# Patient Record
Sex: FEMALE | Race: WHITE | Employment: UNEMPLOYED | URBAN - METROPOLITAN AREA
[De-identification: names, ages, dates, MRNs, and addresses within clinical notes are randomized per-mention and may not be internally consistent; named-entity substitution may affect disease eponyms.]

---

## 2017-01-04 ENCOUNTER — TELEPHONE (OUTPATIENT)
Dept: FAMILY MEDICINE CLINIC | Facility: CLINIC | Age: 52
End: 2017-01-04

## 2017-01-04 NOTE — TELEPHONE ENCOUNTER
Went to [facility] on Monday 1/2/17 and was diagnosed with double ear infection, and there was crackling heard in lower left lung. Patient was given levofloxacin 750mg 1 QD and mucinex. Is doing albuterol nebs every 6 hours at home.      Patient has an appt to se

## 2017-01-04 NOTE — TELEPHONE ENCOUNTER
Future Appointments  Date Time Provider Batsheva Jade   1/5/2017 1:00 PM Lizandro Andrew PA-C [facility] [address] [facility] [address]   1/20/2017 11:40 AM Arlin Patton MD [facility] [address] [facility] [address]   2/3/2017 8:00 AM Edita Lopez  MED CTR-2   2/3/2017 8:45 AM Kathryn

## 2017-01-05 ENCOUNTER — OFFICE VISIT (OUTPATIENT)
Dept: FAMILY MEDICINE CLINIC | Facility: CLINIC | Age: 52
End: 2017-01-05

## 2017-01-05 ENCOUNTER — HOSPITAL ENCOUNTER (OUTPATIENT)
Dept: GENERAL RADIOLOGY | Age: 52
Discharge: HOME | End: 2017-01-05
Attending: PHYSICIAN ASSISTANT
Payer: COMMERCIAL

## 2017-01-05 VITALS
HEART RATE: 77 BPM | HEIGHT: 63 IN | TEMPERATURE: 97 F | WEIGHT: 197 LBS | SYSTOLIC BLOOD PRESSURE: 146 MMHG | RESPIRATION RATE: 13 BRPM | OXYGEN SATURATION: 96 % | BODY MASS INDEX: 34.91 KG/M2 | DIASTOLIC BLOOD PRESSURE: 86 MMHG

## 2017-01-05 DIAGNOSIS — R05.9 COUGH: Primary | ICD-10-CM

## 2017-01-05 DIAGNOSIS — R05.9 COUGH: ICD-10-CM

## 2017-01-05 DIAGNOSIS — J01.00 ACUTE NON-RECURRENT MAXILLARY SINUSITIS: ICD-10-CM

## 2017-01-05 PROCEDURE — 71020 XR CHEST PA + LAT CHEST (CPT=71020): CPT

## 2017-01-05 PROCEDURE — 99213 OFFICE O/P EST LOW 20 MIN: CPT | Performed by: PHYSICIAN ASSISTANT

## 2017-01-05 RX ORDER — METHYLPREDNISOLONE 4 MG/1
TABLET ORAL
Qty: 1 KIT | Refills: 0 | Status: SHIPPED | OUTPATIENT
Start: 2017-01-05 | End: 2017-01-13

## 2017-01-05 RX ORDER — LEVOFLOXACIN 750 MG/1
750 TABLET ORAL DAILY
COMMUNITY
End: 2017-01-13

## 2017-01-05 NOTE — PROGRESS NOTES
[facility] Internal Medicine Progress Note    CC:  Patient presents with:  Convenient Care F/U: see at [facility] immidiate care 1/2/17      HPI:   HPI  Pt went to [facility] quick care on Monday   Was diagnosed with double ear infection and sinus infection  S capsule (20 mg total) by mouth daily. Disp: 14 capsule Rfl: 0   Vitamin B-12 500 MCG Oral Tab Take 1 tablet (500 mcg total) by mouth daily. Disp: 10 tablet Rfl: 0   Linaclotide (LINZESS) 145 MCG Oral Cap Take 145 mcg by mouth daily.  Disp:  Rfl:    furosemi and well-developed, well-nourished, and in no distress. HENT:   Mouth/Throat: Oropharynx is clear and moist. No oropharyngeal exudate.    + serous fluid behind TMs bilaterally  Clear PND  Enlarged turbinates   Eyes: Pupils are equal, round, and reactive t Asthma     Obesity (BMI 30-39. 9)     Other malaise and fatigue     Malignant neoplasm of thyroid gland (HCC)     Irritable bowel syndrome     Allergic rhinitis     Hypoglycemia     Other and unspecified coagulation defects ([address]     Anxiety     Hypokalemia

## 2017-01-05 NOTE — PATIENT INSTRUCTIONS
Thank you for choosing Kahlil Guillory PA-C at [place] [address]  To Do: [address]  1. Pt to start medrol dose pack  2. Get chest xray  3.  Follow-up in 1 week, sooner if problems    • Please signup for MY CHART, which is electronic access to yo to improve your quality of life.     Referrals, and Radiology Information:    If your insurance requires a referral to a specialist, please allow 5 business days to process your referral request.    If Laura Ott PA-C orders a CT or MRI, it may take

## 2017-01-13 ENCOUNTER — TELEPHONE (OUTPATIENT)
Dept: FAMILY MEDICINE CLINIC | Facility: CLINIC | Age: 52
End: 2017-01-13

## 2017-01-13 ENCOUNTER — OFFICE VISIT (OUTPATIENT)
Dept: FAMILY MEDICINE CLINIC | Facility: CLINIC | Age: 52
End: 2017-01-13

## 2017-01-13 VITALS
TEMPERATURE: 98 F | RESPIRATION RATE: 16 BRPM | DIASTOLIC BLOOD PRESSURE: 70 MMHG | SYSTOLIC BLOOD PRESSURE: 110 MMHG | HEART RATE: 80 BPM

## 2017-01-13 DIAGNOSIS — H65.23 BILATERAL CHRONIC SEROUS OTITIS MEDIA: Primary | ICD-10-CM

## 2017-01-13 DIAGNOSIS — J32.0 CHRONIC MAXILLARY SINUSITIS: ICD-10-CM

## 2017-01-13 PROCEDURE — 99213 OFFICE O/P EST LOW 20 MIN: CPT | Performed by: PHYSICIAN ASSISTANT

## 2017-01-13 RX ORDER — SULFAMETHOXAZOLE AND TRIMETHOPRIM 800; 160 MG/1; MG/1
1 TABLET ORAL 2 TIMES DAILY
Qty: 20 TABLET | Refills: 0 | Status: SHIPPED | OUTPATIENT
Start: 2017-01-13 | End: 2017-02-13

## 2017-01-13 NOTE — TELEPHONE ENCOUNTER
Per Chucky Habermann, patient needs to see pulm Dr. Brandin Ochoa sooner than the end of February. Called Dr. Castañeda Lay office and made appt for patient to have PFT on 1/20/17 at 10am and then see Dr. Brandni Ochoa the same day at 10:45 in [place].   Patient a

## 2017-01-13 NOTE — PATIENT INSTRUCTIONS
Thank you for choosing Yuki Gaytan PA-C at [place] [address]  To Do: [address]  1. Pt to begin medications as prescribed  2. Pt to follow-up with ENT and pulmonology  3.  If symptoms persist or increase pt to call office    • Please signup f risks and we strive to make you healthier and to improve your quality of life.     Referrals, and Radiology Information:    If your insurance requires a referral to a specialist, please allow 5 business days to process your referral request.    Whit Krishnamurthy

## 2017-01-13 NOTE — PROGRESS NOTES
[facility] Internal Medicine Progress Note    CC:  Patient presents with: Follow - Up: ear, sinus, cough. Not better, did not clear, feels the same as last visit. Patient states in some ways it feels worse. Patient states fever is off and on. mouth daily. Disp: 10 tablet Rfl: 0   Linaclotide (LINZESS) 145 MCG Oral Cap Take 145 mcg by mouth daily. Disp:  Rfl:    Acidophilus/Pectin Oral Cap Take 2 capsules by mouth 2 (two) times daily with meals.  Disp:  Rfl:    furosemide 20 MG Oral Tab TAKE 1 TA regular rhythm and normal heart sounds. Exam reveals no gallop and no friction rub. No murmur heard. Pulmonary/Chest: Effort normal and breath sounds normal. No respiratory distress. She has no wheezes. She has no rales.    Lymphadenopathy:     She has Hypoglycemia     Other and unspecified coagulation defects ([facility] [address]     Anxiety     Hypokalemia     Fatigue     Acute sinusitis     Hyperlipidemia     Unspecified endocrine disorder     Foreign body of hand, right, infected     Tooth abscess     Encounter for

## 2017-01-19 ENCOUNTER — TELEPHONE (OUTPATIENT)
Dept: FAMILY MEDICINE CLINIC | Facility: CLINIC | Age: 52
End: 2017-01-19

## 2017-01-19 NOTE — TELEPHONE ENCOUNTER
Pt called back to reset appt.     Future Appointments  Date Time Provider Batsheva Jade   1/20/2017 10:00 AM [facility] SYDSANDRITA KB SP [facility] CTR-2   1/20/2017 10:45 AM Dandre Bateman MD SPPULM SP [facility]2   1/24/2017 5:15 PM Pamella Mccarty MD [facility]

## 2017-01-23 NOTE — TELEPHONE ENCOUNTER
Requesting ipratropium  LOV: 1/13/17  RTC:   Last Labs: 11/30/16  Filled: 12/9/16 # 1 bottle with 0 refills    Future Appointments  Date Time Provider Batsheva Hansen   1/24/2017 5:15 PM Dalila Lynn MD [facility]   2/13/2017 3:00 PM Paul Young

## 2017-01-24 ENCOUNTER — OFFICE VISIT (OUTPATIENT)
Dept: INTERNAL MEDICINE CLINIC | Facility: CLINIC | Age: 52
End: 2017-01-24

## 2017-01-24 VITALS
DIASTOLIC BLOOD PRESSURE: 80 MMHG | HEIGHT: 63 IN | WEIGHT: 202 LBS | BODY MASS INDEX: 35.79 KG/M2 | RESPIRATION RATE: 16 BRPM | SYSTOLIC BLOOD PRESSURE: 124 MMHG | HEART RATE: 90 BPM

## 2017-01-24 DIAGNOSIS — E66.9 OBESITY (BMI 30-39.9): ICD-10-CM

## 2017-01-24 DIAGNOSIS — Z51.81 ENCOUNTER FOR THERAPEUTIC DRUG MONITORING: Primary | ICD-10-CM

## 2017-01-24 DIAGNOSIS — J32.9 RECURRENT SINUS INFECTIONS: ICD-10-CM

## 2017-01-24 PROCEDURE — 99213 OFFICE O/P EST LOW 20 MIN: CPT | Performed by: INTERNAL MEDICINE

## 2017-01-24 RX ORDER — TOPIRAMATE 50 MG/1
50 TABLET, FILM COATED ORAL 2 TIMES DAILY
Qty: 60 TABLET | Refills: 5 | Status: SHIPPED | OUTPATIENT
Start: 2017-01-24 | End: 2017-01-30

## 2017-01-24 RX ORDER — FEXOFENADINE HCL 180 MG/1
180 TABLET ORAL DAILY
COMMUNITY
End: 2017-03-13

## 2017-01-24 RX ORDER — CETIRIZINE HYDROCHLORIDE 10 MG/1
10 TABLET ORAL DAILY
COMMUNITY
End: 2017-03-13

## 2017-01-24 RX ORDER — PHENTERMINE HYDROCHLORIDE 37.5 MG/1
37.5 TABLET ORAL
Qty: 30 TABLET | Refills: 0 | Status: SHIPPED | OUTPATIENT
Start: 2017-01-24 | End: 2017-02-13

## 2017-01-25 ENCOUNTER — LAB ENCOUNTER (OUTPATIENT)
Dept: LAB | Age: 52
End: 2017-01-25
Attending: INTERNAL MEDICINE
Payer: COMMERCIAL

## 2017-01-25 DIAGNOSIS — Z51.81 ENCOUNTER FOR THERAPEUTIC DRUG MONITORING: ICD-10-CM

## 2017-01-25 DIAGNOSIS — E66.9 OBESITY (BMI 30-39.9): ICD-10-CM

## 2017-01-25 DIAGNOSIS — E61.1 IRON DEFICIENCY: ICD-10-CM

## 2017-01-25 DIAGNOSIS — J32.9 RECURRENT SINUS INFECTIONS: ICD-10-CM

## 2017-01-25 LAB
25-HYDROXYVITAMIN D (TOTAL): 21 NG/ML (ref 30–100)
ALBUMIN SERPL-MCNC: 4.4 G/DL (ref 3.5–4.8)
ALP LIVER SERPL-CCNC: 110 U/L (ref 41–108)
ALT SERPL-CCNC: 46 U/L (ref 14–54)
AST SERPL-CCNC: 19 U/L (ref 15–41)
BASOPHILS # BLD AUTO: 0.06 X10(3) UL (ref 0–0.1)
BASOPHILS NFR BLD AUTO: 0.7 %
BILIRUB SERPL-MCNC: 0.5 MG/DL (ref 0.1–2)
BUN BLD-MCNC: 20 MG/DL (ref 8–20)
CALCIUM BLD-MCNC: 9.1 MG/DL (ref 8.3–10.3)
CHLORIDE: 104 MMOL/L (ref 101–111)
CHOLEST SMN-MCNC: 264 MG/DL (ref ?–200)
CO2: 25 MMOL/L (ref 22–32)
CREAT BLD-MCNC: 1.06 MG/DL (ref 0.55–1.02)
DEPRECATED HBV CORE AB SER IA-ACNC: 63 NG/ML (ref 10–291)
EOSINOPHIL # BLD AUTO: 0.25 X10(3) UL (ref 0–0.3)
EOSINOPHIL NFR BLD AUTO: 3.1 %
ERYTHROCYTE [DISTWIDTH] IN BLOOD BY AUTOMATED COUNT: 14.6 % (ref 11.5–16)
FREE T4: 0.8 NG/DL (ref 0.9–1.8)
GLUCOSE BLD-MCNC: 117 MG/DL (ref 70–99)
HAV AB SERPL IA-ACNC: 999 PG/ML (ref 193–986)
HCT VFR BLD AUTO: 45.9 % (ref 34–50)
HDLC SERPL-MCNC: 49 MG/DL (ref 45–?)
HDLC SERPL: 5.39 {RATIO} (ref ?–4.44)
HGB BLD-MCNC: 14.9 G/DL (ref 12–16)
IMMATURE GRANULOCYTE COUNT: 0.12 X10(3) UL (ref 0–1)
IMMATURE GRANULOCYTE RATIO %: 1.5 %
IMMUNOGLOBULIN A: 226 MG/DL (ref 70–312)
IMMUNOGLOBULIN G: 658 MG/DL (ref 791–1643)
IMMUNOGLOBULIN M: 143 MG/DL (ref 43–279)
IRON SATURATION: 20 % (ref 13–45)
IRON: 83 UG/DL (ref 28–170)
LDLC SERPL CALC-MCNC: 136 MG/DL (ref ?–130)
LYMPHOCYTES # BLD AUTO: 1.15 X10(3) UL (ref 0.9–4)
LYMPHOCYTES NFR BLD AUTO: 14.2 %
M PROTEIN MFR SERPL ELPH: 7.8 G/DL (ref 6.1–8.3)
MCH RBC QN AUTO: 29.3 PG (ref 27–33.2)
MCHC RBC AUTO-ENTMCNC: 32.5 G/DL (ref 31–37)
MCV RBC AUTO: 90.4 FL (ref 81–100)
MONOCYTES # BLD AUTO: 0.52 X10(3) UL (ref 0.1–0.6)
MONOCYTES NFR BLD AUTO: 6.4 %
NEUTROPHIL ABS PRELIM: 5.99 X10 (3) UL (ref 1.3–6.7)
NEUTROPHILS # BLD AUTO: 5.99 X10(3) UL (ref 1.3–6.7)
NEUTROPHILS NFR BLD AUTO: 74.1 %
NONHDLC SERPL-MCNC: 215 MG/DL (ref ?–130)
PLATELET # BLD AUTO: 279 10(3)UL (ref 150–450)
POTASSIUM SERPL-SCNC: 3.6 MMOL/L (ref 3.6–5.1)
RBC # BLD AUTO: 5.08 X10(6)UL (ref 3.8–5.1)
RED CELL DISTRIBUTION WIDTH-SD: 47.7 FL (ref 35.1–46.3)
SODIUM SERPL-SCNC: 138 MMOL/L (ref 136–144)
T3FREE SERPL-MCNC: 4.14 PG/ML (ref 2.3–4.2)
TOTAL IRON BINDING CAPACITY: 417 UG/DL (ref 298–536)
TRANSFERRIN: 280 MG/DL (ref 200–360)
TRIGLYCERIDES: 397 MG/DL (ref ?–150)
TSI SER-ACNC: 49.4 MIU/ML (ref 0.35–5.5)
VLDL: 79 MG/DL (ref 5–40)
WBC # BLD AUTO: 8.1 X10(3) UL (ref 4–13)

## 2017-01-25 PROCEDURE — 83540 ASSAY OF IRON: CPT

## 2017-01-25 PROCEDURE — 84443 ASSAY THYROID STIM HORMONE: CPT

## 2017-01-25 PROCEDURE — 82607 VITAMIN B-12: CPT

## 2017-01-25 PROCEDURE — 87389 HIV-1 AG W/HIV-1&-2 AB AG IA: CPT

## 2017-01-25 PROCEDURE — 82397 CHEMILUMINESCENT ASSAY: CPT

## 2017-01-25 PROCEDURE — 82784 ASSAY IGA/IGD/IGG/IGM EACH: CPT

## 2017-01-25 PROCEDURE — 82728 ASSAY OF FERRITIN: CPT

## 2017-01-25 PROCEDURE — 84481 FREE ASSAY (FT-3): CPT

## 2017-01-25 PROCEDURE — 80061 LIPID PANEL: CPT

## 2017-01-25 PROCEDURE — 85025 COMPLETE CBC W/AUTO DIFF WBC: CPT

## 2017-01-25 PROCEDURE — 82306 VITAMIN D 25 HYDROXY: CPT

## 2017-01-25 PROCEDURE — 84439 ASSAY OF FREE THYROXINE: CPT

## 2017-01-25 PROCEDURE — 83550 IRON BINDING TEST: CPT

## 2017-01-25 PROCEDURE — 80053 COMPREHEN METABOLIC PANEL: CPT

## 2017-01-25 PROCEDURE — 36415 COLL VENOUS BLD VENIPUNCTURE: CPT

## 2017-01-25 RX ORDER — IPRATROPIUM BROMIDE 21 UG/1
2 SPRAY, METERED NASAL EVERY 12 HOURS
Qty: 3 BOTTLE | Refills: 0 | Status: SHIPPED | OUTPATIENT
Start: 2017-01-25 | End: 2017-04-25

## 2017-01-25 NOTE — TELEPHONE ENCOUNTER
Requesting klor con   LOV: 1/24/16  RTC: 1 month  Last Labs: 11/1/16 level at 4.2  Filled: 4/26/16 #90 with 1 refills    Future Appointments  Date Time Provider Batsheva Hansen   2/13/2017 3:00 PM Misty Tellez MD [facility] [address] [facility] [address]   3/13/2017 5:40 PM

## 2017-01-25 NOTE — PROGRESS NOTES
CC: Patient presents with:  Weight Check: up 22 lb       HPI:   Obesity, worsening wt gain, worsening hunger, still on contrave and topamax. Run out of phentermine.        Current Outpatient Prescriptions:  Fexofenadine HCl (ALLEGRA ALLERGY) 180 MG Oral SODIUM 10 MG Oral Tab TAKE 1 TABLET DAILY Disp: 90 tablet Rfl: 3   KLOR-CON M20 20 MEQ Oral Tab CR TAKE 1 TABLET DAILY Disp: 90 tablet Rfl: 1   B Complex-C (SUPER B COMPLEX) Oral Tab Take  by mouth.  Disp:  Rfl:    Cholecalciferol (VITAMIN D-3 OR) Take  by Foreign body of hand, right, infected     Tooth abscess     Encounter for therapeutic drug monitoring     Elevated LFTs     Superficial foreign body of finger without major open wound and without infection     Displacement of lumbar intervertebral disc wi Status: Future  Standing Expiration Date: 1/24/2018  Immunoglobulin A/G/M, Quant  Standing Status: Future  Standing Expiration Date: 1/24/2018     Signed Prescriptions Disp Refills    Phentermine HCl 37.5 MG Oral Tab 30 tablet 0      Sig: Take 1 tablet (37

## 2017-01-30 LAB — LEPTIN: 20.3 NG/ML

## 2017-01-30 NOTE — TELEPHONE ENCOUNTER
From: Lamberto Hernandez  To: Dione Edmonds MD  Sent: 1/28/2017 12:54 PM CST  Subject: Medication Renewal Request    Original authorizing provider: MD Lamberto Mann would like a refill of the following medications:  topiramate (TOPAMAX) 50

## 2017-01-31 ENCOUNTER — TELEPHONE (OUTPATIENT)
Dept: FAMILY MEDICINE CLINIC | Facility: CLINIC | Age: 52
End: 2017-01-31

## 2017-01-31 NOTE — TELEPHONE ENCOUNTER
rx for topiramate  refilled on 1/24/17 at local pharmacy. Pt is asking this be sent to mail order. Please approve if ok.  Thanks

## 2017-02-02 NOTE — TELEPHONE ENCOUNTER
From: Karla Sepulveda Book  To: Dione Edmonds MD  Sent: 2/2/2017 1:04 PM CST  Subject: Medication Renewal Request    Original authorizing provider: Vern Laboy MD    [address] would like a refill of the following medications:  KLOR-CON M20 20 MEQ Oral

## 2017-02-03 ENCOUNTER — PATIENT MESSAGE (OUTPATIENT)
Dept: FAMILY MEDICINE CLINIC | Facility: CLINIC | Age: 52
End: 2017-02-03

## 2017-02-03 ENCOUNTER — TELEPHONE (OUTPATIENT)
Dept: FAMILY MEDICINE CLINIC | Facility: CLINIC | Age: 52
End: 2017-02-03

## 2017-02-03 DIAGNOSIS — E61.1 IRON DEFICIENCY: Primary | ICD-10-CM

## 2017-02-03 DIAGNOSIS — D50.9 IRON DEFICIENCY ANEMIA, UNSPECIFIED IRON DEFICIENCY ANEMIA TYPE: ICD-10-CM

## 2017-02-03 DIAGNOSIS — Z13.29 THYROID DISORDER SCREENING: Primary | ICD-10-CM

## 2017-02-03 DIAGNOSIS — E66.9 OBESITY (BMI 30-39.9): ICD-10-CM

## 2017-02-03 DIAGNOSIS — D64.9 NORMOCYTIC ANEMIA: ICD-10-CM

## 2017-02-03 RX ORDER — ERGOCALCIFEROL 1.25 MG/1
50000 CAPSULE ORAL WEEKLY
Qty: 4 CAPSULE | Refills: 5 | Status: SHIPPED | OUTPATIENT
Start: 2017-02-03 | End: 2017-02-24

## 2017-02-03 NOTE — TELEPHONE ENCOUNTER
From: Lynnea Cowden Book  To: Jeremi Jerez MD  Sent: 2/3/2017 1:26 AM CST  Subject: Test Results Question    Regarding the Vitamin D test results. I am already taking a vitamin D3 daily.  Should I increase and take a second one daily since I am close to bein

## 2017-02-03 NOTE — TELEPHONE ENCOUNTER
Requesting klor con   LOV: 1/24/17  RTC:   Last Labs: 1/25/7  Filled: 4/26/16 #90 with 1 refills    Future Appointments  Date Time Provider Batsheva Hansen   2/8/2017 10:40 AM Aniket Sebastian MD [facility]   2/13/2017 3:00 PM Kelley Burks,

## 2017-02-03 NOTE — TELEPHONE ENCOUNTER
I forgot to put on recent labs pt has low Vit D, begin 50,000 U weekly for 6 months, and then OTC 1,000 U daily

## 2017-02-03 NOTE — PROGRESS NOTES
Quick Note:    Elevated Leptin level  Thyroid is off, pt should follow-up with endocrine, Dr. Kostas Fischer  Cholesterol is elevated  Low level of immunoglobulin G  Will discuss more at upcoming [address]  ______

## 2017-02-05 RX ORDER — TOPIRAMATE 50 MG/1
50 TABLET, FILM COATED ORAL 2 TIMES DAILY
Qty: 90 TABLET | Refills: 1 | Status: SHIPPED | OUTPATIENT
Start: 2017-02-05 | End: 2017-02-24

## 2017-02-06 RX ORDER — POTASSIUM CHLORIDE 20 MEQ/1
TABLET, EXTENDED RELEASE ORAL
Qty: 90 TABLET | Refills: 1 | OUTPATIENT
Start: 2017-02-06

## 2017-02-08 ENCOUNTER — LAB ENCOUNTER (OUTPATIENT)
Dept: LAB | Age: 52
End: 2017-02-08
Attending: ALLERGY & IMMUNOLOGY
Payer: COMMERCIAL

## 2017-02-08 ENCOUNTER — PATIENT MESSAGE (OUTPATIENT)
Dept: FAMILY MEDICINE CLINIC | Facility: CLINIC | Age: 52
End: 2017-02-08

## 2017-02-08 DIAGNOSIS — D80.1 HYPOGAMMAGLOBULINEMIA (HCC): ICD-10-CM

## 2017-02-08 DIAGNOSIS — J32.4 CHRONIC PANSINUSITIS: ICD-10-CM

## 2017-02-08 PROCEDURE — 86360 T CELL ABSOLUTE COUNT/RATIO: CPT

## 2017-02-08 PROCEDURE — 86359 T CELLS TOTAL COUNT: CPT

## 2017-02-08 PROCEDURE — 86357 NK CELLS TOTAL COUNT: CPT

## 2017-02-08 PROCEDURE — 86356 MONONUCLEAR CELL ANTIGEN: CPT

## 2017-02-08 PROCEDURE — 86648 DIPHTHERIA ANTIBODY: CPT

## 2017-02-08 PROCEDURE — 36415 COLL VENOUS BLD VENIPUNCTURE: CPT

## 2017-02-08 PROCEDURE — 86317 IMMUNOASSAY INFECTIOUS AGENT: CPT

## 2017-02-08 PROCEDURE — 86355 B CELLS TOTAL COUNT: CPT

## 2017-02-08 PROCEDURE — 86774 TETANUS ANTIBODY: CPT

## 2017-02-08 NOTE — TELEPHONE ENCOUNTER
Requesting Klor-Con M20 20MEQ  LOV: 1/13/17 acute, 12/9/16 none acute  RTC: none specified  Last Labs: 1/25/17  Filled: 04/26/16 #90 with 1 refills  Future Appointments  Date Time Provider Batsheva Hansen   2/13/2017 3:00 PM Kathryn Monteiro MD EM[facility] 20 [facility] 1

## 2017-02-09 RX ORDER — POTASSIUM CHLORIDE 20 MEQ/1
TABLET, EXTENDED RELEASE ORAL
Qty: 90 TABLET | Refills: 1 | OUTPATIENT
Start: 2017-02-09

## 2017-02-09 RX ORDER — POTASSIUM CHLORIDE 20 MEQ/1
20 TABLET, EXTENDED RELEASE ORAL
Qty: 90 TABLET | Refills: 1 | Status: SHIPPED | OUTPATIENT
Start: 2017-02-09 | End: 2017-07-31

## 2017-02-09 NOTE — TELEPHONE ENCOUNTER
From: Manny Reyes  To: Vickey Wilks MD  Sent: 2/8/2017 10:18 PM CST  Subject: Prescription Question    Dr. Lexy Mahoney,  I have had my mail order pharmacy send 2 requests now for a refill on my prescription for Darryl Peterson, and it has been denied both times.

## 2017-02-10 LAB
DIPHTHERIA ANTIBODY, IGG: 0.4 IU/ML
Lab: 0.6 %
Lab: 1.1 CELLS/UL
Lab: 13 CELLS/UL
Lab: 14 %
Lab: 150 CELLS/UL
Lab: 16 %
Lab: 16 CELLS/UL
Lab: 191 CELLS/UL
Lab: 31 CELLS/UL
Lab: 7 %
Lab: 79 %
Lab: 9 %
PNEUMOCOCCAL SEROTYPE 1 IGG: 1.77 UG/ML
PNEUMOCOCCAL SEROTYPE 10A IGG: 1.81 UG/ML
PNEUMOCOCCAL SEROTYPE 11A IGG: 2.08 UG/ML
PNEUMOCOCCAL SEROTYPE 12F IGG: 3.07 UG/ML
PNEUMOCOCCAL SEROTYPE 14* IGG: >48.2 UG/ML
PNEUMOCOCCAL SEROTYPE 15B IGG: 8.22 UG/ML
PNEUMOCOCCAL SEROTYPE 17F IGG: >14.22 UG/ML
PNEUMOCOCCAL SEROTYPE 18C* IGG: 20.46 UG/ML
PNEUMOCOCCAL SEROTYPE 19A IGG: 12.16 UG/ML
PNEUMOCOCCAL SEROTYPE 19F* IGG: 18.99 UG/ML
PNEUMOCOCCAL SEROTYPE 2 IGG: 15.29 UG/ML
PNEUMOCOCCAL SEROTYPE 20 IGG: 4.27 UG/ML
PNEUMOCOCCAL SEROTYPE 22F IGG: 6.57 UG/ML
PNEUMOCOCCAL SEROTYPE 23F* IGG: 7.26 UG/ML
PNEUMOCOCCAL SEROTYPE 3 IGG: 1.03 UG/ML
PNEUMOCOCCAL SEROTYPE 33F IGG: 2.04 UG/ML
PNEUMOCOCCAL SEROTYPE 4* IGG: 0.9 UG/ML
PNEUMOCOCCAL SEROTYPE 5 IGG: 2.28 UG/ML
PNEUMOCOCCAL SEROTYPE 6B* IGG: 4.95 UG/ML
PNEUMOCOCCAL SEROTYPE 7F IGG: 4.88 UG/ML
PNEUMOCOCCAL SEROTYPE 8 IGG: 2.51 UG/ML
PNEUMOCOCCAL SEROTYPE 9N IGG: 9.64 UG/ML
PNEUMOCOCCAL SEROTYPE 9V*, IGG: 17.8 UG/ML
TETANUS ANTIBODY, IGG: 4.6 IU/ML

## 2017-02-12 LAB
% CD19: 13 %
% CD3: 70 %
% CD4: 43 %
% CD8: 26 %
% NATURAL KILLER CELLS: 15 %
ABSOLUTE CD19: 200 CELLS/UL
ABSOLUTE CD3: 1085 CELLS/UL
ABSOLUTE CD4: 666 CELLS/UL
ABSOLUTE CD8: 404 CELLS/UL
ABSOLUTE NATURAL KILLER CELLS: 234 CELLS/UL
CD4:CD8 RATIO: 1.65 RATIO

## 2017-02-13 ENCOUNTER — OFFICE VISIT (OUTPATIENT)
Dept: FAMILY MEDICINE CLINIC | Facility: CLINIC | Age: 52
End: 2017-02-13

## 2017-02-13 VITALS
RESPIRATION RATE: 16 BRPM | SYSTOLIC BLOOD PRESSURE: 122 MMHG | BODY MASS INDEX: 34.55 KG/M2 | DIASTOLIC BLOOD PRESSURE: 78 MMHG | HEIGHT: 63 IN | WEIGHT: 195 LBS | HEART RATE: 78 BPM

## 2017-02-13 DIAGNOSIS — E78.5 HYPERLIPIDEMIA, UNSPECIFIED HYPERLIPIDEMIA TYPE: ICD-10-CM

## 2017-02-13 DIAGNOSIS — Z51.81 ENCOUNTER FOR THERAPEUTIC DRUG MONITORING: Primary | ICD-10-CM

## 2017-02-13 DIAGNOSIS — E55.9 VITAMIN D DEFICIENCY: ICD-10-CM

## 2017-02-13 DIAGNOSIS — E66.9 OBESITY (BMI 30-39.9): ICD-10-CM

## 2017-02-13 PROCEDURE — 99214 OFFICE O/P EST MOD 30 MIN: CPT | Performed by: INTERNAL MEDICINE

## 2017-02-13 RX ORDER — METFORMIN HYDROCHLORIDE 750 MG/1
750 TABLET, EXTENDED RELEASE ORAL DAILY
Qty: 30 TABLET | Refills: 11 | Status: SHIPPED | OUTPATIENT
Start: 2017-02-13 | End: 2017-03-13

## 2017-02-13 RX ORDER — PHENTERMINE HYDROCHLORIDE 37.5 MG/1
37.5 TABLET ORAL
Qty: 30 TABLET | Refills: 0 | Status: SHIPPED | OUTPATIENT
Start: 2017-02-13 | End: 2017-03-13

## 2017-02-13 NOTE — PROGRESS NOTES
CC: Patient presents with:  Weight Check: down 7 lb       HPI:   1.  Obesity (BMI 30-39.9), doing well on phentermine, contrave and topamax.   2. Hyperlipidemia, unspecified hyperlipidemia type, trying to watch diet, with family hx of stroke and heart d Vitamin B-12 500 MCG Oral Tab Take 1 tablet (500 mcg total) by mouth daily. Disp: 10 tablet Rfl: 0   Linaclotide (LINZESS) 145 MCG Oral Cap Take 145 mcg by mouth daily.  Disp:  Rfl:    Acidophilus/Pectin Oral Cap Take 2 capsules by mouth 2 (two) times siddharth Asthma     Obesity (BMI 30-39. 9)     Other malaise and fatigue     Malignant neoplasm of thyroid gland (HCC)     Irritable bowel syndrome     Allergic rhinitis     Hypoglycemia     Other and unspecified coagulation defects     Anxiety     Hypokalemia     F Tab 30 tablet 0      Sig: Take 1 tablet (37.5 mg total) by mouth every morning before breakfast.      MetFORMIN HCl  MG Oral Tablet 24 Hr 30 tablet 11      Sig: Take 1 tablet (750 mg total) by mouth daily.           None     ASSESSMENT:   Encounter fo

## 2017-02-14 ENCOUNTER — LAB ENCOUNTER (OUTPATIENT)
Dept: LAB | Age: 52
End: 2017-02-14
Attending: INTERNAL MEDICINE
Payer: COMMERCIAL

## 2017-02-14 ENCOUNTER — TELEPHONE (OUTPATIENT)
Dept: FAMILY MEDICINE CLINIC | Facility: CLINIC | Age: 52
End: 2017-02-14

## 2017-02-14 DIAGNOSIS — E78.00 PURE HYPERCHOLESTEROLEMIA: Primary | ICD-10-CM

## 2017-02-14 DIAGNOSIS — R22.1 NECK MASS: Primary | ICD-10-CM

## 2017-02-14 LAB — AMB EXT HGBA1C: 5.3 %

## 2017-02-14 NOTE — TELEPHONE ENCOUNTER
Per verbal orders from Dr. Yelena Redmond [facility] to order US soft tissue of neck Dx: neck mass  US ordered and patient aware.

## 2017-02-14 NOTE — TELEPHONE ENCOUNTER
Pt would like to have a ultrasound order for her neck. Pt stated that she was supposed to have a order put in at her last visit. Please advise thank you.

## 2017-02-15 ENCOUNTER — HOSPITAL ENCOUNTER (OUTPATIENT)
Dept: ULTRASOUND IMAGING | Age: 52
Discharge: HOME | End: 2017-02-15
Attending: INTERNAL MEDICINE
Payer: COMMERCIAL

## 2017-02-15 DIAGNOSIS — R22.1 NECK MASS: ICD-10-CM

## 2017-02-15 PROCEDURE — 76536 US EXAM OF HEAD AND NECK: CPT

## 2017-02-15 NOTE — PROGRESS NOTES
Quick Note:    Allergy RN to notify:  -Immunity appears to be decent  -Given her history of significant infections, recommend PPV23 and post HISS in 4-6wks    Thanks,    TVO    ______

## 2017-02-16 NOTE — PROGRESS NOTES
Quick Note:    Addendum to result note:  She had PPV23 on 1/20/17 - this likely explains why her antibody levels are protective.  I suspect they were lower prior to receiving this vaccine  Thus, I would not readminister PPV23 at this time  Rather, plan for

## 2017-02-18 ENCOUNTER — PATIENT MESSAGE (OUTPATIENT)
Dept: FAMILY MEDICINE CLINIC | Facility: CLINIC | Age: 52
End: 2017-02-18

## 2017-02-20 ENCOUNTER — PATIENT MESSAGE (OUTPATIENT)
Dept: FAMILY MEDICINE CLINIC | Facility: CLINIC | Age: 52
End: 2017-02-20

## 2017-02-20 NOTE — TELEPHONE ENCOUNTER
From: Tye Langley Book  To: Adenike Chamberlain MD  Sent: 2/18/2017 10:25 PM CST  Subject: Other    Dr Hernandes Show,    I seen Dr Rodriguez Buys this past Thursday 2/16/2017. We scheduled sinus surgery for Tuesday 2/28/2017.  I told his surgical scheduling tech along

## 2017-02-20 NOTE — TELEPHONE ENCOUNTER
I called Dr. Kenzie Mitchell for report of surgery to be faxed to our office.   Is there anything you want to order for patient prior to appointment this Friday with you?    seen Dr Teodoro Raymond this past Thursday 2/16/2017.  We scheduled sinus surgery for Tues

## 2017-02-20 NOTE — TELEPHONE ENCOUNTER
From: Francisco Javier Puckett Book  To: Abhay Gunn MD  Sent: 2/20/2017 12:04 PM CST  Subject: Other    This is a follow up message to LUDWIG Cunha. I called the office and The  said the first available was with Sameer this Friday.  I hope that is

## 2017-02-22 NOTE — TELEPHONE ENCOUNTER
Patient has pre op exam with Kehinde Schwab on 2/24/17.   Future Appointments  Date Time Provider Batsheva Hansen   2/24/2017 9:00 AM Jamia GE PA-C [facility] [address] [facility] [address]   3/13/2017 5:40 PM Yvon Murrieta MD [facility] [address] [facility] [address]   4/14/2017 10:30 AM Jorge L

## 2017-02-24 ENCOUNTER — LAB ENCOUNTER (OUTPATIENT)
Dept: LAB | Age: 52
End: 2017-02-24
Attending: INTERNAL MEDICINE
Payer: COMMERCIAL

## 2017-02-24 ENCOUNTER — OFFICE VISIT (OUTPATIENT)
Dept: FAMILY MEDICINE CLINIC | Facility: CLINIC | Age: 52
End: 2017-02-24

## 2017-02-24 VITALS
HEART RATE: 66 BPM | RESPIRATION RATE: 16 BRPM | SYSTOLIC BLOOD PRESSURE: 120 MMHG | DIASTOLIC BLOOD PRESSURE: 74 MMHG | WEIGHT: 198 LBS | BODY MASS INDEX: 35.08 KG/M2 | TEMPERATURE: 97 F | HEIGHT: 63 IN

## 2017-02-24 DIAGNOSIS — Z01.818 PRE-OP EXAMINATION: ICD-10-CM

## 2017-02-24 DIAGNOSIS — E66.9 OBESITY (BMI 30-39.9): ICD-10-CM

## 2017-02-24 DIAGNOSIS — J32.9 RECURRENT SINUS INFECTIONS: ICD-10-CM

## 2017-02-24 DIAGNOSIS — J45.20 MILD INTERMITTENT ASTHMA WITHOUT COMPLICATION: ICD-10-CM

## 2017-02-24 DIAGNOSIS — E87.6 HYPOKALEMIA: ICD-10-CM

## 2017-02-24 DIAGNOSIS — I10 ESSENTIAL HYPERTENSION: ICD-10-CM

## 2017-02-24 DIAGNOSIS — J30.9 ALLERGIC RHINITIS, UNSPECIFIED ALLERGIC RHINITIS TRIGGER, UNSPECIFIED RHINITIS SEASONALITY: ICD-10-CM

## 2017-02-24 DIAGNOSIS — Z01.818 PRE-OP EXAMINATION: Primary | ICD-10-CM

## 2017-02-24 DIAGNOSIS — E89.0 POSTOPERATIVE HYPOTHYROIDISM: ICD-10-CM

## 2017-02-24 LAB
ALBUMIN SERPL-MCNC: 4.1 G/DL (ref 3.5–4.8)
ALP LIVER SERPL-CCNC: 91 U/L (ref 41–108)
ALT SERPL-CCNC: 31 U/L (ref 14–54)
AST SERPL-CCNC: 13 U/L (ref 15–41)
BASOPHILS # BLD AUTO: 0.05 X10(3) UL (ref 0–0.1)
BASOPHILS NFR BLD AUTO: 0.8 %
BILIRUB SERPL-MCNC: 0.4 MG/DL (ref 0.1–2)
BUN BLD-MCNC: 16 MG/DL (ref 8–20)
CALCIUM BLD-MCNC: 9.3 MG/DL (ref 8.3–10.3)
CHLORIDE: 108 MMOL/L (ref 101–111)
CO2: 29 MMOL/L (ref 22–32)
CREAT BLD-MCNC: 0.89 MG/DL (ref 0.55–1.02)
EOSINOPHIL # BLD AUTO: 0.14 X10(3) UL (ref 0–0.3)
EOSINOPHIL NFR BLD AUTO: 2.1 %
ERYTHROCYTE [DISTWIDTH] IN BLOOD BY AUTOMATED COUNT: 14.4 % (ref 11.5–16)
GLUCOSE BLD-MCNC: 111 MG/DL (ref 70–99)
HCT VFR BLD AUTO: 43.8 % (ref 34–50)
HGB BLD-MCNC: 14.2 G/DL (ref 12–16)
IMMATURE GRANULOCYTE COUNT: 0.08 X10(3) UL (ref 0–1)
IMMATURE GRANULOCYTE RATIO %: 1.2 %
LYMPHOCYTES # BLD AUTO: 1.55 X10(3) UL (ref 0.9–4)
LYMPHOCYTES NFR BLD AUTO: 23.6 %
M PROTEIN MFR SERPL ELPH: 7.2 G/DL (ref 6.1–8.3)
MCH RBC QN AUTO: 29.5 PG (ref 27–33.2)
MCHC RBC AUTO-ENTMCNC: 32.4 G/DL (ref 31–37)
MCV RBC AUTO: 91.1 FL (ref 81–100)
MONOCYTES # BLD AUTO: 0.41 X10(3) UL (ref 0.1–0.6)
MONOCYTES NFR BLD AUTO: 6.2 %
NEUTROPHIL ABS PRELIM: 4.34 X10 (3) UL (ref 1.3–6.7)
NEUTROPHILS # BLD AUTO: 4.34 X10(3) UL (ref 1.3–6.7)
NEUTROPHILS NFR BLD AUTO: 66.1 %
PLATELET # BLD AUTO: 286 10(3)UL (ref 150–450)
POTASSIUM SERPL-SCNC: 3.8 MMOL/L (ref 3.6–5.1)
RBC # BLD AUTO: 4.81 X10(6)UL (ref 3.8–5.1)
RED CELL DISTRIBUTION WIDTH-SD: 48.1 FL (ref 35.1–46.3)
SODIUM SERPL-SCNC: 143 MMOL/L (ref 136–144)
WBC # BLD AUTO: 6.6 X10(3) UL (ref 4–13)

## 2017-02-24 PROCEDURE — 93000 ELECTROCARDIOGRAM COMPLETE: CPT | Performed by: PHYSICIAN ASSISTANT

## 2017-02-24 PROCEDURE — 99214 OFFICE O/P EST MOD 30 MIN: CPT | Performed by: PHYSICIAN ASSISTANT

## 2017-02-24 PROCEDURE — 85025 COMPLETE CBC W/AUTO DIFF WBC: CPT

## 2017-02-24 PROCEDURE — 36415 COLL VENOUS BLD VENIPUNCTURE: CPT

## 2017-02-24 PROCEDURE — 80053 COMPREHEN METABOLIC PANEL: CPT

## 2017-02-24 RX ORDER — TOPIRAMATE 50 MG/1
50 TABLET, FILM COATED ORAL 2 TIMES DAILY
Qty: 180 TABLET | Refills: 1 | Status: SHIPPED | OUTPATIENT
Start: 2017-02-24 | End: 2017-12-12

## 2017-02-24 RX ORDER — ERGOCALCIFEROL 1.25 MG/1
50000 CAPSULE ORAL WEEKLY
Qty: 4 CAPSULE | Refills: 5 | Status: SHIPPED | OUTPATIENT
Start: 2017-02-24 | End: 2017-03-26

## 2017-02-24 NOTE — PROGRESS NOTES
Preoperative History and Physical    CC:  Patient presents with:  Pre-Op Exam: sinus surgery scheduled for 2/28/17 with Dr. Shanique Harris is 46year old presenting for a preoperative exam.    This patient is having surgery on date: 2/28/17 syndrome)    • Unspecified sleep apnea PSG 7-3-14     PSG 7-3-14 AHI 15 RDI 15 REM AHI 20 SaO2 payal 91%   • Anxiety    • Hypertension      not currently being treated   • Dyspnea 8/16/2016   • Blood disorder 2003     MTHFR   • History of blood transfusion Maranda Vernon DO;  Location: [facility] ENDOSCOPY    EGD N/A 9/19/2016    Comment Procedure: ESOPHAGOGASTRODUODENOSCOPY (EGD);   Surgeon: Maranda Vernon DO;  Location: [facility] ENDOSCOPY       Social History:    Smoking Status: Never Smoker                      Sm Rfl: 0   Fexofenadine HCl (ALLEGRA ALLERGY) 180 MG Oral Tab Take 180 mg by mouth daily. Disp:  Rfl:    cetirizine 10 MG Oral Tab Take 10 mg by mouth daily.  Disp:  Rfl:    fluticasone-salmeterol 500-50 MCG/DOSE Inhalation Aerosol Powder, Breath Activated In pain.   Gastrointestinal: Negative for nausea and vomiting. Neurological: Negative for dizziness, syncope, light-headedness and headaches.        /74 mmHg  Pulse 66  Temp(Src) 97 °F (36.1 °C) (Temporal)  Resp 16  Ht 63\"  Wt 198 lb  BMI 35.08 kg/m2 surgery    Lab work is stable  Pt is moderate risk for an intermediate risk surgery    Patient/Caregiver Education: Patient/Caregiver Education: There are no barriers to learning. Medical education done. Outcome: Patient verbalizes understanding.       Orde

## 2017-02-24 NOTE — PATIENT INSTRUCTIONS
Thank you for choosing Erin Angeles PA-C at [place] [address]  To Do: [address]  1. Pt to get lab work done  2.  Pt to follow-up after surgery  Considerations in the Preoperative period:   Surgery is a big deal.  Anesthesia and your medicines your doctor or cardiologist    Psychotropic agents:  Serotinin reuptake inhibitors like zoloft, effexor, paxil, prozac, citalopram, remeron etc. For mood should be held 3 weeks before surgery if high risk of bleeding as these may increase risk of bleeding through the [employer] website http://iMICROQ.org/. org and type in [place]elia [place] and follow the links for \"SCHEDULE ONLINE NOW\"    •The Lab is here [address] Mon, Tues, Friday 8am-4pm in office, Wed and Thurs 7am-3pm plus most Saturday 830am-12p. call 81 allow our office 5 business days to contact you regarding any testing results. Refill policies:   Allow 3 business days for refills; controlled substances may take longer and must be picked up from the office in person.   Narcotic medications can only be

## 2017-02-27 ENCOUNTER — TELEPHONE (OUTPATIENT)
Dept: FAMILY MEDICINE CLINIC | Facility: CLINIC | Age: 52
End: 2017-02-27

## 2017-02-27 NOTE — TELEPHONE ENCOUNTER
Faxed H&P, EKG, and lab results with surgical clearance to Dr. Morejon [facility] office [phone].

## 2017-03-13 ENCOUNTER — TELEPHONE (OUTPATIENT)
Dept: FAMILY MEDICINE CLINIC | Facility: CLINIC | Age: 52
End: 2017-03-13

## 2017-03-13 ENCOUNTER — OFFICE VISIT (OUTPATIENT)
Dept: FAMILY MEDICINE CLINIC | Facility: CLINIC | Age: 52
End: 2017-03-13

## 2017-03-13 VITALS
TEMPERATURE: 98 F | RESPIRATION RATE: 16 BRPM | DIASTOLIC BLOOD PRESSURE: 80 MMHG | WEIGHT: 197 LBS | HEART RATE: 70 BPM | BODY MASS INDEX: 34.91 KG/M2 | HEIGHT: 63 IN | SYSTOLIC BLOOD PRESSURE: 130 MMHG

## 2017-03-13 DIAGNOSIS — E66.9 OBESITY (BMI 30-39.9): ICD-10-CM

## 2017-03-13 DIAGNOSIS — Z51.81 ENCOUNTER FOR THERAPEUTIC DRUG MONITORING: Primary | ICD-10-CM

## 2017-03-13 PROCEDURE — 99213 OFFICE O/P EST LOW 20 MIN: CPT | Performed by: INTERNAL MEDICINE

## 2017-03-13 RX ORDER — MOXIFLOXACIN HYDROCHLORIDE 400 MG/1
400 TABLET ORAL DAILY
COMMUNITY
End: 2017-05-12

## 2017-03-13 RX ORDER — METFORMIN HYDROCHLORIDE 750 MG/1
1500 TABLET, EXTENDED RELEASE ORAL DAILY
Qty: 60 TABLET | Refills: 5 | Status: SHIPPED | OUTPATIENT
Start: 2017-03-13 | End: 2017-05-15

## 2017-03-13 RX ORDER — PHENTERMINE HYDROCHLORIDE 37.5 MG/1
37.5 TABLET ORAL
Qty: 30 TABLET | Refills: 0 | Status: SHIPPED | OUTPATIENT
Start: 2017-03-13 | End: 2017-04-14

## 2017-03-13 NOTE — TELEPHONE ENCOUNTER
Singulex results received.  Per Dr. Mallory Blue: lipids okay    Future Appointments  Date Time Provider [facility]   3/13/2017 5:00 PM Arlin Patton MD [facility] [address] [facility] [address]   4/14/2017 10:30 AM Arlin Patton MD [facility]   5/12/2017 3:30 PM Thomas Tariq

## 2017-03-17 ENCOUNTER — PATIENT MESSAGE (OUTPATIENT)
Dept: INTERNAL MEDICINE CLINIC | Facility: CLINIC | Age: 52
End: 2017-03-17

## 2017-03-19 ENCOUNTER — PATIENT MESSAGE (OUTPATIENT)
Dept: INTERNAL MEDICINE CLINIC | Facility: CLINIC | Age: 52
End: 2017-03-19

## 2017-03-20 NOTE — TELEPHONE ENCOUNTER
From: Lea Gramajo Book  To: Ryder Pichardo MD  Sent: 3/19/2017 9:35 AM CDT  Subject: Prescription Question    Dr Sharyn Calderón,  This is a follow up message to a previous message I sent last Friday. The nausea has lessened. I am still not eating a whole lot.  It ta

## 2017-03-20 NOTE — TELEPHONE ENCOUNTER
From: Jaylin Perkins Book  To: Misty Tellez MD  Sent: 3/17/2017 4:34 PM CDT  Subject: Prescription Question    Dr. Karly Barnhart seen Dr. Anshu Ludwig on Thursday. He felt that I so not need steroids for my sinus'.  He want's me to finish off my antibiotic, which wi

## 2017-04-01 ENCOUNTER — MED REC SCAN ONLY (OUTPATIENT)
Dept: FAMILY MEDICINE CLINIC | Facility: CLINIC | Age: 52
End: 2017-04-01

## 2017-04-14 ENCOUNTER — OFFICE VISIT (OUTPATIENT)
Dept: INTERNAL MEDICINE CLINIC | Facility: CLINIC | Age: 52
End: 2017-04-14

## 2017-04-14 VITALS
HEART RATE: 74 BPM | DIASTOLIC BLOOD PRESSURE: 78 MMHG | WEIGHT: 180 LBS | SYSTOLIC BLOOD PRESSURE: 122 MMHG | HEIGHT: 63 IN | BODY MASS INDEX: 31.89 KG/M2 | RESPIRATION RATE: 16 BRPM

## 2017-04-14 DIAGNOSIS — E66.9 OBESITY (BMI 30-39.9): ICD-10-CM

## 2017-04-14 DIAGNOSIS — Z51.81 ENCOUNTER FOR THERAPEUTIC DRUG MONITORING: Primary | ICD-10-CM

## 2017-04-14 DIAGNOSIS — E55.9 VITAMIN D DEFICIENCY: ICD-10-CM

## 2017-04-14 PROCEDURE — 99213 OFFICE O/P EST LOW 20 MIN: CPT | Performed by: INTERNAL MEDICINE

## 2017-04-14 RX ORDER — PHENTERMINE HYDROCHLORIDE 37.5 MG/1
37.5 TABLET ORAL
Qty: 30 TABLET | Refills: 0 | Status: SHIPPED | OUTPATIENT
Start: 2017-04-14 | End: 2017-05-12

## 2017-04-14 NOTE — PROGRESS NOTES
CC: Patient presents with:  Weight Check: down 17 lb       HPI:   Obesity, doing well on phentermine, topamax, contrave and metformin. No chest pain.        Current Outpatient Prescriptions:  Phentermine HCl 37.5 MG Oral Tab Take 1 tablet (37.5 mg tot Takes 3 tabs daily  Disp:  Rfl:    Calcium Carbonate-Vitamin D (CALCIUM 500/D) 500-125 MG-UNIT Oral Tab Take 1 Tab by mouth daily.  Disp:  Rfl:       Past Medical History   Diagnosis Date   • Obesity 8/31/11   • Lipid screening 8/5/2011   • Thyroid cancer ( in female     Bloating     Bronchitis     H1N1 influenza     Other follow-up examination     DARSHANA (obstructive sleep apnea)     Diarrhea     Breast nodule     Thyroid cancer ([facility] [address]     Nipple discharge     Discharge from left nipple     Abnormal ultrasound o

## 2017-04-17 RX ORDER — FUROSEMIDE 20 MG/1
TABLET ORAL
Qty: 90 TABLET | Refills: 1 | Status: SHIPPED | OUTPATIENT
Start: 2017-04-17 | End: 2017-06-14

## 2017-04-17 RX ORDER — POTASSIUM CHLORIDE 20 MEQ/1
TABLET, EXTENDED RELEASE ORAL
Qty: 90 TABLET | Refills: 1 | OUTPATIENT
Start: 2017-04-17

## 2017-04-19 ENCOUNTER — PATIENT MESSAGE (OUTPATIENT)
Dept: INTERNAL MEDICINE CLINIC | Facility: CLINIC | Age: 52
End: 2017-04-19

## 2017-04-20 NOTE — TELEPHONE ENCOUNTER
From: Amara Reed Book  To: Deo Chandra MD  Sent: 4/19/2017 8:05 PM CDT  Subject: Visit Follow-up Question    Dr. Gaurav Houston,  I tried to sched the appt w/ Dr Jordan Irvin & was told she's not accepting new pts. I sched w/ her partner Dr Ankur Vo.  I seen him tod

## 2017-04-25 NOTE — TELEPHONE ENCOUNTER
Name of Medication:Ipratropium Bromide (ATROVENT) 0.03 % Nasal Solution     Dose:     How is medication prescribed:    Specific name of pharmacy and location: [place] [id number], [address] [place] [address],  [phone], [phone]

## 2017-04-26 NOTE — TELEPHONE ENCOUNTER
Requesting ipratropium  LOV: 4/14/17  RTC: 1 month  Last Labs: 2/24/17  Filled: 4/14/17   # with refills    Future Appointments  Date Time Provider Batsheva Hansen   5/12/2017 3:30 PM Genaro Millan MD [address]   6/14/2017 10:00 AM Genaro Millan

## 2017-04-28 ENCOUNTER — TELEPHONE (OUTPATIENT)
Dept: FAMILY MEDICINE CLINIC | Facility: CLINIC | Age: 52
End: 2017-04-28

## 2017-04-30 RX ORDER — IPRATROPIUM BROMIDE 21 UG/1
2 SPRAY, METERED NASAL EVERY 12 HOURS
Qty: 3 BOTTLE | Refills: 1 | Status: SHIPPED | OUTPATIENT
Start: 2017-04-30 | End: 2018-09-06

## 2017-05-12 ENCOUNTER — OFFICE VISIT (OUTPATIENT)
Dept: INTERNAL MEDICINE CLINIC | Facility: CLINIC | Age: 52
End: 2017-05-12

## 2017-05-12 VITALS
HEART RATE: 76 BPM | WEIGHT: 171 LBS | HEIGHT: 63 IN | RESPIRATION RATE: 16 BRPM | BODY MASS INDEX: 30.3 KG/M2 | SYSTOLIC BLOOD PRESSURE: 122 MMHG | DIASTOLIC BLOOD PRESSURE: 80 MMHG

## 2017-05-12 DIAGNOSIS — E66.9 OBESITY (BMI 30-39.9): ICD-10-CM

## 2017-05-12 DIAGNOSIS — R91.1 LUNG NODULE: ICD-10-CM

## 2017-05-12 DIAGNOSIS — Z51.81 ENCOUNTER FOR THERAPEUTIC DRUG MONITORING: Primary | ICD-10-CM

## 2017-05-12 PROCEDURE — 99213 OFFICE O/P EST LOW 20 MIN: CPT | Performed by: INTERNAL MEDICINE

## 2017-05-12 RX ORDER — PHENTERMINE HYDROCHLORIDE 37.5 MG/1
37.5 TABLET ORAL
Qty: 30 TABLET | Refills: 0 | Status: SHIPPED | OUTPATIENT
Start: 2017-05-12 | End: 2017-06-14

## 2017-05-12 NOTE — PROGRESS NOTES
CC: Patient presents with:  Weight Check: down 9 lbs       HPI:   Obesity, doing well on phentermine, metformin, contrave and topamax. No chest pain.        Current Outpatient Prescriptions:  Phentermine HCl 37.5 MG Oral Tab Take 1 tablet (37.5 mg tot Oral Cap Take 10,000 Units by mouth daily. Takes 3 tabs daily  Disp:  Rfl:    Calcium Carbonate-Vitamin D (CALCIUM 500/D) 500-125 MG-UNIT Oral Tab Take 1 Tab by mouth daily.  Disp:  Rfl:       Past Medical History   Diagnosis Date   • Obesity 8/31/11   • Angelia Huitron without myelopathy     Constipation     Cough     Breast pain in female     Bloating     Bronchitis     H1N1 influenza     Other follow-up examination(V67.59)     DARSHANA (obstructive sleep apnea)     Diarrhea     Breast nodule     Thyroid cancer (HCC)     Nip the plan. Return in about 4 weeks (around 6/9/2017).

## 2017-05-15 RX ORDER — METFORMIN HYDROCHLORIDE 750 MG/1
1500 TABLET, EXTENDED RELEASE ORAL DAILY
Qty: 180 TABLET | Refills: 0 | Status: SHIPPED | OUTPATIENT
Start: 2017-05-15 | End: 2017-07-31

## 2017-05-15 RX ORDER — FUROSEMIDE 20 MG/1
TABLET ORAL
Qty: 90 TABLET | Refills: 1 | Status: SHIPPED | OUTPATIENT
Start: 2017-05-15 | End: 2018-01-16

## 2017-05-15 NOTE — TELEPHONE ENCOUNTER
Requesting contrave  LOV: 5/12/17  RTC: 1 month  Last Labs:   Filled: 1/24/17 #120  with 5 refills     Wants this sent to [facility]    Future Appointments  Date Time Provider Batsheva Hansen   6/14/2017 10:00 AM Deo Chandra MD [facility] [address] [facility] [address]   7/21/2017

## 2017-05-15 NOTE — TELEPHONE ENCOUNTER
Requesting metformin ER 750mg  LOV: 5/12/17  RTC: 4 weeks  Last Labs: 2/14/17 A1c on singulex labs, no microalbumin  Filled: 3/13/17 #60 with 5 refills    Future Appointments  Date Time Provider Batsheva Hansen   6/14/2017 10:00 AM Bong Bya MD EMG 2

## 2017-05-15 NOTE — TELEPHONE ENCOUNTER
From: Lynnea Cowden Book  To: Jeremi Jerez MD  Sent: 5/13/2017 10:19 AM CDT  Subject: Medication Renewal Request    Original authorizing provider: Sowmya Joseph MD    [address] would like a refill of the following medications:  Naltrexone-Bupropion [facility], [facility].

## 2017-05-15 NOTE — TELEPHONE ENCOUNTER
From: Adolfo Clarity Book  To: Pepper Brown MD  Sent: 5/13/2017 10:18 AM CDT  Subject: Medication Renewal Request    Original authorizing provider: Cesar Rodríguez MD    [address] would like a refill of the following medications:  MetFORMIN HCl  MG

## 2017-05-16 ENCOUNTER — PATIENT MESSAGE (OUTPATIENT)
Dept: FAMILY MEDICINE CLINIC | Facility: CLINIC | Age: 52
End: 2017-05-16

## 2017-05-17 NOTE — TELEPHONE ENCOUNTER
From: Kenya Fraga Book  To: Dione Edmonds MD  Sent: 5/16/2017 9:54 PM CDT  Subject: Prescription Question    Dr Purdy Earchang,  So sorry. I just got the message that you approved the Contrave by sending it to my mail order.    I actually was trying to get it sent

## 2017-05-18 ENCOUNTER — PATIENT MESSAGE (OUTPATIENT)
Dept: FAMILY MEDICINE CLINIC | Facility: CLINIC | Age: 52
End: 2017-05-18

## 2017-05-23 ENCOUNTER — LAB ENCOUNTER (OUTPATIENT)
Dept: LAB | Age: 52
End: 2017-05-23
Attending: INTERNAL MEDICINE
Payer: COMMERCIAL

## 2017-05-23 DIAGNOSIS — E89.0 POSTOPERATIVE HYPOTHYROIDISM: ICD-10-CM

## 2017-05-23 PROCEDURE — 36415 COLL VENOUS BLD VENIPUNCTURE: CPT

## 2017-05-23 PROCEDURE — 84439 ASSAY OF FREE THYROXINE: CPT

## 2017-05-23 PROCEDURE — 84443 ASSAY THYROID STIM HORMONE: CPT

## 2017-05-23 PROCEDURE — 84481 FREE ASSAY (FT-3): CPT

## 2017-05-26 RX ORDER — LEVOTHYROXINE SODIUM 0.2 MG/1
TABLET ORAL
Qty: 90 TABLET | Refills: 0 | Status: SHIPPED | OUTPATIENT
Start: 2017-05-26 | End: 2017-07-26

## 2017-05-27 ENCOUNTER — PATIENT MESSAGE (OUTPATIENT)
Dept: FAMILY MEDICINE CLINIC | Facility: CLINIC | Age: 52
End: 2017-05-27

## 2017-05-30 NOTE — TELEPHONE ENCOUNTER
From: Vivienne Mixon Book  To: Stewart Del Valle MD  Sent: 5/27/2017 10:03 AM CDT  Subject: Prescription Question    Dr. Paulette Goins,  I just wanted to keep you in the loop. I had your office transfer my rx of Contrave over to the [facility] pharmacy.  I got a copy of the nasim

## 2017-05-30 NOTE — TELEPHONE ENCOUNTER
From: Dragan Sharp Book  To: Yari Roque MD  Sent: 5/27/2017 10:10 AM CDT  Subject: Non-Urgent Medical Question    Dr. Luz Marina Haro,  Just to keep you informed of whats going on:  Dr. Pacheco Cousins did a thyroid blood test. You can see the results in the with my lab work

## 2017-06-08 ENCOUNTER — PATIENT MESSAGE (OUTPATIENT)
Dept: FAMILY MEDICINE CLINIC | Facility: CLINIC | Age: 52
End: 2017-06-08

## 2017-06-09 NOTE — TELEPHONE ENCOUNTER
From: Cambridge Rhyme Book  To: Isidro Montague MD  Sent: 6/8/2017 5:50 PM CDT  Subject: Prescription Question    5/30/2017 7:28 AM CDT    Von Watts,    Let us know if you are unable to get the card today and we will see what we can do to help you.     Thank y

## 2017-06-14 ENCOUNTER — OFFICE VISIT (OUTPATIENT)
Dept: FAMILY MEDICINE CLINIC | Facility: CLINIC | Age: 52
End: 2017-06-14

## 2017-06-14 VITALS
SYSTOLIC BLOOD PRESSURE: 122 MMHG | WEIGHT: 167 LBS | HEIGHT: 63 IN | DIASTOLIC BLOOD PRESSURE: 78 MMHG | BODY MASS INDEX: 29.59 KG/M2 | RESPIRATION RATE: 16 BRPM | HEART RATE: 84 BPM

## 2017-06-14 DIAGNOSIS — J45.20 MILD INTERMITTENT ASTHMA WITHOUT COMPLICATION: ICD-10-CM

## 2017-06-14 DIAGNOSIS — E66.9 OBESITY (BMI 30-39.9): ICD-10-CM

## 2017-06-14 DIAGNOSIS — Z51.81 ENCOUNTER FOR THERAPEUTIC DRUG MONITORING: Primary | ICD-10-CM

## 2017-06-14 PROCEDURE — 99213 OFFICE O/P EST LOW 20 MIN: CPT | Performed by: INTERNAL MEDICINE

## 2017-06-14 RX ORDER — PHENTERMINE HYDROCHLORIDE 37.5 MG/1
37.5 TABLET ORAL
Qty: 30 TABLET | Refills: 0 | Status: SHIPPED | OUTPATIENT
Start: 2017-06-14 | End: 2017-07-21

## 2017-06-14 RX ORDER — ALBUTEROL SULFATE 90 UG/1
2 AEROSOL, METERED RESPIRATORY (INHALATION) EVERY 4 HOURS PRN
Qty: 1 INHALER | Refills: 3 | Status: SHIPPED | OUTPATIENT
Start: 2017-06-14 | End: 2017-06-19

## 2017-06-14 NOTE — PROGRESS NOTES
CC: Patient presents with:  Weight Check: down 4 lb       HPI:   Obesity, doing well on phentermine, metformin, contrave and topamax, no chest pain.        Current Outpatient Prescriptions:  Phentermine HCl 37.5 MG Oral Tab Take 1 tablet (37.5 mg tota MONTELUKAST SODIUM 10 MG Oral Tab TAKE 1 TABLET DAILY Disp: 90 tablet Rfl: 3   B Complex-C (SUPER B COMPLEX) Oral Tab Take  by mouth. Disp:  Rfl:    Biotin (BIOTIN 5000) 5 MG Oral Cap Take 10,000 Units by mouth daily.  Takes 3 tabs daily  Disp:  Rfl: therapeutic drug monitoring     Elevated LFTs     Superficial foreign body of finger without major open wound and without infection     Displacement of lumbar intervertebral disc without myelopathy     Constipation     Cough     Breast pain in female     B (BMI 30-39.9), pt with 35 lb wt loss on 5 months of phentermine, metformin, contrave and topamax. Labs show high leptin. Will cont topamax and contrave. Will cont phentermine and do monthly for 6 months. Will cont metformin 2 tabs.      2.  Mild intermittent

## 2017-06-15 ENCOUNTER — HOSPITAL ENCOUNTER (OUTPATIENT)
Dept: ULTRASOUND IMAGING | Age: 52
Discharge: HOME | End: 2017-06-15
Attending: INTERNAL MEDICINE
Payer: COMMERCIAL

## 2017-06-15 ENCOUNTER — HOSPITAL ENCOUNTER (OUTPATIENT)
Dept: MAMMOGRAPHY | Age: 52
Discharge: HOME | End: 2017-06-15
Attending: INTERNAL MEDICINE
Payer: COMMERCIAL

## 2017-06-15 ENCOUNTER — TELEPHONE (OUTPATIENT)
Dept: FAMILY MEDICINE CLINIC | Facility: CLINIC | Age: 52
End: 2017-06-15

## 2017-06-15 DIAGNOSIS — R92.8 ABNORMAL MAMMOGRAM: Primary | ICD-10-CM

## 2017-06-15 DIAGNOSIS — R92.8 ABNORMAL MAMMOGRAM OF LEFT BREAST: ICD-10-CM

## 2017-06-15 DIAGNOSIS — R92.8 ABNORMAL MAMMOGRAM: ICD-10-CM

## 2017-06-15 PROCEDURE — 76642 ULTRASOUND BREAST LIMITED: CPT | Performed by: INTERNAL MEDICINE

## 2017-06-15 PROCEDURE — 77066 DX MAMMO INCL CAD BI: CPT | Performed by: INTERNAL MEDICINE

## 2017-06-15 NOTE — TELEPHONE ENCOUNTER
Patient is having u/s of left breast but is also due for bilateral diagnostic mammogram.  Radiology is asking for order to be placed.   Requesting Diagnostic mammogram  LOV: 6/14/17  RTC: 4 weeks  Last Labs: 6/16/16 mammogram      Future Appointments  Date

## 2017-06-19 DIAGNOSIS — Z12.31 ENCOUNTER FOR SCREENING MAMMOGRAM FOR BREAST CANCER: Primary | ICD-10-CM

## 2017-07-21 ENCOUNTER — OFFICE VISIT (OUTPATIENT)
Dept: INTERNAL MEDICINE CLINIC | Facility: CLINIC | Age: 52
End: 2017-07-21

## 2017-07-21 VITALS
SYSTOLIC BLOOD PRESSURE: 122 MMHG | WEIGHT: 161 LBS | DIASTOLIC BLOOD PRESSURE: 78 MMHG | HEIGHT: 63 IN | RESPIRATION RATE: 16 BRPM | HEART RATE: 80 BPM | BODY MASS INDEX: 28.53 KG/M2

## 2017-07-21 DIAGNOSIS — Z51.81 ENCOUNTER FOR THERAPEUTIC DRUG MONITORING: Primary | ICD-10-CM

## 2017-07-21 DIAGNOSIS — E66.9 OBESITY (BMI 30-39.9): ICD-10-CM

## 2017-07-21 DIAGNOSIS — E89.0 POSTOPERATIVE HYPOTHYROIDISM: ICD-10-CM

## 2017-07-21 PROCEDURE — 99213 OFFICE O/P EST LOW 20 MIN: CPT | Performed by: INTERNAL MEDICINE

## 2017-07-21 RX ORDER — PHENTERMINE HYDROCHLORIDE 37.5 MG/1
37.5 TABLET ORAL
Qty: 30 TABLET | Refills: 0 | Status: SHIPPED | OUTPATIENT
Start: 2017-07-21 | End: 2017-08-22

## 2017-07-21 NOTE — PROGRESS NOTES
CC: Patient presents with:  Weight Check: down 6 lb       HPI:   Obesity, phentermine, metformin, contrave and topamax, no chest pain. Doing well on thyroid meds.        Current Outpatient Prescriptions:  Phentermine HCl 37.5 MG Oral Tab Take 1 tablet Biotin (BIOTIN 5000) 5 MG Oral Cap Take 10,000 Units by mouth daily. Takes 3 tabs daily  Disp:  Rfl:    Calcium Carbonate-Vitamin D (CALCIUM 500/D) 500-125 MG-UNIT Oral Tab Take 1 Tab by mouth daily.  Disp:  Rfl:       Past Medical History:   Diagnosis Da intervertebral disc without myelopathy     Constipation     Cough     Breast pain in female     Bloating     Bronchitis     H1N1 influenza     Other follow-up examination(V67.59)     DARSHANA (obstructive sleep apnea)     Diarrhea     Breast nodule     Thyroid

## 2017-07-23 ENCOUNTER — PATIENT MESSAGE (OUTPATIENT)
Dept: INTERNAL MEDICINE CLINIC | Facility: CLINIC | Age: 52
End: 2017-07-23

## 2017-07-24 NOTE — TELEPHONE ENCOUNTER
From: Karl Valdes  To: Kathryn Monteiro MD  Sent: 7/23/2017 9:15 PM CDT  Subject: Prescription Question    Dr Leda Larkin,   I was seen on Friday for visit.  I forgot that last month I had a problem getting the Contrave filled, their site was not working and I

## 2017-07-24 NOTE — TELEPHONE ENCOUNTER
Time started: 1928    Time ended: 3633    Total time spent on chart: 4 min      Samples set aside, patient notified via [employer]

## 2017-07-25 ENCOUNTER — LAB ENCOUNTER (OUTPATIENT)
Dept: LAB | Age: 52
End: 2017-07-25
Attending: INTERNAL MEDICINE
Payer: COMMERCIAL

## 2017-07-25 DIAGNOSIS — E89.0 POSTOPERATIVE HYPOTHYROIDISM: ICD-10-CM

## 2017-07-25 LAB
FREE T4: 2 NG/DL (ref 0.9–1.8)
TSI SER-ACNC: <0.005 MIU/ML (ref 0.35–5.5)

## 2017-07-25 PROCEDURE — 36415 COLL VENOUS BLD VENIPUNCTURE: CPT

## 2017-07-25 PROCEDURE — 84439 ASSAY OF FREE THYROXINE: CPT

## 2017-07-25 PROCEDURE — 84443 ASSAY THYROID STIM HORMONE: CPT

## 2017-07-26 ENCOUNTER — TELEPHONE (OUTPATIENT)
Dept: INTERNAL MEDICINE CLINIC | Facility: CLINIC | Age: 52
End: 2017-07-26

## 2017-07-26 RX ORDER — LEVOTHYROXINE SODIUM 0.15 MG/1
150 TABLET ORAL DAILY
Qty: 30 TABLET | Refills: 6 | Status: SHIPPED | OUTPATIENT
Start: 2017-07-26 | End: 2017-08-09

## 2017-08-02 RX ORDER — METFORMIN HYDROCHLORIDE 750 MG/1
TABLET, EXTENDED RELEASE ORAL
Qty: 180 TABLET | Refills: 0 | Status: SHIPPED | OUTPATIENT
Start: 2017-08-02 | End: 2017-12-13 | Stop reason: ALTCHOICE

## 2017-08-02 NOTE — TELEPHONE ENCOUNTER
Requesting metformin  LOV: 7/21/17  RTC: 4 weeks  Last Labs: 2/14/17 5.3%  Filled: 5/15/17 #180 with 0 refills    Future Appointments  Date Time Provider Batsheva Hansen   8/22/2017 1:15 PM Ryder Pichardo MD [facility]   9/18/2017 1:40 PM Sharyn Calderón,

## 2017-08-03 RX ORDER — POTASSIUM CHLORIDE 20 MEQ/1
TABLET, EXTENDED RELEASE ORAL
Qty: 90 TABLET | Refills: 1 | Status: SHIPPED | OUTPATIENT
Start: 2017-08-03 | End: 2018-06-01

## 2017-08-03 NOTE — TELEPHONE ENCOUNTER
Time started: 914    Time ended: 917    Total time spent on chart: 3mins    Requesting klor geneva 20meq  LOV: 7/21/17 [facility])  RTC: 1 month  Last Labs: 2/24/17 (3.8)  Filled: 2/9/17 # 90  with 1 refills    Future Appointments  Date Time Provider Department Dain

## 2017-08-10 ENCOUNTER — HOSPITAL ENCOUNTER (OUTPATIENT)
Dept: GENERAL RADIOLOGY | Age: 52
Discharge: HOME | End: 2017-08-10
Attending: NURSE PRACTITIONER
Payer: COMMERCIAL

## 2017-08-10 DIAGNOSIS — R05.9 COUGH: ICD-10-CM

## 2017-08-10 PROCEDURE — 71020 XR CHEST PA + LAT CHEST (CPT=71020): CPT | Performed by: NURSE PRACTITIONER

## 2017-08-13 ENCOUNTER — PATIENT MESSAGE (OUTPATIENT)
Dept: INTERNAL MEDICINE CLINIC | Facility: CLINIC | Age: 52
End: 2017-08-13

## 2017-08-14 NOTE — TELEPHONE ENCOUNTER
From: Naomi Langford Book  To: Kiley Marsh MD  Sent: 8/13/2017 6:23 PM CDT  Subject: Non-Urgent Medical Question    Dr James Hernandez,  Returned home 8/8 from [place]. Every night while there, due to humidity I had asthma issues.  Used nebulizer and rescue inhaler several

## 2017-08-18 ENCOUNTER — OFFICE VISIT (OUTPATIENT)
Dept: FAMILY MEDICINE CLINIC | Facility: CLINIC | Age: 52
End: 2017-08-18

## 2017-08-18 VITALS
RESPIRATION RATE: 16 BRPM | HEART RATE: 68 BPM | BODY MASS INDEX: 28.88 KG/M2 | SYSTOLIC BLOOD PRESSURE: 110 MMHG | WEIGHT: 163 LBS | DIASTOLIC BLOOD PRESSURE: 70 MMHG | HEIGHT: 63 IN | TEMPERATURE: 98 F

## 2017-08-18 DIAGNOSIS — R05.9 COUGH: Primary | ICD-10-CM

## 2017-08-18 DIAGNOSIS — R06.02 SOB (SHORTNESS OF BREATH): ICD-10-CM

## 2017-08-18 PROCEDURE — 99213 OFFICE O/P EST LOW 20 MIN: CPT | Performed by: PHYSICIAN ASSISTANT

## 2017-08-18 RX ORDER — OMEGA-3S/DHA/EPA/FISH OIL 980-1400MG
1000 CAPSULE,DELAYED RELEASE (ENTERIC COATED) ORAL DAILY
COMMUNITY
End: 2018-11-30 | Stop reason: ALTCHOICE

## 2017-08-18 RX ORDER — ERGOCALCIFEROL 1.25 MG/1
50000 CAPSULE ORAL WEEKLY
COMMUNITY
End: 2018-04-11 | Stop reason: ALTCHOICE

## 2017-08-18 RX ORDER — ALBUTEROL SULFATE 90 UG/1
1 AEROSOL, METERED RESPIRATORY (INHALATION) EVERY 6 HOURS PRN
COMMUNITY
End: 2020-07-13

## 2017-08-18 RX ORDER — MELATONIN
400 DAILY
COMMUNITY
End: 2018-11-30 | Stop reason: ALTCHOICE

## 2017-08-18 RX ORDER — SULFAMETHOXAZOLE AND TRIMETHOPRIM 800; 160 MG/1; MG/1
1 TABLET ORAL 2 TIMES DAILY
Qty: 20 TABLET | Refills: 0 | Status: ON HOLD | OUTPATIENT
Start: 2017-08-18 | End: 2017-08-29

## 2017-08-18 RX ORDER — ALBUTEROL SULFATE 2.5 MG/3ML
2.5 SOLUTION RESPIRATORY (INHALATION)
COMMUNITY
End: 2017-08-22

## 2017-08-18 NOTE — PATIENT INSTRUCTIONS
Thank you for choosing Horald Mcardle, PA-C at [place] [address]  To Do: [address]  1. Pt to continue prednisone  2. Start bactrim   3. Continue inhalers and nebulizer  4. If not better call office and we can get CT scan of chest  5.  Follow-up all of the risks of treatment even beyond those discussed today.  All therapies have potential risk of harm or side effects or medication interactions.  It is your duty and for your safety to discuss with the pharmacist and our office with questions, and t

## 2017-08-18 NOTE — PROGRESS NOTES
[facility] Internal Medicine Progress Note    CC:  Patient presents with:  Asthma: f/u from 8/10 pulmonologist visit  Shortness Of Breath: on and  off   Fever: started on 9/8,still taking prednisione but not really helping       HPI:   HPI  Pt wa Rfl: 0   Levothyroxine Sodium 150 MCG Oral Tab Take 1 tablet (150 mcg total) by mouth daily.  Disp: 30 tablet Rfl: 6   KLOR-CON M20 20 MEQ Oral Tab CR TAKE 1 TABLET DAILY Disp: 90 tablet Rfl: 1   METFORMIN HCL  MG Oral Tablet 24 Hr TAKE 2 TABLETS (=15 visit. Review of Systems :  Review of Systems   Constitutional: Negative for chills and fever. HENT: Positive for congestion, postnasal drip and sinus pressure. Negative for ear pain, rhinorrhea, sinus pain and sore throat.     Respiratory: Positive f 800-160 MG Oral Tab per tablet 20 tablet 0      Sig: Take 1 tablet by mouth 2 (two) times daily. Imaging & Consults:  None     Patient/Caregiver Education: Patient/Caregiver Education: There are no barriers to learning. Medical education done.  Lola Perez Anemia     Symptomatic anemia     Anemia, unspecified type     Iron deficiency     Normocytic anemia     Recurrent sinus infections     Vitamin D deficiency     Lung nodule

## 2017-08-21 ENCOUNTER — TELEPHONE (OUTPATIENT)
Dept: FAMILY MEDICINE CLINIC | Facility: CLINIC | Age: 52
End: 2017-08-21

## 2017-08-21 DIAGNOSIS — R05.9 COUGH: ICD-10-CM

## 2017-08-21 DIAGNOSIS — R50.9 LOW GRADE FEVER: Primary | ICD-10-CM

## 2017-08-21 DIAGNOSIS — Z86.19 HISTORY OF PNEUMOCYSTIS JIROVECII PNEUMONIA: ICD-10-CM

## 2017-08-21 DIAGNOSIS — R06.02 SOB (SHORTNESS OF BREATH): ICD-10-CM

## 2017-08-21 NOTE — TELEPHONE ENCOUNTER
Pt was seen in office last Thursday 8/18/2017 by Regla DORADO, Was advised to call the office if not better that she would order a cat scan.

## 2017-08-21 NOTE — TELEPHONE ENCOUNTER
Patient states she symptoms have worsened. Patient reports low grade fever 99.4.        Per OV on 8/18/17:     Assessment and Plan:  Cough  (primary encounter diagnosis)  Sob (shortness of breath)  Pt to continue steroid  Has history of PCP pneumonia  Chest

## 2017-08-22 ENCOUNTER — OFFICE VISIT (OUTPATIENT)
Dept: INTERNAL MEDICINE CLINIC | Facility: CLINIC | Age: 52
End: 2017-08-22

## 2017-08-22 ENCOUNTER — HOSPITAL ENCOUNTER (OUTPATIENT)
Dept: CT IMAGING | Facility: HOSPITAL | Age: 52
Discharge: HOME | End: 2017-08-22
Attending: PHYSICIAN ASSISTANT
Payer: COMMERCIAL

## 2017-08-22 VITALS
BODY MASS INDEX: 28.7 KG/M2 | SYSTOLIC BLOOD PRESSURE: 122 MMHG | HEIGHT: 63 IN | DIASTOLIC BLOOD PRESSURE: 76 MMHG | RESPIRATION RATE: 16 BRPM | HEART RATE: 88 BPM | WEIGHT: 162 LBS

## 2017-08-22 DIAGNOSIS — R50.9 LOW GRADE FEVER: ICD-10-CM

## 2017-08-22 DIAGNOSIS — Z51.81 ENCOUNTER FOR THERAPEUTIC DRUG MONITORING: Primary | ICD-10-CM

## 2017-08-22 DIAGNOSIS — R06.02 SOB (SHORTNESS OF BREATH): ICD-10-CM

## 2017-08-22 DIAGNOSIS — Z86.19 HISTORY OF PNEUMOCYSTIS JIROVECII PNEUMONIA: ICD-10-CM

## 2017-08-22 DIAGNOSIS — R05.9 COUGH: ICD-10-CM

## 2017-08-22 DIAGNOSIS — E66.9 OBESITY (BMI 30-39.9): ICD-10-CM

## 2017-08-22 PROCEDURE — 71260 CT THORAX DX C+: CPT | Performed by: PHYSICIAN ASSISTANT

## 2017-08-22 PROCEDURE — 99213 OFFICE O/P EST LOW 20 MIN: CPT | Performed by: INTERNAL MEDICINE

## 2017-08-22 RX ORDER — ALBUTEROL SULFATE 2.5 MG/3ML
2.5 SOLUTION RESPIRATORY (INHALATION) EVERY 6 HOURS PRN
Qty: 1 BOX | Refills: 3 | Status: SHIPPED | OUTPATIENT
Start: 2017-08-22 | End: 2018-10-22

## 2017-08-22 RX ORDER — PHENTERMINE HYDROCHLORIDE 37.5 MG/1
37.5 TABLET ORAL
Qty: 30 TABLET | Refills: 0 | Status: ON HOLD | OUTPATIENT
Start: 2017-08-22 | End: 2017-08-29

## 2017-08-22 NOTE — PROGRESS NOTES
CC: Patient presents with:  Weight Check: up 1 lb       HPI:   Obesity, doing well on phentermine, metformin, contrave and topamax. No chest. Worsening cough for past 1 week.        Current Outpatient Prescriptions:  albuterol sulfate (2.5 MG/3ML) 0.0 Breath Activated Inhale 1 puff into the lungs daily. Disp: 3 each Rfl: 1   Ascorbic Acid (VITAMIN C OR) Take 1 tablet by mouth daily.  Disp:  Rfl:    alprazolam 0.25 MG Oral Tab Take 1 tablet (0.25 mg total) by mouth 2 (two) times daily as needed for Anxiet Insomnia     Lumbago     Unspecified adjustment reaction     Esophageal reflux     Hypothyroidism     Essential hypertension     Edema     Asthma     Obesity (BMI 30-39. 9)     Other malaise and fatigue     Malignant neoplasm of thyroid gland ([address]     Irri Take 3 mL (2.5 mg total) by nebulization every 6 (six) hours as needed for Wheezing.       Phentermine HCl 37.5 MG Oral Tab 30 tablet 0      Sig: Take 1 tablet (37.5 mg total) by mouth every morning before breakfast.          None     ASSESSMENT:   Encounte

## 2017-08-23 PROBLEM — J38.3 VOCAL CORD DYSFUNCTION: Chronic | Status: ACTIVE | Noted: 2017-08-23

## 2017-08-23 PROBLEM — J38.3 VOCAL CORD DYSFUNCTION: Status: ACTIVE | Noted: 2017-08-23

## 2017-08-23 PROBLEM — D80.1 HYPOGAMMAGLOBULINEMIA (HCC): Chronic | Status: ACTIVE | Noted: 2017-08-23

## 2017-08-23 PROBLEM — D80.1 HYPOGAMMAGLOBULINEMIA (HCC): Status: ACTIVE | Noted: 2017-08-23

## 2017-08-23 RX ORDER — MONTELUKAST SODIUM 10 MG/1
TABLET ORAL
Qty: 90 TABLET | Refills: 3 | Status: SHIPPED | OUTPATIENT
Start: 2017-08-23 | End: 2018-04-11

## 2017-08-23 NOTE — TELEPHONE ENCOUNTER
Asthma & COPD Medication Protocol Failed8/21 12:58 AM   Asthma Action Score greater than or equal to 20     Requesting Montelukast   LOV: 8/23/17  RTC: 4 weeks   Last Labs: no AAP on file   Filled: 4/26/16 #90 with 3 refills    Future Appointments  Date Ti

## 2017-08-24 ENCOUNTER — LAB ENCOUNTER (OUTPATIENT)
Dept: LAB | Age: 52
End: 2017-08-24
Attending: INTERNAL MEDICINE
Payer: COMMERCIAL

## 2017-08-24 DIAGNOSIS — D80.1 HYPOGAMMAGLOBULINEMIA (HCC): ICD-10-CM

## 2017-08-24 LAB
C-REACTIVE PROTEIN: 0.31 MG/DL (ref ?–1)
IMMUNOGLOBULIN A: 184 MG/DL (ref 70–312)
IMMUNOGLOBULIN G: 576 MG/DL (ref 791–1643)
IMMUNOGLOBULIN M: 96.7 MG/DL (ref 43–279)
SED RATE-ML: 6 MM/HR (ref 0–25)

## 2017-08-24 PROCEDURE — 36415 COLL VENOUS BLD VENIPUNCTURE: CPT

## 2017-08-24 PROCEDURE — 86774 TETANUS ANTIBODY: CPT

## 2017-08-24 PROCEDURE — 86140 C-REACTIVE PROTEIN: CPT

## 2017-08-24 PROCEDURE — 86317 IMMUNOASSAY INFECTIOUS AGENT: CPT

## 2017-08-24 PROCEDURE — 82784 ASSAY IGA/IGD/IGG/IGM EACH: CPT

## 2017-08-24 PROCEDURE — 86648 DIPHTHERIA ANTIBODY: CPT

## 2017-08-24 PROCEDURE — 85652 RBC SED RATE AUTOMATED: CPT

## 2017-08-25 ENCOUNTER — PATIENT MESSAGE (OUTPATIENT)
Dept: FAMILY MEDICINE CLINIC | Facility: CLINIC | Age: 52
End: 2017-08-25

## 2017-08-25 NOTE — TELEPHONE ENCOUNTER
LOV: 8/18/17 with Reggie Coy   RTC: Return if symptoms worsen or fail to improve.        Assessment and Plan:  Cough  (primary encounter diagnosis)  Sob (shortness of breath)  Pt to continue steroid  Has history of PCP pneumonia  Chest xray was normal  Will sta

## 2017-08-25 NOTE — TELEPHONE ENCOUNTER
From: Karla Sepulveda Book  To: Caren Kaur  Sent: 8/25/2017 2:06 PM CDT  Subject: Visit Jackie Dears Dr Gurwinder Chong,  Seen Juan Antonio bell/ Dr Collin Bates on Wed. She started me on a new inhaler & neb tx.  Still very labored breathing when

## 2017-08-26 ENCOUNTER — HOSPITAL ENCOUNTER (INPATIENT)
Facility: HOSPITAL | Age: 52
LOS: 3 days | Discharge: HOME | DRG: 202 | End: 2017-08-29
Attending: EMERGENCY MEDICINE | Admitting: HOSPITALIST
Payer: COMMERCIAL

## 2017-08-26 ENCOUNTER — APPOINTMENT (OUTPATIENT)
Dept: GENERAL RADIOLOGY | Facility: HOSPITAL | Age: 52
DRG: 202 | End: 2017-08-26
Attending: EMERGENCY MEDICINE
Payer: COMMERCIAL

## 2017-08-26 DIAGNOSIS — J38.3 VOCAL CORD DYSFUNCTION: Chronic | ICD-10-CM

## 2017-08-26 DIAGNOSIS — R06.00 DYSPNEA, UNSPECIFIED TYPE: Primary | ICD-10-CM

## 2017-08-26 DIAGNOSIS — J98.01 ACUTE BRONCHOSPASM: ICD-10-CM

## 2017-08-26 LAB
ALBUMIN SERPL-MCNC: 3.9 G/DL (ref 3.5–4.8)
ALP LIVER SERPL-CCNC: 92 U/L (ref 41–108)
ALT SERPL-CCNC: 39 U/L (ref 14–54)
APTT PPP: 27.4 SECONDS (ref 25–34)
AST SERPL-CCNC: 19 U/L (ref 15–41)
BASOPHILS # BLD AUTO: 0.08 X10(3) UL (ref 0–0.1)
BASOPHILS NFR BLD AUTO: 0.8 %
BILIRUB SERPL-MCNC: 0.4 MG/DL (ref 0.1–2)
BUN BLD-MCNC: 13 MG/DL (ref 8–20)
CALCIUM BLD-MCNC: 9.3 MG/DL (ref 8.3–10.3)
CHLORIDE: 107 MMOL/L (ref 101–111)
CO2: 22 MMOL/L (ref 22–32)
CREAT BLD-MCNC: 1.07 MG/DL (ref 0.55–1.02)
DIPHTHERIA ANTIBODY, IGG: 0.3 IU/ML
EOSINOPHIL # BLD AUTO: 0.15 X10(3) UL (ref 0–0.3)
EOSINOPHIL NFR BLD AUTO: 1.6 %
ERYTHROCYTE [DISTWIDTH] IN BLOOD BY AUTOMATED COUNT: 13.9 % (ref 11.5–16)
GLUCOSE BLD-MCNC: 158 MG/DL (ref 65–99)
GLUCOSE BLD-MCNC: 97 MG/DL (ref 70–99)
HCT VFR BLD AUTO: 42.5 % (ref 34–50)
HGB BLD-MCNC: 14.4 G/DL (ref 12–16)
IMMATURE GRANULOCYTE COUNT: 0.07 X10(3) UL (ref 0–1)
IMMATURE GRANULOCYTE RATIO %: 0.7 %
INR BLD: 1.01 (ref 0.89–1.11)
LDH: 167 U/L (ref 84–246)
LYMPHOCYTES # BLD AUTO: 2.32 X10(3) UL (ref 0.9–4)
LYMPHOCYTES NFR BLD AUTO: 24.1 %
M PROTEIN MFR SERPL ELPH: 7.1 G/DL (ref 6.1–8.3)
MCH RBC QN AUTO: 29.1 PG (ref 27–33.2)
MCHC RBC AUTO-ENTMCNC: 33.9 G/DL (ref 31–37)
MCV RBC AUTO: 86 FL (ref 81–100)
MONOCYTES # BLD AUTO: 0.46 X10(3) UL (ref 0.1–0.6)
MONOCYTES NFR BLD AUTO: 4.8 %
NEUTROPHIL ABS PRELIM: 6.53 X10 (3) UL (ref 1.3–6.7)
NEUTROPHILS # BLD AUTO: 6.53 X10(3) UL (ref 1.3–6.7)
NEUTROPHILS NFR BLD AUTO: 68 %
PLATELET # BLD AUTO: 346 10(3)UL (ref 150–450)
PNEUMOCOCCAL SEROTYPE 1 IGG: 1.18 UG/ML
PNEUMOCOCCAL SEROTYPE 10A IGG: 1.33 UG/ML
PNEUMOCOCCAL SEROTYPE 11A IGG: 0.88 UG/ML
PNEUMOCOCCAL SEROTYPE 12F IGG: 1.63 UG/ML
PNEUMOCOCCAL SEROTYPE 14* IGG: 63.42 UG/ML
PNEUMOCOCCAL SEROTYPE 15B IGG: 8.66 UG/ML
PNEUMOCOCCAL SEROTYPE 17F IGG: 15.08 UG/ML
PNEUMOCOCCAL SEROTYPE 18C* IGG: 17.83 UG/ML
PNEUMOCOCCAL SEROTYPE 19A IGG: 5.28 UG/ML
PNEUMOCOCCAL SEROTYPE 19F* IGG: 6.37 UG/ML
PNEUMOCOCCAL SEROTYPE 2 IGG: 10.4 UG/ML
PNEUMOCOCCAL SEROTYPE 20 IGG: 3.01 UG/ML
PNEUMOCOCCAL SEROTYPE 22F IGG: 1.29 UG/ML
PNEUMOCOCCAL SEROTYPE 23F* IGG: 3.42 UG/ML
PNEUMOCOCCAL SEROTYPE 3 IGG: 0.44 UG/ML
PNEUMOCOCCAL SEROTYPE 33F IGG: 0.55 UG/ML
PNEUMOCOCCAL SEROTYPE 4* IGG: 0.46 UG/ML
PNEUMOCOCCAL SEROTYPE 5 IGG: 1.76 UG/ML
PNEUMOCOCCAL SEROTYPE 6B* IGG: 1.71 UG/ML
PNEUMOCOCCAL SEROTYPE 7F IGG: 1.6 UG/ML
PNEUMOCOCCAL SEROTYPE 8 IGG: 1.44 UG/ML
PNEUMOCOCCAL SEROTYPE 9N IGG: 5.93 UG/ML
PNEUMOCOCCAL SEROTYPE 9V*, IGG: 7.71 UG/ML
POTASSIUM SERPL-SCNC: 3.5 MMOL/L (ref 3.6–5.1)
PSA SERPL DL<=0.01 NG/ML-MCNC: 13.3 SECONDS (ref 12–14.3)
RBC # BLD AUTO: 4.94 X10(6)UL (ref 3.8–5.1)
RED CELL DISTRIBUTION WIDTH-SD: 43.5 FL (ref 35.1–46.3)
SODIUM SERPL-SCNC: 141 MMOL/L (ref 136–144)
TETANUS ANTIBODY, IGG: 2.8 IU/ML
TROPONIN: <0.046 NG/ML (ref ?–0.05)
WBC # BLD AUTO: 9.6 X10(3) UL (ref 4–13)

## 2017-08-26 PROCEDURE — 99223 1ST HOSP IP/OBS HIGH 75: CPT | Performed by: HOSPITALIST

## 2017-08-26 PROCEDURE — 71020 XR CHEST PA + LAT CHEST (CPT=71020): CPT | Performed by: EMERGENCY MEDICINE

## 2017-08-26 RX ORDER — LEVOTHYROXINE SODIUM 0.15 MG/1
150 TABLET ORAL
Status: DISCONTINUED | OUTPATIENT
Start: 2017-08-27 | End: 2017-08-29

## 2017-08-26 RX ORDER — CHOLECALCIFEROL (VITAMIN D3) 125 MCG
500 CAPSULE ORAL DAILY
Status: DISCONTINUED | OUTPATIENT
Start: 2017-08-26 | End: 2017-08-29

## 2017-08-26 RX ORDER — METHYLPREDNISOLONE SODIUM SUCCINATE 40 MG/ML
40 INJECTION, POWDER, LYOPHILIZED, FOR SOLUTION INTRAMUSCULAR; INTRAVENOUS EVERY 8 HOURS
Status: COMPLETED | OUTPATIENT
Start: 2017-08-27 | End: 2017-08-28

## 2017-08-26 RX ORDER — ALPRAZOLAM 0.25 MG/1
0.25 TABLET ORAL 2 TIMES DAILY PRN
Status: DISCONTINUED | OUTPATIENT
Start: 2017-08-26 | End: 2017-08-29

## 2017-08-26 RX ORDER — ALBUTEROL SULFATE 2.5 MG/3ML
2.5 SOLUTION RESPIRATORY (INHALATION)
Status: DISCONTINUED | OUTPATIENT
Start: 2017-08-26 | End: 2017-08-27

## 2017-08-26 RX ORDER — ONDANSETRON 2 MG/ML
4 INJECTION INTRAMUSCULAR; INTRAVENOUS EVERY 4 HOURS PRN
Status: DISCONTINUED | OUTPATIENT
Start: 2017-08-26 | End: 2017-08-29

## 2017-08-26 RX ORDER — POTASSIUM CHLORIDE 20 MEQ/1
40 TABLET, EXTENDED RELEASE ORAL EVERY 4 HOURS
Status: COMPLETED | OUTPATIENT
Start: 2017-08-26 | End: 2017-08-27

## 2017-08-26 RX ORDER — PHENTERMINE HYDROCHLORIDE 37.5 MG/1
37.5 TABLET ORAL
Status: DISCONTINUED | OUTPATIENT
Start: 2017-08-27 | End: 2017-08-27

## 2017-08-26 RX ORDER — MONTELUKAST SODIUM 10 MG/1
10 TABLET ORAL
Status: DISCONTINUED | OUTPATIENT
Start: 2017-08-27 | End: 2017-08-29

## 2017-08-26 RX ORDER — IPRATROPIUM BROMIDE AND ALBUTEROL SULFATE 2.5; .5 MG/3ML; MG/3ML
3 SOLUTION RESPIRATORY (INHALATION) EVERY 6 HOURS
Status: DISCONTINUED | OUTPATIENT
Start: 2017-08-26 | End: 2017-08-29

## 2017-08-26 RX ORDER — TOPIRAMATE 25 MG/1
50 TABLET ORAL 2 TIMES DAILY
Status: DISCONTINUED | OUTPATIENT
Start: 2017-08-26 | End: 2017-08-29

## 2017-08-26 RX ORDER — HEPARIN SODIUM 5000 [USP'U]/ML
5000 INJECTION, SOLUTION INTRAVENOUS; SUBCUTANEOUS EVERY 8 HOURS SCHEDULED
Status: DISCONTINUED | OUTPATIENT
Start: 2017-08-26 | End: 2017-08-29

## 2017-08-26 RX ORDER — IPRATROPIUM BROMIDE AND ALBUTEROL SULFATE 2.5; .5 MG/3ML; MG/3ML
3 SOLUTION RESPIRATORY (INHALATION) ONCE
Status: COMPLETED | OUTPATIENT
Start: 2017-08-26 | End: 2017-08-26

## 2017-08-26 RX ORDER — SODIUM CHLORIDE 9 MG/ML
INJECTION, SOLUTION INTRAVENOUS CONTINUOUS
Status: ACTIVE | OUTPATIENT
Start: 2017-08-26 | End: 2017-08-26

## 2017-08-26 RX ORDER — METHYLPREDNISOLONE SODIUM SUCCINATE 125 MG/2ML
125 INJECTION, POWDER, LYOPHILIZED, FOR SOLUTION INTRAMUSCULAR; INTRAVENOUS ONCE
Status: COMPLETED | OUTPATIENT
Start: 2017-08-26 | End: 2017-08-26

## 2017-08-26 RX ORDER — BUDESONIDE 0.5 MG/2ML
0.5 INHALANT ORAL 2 TIMES DAILY
Status: DISCONTINUED | OUTPATIENT
Start: 2017-08-26 | End: 2017-08-28

## 2017-08-26 RX ORDER — FUROSEMIDE 20 MG/1
20 TABLET ORAL DAILY
Status: DISCONTINUED | OUTPATIENT
Start: 2017-08-26 | End: 2017-08-29

## 2017-08-26 NOTE — ED INITIAL ASSESSMENT (HPI)
C/o SOB,fevers,dry cough since 8/8/17 and has had numerous tests and placed an on several courses of antibiotics. C/o worsening SOB the past day and was told to come here.

## 2017-08-27 ENCOUNTER — APPOINTMENT (OUTPATIENT)
Dept: CT IMAGING | Facility: HOSPITAL | Age: 52
DRG: 202 | End: 2017-08-27
Attending: INTERNAL MEDICINE
Payer: COMMERCIAL

## 2017-08-27 LAB
ATRIAL RATE: 76 BPM
GLUCOSE BLD-MCNC: 132 MG/DL (ref 65–99)
GLUCOSE BLD-MCNC: 138 MG/DL (ref 65–99)
GLUCOSE BLD-MCNC: 143 MG/DL (ref 65–99)
GLUCOSE BLD-MCNC: 160 MG/DL (ref 65–99)
P AXIS: 73 DEGREES
P-R INTERVAL: 158 MS
POTASSIUM SERPL-SCNC: 5.1 MMOL/L (ref 3.6–5.1)
Q-T INTERVAL: 420 MS
QRS DURATION: 98 MS
QTC CALCULATION (BEZET): 472 MS
R AXIS: 38 DEGREES
RESPIRATORY PANEL NEG:: NEGATIVE
T AXIS: 72 DEGREES
VENTRICULAR RATE: 76 BPM

## 2017-08-27 PROCEDURE — 5A09357 ASSISTANCE WITH RESPIRATORY VENTILATION, LESS THAN 24 CONSECUTIVE HOURS, CONTINUOUS POSITIVE AIRWAY PRESSURE: ICD-10-PCS | Performed by: HOSPITALIST

## 2017-08-27 PROCEDURE — 99232 SBSQ HOSP IP/OBS MODERATE 35: CPT | Performed by: HOSPITALIST

## 2017-08-27 PROCEDURE — 71275 CT ANGIOGRAPHY CHEST: CPT | Performed by: INTERNAL MEDICINE

## 2017-08-27 RX ORDER — ALBUTEROL SULFATE 2.5 MG/3ML
2.5 SOLUTION RESPIRATORY (INHALATION) EVERY 4 HOURS PRN
Status: DISCONTINUED | OUTPATIENT
Start: 2017-08-27 | End: 2017-08-29

## 2017-08-27 NOTE — ED PROVIDER NOTES
Patient Seen in: [facility] 5nw-a    History   Patient presents with:  Dyspnea ROYA SOB (respiratory)    Stated Complaint: dyspnea    HPI    Patient is a 15-year-old female with extensive medical history as noted below including asthma and also a his • Obesity 8/31/11   • Pneumonia, organism unspecified(486)    • Prediabetes    • Problems with swallowing    • Shortness of breath    • Thyroid cancer ([facility] 2008    s/p PERRY, stage 1   • Unspecified sleep apnea PSG 7-3-14    PSG 7-3-14 AHI 15 RDI 15 REM exam. Upper and lower                esophageal biopsies taken (-) eosinophilic                esophagitis, and empiric dilation to 57 [place]   8/28/2014: UPPER GI ENDOSCOPY,BIOPSY N/A      Comment: Procedure: ESOPHAGOGASTRODUODENOSCOPY,                COL mouth 2 (two) times daily. Ascorbic Acid (VITAMIN C OR),  Take 1 tablet by mouth daily. alprazolam 0.25 MG Oral Tab,  Take 1 tablet (0.25 mg total) by mouth 2 (two) times daily as needed for Anxiety.    Vitamin B-12 500 MCG Oral Tab,  Take 1 tablet (500 Comment: OCCASIONAL less than once a week      Review of Systems    Positive for stated complaint: dyspnea  Other systems are as noted in HPI. Constitutional and vital signs reviewed. All other systems reviewed and negative except as noted above. (14) - Abnormal; Notable for the following:        Result Value    Creatinine 1.07 (*)     Potassium 3.5 (*)     All other components within normal limits   POCT GLUCOSE - Abnormal; Notable for the following:     POC Glucose 158 (*)     All other component did state by her pulmonologist office that she would require admission to the hospital if ongoing symptoms persisted despite outpatient treatment.   Dr. Kvng Rosas was notified from the emergency room and was in agreement with the need for admission at this t

## 2017-08-27 NOTE — RESPIRATORY THERAPY NOTE
DARSHANA - Equipment Use Daily Summary:  · Set Mode CFLEX  · Usage in hours: 2:46  · 90% Pressure (EPAP) level:   · 90% Insp Pressure (IPAP): 6.6  · AHI: 5  · Supplemental Oxygen:  · Comments:

## 2017-08-27 NOTE — PLAN OF CARE
PAIN - ADULT    • Verbalizes/displays adequate comfort level or patient's stated pain goal Progressing        Patient/Family Goals    • Patient/Family Long Term Goal Progressing    • Patient/Family Short Term Goal Progressing        RESPIRATORY - ADULT pain in female     Bloating     Bronchitis     H1N1 influenza     Other follow-up examination(V67.59)     DARSHANA (obstructive sleep apnea)     Diarrhea     Breast nodule     Thyroid cancer ([facility]     Nipple discharge     Discharge from left nipple     Abnormal

## 2017-08-27 NOTE — CONSULTS
Pulmonary Consult     Assessment / Plan:  1. Dyspnea, wheezing - likely asthma exacerbation and VCD, but given no improvement with usual treatment and history of MTHFR gene mutation will eval for PE  - CTA chest  - check RVP  2.  Asthma exacerbation  - IV s being treated   • IBS (irritable bowel syndrome)    • Kidney stones    • Lipid screening 8/5/2011   • Migraines    • MTHFR (methylene THF reductase) deficiency and homocystinuria (HCC)    • Muscle weakness    • Obesity 8/31/11   • Pneumonia, organism unspe ENDOSCOPY,SARAH CHIU GUIDE      Comment: Procedure: ;  Surgeon: Brando Borges MD;                Location: [address] [place] [address]  8/2014: UPPER GI ENDOSCOPY - REFERRAL      Comment: otherwise unremarkable exam. Upper and lower                esophage Unknown time   Sulfamethoxazole-TMP DS (BACTRIM DS) 800-160 MG Oral Tab per tablet Take 1 tablet by mouth 2 (two) times daily.  Disp: 20 tablet Rfl: 0 8/26/2017 at Unknown time   Levothyroxine Sodium 150 MCG Oral Tab Take 1 tablet (150 mcg total) by mouth d Potassium 50 MG Oral Powd Pack Take by mouth. Disp:  Rfl:  More than a month at Unknown time   Ipratropium Bromide (ATROVENT) 0.03 % Nasal Solution 2 sprays by Nasal route every 12 (twelve) hours.  Disp: 3 Bottle Rfl: 1 More than a month at Unknown time   f TABLETS (=1500MG)   DAILY Disp: 180 tablet Rfl: 0   Naltrexone-Bupropion HCl ER (CONTRAVE) 8-90 MG Oral Tablet 12 Hr Take 2 tablets by mouth 2 (two) times daily.  Disp: 70 tablet Rfl: 0   FUROSEMIDE 20 MG Oral Tab TAKE 1 TABLET ONCE DAILY Disp: 90 tablet Rf Drug use: No    Sexual activity: Not on file     Other Topics Concern    Caffeine Concern Yes    Comment: daily    Exercise Yes    Comment: walks     Social History Narrative   None on file       Family History   Problem Relation Age of Onset   • Hypert

## 2017-08-27 NOTE — PROGRESS NOTES
[facility] H[facility]PITAL[facility]  Progress Note     [address] Patient Status:  Inpatient    1965 MRN [id number]   [facility] 5NW-A Attending Missael Fox MD   Hosp Day # 1 PCP Velma Rodrigues MD     Chief Complaint: SOB    S: Patient still f topiramate  50 mg Oral BID   • Vitamin B-12  500 mcg Oral Daily   • Heparin Sodium (Porcine)  5,000 Units Subcutaneous Q8H [address]   • ipratropium-albuterol  3 mL Nebulization Q6H   • MethylPREDNISolone Sodium Succ  40 mg Intravenous Q8H       ASSESSMENT / RAJAT

## 2017-08-27 NOTE — PROGRESS NOTES
08/27/17 1348   Clinical Encounter Type   Visited With Patient   Routine Visit Introduction   Continue Visiting No   Hinduism Encounters   Spiritual Requests During Visit / Hospitalization [address]

## 2017-08-27 NOTE — H&P
[facility] HOSPITALIST  History and Physical     [facility] Duet Book Patient Status:  Emergency    1965 MRN [id number]   Location [address] [facility] Attending Radha Sorensen MD   Hosp Day # 0 PCP Vern Laboy MD     Chief Complaint: ENDOSCOPY  8/28/2014: COLONOSCOPY,BIOPSY      Comment: Procedure: ;  Surgeon: Eloina Vega MD;                Location: [address] [place] [address]  8/28/2014: [facility] [place]      Comment: Procedure: ;  Surgeon: Eloina Vega MD; family hx   • CHF[other] [OTHER] Other      family hx   • Diabetes Other      family hx   • Hypertension Mother    • Other[other] Elza Spatz Mother    • thyroid nodules[other] [OTHER] Mother    • nephrolithiasis[other] [OTHER] Other      family hx   • Rhae Pillion Rfl:    Fish Oil-Cholecalciferol (FISH OIL + D3) 5268-5019 MG-UNIT Oral Cap Take 2 tablets by mouth. Disp:  Rfl:    Diclofenac Potassium 50 MG Oral Powd Pack Take by mouth.  Disp:  Rfl:    Albuterol Sulfate HFA (PROAIR HFA) 108 (90 Base) MCG/ACT Inhalation 5000) 5 MG Oral Cap Take 10,000 Units by mouth daily. Takes 3 tabs daily  Disp:  Rfl:    Calcium Carbonate-Vitamin D (CALCIUM 500/D) 500-125 MG-UNIT Oral Tab Take 1 Tab by mouth daily.  Disp:  Rfl:        Review of Systems:   A comprehensive 14 point review laryngeal nerve  -Chest x-ray without evidence of pneumonia  -Vital signs stable  -Failed outpatient treatment with Bactrim Z-Howard prednisone and Advair for possible acute bronchitis  -ENT  and speech therapy referral  -Continue IV Solu-Medrol and frequent

## 2017-08-28 ENCOUNTER — PATIENT MESSAGE (OUTPATIENT)
Dept: FAMILY MEDICINE CLINIC | Facility: CLINIC | Age: 52
End: 2017-08-28

## 2017-08-28 LAB
GLUCOSE BLD-MCNC: 116 MG/DL (ref 65–99)
GLUCOSE BLD-MCNC: 133 MG/DL (ref 65–99)
GLUCOSE BLD-MCNC: 142 MG/DL (ref 65–99)
GLUCOSE BLD-MCNC: 163 MG/DL (ref 65–99)

## 2017-08-28 PROCEDURE — 99232 SBSQ HOSP IP/OBS MODERATE 35: CPT | Performed by: HOSPITALIST

## 2017-08-28 RX ORDER — BUDESONIDE 0.5 MG/2ML
0.5 INHALANT ORAL 2 TIMES DAILY
Status: DISCONTINUED | OUTPATIENT
Start: 2017-08-28 | End: 2017-08-29

## 2017-08-28 RX ORDER — PREDNISONE 20 MG/1
40 TABLET ORAL
Status: DISCONTINUED | OUTPATIENT
Start: 2017-08-29 | End: 2017-08-29

## 2017-08-28 RX ORDER — BENZONATATE 200 MG/1
200 CAPSULE ORAL 3 TIMES DAILY
Status: DISCONTINUED | OUTPATIENT
Start: 2017-08-28 | End: 2017-08-29

## 2017-08-28 NOTE — SLP NOTE
Voice EVALUATION - INPATIENT    Admission Date: 8/26/2017  Evaluation Date: 08/28/17    Reason for Referral: Other (Comment) (VCD)    ASSESSMENT & PLAN   ASSESSMENT    Speech therapy/voice eval ordered to assess possible VCD.  Pt has had progressive dyspnea with speech therapy in the past for her voice, however has forgotten the exercises. She participated in a FEES at that time which revealed a delayed swallow and retention, however no penetration or aspiration.   She reportedly tolerates a regular diet with and agree on the findings and goals.         FOLLOW UP  Treatment Plan:  (VCD exercises)  Number of Visits to Meet Established Goals: 2  Follow Up Needed: Yes  SLP Follow-up Date: 08/29/17    Thank you for your referral.  If you have any questions please co

## 2017-08-28 NOTE — RESPIRATORY THERAPY NOTE
DARSHANA - Equipment Use Daily Summary:                  . Set Mode:  CPAP WITH C-FLEX                . Usage in hours: 7:29                . 90% Pressure (EPAP) level: 10                . 90% Insp. Pressure (IPAP): Conor Hernández AHI: 1.8                .  Sup

## 2017-08-28 NOTE — PLAN OF CARE
DISCHARGE PLANNING    • Discharge to home or other facility with appropriate resources Progressing        METABOLIC/FLUID AND ELECTROLYTES - ADULT    • Glucose maintained within prescribed range Progressing        PAIN - ADULT    • Verbalizes/displays adeq 30-39. 9)     Other malaise and fatigue     Malignant neoplasm of thyroid gland (HCC)     Irritable bowel syndrome     Allergic rhinitis     Hypoglycemia     Other and unspecified coagulation defects     Anxiety     Hypokalemia     Fatigue     Acute sinusit

## 2017-08-28 NOTE — PAYOR COMM NOTE
--------------  ADMISSION REVIEW     Payor: [address]PO  Subscriber #:  [id number]  Authorization Number: N/A    Admit date: 8/26/17  Admit time: 2034       Admitting Physician: Francisco Javier Sahu MD  Attending Physician:  Adan Cuevas MD  Primary aPTT Heparin Therapeutic Range is approximately 65- 104 seconds. The therapeutic range has been validated against 0.3-0.7 heparin anti-Xa units/mL. LDH - Normal   CBC W/ DIFFERENTIAL - Normal   CBC WITH DIFFERENTIAL WITH PLATELET    Narrative:      The Nebulization Emma Nielsen, RCYAKOV      budesonide (PULMICORT) 0.5 MG/2ML nebulizer solution 0.5 mg     Date Action Dose Route User    8/28/2017 0730 Given 0.5 mg Nebulization Dhaval Mitchell RCP      furosemide (LASIX) tab 20 mg     Date Action Dose Route U 62.5 MCG/INH inhaler 1 puff     Date Action Dose Route User    8/27/2017 1211 Given 1 puff Inhalation Javon JENSEN RCP      Vitamin B-12 (VITAMIN B12) tab 500 mcg     Date Action Dose Route User    8/27/2017 3633 Given 500 mcg Oral Ava Erwin,

## 2017-08-28 NOTE — PROGRESS NOTES
Pulmonary Progress Note        NAME: [address] [facility]: 720/330-O - MRN: [id number] - Age: 46year old - : 1965        SUBJECTIVE: No events overnight, states that she doesn't feel any better than yesterday, still bothered by coughing, denies Recent Labs   08/26/17  1707   ALT 39   AST 19   ALB 3.9          Invalid input(s): ARTERIALPH, ARTERIALPO2, ARTERIALPCO2, ARTERIALHCO3    No results for input(s): BNP in the last 72 hours.     Invalid input(s): TROPI      INR   Date/Time Va 4.7 3.6 - 5.1 g/dL Final   ----------      Imaging: CTA reviewed- no PE noted, clear lung fields save for small nodule in left lower lobe that is stable    ASSESSMENT/PLAN:  1.  Dyspnea, wheezing - suspect VCD  -CTA negative, RVP negative  -off O2  -cont tr

## 2017-08-28 NOTE — TELEPHONE ENCOUNTER
FYI patient admitted to [address] [place] [address]     Time started: 1318    Time ended: 1319    Total time spent on chart: 1 min

## 2017-08-28 NOTE — PROGRESS NOTES
[facility]  Progress Note     [address] Patient Status:  Inpatient    1965 MRN [id number]   [facility] 5NW-A Attending Fidel New MD   Hosp Day # 2 PCP Maria A Choi MD     Chief Complaint: SOB    S: still with SOB Oral Daily   • Umeclidinium Bromide  1 puff Inhalation Daily   • topiramate  50 mg Oral BID   • Vitamin B-12  500 mcg Oral Daily   • Heparin Sodium (Porcine)  5,000 Units Subcutaneous Q8H [facility]   • ipratropium-albuterol  3 mL Nebulization Q6H   • MethylPREDN

## 2017-08-28 NOTE — PROGRESS NOTES
Allergy RN to notify:  -Immunity looks excellent.  She does not have CVID or other immunodeficiency syndrome and is not a candidate for antibody replacement therapy.   -No further immunology testing recommended at this time  -Likely needs ENT and speech the

## 2017-08-28 NOTE — CM/SW NOTE
08/28/17 1600   CM/SW Screening   Referral Source Social Work (self-referral)   [address] staff; Chart review;Nursing rounds   Patient's Mental Status Alert;Oriented   Patient lives with Spouse   Patient Status Prior to Admission   Independe

## 2017-08-28 NOTE — TELEPHONE ENCOUNTER
From: Earlene Neves Book  To: Claire Zhao  Sent: 8/28/2017 9:48 AM CDT  Subject: Visit Follow-up Question    April RN,  Please let Sonia Martines know that I was admitted into [facility] on Saturday. Thank you.    Marbin Mckeon

## 2017-08-29 ENCOUNTER — PATIENT MESSAGE (OUTPATIENT)
Dept: FAMILY MEDICINE CLINIC | Facility: CLINIC | Age: 52
End: 2017-08-29

## 2017-08-29 VITALS
OXYGEN SATURATION: 100 % | DIASTOLIC BLOOD PRESSURE: 82 MMHG | RESPIRATION RATE: 18 BRPM | BODY MASS INDEX: 29 KG/M2 | TEMPERATURE: 98 F | SYSTOLIC BLOOD PRESSURE: 118 MMHG | WEIGHT: 162 LBS | HEART RATE: 91 BPM

## 2017-08-29 LAB
GLUCOSE BLD-MCNC: 78 MG/DL (ref 65–99)
GLUCOSE BLD-MCNC: 93 MG/DL (ref 65–99)

## 2017-08-29 PROCEDURE — 99239 HOSP IP/OBS DSCHRG MGMT >30: CPT | Performed by: HOSPITALIST

## 2017-08-29 RX ORDER — BENZONATATE 200 MG/1
200 CAPSULE ORAL 3 TIMES DAILY
Qty: 50 CAPSULE | Refills: 0 | Status: SHIPPED | OUTPATIENT
Start: 2017-08-29 | End: 2017-12-12

## 2017-08-29 RX ORDER — PREDNISONE 20 MG/1
40 TABLET ORAL
Qty: 8 TABLET | Refills: 0 | Status: SHIPPED | OUTPATIENT
Start: 2017-08-29 | End: 2017-09-05

## 2017-08-29 RX ORDER — PREDNISONE 20 MG/1
40 TABLET ORAL
Qty: 4 TABLET | Refills: 0 | Status: SHIPPED | OUTPATIENT
Start: 2017-08-29 | End: 2017-08-29

## 2017-08-29 NOTE — PLAN OF CARE
DISCHARGE PLANNING    • Discharge to home or other facility with appropriate resources Adequate for Discharge        METABOLIC/FLUID AND ELECTROLYTES - ADULT    • Glucose maintained within prescribed range Adequate for Discharge        PAIN - ADULT    • Ve

## 2017-08-29 NOTE — RESPIRATORY THERAPY NOTE
DARSHANA Equipment Usage Summary :            Set Mode :CPAP          Usage in Hours:7;39          90% Pressure (EPAP) :  10          90% Insp Pressure (IPAP);           AHI : 1.9          Supplemental Oxygen :      LPM

## 2017-08-29 NOTE — PROGRESS NOTES
NURSING DISCHARGE NOTE    Discharged Home via Wheelchair. Accompanied by Support staff  Belongings Taken by patient/family. Pt sent home with personal belongings and stable vital signs. Pt given discharge instructions and verbalizes understanding.

## 2017-08-29 NOTE — PROGRESS NOTES
[facility] Patient Status:  Inpatient    1965 MRN [id number]   [facility] Taylor Carpenter MD   Hosp Day # 3 PCP Brina Khan MD     SUBJECTIVE: No acute events overnight.   This morning pt state Exam:                          General: alert, cooperative, in NAD                          HEENT: oropharynx clear without erythema or exudates, moist mucous membranes                          Lungs: Clear to auscultation bilaterally, no wheezes or crackl clinic in 2-3 weeks     Santhosh Lockwood MD  8/29/2017  10:14 AM

## 2017-08-29 NOTE — PLAN OF CARE
DISCHARGE PLANNING    • Discharge to home or other facility with appropriate resources Progressing        METABOLIC/FLUID AND ELECTROLYTES - ADULT    • Glucose maintained within prescribed range Progressing        PAIN - ADULT    • Verbalizes/displays adeq thyroid gland (HCC)     Irritable bowel syndrome     Allergic rhinitis     Hypoglycemia     Other and unspecified coagulation defects     Anxiety     Hypokalemia     Fatigue     Acute sinusitis     Hyperlipidemia     Unspecified endocrine disorder     Fore

## 2017-08-29 NOTE — DISCHARGE SUMMARY
[facility]OSPITAL[facility]  DISCHARGE SUMMARY     Jennifer Ayala Book Patient Status:  Inpatient    1965 MRN [id number]   [facility] 5NW-A Attending Prisca Rios MD   Hosp Day # 3 PCP Horacio Dudley MD     Date of Admission: 2017  Date of results pending at Discharge:   · none    Consultants:  • Pulmonary    Discharge Medication List:     Discharge Medications      START taking these medications      Instructions Prescription details   predniSONE 20 MG Tabs  Commonly known as:  Tavon Laurent tablet  Refills:  0     BIOTIN 5000 5 MG Caps  Generic drug:  Biotin      Take 10,000 Units by mouth daily.  Takes 3 tabs daily   Refills:  0     CALCIUM 500/VITAMIN D 500-125 MG-UNIT Tabs  Generic drug:  Calcium Carbonate-Vitamin D      Take 1 Tab by mouth times daily. Quantity:  180 tablet  Refills:  1     Vitamin B-12 500 MCG Tabs  Commonly known as:  VITAMIN B12      Take 1 tablet (500 mcg total) by mouth daily. Quantity:  10 tablet  Refills:  0     VITAMIN C OR      Take 1 tablet by mouth daily.    Re

## 2017-08-29 NOTE — SLP NOTE
SPEECH DAILY NOTE - INPATIENT    Evaluation Date: 08/29/17    ASSESSMENT & PLAN   ASSESSMENT  Pt seen for follow up voice therapy session. Pt reportedly feeling better and plans to d/c home later today.  Voice was perceptually stronger today as compared to Latrice Alains.  Silvia Llanes M.S.,CCC-SLP  Speech Language Pathologist

## 2017-08-29 NOTE — TELEPHONE ENCOUNTER
From: Bre Lambert Book  To: Paula Crenshaw MD  Sent: 8/29/2017 4:02 PM CDT  Subject: Other    I am scheduled for a echo cardio gram on the 31st. I was just released from the hospital today. They dx'd me with VCD (vocal cord dysfunction).  They did a echo in

## 2017-08-30 ENCOUNTER — PATIENT OUTREACH (OUTPATIENT)
Dept: CASE MANAGEMENT | Age: 52
End: 2017-08-30

## 2017-08-30 DIAGNOSIS — J45.901 ASTHMA WITH ACUTE EXACERBATION, UNSPECIFIED ASTHMA SEVERITY: ICD-10-CM

## 2017-08-30 DIAGNOSIS — J38.3 VOCAL CORD DYSFUNCTION: Chronic | ICD-10-CM

## 2017-08-30 DIAGNOSIS — J98.01 ACUTE BRONCHOSPASM: ICD-10-CM

## 2017-08-30 NOTE — PROGRESS NOTES
Initial Post Discharge Follow Up   Discharge Date: 8/29/17  Contact Date: 8/30/2017    Consent Verification:  Assessment Completed With: Patient  HIPAA Verified?   Yes    Discharge Dx:    Asthma exacerbation, vocal cord dysfunction     General:   • How h by mouth. Disp:  Rfl:    Albuterol Sulfate HFA (PROAIR HFA) 108 (90 Base) MCG/ACT Inhalation Aero Soln Inhale into the lungs every 6 (six) hours as needed for Wheezing.  Disp:  Rfl:    Levothyroxine Sodium 150 MCG Oral Tab Take 1 tablet (150 mcg total) by m discussed? yes  o Do you have any questions about your new medication? No  • Did you  your discharge medications when you left the hospital? Yes  • May I go over your medications with you to make sure we are not missing anything? yes  • Are there any ENDOCRINOLOGY ([facility])    Dec 18, 2017 10:45 AM CST FOLLOW UP with Abraham Pace MD [address] PULMONARY DEPT ([facility])        [address] AT [facility]

## 2017-08-31 ENCOUNTER — HOSPITAL ENCOUNTER (OUTPATIENT)
Dept: CV DIAGNOSTICS | Age: 52
Discharge: HOME | End: 2017-08-31
Attending: INTERNAL MEDICINE
Payer: COMMERCIAL

## 2017-08-31 DIAGNOSIS — E66.9 OBESITY (BMI 30-39.9): ICD-10-CM

## 2017-08-31 DIAGNOSIS — Z51.81 ENCOUNTER FOR THERAPEUTIC DRUG MONITORING: ICD-10-CM

## 2017-08-31 PROCEDURE — 93306 TTE W/DOPPLER COMPLETE: CPT | Performed by: INTERNAL MEDICINE

## 2017-09-05 ENCOUNTER — OFFICE VISIT (OUTPATIENT)
Dept: FAMILY MEDICINE CLINIC | Facility: CLINIC | Age: 52
End: 2017-09-05

## 2017-09-05 VITALS
BODY MASS INDEX: 29.77 KG/M2 | WEIGHT: 168 LBS | SYSTOLIC BLOOD PRESSURE: 120 MMHG | DIASTOLIC BLOOD PRESSURE: 70 MMHG | OXYGEN SATURATION: 98 % | HEIGHT: 63 IN | RESPIRATION RATE: 18 BRPM | TEMPERATURE: 98 F | HEART RATE: 80 BPM

## 2017-09-05 DIAGNOSIS — R06.02 SHORTNESS OF BREATH: ICD-10-CM

## 2017-09-05 DIAGNOSIS — J45.901 ASTHMA EXACERBATION: ICD-10-CM

## 2017-09-05 DIAGNOSIS — F43.9 STRESS: ICD-10-CM

## 2017-09-05 DIAGNOSIS — J38.3 VOCAL CORD DYSFUNCTION: Primary | ICD-10-CM

## 2017-09-05 PROCEDURE — 99495 TRANSJ CARE MGMT MOD F2F 14D: CPT | Performed by: INTERNAL MEDICINE

## 2017-09-05 RX ORDER — ALPRAZOLAM 0.25 MG/1
0.25 TABLET ORAL 2 TIMES DAILY PRN
Qty: 30 TABLET | Refills: 0 | Status: SHIPPED | OUTPATIENT
Start: 2017-09-05 | End: 2018-03-19

## 2017-09-05 NOTE — PROGRESS NOTES
HPI:    Karl Valdes is a 46year old female here today for hospital follow up.    She was discharged from Inpatient hospital, [facility] to Home   Admission Date: 8/26/17   Discharge Date: 8/29/17  Hospital Discharge Diagnosis: No Value exists f into the lungs daily. budesonide 0.5 MG/2ML Inhalation Suspension Take 2 mL (0.5 mg total) by nebulization 2 (two) times daily.    albuterol sulfate (2.5 MG/3ML) 0.083% Inhalation Nebu Soln Take 3 mL (2.5 mg total) by nebulization every 6 (six) hours as n Back problem; Blood disorder (2003); Cancer ([facility] Disorder of thyroid; Dyspnea (8/16/2016); Esophageal reflux; Exposure to radiation; Extrinsic asthma, unspecified; GENITO-URINARY DISEASE; History of blood transfusion (9/16/2016);  Hypertension; IBS (irrit smoked. She has never used smokeless tobacco. She reports that she drinks about 0.5 - 1.0 oz of alcohol per week . She reports that she does not use drugs. ROS:   Review of Systems   Constitutional: Negative for fever. HENT: Negative for rhinorrhea. visit    Other orders  -     ALPRAZolam 0.25 MG Oral Tab; Take 1 tablet (0.25 mg total) by mouth 2 (two) times daily as needed for Anxiety.       Meds & Refills for this Visit:  Signed Prescriptions Disp Refills    ALPRAZolam 0.25 MG Oral Tab 30 tablet 0

## 2017-09-06 NOTE — PAYOR COMM NOTE
--------------  DISCHARGE REVIEW    Payor: [address]  Subscriber #:  [id number]  Authorization Number: [id number]    Admit date: 8/26/17  Admit time:  2034  Discharge Date: 8/29/2017  2:09 PM     Admitting Physician: Dequan Vincent MD  Attending her nebs there with some improvement. 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 She was seen by pulmonary APN and her PCP's APN without good improvement after being treated with Bactrim z pack and Prednison Wheezing. Refills:  0     albuterol sulfate (2.5 MG/3ML) 0.083% Nebu  Commonly known as:  VENTOLIN      Take 3 mL (2.5 mg total) by nebulization every 6 (six) hours as needed for Wheezing.    Quantity:  1 Box  Refills:  3     SPIRIVA RESPIMAT 1.25 MCG/ACT TAKE 2 TABLETS (=1500MG)   DAILY   Quantity:  180 tablet  Refills:  0     Naltrexone-Bupropion HCl ER 8-90 MG Tb12  Commonly known as:  CONTRAVE      Take 2 tablets by mouth 2 (two) times daily.    Quantity:  70 tablet  Refills:  0     Phentermine HCl 37.5 extremities. Extremities: No edema.   -----------------------------------------------------------------------------------------------  PATIENT DISCHARGE INSTRUCTIONS: See electronic chart    Errol Everett MD 8/29/2017

## 2017-09-12 ENCOUNTER — PATIENT MESSAGE (OUTPATIENT)
Dept: FAMILY MEDICINE CLINIC | Facility: CLINIC | Age: 52
End: 2017-09-12

## 2017-09-12 NOTE — TELEPHONE ENCOUNTER
Advised pt to contact the office to schedule an OV.     Future Appointments  Date Time Provider Batsheva Hansen   9/13/2017 9:00 AM David Berger, SLP [facility]  [address]   9/18/2017 1:40 PM Gómez Quijano MD [facility] [address]   9/20/2017 8:00 AM

## 2017-09-12 NOTE — TELEPHONE ENCOUNTER
From: [employer]  To: Manjit Novoa MD  Sent: 9/12/2017 2:21 PM CDT  Subject: Other    Dr Keon Thrasher to [facility] event Fri & Sat & tried talking to much. Having extreme difficulty w/ the VCD b'darcie of this. Have appt w/ speech therapist tomorrow.    Have been

## 2017-09-13 ENCOUNTER — HOSPITAL ENCOUNTER (OUTPATIENT)
Dept: SPEECH THERAPY | Facility: HOSPITAL | Age: 52
Setting detail: THERAPIES SERIES
Discharge: HOME | End: 2017-09-13
Attending: INTERNAL MEDICINE
Payer: COMMERCIAL

## 2017-09-13 ENCOUNTER — OFFICE VISIT (OUTPATIENT)
Dept: FAMILY MEDICINE CLINIC | Facility: CLINIC | Age: 52
End: 2017-09-13

## 2017-09-13 VITALS
RESPIRATION RATE: 16 BRPM | SYSTOLIC BLOOD PRESSURE: 122 MMHG | HEIGHT: 63 IN | BODY MASS INDEX: 29.23 KG/M2 | WEIGHT: 165 LBS | DIASTOLIC BLOOD PRESSURE: 76 MMHG | HEART RATE: 88 BPM

## 2017-09-13 DIAGNOSIS — Z51.81 ENCOUNTER FOR THERAPEUTIC DRUG MONITORING: Primary | ICD-10-CM

## 2017-09-13 DIAGNOSIS — R06.02 SHORTNESS OF BREATH: ICD-10-CM

## 2017-09-13 DIAGNOSIS — K21.9 GASTROESOPHAGEAL REFLUX DISEASE, ESOPHAGITIS PRESENCE NOT SPECIFIED: ICD-10-CM

## 2017-09-13 DIAGNOSIS — E66.9 OBESITY (BMI 30-39.9): ICD-10-CM

## 2017-09-13 DIAGNOSIS — J38.3 VOCAL CORD DYSFUNCTION: Chronic | ICD-10-CM

## 2017-09-13 PROCEDURE — 92524 BEHAVRAL QUALIT ANALYS VOICE: CPT

## 2017-09-13 PROCEDURE — 99213 OFFICE O/P EST LOW 20 MIN: CPT | Performed by: INTERNAL MEDICINE

## 2017-09-13 RX ORDER — PHENTERMINE HYDROCHLORIDE 15 MG/1
15 CAPSULE ORAL EVERY MORNING
Qty: 30 CAPSULE | Refills: 2 | Status: SHIPPED | OUTPATIENT
Start: 2017-09-13 | End: 2017-10-13

## 2017-09-13 RX ORDER — PHENTERMINE HYDROCHLORIDE 37.5 MG/1
37.5 TABLET ORAL
Qty: 30 TABLET | Refills: 0 | Status: CANCELLED | OUTPATIENT
Start: 2017-09-13

## 2017-09-13 RX ORDER — PHENTERMINE HYDROCHLORIDE 37.5 MG/1
37.5 TABLET ORAL
COMMUNITY
End: 2018-01-11

## 2017-09-13 NOTE — PROGRESS NOTES
CC: Patient presents with:  Weight Check: up 3 lb       HPI:   Obesity, doing well on phentermine, metformin, contrave and topamax. Some worsening acid reflux.        Current Outpatient Prescriptions:  Phentermine HCl 37.5 MG Oral Tab Take 37.5 mg by mo TAKE 2 TABLETS (=1500MG)   DAILY Disp: 180 tablet Rfl: 0   FUROSEMIDE 20 MG Oral Tab TAKE 1 TABLET ONCE DAILY Disp: 90 tablet Rfl: 1   Ipratropium Bromide (ATROVENT) 0.03 % Nasal Solution 2 sprays by Nasal route every 12 (twelve) hours.  Disp: 3 Bottle Rfl: Shortness of breath    • [facility] 2008    s/p PERRY, stage 1   • Unspecified sleep apnea PSG 7-3-14    PSG 7-3-14 AHI 15 RDI 15 REM AHI 20 SaO2 payal 91%   • Visual impairment     just glasses for reading      Patient Active Problem List:     Uns type     Iron deficiency     Normocytic anemia     Recurrent sinus infections     Vitamin D deficiency     Lung nodule     Vocal cord dysfunction     Hypogammaglobulinemia ([address] mild at 658mg/dL with normal specific antibody titers.      Acute bronchospasm

## 2017-09-13 NOTE — PROGRESS NOTES
VOCAL CORD DYSFUNCTION EVALUATION  Referring Physician: Dr. Vita Perez  Diagnosis: Vocal Cord Dysfunction (J38.3)   Date of Service: 9/13/2017   Voice evaluation completed per MD order.   Patient arrived to session on time and participated actively during eval include difficulty breathing and speaking at appropriate volume, which negatively impacts participation in IADLs. Patient describes prior level of function as functional with limitations due to breathing problems. Patient did not state goals.   Past medi tablet Rfl: 0   benzonatate 200 MG Oral Cap Take 1 capsule (200 mg total) by mouth 3 (three) times daily.  Disp: 50 capsule Rfl: 0   MONTELUKAST SODIUM 10 MG Oral Tab TAKE 1 TABLET DAILY Disp: 90 tablet Rfl: 3   Tiotropium Bromide Monohydrate (SPIRIVA RESPI daily. Disp: 10 tablet Rfl: 0   Linaclotide (LINZESS) 145 MCG Oral Cap Take 145 mcg by mouth daily. Disp:  Rfl:    Acidophilus/Pectin Oral Cap Take 2 capsules by mouth 2 (two) times daily with meals.  Disp:  Rfl:    B Complex-C (SUPER B COMPLEX) Oral Tab Ta sequence. Patient was provided with a home exercise program (HEP) to increase rehab potential.        Charges: Eval x1, [id number]; Treatment 38556. Total Treatment Time: 60 min   PLAN  Recommendations:   1.  Patient will be seen 6x for voice therapy over the du my care.     X___________________________________________________ Date____________________    Certification From: 1/73/9801  To:12/12/2017

## 2017-09-21 ENCOUNTER — HOSPITAL ENCOUNTER (OUTPATIENT)
Dept: SPEECH THERAPY | Facility: HOSPITAL | Age: 52
Setting detail: THERAPIES SERIES
Discharge: HOME | End: 2017-09-21
Attending: INTERNAL MEDICINE
Payer: COMMERCIAL

## 2017-09-21 PROCEDURE — 92507 TX SP LANG VOICE COMM INDIV: CPT

## 2017-09-21 NOTE — PROGRESS NOTES
Treatment #2 (PPO; PN due 12/15/17)   Treatment Time: 60 minutes  Precautions: none     Charges: 1 billed (15332)  Pain: 0/10      Diagnosis: VCD (J38.3)      Subjective: Patient arrived to session on time.   She stated she was feeling significant SOB due t

## 2017-09-22 ENCOUNTER — LAB ENCOUNTER (OUTPATIENT)
Dept: LAB | Age: 52
End: 2017-09-22
Attending: INTERNAL MEDICINE
Payer: COMMERCIAL

## 2017-09-22 DIAGNOSIS — E89.0 POSTOPERATIVE HYPOTHYROIDISM: ICD-10-CM

## 2017-09-22 LAB
FREE T4: 1.1 NG/DL (ref 0.9–1.8)
TSI SER-ACNC: 24.1 MIU/ML (ref 0.35–5.5)

## 2017-09-22 PROCEDURE — 86800 THYROGLOBULIN ANTIBODY: CPT

## 2017-09-22 PROCEDURE — 84432 ASSAY OF THYROGLOBULIN: CPT

## 2017-09-22 PROCEDURE — 84439 ASSAY OF FREE THYROXINE: CPT

## 2017-09-22 PROCEDURE — 36415 COLL VENOUS BLD VENIPUNCTURE: CPT

## 2017-09-22 PROCEDURE — 84443 ASSAY THYROID STIM HORMONE: CPT

## 2017-09-24 LAB
THYROGLOBULIN AB: <0.9 IU/ML
THYROGLOBULIN, SERUM OR PLASMA: 0.2 NG/ML

## 2017-09-27 ENCOUNTER — HOSPITAL ENCOUNTER (OUTPATIENT)
Dept: SPEECH THERAPY | Facility: HOSPITAL | Age: 52
Setting detail: THERAPIES SERIES
Discharge: HOME | End: 2017-09-27
Attending: INTERNAL MEDICINE
Payer: COMMERCIAL

## 2017-09-27 PROCEDURE — 92507 TX SP LANG VOICE COMM INDIV: CPT

## 2017-09-27 NOTE — PROGRESS NOTES
Treatment #3 (PPO; PN due 12/15/17)   Treatment Time: 60 minutes  Precautions: none     Charges: 1 billed (73649)  Pain: 0/10      Diagnosis: VCD (J38.3)      Subjective: Patient arrived to session on time.   She stated she was feeling very well and that br months). Not addressed due to focus on other goals. Assessment: Patient reports decreased VCD symptoms with use of easy/rescue breathing techniques.      Plan: Continue speech therapy targeting easy/rescue breathing techniques to prevent/remediate VCD/c

## 2017-10-04 ENCOUNTER — HOSPITAL ENCOUNTER (OUTPATIENT)
Dept: SPEECH THERAPY | Facility: HOSPITAL | Age: 52
Setting detail: THERAPIES SERIES
Discharge: HOME | End: 2017-10-04
Attending: INTERNAL MEDICINE
Payer: COMMERCIAL

## 2017-10-04 PROCEDURE — 92507 TX SP LANG VOICE COMM INDIV: CPT

## 2017-10-04 NOTE — PROGRESS NOTES
Treatment #4 (PPO; PN due 12/15/17)   Treatment Time: 60 minutes  Precautions: none     Charges: 1 billed ([id number])  Pain: 0/10      Diagnosis: VCD (J38.3)      Subjective: Patient arrived to session on time.   She stated that she continues to feel better wit techniques to prevent/remediate VCD/coughing episodes.   NEW GOALS:  STG 6: Patient will demonstrate resonant voice during word/sentence level with 80% accuracy given min verbal/visual cues to reduce laryngeal focus and increase oral resonance for speech (3

## 2017-10-11 ENCOUNTER — HOSPITAL ENCOUNTER (OUTPATIENT)
Dept: SPEECH THERAPY | Facility: HOSPITAL | Age: 52
Setting detail: THERAPIES SERIES
Discharge: HOME | End: 2017-10-11
Attending: INTERNAL MEDICINE
Payer: COMMERCIAL

## 2017-10-11 PROCEDURE — 92507 TX SP LANG VOICE COMM INDIV: CPT

## 2017-10-11 NOTE — PROGRESS NOTES
Treatment #5 (PPO; PN due 12/15/17)   Treatment Time: 60 minutes  Precautions: none     Charges: 1 billed ([id number])  Pain: 0/10      Diagnosis: VCD (J38.3)      Subjective: Patient arrived to session on time.   She stated that she has had a very stressful wee symptoms with use of easy/rescue breathing techniques. Plan: Continue speech therapy targeting easy/rescue breathing techniques to prevent/remediate VCD/coughing episodes.

## 2017-10-14 ENCOUNTER — PATIENT MESSAGE (OUTPATIENT)
Dept: FAMILY MEDICINE CLINIC | Facility: CLINIC | Age: 52
End: 2017-10-14

## 2017-10-16 NOTE — TELEPHONE ENCOUNTER
From: Augustine Cline  To: Gianna Giles  Sent: 10/14/2017 8:06 PM CDT  Subject: Non-Urgent Medical Question    Dr Jessika Hood,  I am scheduled w/ my husbands [employer] health fair on Wednesday (10/18).  I want to confirm w/ you because of my health w

## 2017-10-18 ENCOUNTER — APPOINTMENT (OUTPATIENT)
Dept: SPEECH THERAPY | Facility: HOSPITAL | Age: 52
End: 2017-10-18
Attending: INTERNAL MEDICINE
Payer: COMMERCIAL

## 2017-10-26 ENCOUNTER — APPOINTMENT (OUTPATIENT)
Dept: SPEECH THERAPY | Facility: HOSPITAL | Age: 52
End: 2017-10-26
Attending: INTERNAL MEDICINE
Payer: COMMERCIAL

## 2017-11-08 ENCOUNTER — HOSPITAL ENCOUNTER (OUTPATIENT)
Dept: SPEECH THERAPY | Facility: HOSPITAL | Age: 52
Setting detail: THERAPIES SERIES
Discharge: HOME | End: 2017-11-08
Attending: INTERNAL MEDICINE
Payer: COMMERCIAL

## 2017-11-08 PROCEDURE — 92507 TX SP LANG VOICE COMM INDIV: CPT

## 2017-11-08 NOTE — PROGRESS NOTES
Treatment #6 (PPO; PN due 12/15/17)   Treatment Time: 60 minutes  Precautions: none     Charges: 1 billed ([id number])  Pain: 0/10      Diagnosis: VCD (J38.3)      Subjective: Patient returned to therapy after a few missed sessions.   She stated that she has bee bisyllabic words and /m/ sentences with 60% accuracy independently increasing to 80% given self-correction and min verbal/visual cues. Assessment: Patient continues to report decreased VCD symptoms with use of easy/rescue breathing techniques.      Plan:

## 2017-11-10 ENCOUNTER — APPOINTMENT (OUTPATIENT)
Dept: LAB | Age: 52
End: 2017-11-10
Attending: INTERNAL MEDICINE
Payer: COMMERCIAL

## 2017-11-10 DIAGNOSIS — E89.0 POSTOPERATIVE HYPOTHYROIDISM: ICD-10-CM

## 2017-11-10 PROCEDURE — 36415 COLL VENOUS BLD VENIPUNCTURE: CPT

## 2017-11-10 PROCEDURE — 84443 ASSAY THYROID STIM HORMONE: CPT

## 2017-11-15 ENCOUNTER — APPOINTMENT (OUTPATIENT)
Dept: SPEECH THERAPY | Facility: HOSPITAL | Age: 52
End: 2017-11-15
Payer: COMMERCIAL

## 2017-11-24 ENCOUNTER — HOSPITAL ENCOUNTER (OUTPATIENT)
Dept: SPEECH THERAPY | Facility: HOSPITAL | Age: 52
Setting detail: THERAPIES SERIES
Discharge: HOME | End: 2017-11-24
Attending: INTERNAL MEDICINE
Payer: COMMERCIAL

## 2017-11-24 PROCEDURE — 92507 TX SP LANG VOICE COMM INDIV: CPT

## 2017-11-24 NOTE — PROGRESS NOTES
Treatment #7 (PPO; PN due 12/15/17)   Treatment Time: 60 minutes  Precautions: none     Charges: 1 billed ([id number])  Pain: 0/10      Diagnosis: VCD (J38.3)      Subjective: Patient arrived to therapy on time.    She stated she was fatigued from Thanksgiving p

## 2017-12-01 ENCOUNTER — APPOINTMENT (OUTPATIENT)
Dept: SPEECH THERAPY | Facility: HOSPITAL | Age: 52
End: 2017-12-01
Payer: COMMERCIAL

## 2017-12-12 ENCOUNTER — OFFICE VISIT (OUTPATIENT)
Dept: FAMILY MEDICINE CLINIC | Facility: CLINIC | Age: 52
End: 2017-12-12

## 2017-12-12 VITALS
OXYGEN SATURATION: 100 % | RESPIRATION RATE: 12 BRPM | TEMPERATURE: 98 F | HEART RATE: 75 BPM | SYSTOLIC BLOOD PRESSURE: 134 MMHG | WEIGHT: 188 LBS | HEIGHT: 63 IN | BODY MASS INDEX: 33.31 KG/M2 | DIASTOLIC BLOOD PRESSURE: 80 MMHG

## 2017-12-12 DIAGNOSIS — J01.00 ACUTE NON-RECURRENT MAXILLARY SINUSITIS: Primary | ICD-10-CM

## 2017-12-12 PROCEDURE — 99203 OFFICE O/P NEW LOW 30 MIN: CPT | Performed by: FAMILY MEDICINE

## 2017-12-12 RX ORDER — LEVOFLOXACIN 500 MG/1
500 TABLET, FILM COATED ORAL DAILY
Qty: 10 TABLET | Refills: 0 | Status: SHIPPED | OUTPATIENT
Start: 2017-12-12 | End: 2017-12-22

## 2017-12-16 ENCOUNTER — HOSPITAL ENCOUNTER (EMERGENCY)
Age: 52
Discharge: HOME | End: 2017-12-16
Attending: EMERGENCY MEDICINE
Payer: COMMERCIAL

## 2017-12-16 ENCOUNTER — APPOINTMENT (OUTPATIENT)
Dept: CT IMAGING | Age: 52
End: 2017-12-16
Attending: NURSE PRACTITIONER
Payer: COMMERCIAL

## 2017-12-16 VITALS
TEMPERATURE: 99 F | SYSTOLIC BLOOD PRESSURE: 125 MMHG | BODY MASS INDEX: 33 KG/M2 | RESPIRATION RATE: 20 BRPM | DIASTOLIC BLOOD PRESSURE: 68 MMHG | WEIGHT: 186 LBS | HEART RATE: 85 BPM | OXYGEN SATURATION: 97 %

## 2017-12-16 DIAGNOSIS — J32.0 CHRONIC MAXILLARY SINUSITIS: Primary | ICD-10-CM

## 2017-12-16 PROCEDURE — 96374 THER/PROPH/DIAG INJ IV PUSH: CPT

## 2017-12-16 PROCEDURE — 99284 EMERGENCY DEPT VISIT MOD MDM: CPT

## 2017-12-16 PROCEDURE — 96361 HYDRATE IV INFUSION ADD-ON: CPT

## 2017-12-16 PROCEDURE — 96376 TX/PRO/DX INJ SAME DRUG ADON: CPT

## 2017-12-16 PROCEDURE — 70486 CT MAXILLOFACIAL W/O DYE: CPT | Performed by: NURSE PRACTITIONER

## 2017-12-16 RX ORDER — KETOROLAC TROMETHAMINE 30 MG/ML
30 INJECTION, SOLUTION INTRAMUSCULAR; INTRAVENOUS ONCE
Status: DISCONTINUED | OUTPATIENT
Start: 2017-12-16 | End: 2017-12-16

## 2017-12-16 RX ORDER — KETOROLAC TROMETHAMINE 30 MG/ML
15 INJECTION, SOLUTION INTRAMUSCULAR; INTRAVENOUS ONCE
Status: COMPLETED | OUTPATIENT
Start: 2017-12-16 | End: 2017-12-16

## 2017-12-16 RX ORDER — ACETAMINOPHEN 500 MG
1000 TABLET ORAL ONCE
Status: COMPLETED | OUTPATIENT
Start: 2017-12-16 | End: 2017-12-16

## 2017-12-16 RX ORDER — ONDANSETRON 2 MG/ML
4 INJECTION INTRAMUSCULAR; INTRAVENOUS ONCE
Status: COMPLETED | OUTPATIENT
Start: 2017-12-16 | End: 2017-12-16

## 2017-12-16 NOTE — ED INITIAL ASSESSMENT (HPI)
WAS DX WITH SINUS INFECTION ON TUES AND HAS BEEN ON LEVAQUIN SINCE THEN.   REPORTS INCREASED PAIN SINCE THEN

## 2017-12-17 NOTE — ED PROVIDER NOTES
Patient Seen in: [facility] Emergency Department In [place]    History   Patient presents with:  Fever (infectious)  Cough/URI    Stated Complaint: fever, congestion- dx with sinus infection Tuesday.   Started on levaquin- state*    49-year-old female who transfusion 9/16/2016    HGB 7.1 with 2 units to be given   • Hypertension     not currently being treated   • IBS (irritable bowel syndrome)    • Kidney stones    • Lipid screening 8/5/2011   • Migraines    • MTHFR (methylene THF reductase) deficiency and Comment: ablation of vaginal lesion  03/28/2017: SINUS SURGERY    No date: TOTAL ABDOM HYSTERECTOMY  8/28/2014: UP GI ENDOSCOPY,SARAH CHIU GUIDE      Comment: Procedure: ;  Surgeon: Brando Borges MD;                Location: [address] [place] [address]  8/ place, and time. Vital signs are normal. SHe appears well-developed and well-nourished. She is cooperative. Non-toxic appearance. SHe does not have a sickly appearance. SHe does not appear ill. No distress. HENT:   Head: Normocephalic and atraumatic. information is transmitted to the ACR ([facility]) NRDR ([address]) which includes the Dose Index Registry.  PATIENT STATED HISTORY: (As transcribed by Technologist)  Patient states she was diagnosed with sinus infe  discharge instructions and plan. I answered all of the patient's questions prior to discharge.           Disposition and Plan     Clinical Impression:  Chronic maxillary sinusitis  (primary encounter diagnosis)    Disposition:  Discharge  12/16/2017  5:49

## 2017-12-20 ENCOUNTER — PATIENT MESSAGE (OUTPATIENT)
Dept: FAMILY MEDICINE CLINIC | Facility: CLINIC | Age: 52
End: 2017-12-20

## 2017-12-21 NOTE — TELEPHONE ENCOUNTER
From: Ankita Hendrickson Book  To: Lakisha Mcguire MD  Sent: 12/20/2017 11:12 PM CST  Subject: Other    I received a message stating that I missed a appt on the 18th w/ Dr Radha Foley. I called and rescheduled that appt since I was down w/ the flu.    Please make ap

## 2018-01-03 ENCOUNTER — TELEPHONE (OUTPATIENT)
Dept: FAMILY MEDICINE CLINIC | Facility: CLINIC | Age: 53
End: 2018-01-03

## 2018-01-03 ENCOUNTER — HOSPITAL ENCOUNTER (EMERGENCY)
Age: 53
Discharge: HOME | End: 2018-01-03
Attending: EMERGENCY MEDICINE
Payer: COMMERCIAL

## 2018-01-03 VITALS
OXYGEN SATURATION: 97 % | HEART RATE: 68 BPM | RESPIRATION RATE: 16 BRPM | HEIGHT: 63 IN | BODY MASS INDEX: 32.78 KG/M2 | TEMPERATURE: 98 F | DIASTOLIC BLOOD PRESSURE: 90 MMHG | WEIGHT: 185 LBS | SYSTOLIC BLOOD PRESSURE: 164 MMHG

## 2018-01-03 DIAGNOSIS — J32.0 CHRONIC MAXILLARY SINUSITIS: Primary | ICD-10-CM

## 2018-01-03 PROCEDURE — 99283 EMERGENCY DEPT VISIT LOW MDM: CPT

## 2018-01-03 PROCEDURE — 96372 THER/PROPH/DIAG INJ SC/IM: CPT

## 2018-01-03 RX ORDER — KETOROLAC TROMETHAMINE 30 MG/ML
30 INJECTION, SOLUTION INTRAMUSCULAR; INTRAVENOUS ONCE
Status: COMPLETED | OUTPATIENT
Start: 2018-01-03 | End: 2018-01-03

## 2018-01-03 RX ORDER — DOXYCYCLINE HYCLATE 100 MG/1
100 CAPSULE ORAL 2 TIMES DAILY
Qty: 20 CAPSULE | Refills: 0 | Status: SHIPPED | OUTPATIENT
Start: 2018-01-03 | End: 2018-01-11

## 2018-01-03 NOTE — ED INITIAL ASSESSMENT (HPI)
Patient sent here from [address] for further eval of continued sinus pain. Patient was treated last month with a 10-day course of Levaquin for sinusitis that she finished on 12/22/17.   Patient states continued sinus pain and pressure, \"my head fe

## 2018-01-03 NOTE — ED PROVIDER NOTES
Patient Seen in: [facility] In [place]    History   Patient presents with:  Cough/URI    Stated Complaint: fever, sinus pressure, headache    HPI    80-year-old female, extensive medical history as noted below, here for chronic sinus Unspecified sleep apnea PSG 7-3-14    PSG 7-3-14 AHI 15 RDI 15 REM AHI 20 SaO2 payal 91%   • Visual impairment     just glasses for reading       Past Surgical History:  : Luis Ocampo  No date: BACK SURGERY  96,4176,5856,:  UPPER GI ENDOSCOPY,BIOPSY N/A      Comment: Procedure: ESOPHAGOGASTRODUODENOSCOPY,                COLONOSCOPY, POSSIBLE BIOPSY, POSSIBLE                POLYPECTOMY 51134,16060;  Surgeon: Shiva Aguirre MD;  Location: [address] [place] [address], sinuses. ED Course   Labs Reviewed - No data to display    ED Course as of Jan 03 1653  ------------------------------------------------------------       MDM     Patient's medical records were reviewed.   Her prior visits to the ER and urgent care rev

## 2018-01-11 ENCOUNTER — OFFICE VISIT (OUTPATIENT)
Dept: FAMILY MEDICINE CLINIC | Facility: CLINIC | Age: 53
End: 2018-01-11

## 2018-01-11 VITALS
HEART RATE: 70 BPM | TEMPERATURE: 98 F | WEIGHT: 199 LBS | SYSTOLIC BLOOD PRESSURE: 130 MMHG | DIASTOLIC BLOOD PRESSURE: 80 MMHG | BODY MASS INDEX: 35.26 KG/M2 | RESPIRATION RATE: 16 BRPM | HEIGHT: 63 IN

## 2018-01-11 DIAGNOSIS — J32.0 CHRONIC MAXILLARY SINUSITIS: Primary | ICD-10-CM

## 2018-01-11 DIAGNOSIS — E66.9 OBESITY (BMI 30-39.9): ICD-10-CM

## 2018-01-11 PROCEDURE — 99214 OFFICE O/P EST MOD 30 MIN: CPT | Performed by: FAMILY MEDICINE

## 2018-01-11 RX ORDER — PHENTERMINE HYDROCHLORIDE 37.5 MG/1
37.5 TABLET ORAL
Qty: 30 TABLET | Refills: 2 | Status: SHIPPED | OUTPATIENT
Start: 2018-01-11 | End: 2018-04-11

## 2018-01-11 RX ORDER — LEVOFLOXACIN 500 MG/1
500 TABLET, FILM COATED ORAL DAILY
Qty: 10 TABLET | Refills: 0 | Status: SHIPPED | OUTPATIENT
Start: 2018-01-11 | End: 2018-01-21

## 2018-01-11 RX ORDER — FLUTICASONE PROPIONATE 50 MCG
2 SPRAY, SUSPENSION (ML) NASAL DAILY
Qty: 3 BOTTLE | Refills: 3 | Status: SHIPPED | OUTPATIENT
Start: 2018-01-11 | End: 2019-01-06

## 2018-01-11 NOTE — PATIENT INSTRUCTIONS
Thank you for choosing Lizz Fontanez MD at [place] [address]  To Do: [address]  1. Please schedule appointment with ENT  Effective 6/19/17 until November 2017  Due to Maldonado Carrie is being moved.   It is inside the [facility] interactions.  It is your duty and for your safety to discuss with the pharmacist and our office with questions, and to notify us and stop treatment if problems arise, but know that our intention is that the benefits outweigh those potential risks and we s

## 2018-01-11 NOTE — PROGRESS NOTES
HPI:    Patient ID: Dana Harmon is a 46year old female. HPI  Mrs. Bella Gallegos is a 70-year-old female with history of hypothyroidism, asthma, chronic sinusitis who was recently seen for recurrent sinusitis and had CT scan of the sinuses done.   Last year Rfl: 3   Tiotropium Bromide Monohydrate (SPIRIVA RESPIMAT) 1.25 MCG/ACT Inhalation Aero Soln Inhale 2 puffs into the lungs daily.  Disp: 3 Inhaler Rfl: 3   ALPRAZolam 0.25 MG Oral Tab Take 1 tablet (0.25 mg total) by mouth 2 (two) times daily as needed for Asthma, Runny nose, Wheezing   PHYSICAL EXAM:   Physical Exam   HENT:   Right Ear: Tympanic membrane, external ear and ear canal normal.   Left Ear: Tympanic membrane, external ear and ear canal normal.   Nose: No mucosal edema.  Right sinus exhibits Nare route daily. Diclofenac Sodium 50 MG Oral Tab  tablet 3      Sig: Take 1 tablet (50 mg total) by mouth 2 (two) times daily as needed.       Phentermine HCl 37.5 MG Oral Tab 30 tablet 2      Sig: Take 1 tablet (37.5 mg total) by mouth every m

## 2018-01-16 RX ORDER — FUROSEMIDE 20 MG/1
TABLET ORAL
Qty: 90 TABLET | Refills: 1 | Status: SHIPPED | OUTPATIENT
Start: 2018-01-16 | End: 2018-07-30

## 2018-01-16 NOTE — TELEPHONE ENCOUNTER
Received refill request from [facility] mail order pharmacy for Furosemide Tab 20 mg for quantity of 90.

## 2018-01-16 NOTE — TELEPHONE ENCOUNTER
Hypertension Medications Protocol Passed1/16 7:31 AM   CMP or BMP in past 12 months    Last serum creatinine< 2.0    Appointment in past 6 or next 3 months     Requesting furosemide 20mg  LOV: 1/11/18  RTC: 3 months  Last Relevant Labs: 8/26/17  Filled: 5/

## 2018-03-19 PROBLEM — J98.01 ACUTE BRONCHOSPASM: Status: RESOLVED | Noted: 2017-08-26 | Resolved: 2018-03-19

## 2018-03-19 PROBLEM — Z86.19 HISTORY OF PNEUMOCYSTIS JIROVECII PNEUMONIA: Status: ACTIVE | Noted: 2018-03-19

## 2018-03-19 RX ORDER — ERGOCALCIFEROL 1.25 MG/1
CAPSULE ORAL
Qty: 4 CAPSULE | Refills: 5 | OUTPATIENT
Start: 2018-03-19

## 2018-03-20 RX ORDER — ALPRAZOLAM 0.25 MG/1
TABLET ORAL
Qty: 30 TABLET | Refills: 0 | Status: SHIPPED
Start: 2018-03-20 | End: 2018-09-06

## 2018-03-20 NOTE — TELEPHONE ENCOUNTER
ALPRAZolam 0.25MG TAB  Will file in chart as: ALPRAZOLAM 0.25 MG Oral Tab  TAKE ONE TABLET BY MOUTH TWICE DAILY AS NEEDED FOR ANXIETY       Disp: 30 tablet Refills: 0    Class: Normal Start: 3/19/2018   Originally ordered: 1 year ago by Ilya James MD

## 2018-04-11 ENCOUNTER — OFFICE VISIT (OUTPATIENT)
Dept: FAMILY MEDICINE CLINIC | Facility: CLINIC | Age: 53
End: 2018-04-11

## 2018-04-11 VITALS
SYSTOLIC BLOOD PRESSURE: 130 MMHG | HEART RATE: 70 BPM | WEIGHT: 208 LBS | BODY MASS INDEX: 36.86 KG/M2 | DIASTOLIC BLOOD PRESSURE: 90 MMHG | RESPIRATION RATE: 16 BRPM | TEMPERATURE: 98 F | HEIGHT: 63 IN

## 2018-04-11 DIAGNOSIS — R53.82 CHRONIC FATIGUE: ICD-10-CM

## 2018-04-11 DIAGNOSIS — J38.3 VOCAL CORD DYSFUNCTION: Chronic | ICD-10-CM

## 2018-04-11 DIAGNOSIS — E66.9 OBESITY (BMI 30-39.9): Primary | ICD-10-CM

## 2018-04-11 PROCEDURE — 99213 OFFICE O/P EST LOW 20 MIN: CPT | Performed by: FAMILY MEDICINE

## 2018-04-11 PROCEDURE — 90471 IMMUNIZATION ADMIN: CPT | Performed by: FAMILY MEDICINE

## 2018-04-11 PROCEDURE — 90670 PCV13 VACCINE IM: CPT | Performed by: FAMILY MEDICINE

## 2018-04-11 RX ORDER — PHENTERMINE HYDROCHLORIDE 37.5 MG/1
37.5 TABLET ORAL
Qty: 30 TABLET | Refills: 2 | Status: SHIPPED | OUTPATIENT
Start: 2018-04-11 | End: 2018-09-06

## 2018-04-11 NOTE — PROGRESS NOTES
HPI:    Patient ID: Roger Butler is a 46year old female. HPI  Ms. rene is a pleasant 59-year-old female with history of sleep apnea, laryngeal cancer, hypertension, GERD, hypothyroidism, asthma who is here initially for weight management.   She was (six) hours as needed for Wheezing. Disp: 1 Box Rfl: 3   Folic Acid 5 MG Oral Cap Take by mouth. Disp:  Rfl:    Fish Oil-Cholecalciferol (FISH OIL + D3) 5715-7315 MG-UNIT Oral Cap Take 2 tablets by mouth.  Disp:  Rfl:    Albuterol Sulfate HFA (PROAIR HFA) 1 has no cervical adenopathy. Neurological: She is alert. Psychiatric: She has a normal mood and affect. Her behavior is normal.   Vitals reviewed.              ASSESSMENT/PLAN:   Obesity (bmi 30-39.9)  (primary encounter diagnosis)  Chronic fatigue  Voca

## 2018-04-11 NOTE — PATIENT INSTRUCTIONS
Thank you for choosing Cate Gann MD at [place] [address]  To Do: [address]  1. Please see ENT re: vocal cord dysfunction  [facility] is located in [address]. Monday, Tuesday & Friday – 8 a.m. to 4 p.m. Wednesday, Thursday – 7 a. benefits outweigh those potential risks and we strive to make you healthier and to improve your quality of life.     Referrals, and Radiology Information:    If your insurance requires a referral to a specialist, please allow 5 business days to process your

## 2018-05-31 ENCOUNTER — PATIENT MESSAGE (OUTPATIENT)
Dept: FAMILY MEDICINE CLINIC | Facility: CLINIC | Age: 53
End: 2018-05-31

## 2018-05-31 DIAGNOSIS — Z12.31 ENCOUNTER FOR SCREENING MAMMOGRAM FOR MALIGNANT NEOPLASM OF BREAST: Primary | ICD-10-CM

## 2018-06-01 RX ORDER — POTASSIUM CHLORIDE 20 MEQ/1
20 TABLET, EXTENDED RELEASE ORAL
Qty: 90 TABLET | Refills: 1 | Status: SHIPPED | OUTPATIENT
Start: 2018-06-01 | End: 2018-12-09

## 2018-06-01 NOTE — TELEPHONE ENCOUNTER
Requesting: Rosario 20MEQ  LOV: 4/11/18  RTC: 4 weeks or as needed  Last Relevant Labs: Potassium level drawn on 8/27/17   Filled: 8/3/17 #90 with 1 refills    Future Appointments  Date Time Provider Batsheva Hansen   9/19/2018 8:20 AM ARSENIO Millan

## 2018-06-01 NOTE — TELEPHONE ENCOUNTER
Requesting mammogram order   LOV: 4/11/18  RTC: 4 weeks   Last Relevant Labs: 6/15/17      Future Appointments  Date Time Provider Batsheva Hansen   9/19/2018 8:20 AM RUI Valdivia [address] 12/12/2018 2:00 PM Luis Gilmore MD [facility] ENDO DM

## 2018-06-01 NOTE — TELEPHONE ENCOUNTER
From: Jennifer Rangel  Sent: 5/31/2018 9:54 PM CDT  Subject: Medication Renewal Request    Mami Christianson.  Book would like a refill of the following medications:     KLOR-CON M20 20 MEQ Oral Tab CR Kahlil Guillory PA-C]   Patient Comment: 3 mo supply to C

## 2018-06-01 NOTE — TELEPHONE ENCOUNTER
From: Shannon Wilde Book  To: Apryl Walters MD  Sent: 5/31/2018 9:56 PM CDT  Subject: Referral Request    Hi! I am due for my mammogram. Please place the order so that I can call and schedule.     Gwen Camacho

## 2018-06-06 ENCOUNTER — HOSPITAL ENCOUNTER (OUTPATIENT)
Dept: MAMMOGRAPHY | Age: 53
Discharge: HOME | End: 2018-06-06
Attending: FAMILY MEDICINE
Payer: COMMERCIAL

## 2018-06-06 DIAGNOSIS — Z12.31 ENCOUNTER FOR SCREENING MAMMOGRAM FOR MALIGNANT NEOPLASM OF BREAST: ICD-10-CM

## 2018-06-06 PROCEDURE — 77063 BREAST TOMOSYNTHESIS BI: CPT | Performed by: FAMILY MEDICINE

## 2018-06-06 PROCEDURE — 77067 SCR MAMMO BI INCL CAD: CPT | Performed by: FAMILY MEDICINE

## 2018-07-30 RX ORDER — FUROSEMIDE 20 MG/1
TABLET ORAL
Qty: 30 TABLET | Refills: 0 | Status: SHIPPED | OUTPATIENT
Start: 2018-07-30 | End: 2018-09-06

## 2018-07-30 NOTE — TELEPHONE ENCOUNTER
Medication(s) to Refill: Furosemide 20mg Disp 9      Last time Medication was Filled: 1/16/18 # 90 with 1 refill      Last office Visit with Provider: 4/11/18      When Patient was Due Back to the Office : 1 month/ Cancelled      Future Appointments: NONE

## 2018-09-06 ENCOUNTER — LAB ENCOUNTER (OUTPATIENT)
Dept: LAB | Age: 53
End: 2018-09-06
Attending: PHYSICIAN ASSISTANT
Payer: COMMERCIAL

## 2018-09-06 ENCOUNTER — OFFICE VISIT (OUTPATIENT)
Dept: FAMILY MEDICINE CLINIC | Facility: CLINIC | Age: 53
End: 2018-09-06
Payer: COMMERCIAL

## 2018-09-06 VITALS
RESPIRATION RATE: 16 BRPM | BODY MASS INDEX: 38.45 KG/M2 | HEART RATE: 82 BPM | SYSTOLIC BLOOD PRESSURE: 122 MMHG | TEMPERATURE: 98 F | WEIGHT: 217 LBS | HEIGHT: 63 IN | DIASTOLIC BLOOD PRESSURE: 80 MMHG

## 2018-09-06 DIAGNOSIS — H66.002 ACUTE SUPPURATIVE OTITIS MEDIA OF LEFT EAR WITHOUT SPONTANEOUS RUPTURE OF TYMPANIC MEMBRANE, RECURRENCE NOT SPECIFIED: ICD-10-CM

## 2018-09-06 DIAGNOSIS — J38.3 VOCAL CORD DYSFUNCTION: Chronic | ICD-10-CM

## 2018-09-06 DIAGNOSIS — I10 ESSENTIAL HYPERTENSION: Primary | ICD-10-CM

## 2018-09-06 DIAGNOSIS — E89.0 POSTOPERATIVE HYPOTHYROIDISM: ICD-10-CM

## 2018-09-06 DIAGNOSIS — I10 ESSENTIAL HYPERTENSION: ICD-10-CM

## 2018-09-06 DIAGNOSIS — J45.909 MODERATE ASTHMA WITHOUT COMPLICATION, UNSPECIFIED WHETHER PERSISTENT: ICD-10-CM

## 2018-09-06 LAB
ANION GAP SERPL CALC-SCNC: 8 MMOL/L (ref 0–18)
BUN BLD-MCNC: 15 MG/DL (ref 8–20)
BUN/CREAT SERPL: 18.8 (ref 10–20)
CALCIUM BLD-MCNC: 9.2 MG/DL (ref 8.3–10.3)
CHLORIDE SERPL-SCNC: 104 MMOL/L (ref 101–111)
CO2 SERPL-SCNC: 29 MMOL/L (ref 22–32)
CREAT BLD-MCNC: 0.8 MG/DL (ref 0.55–1.02)
GLUCOSE BLD-MCNC: 106 MG/DL (ref 70–99)
OSMOLALITY SERPL CALC.SUM OF ELEC: 293 MOSM/KG (ref 275–295)
POTASSIUM SERPL-SCNC: 3.9 MMOL/L (ref 3.6–5.1)
SODIUM SERPL-SCNC: 141 MMOL/L (ref 136–144)

## 2018-09-06 PROCEDURE — 36415 COLL VENOUS BLD VENIPUNCTURE: CPT | Performed by: PHYSICIAN ASSISTANT

## 2018-09-06 PROCEDURE — 80048 BASIC METABOLIC PNL TOTAL CA: CPT | Performed by: PHYSICIAN ASSISTANT

## 2018-09-06 PROCEDURE — 99214 OFFICE O/P EST MOD 30 MIN: CPT | Performed by: PHYSICIAN ASSISTANT

## 2018-09-06 RX ORDER — CLARITHROMYCIN 500 MG/1
500 TABLET, COATED ORAL 2 TIMES DAILY
Qty: 20 TABLET | Refills: 0 | Status: ON HOLD | OUTPATIENT
Start: 2018-09-06 | End: 2018-09-21

## 2018-09-06 RX ORDER — FUROSEMIDE 20 MG/1
20 TABLET ORAL DAILY
Qty: 90 TABLET | Refills: 1 | Status: SHIPPED | OUTPATIENT
Start: 2018-09-06 | End: 2019-02-27

## 2018-09-06 NOTE — PATIENT INSTRUCTIONS
Thank you for choosing Elmira Blankenship PA-C at [place] [address]  To Do: [address]  1. Pt to begin medications as prescribed  2. Get lab work done  3. Bring in copy of recent labs through insurance  4.  Follow-up for annual well visit    • Felipa called informing you that the insurance company approved your testing, please call Central Scheduling at [phone]  Please allow our office 5 business days to contact you regarding any testing results.     Refill policies:   Allow 3 business days for ref

## 2018-09-06 NOTE — PROGRESS NOTES
[facility] Internal Medicine Progress Note    CC:  Patient presents with:  Medication Follow-Up      HPI:   HPI  Had surgery July 12th for skin cancer  Pt saw Dr. Jo-Ann Staton, for all her recurrent sinus infections  She has been having issues with h 1   Fluticasone Propionate 50 MCG/ACT Nasal Suspension 2 sprays by Each Nare route daily.  Disp: 3 Bottle Rfl: 3   albuterol sulfate (2.5 MG/3ML) 0.083% Inhalation Nebu Soln Take 3 mL (2.5 mg total) by nebulization every 6 (six) hours as needed for Wheezing reactive to light. Neck: No thyromegaly present. Cardiovascular: Normal rate, regular rhythm and normal heart sounds. Exam reveals no gallop and no friction rub. No murmur heard.   Pulmonary/Chest: Effort normal and breath sounds normal. No respirat barriers to learning. Medical education done. Outcome: Patient verbalizes understanding.     Problem List:  Patient Active Problem List:     Unspecified polyarthropathy or polyarthritis, multiple sites     Migraine     Pure hyperglyceridemia     Insomnia

## 2018-09-11 ENCOUNTER — HOSPITAL ENCOUNTER (EMERGENCY)
Facility: HOSPITAL | Age: 53
Discharge: HOME | End: 2018-09-11
Attending: EMERGENCY MEDICINE
Payer: COMMERCIAL

## 2018-09-11 ENCOUNTER — APPOINTMENT (OUTPATIENT)
Dept: GENERAL RADIOLOGY | Facility: HOSPITAL | Age: 53
End: 2018-09-11
Attending: EMERGENCY MEDICINE
Payer: COMMERCIAL

## 2018-09-11 VITALS
RESPIRATION RATE: 22 BRPM | SYSTOLIC BLOOD PRESSURE: 146 MMHG | BODY MASS INDEX: 37.21 KG/M2 | HEIGHT: 63 IN | OXYGEN SATURATION: 98 % | TEMPERATURE: 99 F | DIASTOLIC BLOOD PRESSURE: 69 MMHG | WEIGHT: 210 LBS | HEART RATE: 80 BPM

## 2018-09-11 DIAGNOSIS — J45.21 MILD INTERMITTENT ASTHMA WITH EXACERBATION: Primary | ICD-10-CM

## 2018-09-11 LAB
ALBUMIN SERPL-MCNC: 4.1 G/DL (ref 3.5–4.8)
ALBUMIN/GLOB SERPL: 1.1 {RATIO} (ref 1–2)
ALP LIVER SERPL-CCNC: 132 U/L (ref 41–108)
ALT SERPL-CCNC: 73 U/L (ref 14–54)
ANION GAP SERPL CALC-SCNC: 8 MMOL/L (ref 0–18)
AST SERPL-CCNC: 39 U/L (ref 15–41)
BASOPHILS # BLD AUTO: 0.07 X10(3) UL (ref 0–0.1)
BASOPHILS NFR BLD AUTO: 1 %
BILIRUB SERPL-MCNC: 0.6 MG/DL (ref 0.1–2)
BUN BLD-MCNC: 16 MG/DL (ref 8–20)
BUN/CREAT SERPL: 19 (ref 10–20)
CALCIUM BLD-MCNC: 9.4 MG/DL (ref 8.3–10.3)
CHLORIDE SERPL-SCNC: 104 MMOL/L (ref 101–111)
CO2 SERPL-SCNC: 28 MMOL/L (ref 22–32)
CREAT BLD-MCNC: 0.84 MG/DL (ref 0.55–1.02)
D-DIMER: 0.37 UG/ML FEU (ref 0–0.49)
EOSINOPHIL # BLD AUTO: 0.12 X10(3) UL (ref 0–0.3)
EOSINOPHIL NFR BLD AUTO: 1.7 %
ERYTHROCYTE [DISTWIDTH] IN BLOOD BY AUTOMATED COUNT: 13.7 % (ref 11.5–16)
GLOBULIN PLAS-MCNC: 3.6 G/DL (ref 2.5–4)
GLUCOSE BLD-MCNC: 98 MG/DL (ref 70–99)
HCT VFR BLD AUTO: 40.5 % (ref 34–50)
HGB BLD-MCNC: 14 G/DL (ref 12–16)
IMMATURE GRANULOCYTE COUNT: 0.08 X10(3) UL (ref 0–1)
IMMATURE GRANULOCYTE RATIO %: 1.2 %
LYMPHOCYTES # BLD AUTO: 1.82 X10(3) UL (ref 0.9–4)
LYMPHOCYTES NFR BLD AUTO: 26.5 %
M PROTEIN MFR SERPL ELPH: 7.7 G/DL (ref 6.1–8.3)
MCH RBC QN AUTO: 30.8 PG (ref 27–33.2)
MCHC RBC AUTO-ENTMCNC: 34.6 G/DL (ref 31–37)
MCV RBC AUTO: 89 FL (ref 81–100)
MONOCYTES # BLD AUTO: 0.48 X10(3) UL (ref 0.1–1)
MONOCYTES NFR BLD AUTO: 7 %
NEUTROPHIL ABS PRELIM: 4.3 X10 (3) UL (ref 1.3–6.7)
NEUTROPHILS # BLD AUTO: 4.3 X10(3) UL (ref 1.3–6.7)
NEUTROPHILS NFR BLD AUTO: 62.6 %
OSMOLALITY SERPL CALC.SUM OF ELEC: 291 MOSM/KG (ref 275–295)
PLATELET # BLD AUTO: 290 10(3)UL (ref 150–450)
POTASSIUM SERPL-SCNC: 3.7 MMOL/L (ref 3.6–5.1)
RBC # BLD AUTO: 4.55 X10(6)UL (ref 3.8–5.1)
RED CELL DISTRIBUTION WIDTH-SD: 44.3 FL (ref 35.1–46.3)
SODIUM SERPL-SCNC: 140 MMOL/L (ref 136–144)
TROPONIN I SERPL-MCNC: <0.046 NG/ML (ref ?–0.05)
WBC # BLD AUTO: 6.9 X10(3) UL (ref 4–13)

## 2018-09-11 PROCEDURE — 96374 THER/PROPH/DIAG INJ IV PUSH: CPT

## 2018-09-11 PROCEDURE — 85025 COMPLETE CBC W/AUTO DIFF WBC: CPT | Performed by: EMERGENCY MEDICINE

## 2018-09-11 PROCEDURE — 85378 FIBRIN DEGRADE SEMIQUANT: CPT | Performed by: EMERGENCY MEDICINE

## 2018-09-11 PROCEDURE — 71046 X-RAY EXAM CHEST 2 VIEWS: CPT | Performed by: EMERGENCY MEDICINE

## 2018-09-11 PROCEDURE — 93005 ELECTROCARDIOGRAM TRACING: CPT

## 2018-09-11 PROCEDURE — 99285 EMERGENCY DEPT VISIT HI MDM: CPT

## 2018-09-11 PROCEDURE — 93010 ELECTROCARDIOGRAM REPORT: CPT

## 2018-09-11 PROCEDURE — 94640 AIRWAY INHALATION TREATMENT: CPT

## 2018-09-11 PROCEDURE — 84484 ASSAY OF TROPONIN QUANT: CPT | Performed by: EMERGENCY MEDICINE

## 2018-09-11 PROCEDURE — 80053 COMPREHEN METABOLIC PANEL: CPT | Performed by: EMERGENCY MEDICINE

## 2018-09-11 RX ORDER — METHYLPREDNISOLONE SODIUM SUCCINATE 125 MG/2ML
60 INJECTION, POWDER, LYOPHILIZED, FOR SOLUTION INTRAMUSCULAR; INTRAVENOUS ONCE
Status: COMPLETED | OUTPATIENT
Start: 2018-09-11 | End: 2018-09-11

## 2018-09-11 RX ORDER — IPRATROPIUM BROMIDE AND ALBUTEROL SULFATE 2.5; .5 MG/3ML; MG/3ML
3 SOLUTION RESPIRATORY (INHALATION) ONCE
Status: COMPLETED | OUTPATIENT
Start: 2018-09-11 | End: 2018-09-11

## 2018-09-11 RX ORDER — PREDNISONE 20 MG/1
40 TABLET ORAL DAILY
Qty: 10 TABLET | Refills: 0 | Status: SHIPPED | OUTPATIENT
Start: 2018-09-11 | End: 2018-09-12 | Stop reason: DRUGHIGH

## 2018-09-12 ENCOUNTER — PATIENT MESSAGE (OUTPATIENT)
Dept: FAMILY MEDICINE CLINIC | Facility: CLINIC | Age: 53
End: 2018-09-12

## 2018-09-12 DIAGNOSIS — R79.89 ELEVATED LFTS: Primary | ICD-10-CM

## 2018-09-12 LAB
ATRIAL RATE: 78 BPM
P AXIS: 56 DEGREES
P-R INTERVAL: 166 MS
Q-T INTERVAL: 412 MS
QRS DURATION: 106 MS
QTC CALCULATION (BEZET): 469 MS
R AXIS: 19 DEGREES
T AXIS: 26 DEGREES
VENTRICULAR RATE: 78 BPM

## 2018-09-12 NOTE — TELEPHONE ENCOUNTER
From: Diana Alas Book  To: Adalberto Patten  Sent: 9/12/2018 5:39 AM CDT  Subject: Test Results Question    Please ask Alma Merchant to look at the blood test results I had last night in the ER.    Thank you,  Clark Davila

## 2018-09-12 NOTE — TELEPHONE ENCOUNTER
Liver enzymes are elevated, I would recommend rechecking CMP in 2 weeks, if still elevated will do elevated liver enzyme protocol.   Otherwise labs are normal

## 2018-09-12 NOTE — ED PROVIDER NOTES
Patient Seen in: [facility] Emergency Department    History   Patient presents with:  Dyspnea DIGNA SOB (respiratory)    Stated Complaint: digna    HPI    63-year-old white female who presents emergency room today for complaint of cough and difficulty b BACK SURGERY  5000,[phone],3171,:       Comment:  x3  2014: COLONOSCOPY      Comment:  1 cm cecal polyp s/p tattoo and removal was                hyperplastic.  Smaller polyps in descending were                adenomas/hp's. repeat 2017, MACe  1996: auscultation bilaterally. Heart is regular rate and rhythm without murmur gallop or rub    Abdomen is soft nondistended nontender to deep palpation there is no rebound or guarding noted no hepatosplenomegaly is noted. No masses are noted.   No hernias are  Heart and mediastinum are normal.  There is no pleural effusion. CARDIAC:  Normal size cardiac silhouette. MEDIASTINUM:  Normal.  PLEURA:  Normal.  No pleural effusions.   BONES:  Normal for age.      Impression     CONCLUSION:  Basilar discoid atelectas

## 2018-09-20 ENCOUNTER — APPOINTMENT (OUTPATIENT)
Dept: GENERAL RADIOLOGY | Facility: HOSPITAL | Age: 53
DRG: 153 | End: 2018-09-20
Attending: EMERGENCY MEDICINE
Payer: COMMERCIAL

## 2018-09-20 ENCOUNTER — HOSPITAL ENCOUNTER (INPATIENT)
Facility: HOSPITAL | Age: 53
LOS: 1 days | Discharge: HOME | DRG: 153 | End: 2018-09-21
Attending: EMERGENCY MEDICINE | Admitting: HOSPITALIST
Payer: COMMERCIAL

## 2018-09-20 DIAGNOSIS — J18.9 COMMUNITY ACQUIRED PNEUMONIA, UNSPECIFIED LATERALITY: Primary | ICD-10-CM

## 2018-09-20 PROBLEM — R73.9 HYPERGLYCEMIA: Status: ACTIVE | Noted: 2018-09-20

## 2018-09-20 LAB
ALBUMIN SERPL-MCNC: 4 G/DL (ref 3.5–4.8)
ALBUMIN/GLOB SERPL: 1.1 {RATIO} (ref 1–2)
ALP LIVER SERPL-CCNC: 128 U/L (ref 41–108)
ALT SERPL-CCNC: 50 U/L (ref 14–54)
ANION GAP SERPL CALC-SCNC: 8 MMOL/L (ref 0–18)
AST SERPL-CCNC: 25 U/L (ref 15–41)
BAND %: 2 %
BASOPHIL % MANUAL: 2 %
BASOPHIL ABSOLUTE MANUAL: 0.26 X10(3) UL (ref 0–0.1)
BILIRUB SERPL-MCNC: 0.4 MG/DL (ref 0.1–2)
BUN BLD-MCNC: 16 MG/DL (ref 8–20)
BUN/CREAT SERPL: 16.3 (ref 10–20)
CALCIUM BLD-MCNC: 9 MG/DL (ref 8.3–10.3)
CHLORIDE SERPL-SCNC: 103 MMOL/L (ref 101–111)
CO2 SERPL-SCNC: 29 MMOL/L (ref 22–32)
CREAT BLD-MCNC: 0.98 MG/DL (ref 0.55–1.02)
D-DIMER: <0.27 UG/ML FEU (ref 0–0.49)
EOSINOPHIL % MANUAL: 0 %
EOSINOPHIL ABSOLUTE MANUAL: 0 X10(3) UL (ref 0–0.3)
ERYTHROCYTE [DISTWIDTH] IN BLOOD BY AUTOMATED COUNT: 14.5 % (ref 11.5–16)
GLOBULIN PLAS-MCNC: 3.7 G/DL (ref 2.5–4)
GLUCOSE BLD-MCNC: 111 MG/DL (ref 70–99)
HCT VFR BLD AUTO: 43.3 % (ref 34–50)
HGB BLD-MCNC: 14.5 G/DL (ref 12–16)
LYMPHOCYTE % MANUAL: 12 %
LYMPHOCYTE ABSOLUTE MANUAL: 1.56 X10(3) UL (ref 0.9–4)
M PROTEIN MFR SERPL ELPH: 7.7 G/DL (ref 6.1–8.3)
MCH RBC QN AUTO: 30.6 PG (ref 27–33.2)
MCHC RBC AUTO-ENTMCNC: 33.5 G/DL (ref 31–37)
MCV RBC AUTO: 91.4 FL (ref 81–100)
METAMYELOCYTE %: 2 %
METAMYELOCYTE ABSOLUTE MANUAL: 0.26 X10(3) UL (ref ?–0.01)
MONOCYTE % MANUAL: 2 %
MONOCYTE ABSOLUTE MANUAL: 0.26 X10(3) UL (ref 0.1–1)
MORPHOLOGY: NORMAL
MYELOCYTE %: 1 %
MYELOCYTE ABSOLUTE MANUAL: 0.13 X10(3) UL (ref ?–0.01)
NEUTROPHIL ABS PRELIM: 9.58 X10 (3) UL (ref 1.3–6.7)
NEUTROPHIL ABSOLUTE MANUAL: 10.53 X10(3) UL (ref 1.3–6.7)
NEUTROPHILS % MANUAL: 79 %
OSMOLALITY SERPL CALC.SUM OF ELEC: 292 MOSM/KG (ref 275–295)
PLATELET # BLD AUTO: 351 10(3)UL (ref 150–450)
PLATELET MORPHOLOGY: NORMAL
POTASSIUM SERPL-SCNC: 4.2 MMOL/L (ref 3.6–5.1)
PRO-BETA NATRIURETIC PEPTIDE: 28 PG/ML (ref ?–125)
PROCALCITONIN SERPL-MCNC: <0.11 NG/ML
RBC # BLD AUTO: 4.74 X10(6)UL (ref 3.8–5.1)
RED CELL DISTRIBUTION WIDTH-SD: 48 FL (ref 35.1–46.3)
SODIUM SERPL-SCNC: 140 MMOL/L (ref 136–144)
TOTAL CELLS COUNTED: 100
TROPONIN I SERPL-MCNC: <0.046 NG/ML (ref ?–0.05)
WBC # BLD AUTO: 13 X10(3) UL (ref 4–13)

## 2018-09-20 PROCEDURE — 99223 1ST HOSP IP/OBS HIGH 75: CPT | Performed by: HOSPITALIST

## 2018-09-20 PROCEDURE — 71045 X-RAY EXAM CHEST 1 VIEW: CPT | Performed by: EMERGENCY MEDICINE

## 2018-09-20 RX ORDER — TRAZODONE HYDROCHLORIDE 50 MG/1
50 TABLET ORAL NIGHTLY PRN
Status: DISCONTINUED | OUTPATIENT
Start: 2018-09-20 | End: 2018-09-21

## 2018-09-20 RX ORDER — GARLIC EXTRACT 500 MG
2 CAPSULE ORAL 2 TIMES DAILY WITH MEALS
Status: DISCONTINUED | OUTPATIENT
Start: 2018-09-21 | End: 2018-09-21

## 2018-09-20 RX ORDER — ONDANSETRON 2 MG/ML
4 INJECTION INTRAMUSCULAR; INTRAVENOUS EVERY 6 HOURS PRN
Status: DISCONTINUED | OUTPATIENT
Start: 2018-09-20 | End: 2018-09-21

## 2018-09-20 RX ORDER — AMITRIPTYLINE HYDROCHLORIDE 50 MG/1
50 TABLET, FILM COATED ORAL NIGHTLY
Status: DISCONTINUED | OUTPATIENT
Start: 2018-09-20 | End: 2018-09-21

## 2018-09-20 RX ORDER — LEVOTHYROXINE SODIUM 0.15 MG/1
150 TABLET ORAL
Status: DISCONTINUED | OUTPATIENT
Start: 2018-09-21 | End: 2018-09-21

## 2018-09-20 RX ORDER — ENOXAPARIN SODIUM 100 MG/ML
40 INJECTION SUBCUTANEOUS DAILY
Status: DISCONTINUED | OUTPATIENT
Start: 2018-09-20 | End: 2018-09-21

## 2018-09-20 RX ORDER — AMITRIPTYLINE HYDROCHLORIDE 50 MG/1
50 TABLET, FILM COATED ORAL NIGHTLY
COMMUNITY
End: 2018-10-16 | Stop reason: ALTCHOICE

## 2018-09-20 RX ORDER — OMEPRAZOLE 20 MG/1
10 CAPSULE, DELAYED RELEASE ORAL
COMMUNITY
End: 2018-10-03

## 2018-09-20 RX ORDER — IPRATROPIUM BROMIDE AND ALBUTEROL SULFATE 2.5; .5 MG/3ML; MG/3ML
3 SOLUTION RESPIRATORY (INHALATION) ONCE
Status: COMPLETED | OUTPATIENT
Start: 2018-09-20 | End: 2018-09-20

## 2018-09-20 RX ORDER — SODIUM CHLORIDE 9 MG/ML
INJECTION, SOLUTION INTRAVENOUS CONTINUOUS
Status: CANCELLED | OUTPATIENT
Start: 2018-09-20 | End: 2018-09-20

## 2018-09-20 RX ORDER — METHYLPREDNISOLONE SODIUM SUCCINATE 125 MG/2ML
125 INJECTION, POWDER, LYOPHILIZED, FOR SOLUTION INTRAMUSCULAR; INTRAVENOUS ONCE
Status: COMPLETED | OUTPATIENT
Start: 2018-09-20 | End: 2018-09-20

## 2018-09-20 RX ORDER — ALBUTEROL SULFATE 2.5 MG/3ML
2.5 SOLUTION RESPIRATORY (INHALATION) EVERY 6 HOURS PRN
Status: DISCONTINUED | OUTPATIENT
Start: 2018-09-20 | End: 2018-09-21

## 2018-09-20 RX ORDER — METOCLOPRAMIDE HYDROCHLORIDE 5 MG/ML
10 INJECTION INTRAMUSCULAR; INTRAVENOUS EVERY 8 HOURS PRN
Status: DISCONTINUED | OUTPATIENT
Start: 2018-09-20 | End: 2018-09-21

## 2018-09-20 RX ORDER — ACETAMINOPHEN 325 MG/1
650 TABLET ORAL EVERY 6 HOURS PRN
Status: DISCONTINUED | OUTPATIENT
Start: 2018-09-20 | End: 2018-09-21

## 2018-09-20 NOTE — ED INITIAL ASSESSMENT (HPI)
Pt states 9/6 she was dx with bilateral ear infection and sinus infection. Pt took abx and ear infection cleared. Was given another rx for sinus infection, avelox. Pt states she is still taking them.   Pt was seen at [facility] ER 9/14 and had blood work, ch

## 2018-09-21 VITALS
DIASTOLIC BLOOD PRESSURE: 74 MMHG | HEART RATE: 84 BPM | SYSTOLIC BLOOD PRESSURE: 138 MMHG | HEIGHT: 63 IN | OXYGEN SATURATION: 94 % | WEIGHT: 218.5 LBS | TEMPERATURE: 98 F | BODY MASS INDEX: 38.71 KG/M2 | RESPIRATION RATE: 18 BRPM

## 2018-09-21 LAB
ADENOVIRUS PCR:: NEGATIVE
ANION GAP SERPL CALC-SCNC: 11 MMOL/L (ref 0–18)
ATRIAL RATE: 86 BPM
B PERT DNA SPEC QL NAA+PROBE: NEGATIVE
BAND %: 4 %
BASOPHIL % MANUAL: 0 %
BASOPHIL ABSOLUTE MANUAL: 0 X10(3) UL (ref 0–0.1)
BUN BLD-MCNC: 17 MG/DL (ref 8–20)
BUN/CREAT SERPL: 17.9 (ref 10–20)
C PNEUM DNA SPEC QL NAA+PROBE: NEGATIVE
CALCIUM BLD-MCNC: 9.4 MG/DL (ref 8.3–10.3)
CHLORIDE SERPL-SCNC: 103 MMOL/L (ref 101–111)
CO2 SERPL-SCNC: 25 MMOL/L (ref 22–32)
CORONAVIRUS 229E PCR:: NEGATIVE
CORONAVIRUS HKU1 PCR:: NEGATIVE
CORONAVIRUS NL63 PCR:: NEGATIVE
CORONAVIRUS OC43 PCR:: NEGATIVE
CREAT BLD-MCNC: 0.95 MG/DL (ref 0.55–1.02)
CRP SERPL-MCNC: <0.29 MG/DL (ref ?–1)
EOSINOPHIL % MANUAL: 3 %
EOSINOPHIL ABSOLUTE MANUAL: 0.53 X10(3) UL (ref 0–0.3)
ERYTHROCYTE [DISTWIDTH] IN BLOOD BY AUTOMATED COUNT: 14.4 % (ref 11.5–16)
FLUAV RNA SPEC QL NAA+PROBE: NEGATIVE
FLUBV RNA SPEC QL NAA+PROBE: NEGATIVE
GLUCOSE BLD-MCNC: 130 MG/DL (ref 70–99)
HAV IGM SER QL: 2.8 MG/DL (ref 1.8–2.5)
HCT VFR BLD AUTO: 41 % (ref 34–50)
HGB BLD-MCNC: 13.7 G/DL (ref 12–16)
LYMPHOCYTE % MANUAL: 12 %
LYMPHOCYTE ABSOLUTE MANUAL: 2.14 X10(3) UL (ref 0.9–4)
MCH RBC QN AUTO: 30.2 PG (ref 27–33.2)
MCHC RBC AUTO-ENTMCNC: 33.4 G/DL (ref 31–37)
MCV RBC AUTO: 90.3 FL (ref 81–100)
METAMYELOCYTE %: 2 %
METAMYELOCYTE ABSOLUTE MANUAL: 0.36 X10(3) UL (ref ?–0.01)
METAPNEUMOVIRUS PCR:: NEGATIVE
MONOCYTE % MANUAL: 5 %
MONOCYTE ABSOLUTE MANUAL: 0.89 X10(3) UL (ref 0.1–1)
MORPHOLOGY: NORMAL
MYCOPLASMA PNEUMONIA PCR:: NEGATIVE
MYELOCYTE %: 1 %
MYELOCYTE ABSOLUTE MANUAL: 0.18 X10(3) UL (ref ?–0.01)
NEUTROPHIL ABS PRELIM: 14.62 X10 (3) UL (ref 1.3–6.7)
NEUTROPHIL ABSOLUTE MANUAL: 13.71 X10(3) UL (ref 1.3–6.7)
NEUTROPHILS % MANUAL: 73 %
OSMOLALITY SERPL CALC.SUM OF ELEC: 291 MOSM/KG (ref 275–295)
P AXIS: 56 DEGREES
P-R INTERVAL: 156 MS
PARAINFLUENZA 1 PCR:: NEGATIVE
PARAINFLUENZA 2 PCR:: NEGATIVE
PARAINFLUENZA 3 PCR:: NEGATIVE
PARAINFLUENZA 4 PCR:: NEGATIVE
PLATELET # BLD AUTO: 355 10(3)UL (ref 150–450)
PLATELET MORPHOLOGY: NORMAL
POTASSIUM SERPL-SCNC: 4.4 MMOL/L (ref 3.6–5.1)
PROCALCITONIN SERPL-MCNC: <0.11 NG/ML
Q-T INTERVAL: 390 MS
QRS DURATION: 102 MS
QTC CALCULATION (BEZET): 466 MS
R AXIS: 34 DEGREES
RBC # BLD AUTO: 4.54 X10(6)UL (ref 3.8–5.1)
RED CELL DISTRIBUTION WIDTH-SD: 47.2 FL (ref 35.1–46.3)
RHINOVIRUS/ENTERO PCR:: NEGATIVE
RSV RNA SPEC QL NAA+PROBE: NEGATIVE
SED RATE-ML: 8 MM/HR (ref 0–25)
SODIUM SERPL-SCNC: 139 MMOL/L (ref 136–144)
T AXIS: 43 DEGREES
TOTAL CELLS COUNTED: 100
VENTRICULAR RATE: 86 BPM
WBC # BLD AUTO: 17.8 X10(3) UL (ref 4–13)

## 2018-09-21 PROCEDURE — 99238 HOSP IP/OBS DSCHRG MGMT 30/<: CPT | Performed by: HOSPITALIST

## 2018-09-21 PROCEDURE — 5A09357 ASSISTANCE WITH RESPIRATORY VENTILATION, LESS THAN 24 CONSECUTIVE HOURS, CONTINUOUS POSITIVE AIRWAY PRESSURE: ICD-10-PCS | Performed by: HOSPITALIST

## 2018-09-21 RX ORDER — FLUTICASONE PROPIONATE 50 MCG
1 SPRAY, SUSPENSION (ML) NASAL 2 TIMES DAILY
Status: DISCONTINUED | OUTPATIENT
Start: 2018-09-21 | End: 2018-09-21

## 2018-09-21 RX ORDER — MOXIFLOXACIN HYDROCHLORIDE 400 MG/1
400 TABLET ORAL DAILY
Refills: 0 | Status: SHIPPED | COMMUNITY
Start: 2018-09-21 | End: 2018-09-25

## 2018-09-21 NOTE — RESPIRATORY THERAPY NOTE
Sputum cup placed at bedside. Patient states cannot cough anything up at this time. Will notify RN once sample produced.

## 2018-09-21 NOTE — PROGRESS NOTES
09/21/18 0058   Provider Notification   Reason for Communication New consult   Provider Name Chivo Calderón MD   Method of Communication Page   Response Waiting for response   Notification Time [phone]    called back- see orders, DC vibramycin    Re

## 2018-09-21 NOTE — PROGRESS NOTES
09/21/18 0055   Provider Notification   Reason for Communication New consult   Provider Name Other (comment)  Shannan Padron)   Method of Communication Page   Response Waiting for response   Notification Time Patrecia Cooks Dr called back- no new orders at this time

## 2018-09-21 NOTE — PROGRESS NOTES
NURSING DISCHARGE NOTE    Discharged Home via Ambulatory. Accompanied by Spouse  Belongings Taken by patient/family. Pt is assessed and ready for discharge. Instructions gone over with patient, no questions at this time. IV dc'd.  To marli ambulator

## 2018-09-21 NOTE — ED PROVIDER NOTES
Patient Seen in: [facility] Emergency Department    History   Patient presents with:  Dyspnea ROYA SOB (respiratory)    Stated Complaint: sob    HPI    This is a 51-year-old female complaining of shortness of breath this patient has had symptoms hist 7-3-14 AHI 15 RDI 15 REM AHI 20 SaO2 payal 91%  No date: Visual impairment      Comment:  just glasses for reading    Past Surgical History:  : ANESTH, SECTION  No date: BACK SURGERY  2016: BRONCHOSCOPY; N/A      Comment:  Performed by discectomy  No date: OTHER SURGICAL HISTORY      Comment:  ablation of vaginal lesion  03/28/2017: SINUS SURGERY    No date: TOTAL ABDOM HYSTERECTOMY  8/28/2014: UP GI ENDOSCOPY,SARAH STAHL      Comment:  Procedure: ;  Surgeon: Suzanne Hill MD;  L exams normal.    ED Course     Labs Reviewed   COMP METABOLIC PANEL (14) - Abnormal; Notable for the following components:       Result Value    Glucose 111 (*)     Alkaline Phosphatase 128 (*)     All other components within normal limits   MANUAL DIFFERE COMPARISON:  [facility] , XR CHEST PA + LAT CHEST (CPT=71020), 8/26/2017, 18:10.  TECHNIQUE:  PA and lateral chest radiographs were obtained.   PATIENT STATED HISTORY: (As transcribed by Technologist)  Patient stated she has been experiencing shortness of breat antibiotics will be admitted.     MDM   The patient's had 2 courses of antibiotics with as well as steroids with no improvement chest x-ray shows findings consistent with pneumonia patient will be admitted given IV antibiotics            Disposition and Kevin

## 2018-09-21 NOTE — RESPIRATORY THERAPY NOTE
DARSHANA : EQUIPMENT USE: DAILY SUMMARY                                            SET MODE: CPAP WITH CFLEX                                          USAGE IN HOURS: 3:49                                          90% EXP

## 2018-09-21 NOTE — PROGRESS NOTES
[facility]  Progress Note     Amara Reyes Patient Status:  Inpatient    1965 MRN [id number]   [facility] 5NW-A Attending Jose Kapadia MD   Hosp Day # 1 PCP Bhavana Jack MD     Chief Complaint: dyspnea    S: Patient ASSESSMENT / PLAN:     1. Sinusitis  1. Resume nasal steroids  2. ? PNA  1. On azithro  2. ESR/CRP neg  3. Leukocytosis  1. Due to steroids given in ED  4. VCD  1. Stable  5. Neurogenic Cough  1. Cont. Home meds  6. Hx of PJP PNA  1.  Likely was due t

## 2018-09-21 NOTE — H&P
[facility] HOSPITALIST  History and Physical     [facility] Book Patient Status:  Emergency    1965 MRN [id number]   Location [facility] Attending Francisco J Guzman MD   Hosp Day # 0 PCP Jeannine Bingham MD     Chief Complain Comment:  Performed by Dandre Bateman MD at [facility] ENDOSCOPY  [id number],:       Comment:  x3  2014: COLONOSCOPY      Comment:  1 cm cecal polyp s/p tattoo and removal was                hyperplastic.  Smaller polyps in descending were Paola Hernandez MD;  Location:               [address] [place] [address]  8/2014: UPPER GI ENDOSCOPY - REFERRAL      Comment:  otherwise unremarkable exam. Upper and lower esophageal                biopsies taken (-) eosinophilic esophagitis, and empiric Current Outpatient Medications on File Prior to Encounter:  predniSONE 10 MG Oral Tab 4 tabs daily for 3 days, then 3 tabs daily for 3 days, then 2 tabs daily for 3 days, then 1 tab daily for 3 days, then discontinue.  Disp: 30 tablet Rfl: 0   Boswellia S (BIOTIN 5000) 5 MG Oral Cap Take 10,000 Units by mouth daily. Takes 3 tabs daily  Disp:  Rfl:        Review of Systems:   A comprehensive 14 point review of systems was completed. Pertinent positives and negatives noted in the HPI.     Physical Exam: avelox; will start doxcycline but suspect will need bronchoscopy; will ask pulm and ID to see; pcp pcr ordered; sputum culture ordered; will also recheck ANCA levels to ensure this is not a presentation of vasculitis (given sinus involvement and hx ANCA+).

## 2018-09-21 NOTE — PAYOR COMM NOTE
--------------  ADMISSION REVIEW     Payor: [address] [place] [address]  Subscriber #:  [id number]  Authorization Number: N/A    Admit date: 9/20/18  Admit time: 2258       Admitting Physician: Sheri Dewitt DO  Attending Physician:  Vini Padron unspecified  No date: GENITO-URINARY DISEASE  9/16/2016: History of blood transfusion      Comment:  HGB 7.1 with 2 units to be given  No date: Hypertension      Comment:  not currently being treated  No date: IBS (irritable bowel syndrome)  No date: JUANCARLOS Kwan [address]  9/19/2016: EGD; N/A      Comment:  Procedure: ESOPHAGOGASTRODUODENOSCOPY (EGD);   Surgeon:                Dinora Bravo DO;  Location: [address] [place] [address] ENDOSCOPY  9/19/2016: ESOPHAGOGASTRODUODENOSCOPY (EGD); N/A      Comment:  Performed by Bernadette Escobedo 160 cm (5' 3\")   Wt 95.3 kg   SpO2 96%   BMI 37.20 kg/m²      Physical Exam  Patient is alert and oriented ×3 no acute distress  HEENT exam within normal limits neck is no lymphadenopathy or JVD lungs are clear cardiac vascular exam shows regular rate and will be admitted.     MDM   The patient's had 2 courses of antibiotics as well as steroids with no improvement chest x-ray shows findings consistent with pneumonia patient will be admitted given IV antibiotics    Disposition and Plan     Clinical Impression Medications on File Prior to Encounter:  predniSONE 10 MG Oral Tab 4 tabs daily for 3 days, then 3 tabs daily for 3 days, then 2 tabs daily for 3 days, then 1 tab daily for 3 days, then discontinue.  Disp: 30 tablet Rfl: 0   Boswellia Juanjo (BOSWELLIA OR) tabs daily  Disp:  Rfl:      Review of Systems:   A comprehensive 14 point review of systems was completed. Pertinent positives and negatives noted in the HPI.     Physical Exam:    /78   Pulse 79   Temp 98.6 °F (37 °C) (Temporal)   Resp (!) 27   H ordered; sputum culture ordered; will also recheck ANCA levels to ensure this is not a presentation of vasculitis (given sinus involvement and hx ANCA+). Check respiratory pcr and procalcitonin as well. No role IMO for steroids at this time. 2.  Hx PCP pn

## 2018-09-21 NOTE — PLAN OF CARE
Patient/Family Goals    • Patient/Family Long Term Goal Progressing    • Patient/Family Short Term Goal Progressing        RESPIRATORY - ADULT    • Achieves optimal ventilation and oxygenation Progressing          See flowsheets. Pt is AOx4.  Pt denies pain

## 2018-09-21 NOTE — PROGRESS NOTES
09/21/18 1321   Clinical Encounter Type   Visited With Patient   Sacramental Encounters   Sacrament of Sick-Anointing Anointed   The patient was seen by Rafita Fan. Received prayer, Scripture, support and Sacrament of the Sick.  Chaplain sorensen

## 2018-09-23 LAB
MYELOPEROX ANTIBODIES, IGG: 0 AU/ML
SERINE PROTEASE3, IGG: 118 AU/ML

## 2018-09-23 NOTE — DISCHARGE SUMMARY
[facility] HOSPITALIST  DISCHARGE SUMMARY     [facility] Book Patient Status:  Inpatient    1965 MRN [id number]   [facility] 5NW-A Attending No att. providers found   Hosp Day # 1 PCP Flaco Cleveland MD     Date of Admission: 2018 (two) times daily with meals. Refills:  0     Amitriptyline HCl 50 MG Tabs  Commonly known as:  ELAVIL      Take 50 mg by mouth nightly. Refills:  0     Amitriptyline HCl 10 MG Tabs  Commonly known as:  ELAVIL      30 mg Noon.    Refills:  0     BIOTIN tablet by mouth daily. Refills:  0     SYNTHROID 150 MCG Tabs  Generic drug:  Levothyroxine Sodium      Take 150 mcg by mouth once daily. Refills:  2     Vitamin D3 38595 units Caps      Take 1 capsule by mouth once a week.    Refills:  0     VITAMIN K2

## 2018-09-24 ENCOUNTER — PATIENT OUTREACH (OUTPATIENT)
Dept: CASE MANAGEMENT | Age: 53
End: 2018-09-24

## 2018-09-24 ENCOUNTER — TELEPHONE (OUTPATIENT)
Dept: FAMILY MEDICINE CLINIC | Facility: CLINIC | Age: 53
End: 2018-09-24

## 2018-09-24 DIAGNOSIS — Z02.9 ENCOUNTERS FOR ADMINISTRATIVE PURPOSE: ICD-10-CM

## 2018-09-24 DIAGNOSIS — R76.8 ABNORMAL ANCA TEST: Primary | ICD-10-CM

## 2018-09-24 PROCEDURE — 1111F DSCHRG MED/CURRENT MED MERGE: CPT

## 2018-09-24 NOTE — PROGRESS NOTES
Initial Post Discharge Follow Up   Discharge Date: 9/21/18  Contact Date: 9/24/2018    Consent Verification:  Assessment Completed With: Patient  HIPAA Verified?   Yes    Discharge Dx:     Sinusitis  VCD  Neurogenic Cough  Hx of PJP PNA  Hypothyroidism w Menaquinone-7 (VITAMIN K2 OR) Take 1 tablet by mouth daily. Disp:  Rfl:    Cholecalciferol (VITAMIN D3) 70091 units Oral Cap Take 1 capsule by mouth once a week. Disp:  Rfl:    furosemide 20 MG Oral Tab Take 1 tablet (20 mg total) by mouth daily.  Disp: 9 you received your (DME)?   no  Services ordered at D/C?   No        Needs post D/C:   Now that you are home, are there any needs or concerns you need addressed before your next visit with your PCP?  (DME, meds, disease concerns, Etc): No     Follow up appoi Panel Vasculitis w/ Reflex lab result. Provided support and encouragement regarding pt healthcare.     BOOK BY DATE: 10/05/18    [x]  Discharge Summary, Discharge medications reviewed/discussed/and reconciled with patient, and orders reviewed and discussed

## 2018-09-24 NOTE — TELEPHONE ENCOUNTER
Spoke to patient and informed of below, patient will follow up with Rheumatology    Referral placed  [facility] Jeff Bell  [address] [facility]  [facility] [address]  [place], [address]  Phone: [phone]  Fax: [phone]

## 2018-09-24 NOTE — TELEPHONE ENCOUNTER
Patient would like to know if you would be able to review results of ANCA test completed when she was inpatient.  Patient never received results from hospital.     Future Appointments   Date Time Provider Batsheva Hansen   9/26/2018  8:00 AM Gabriela Carrillo

## 2018-09-24 NOTE — TELEPHONE ENCOUNTER
Elevated serine protease IgG. I am not certain why this would be elevated but would recommend referral to rheumatology for further evaluation.

## 2018-09-24 NOTE — TELEPHONE ENCOUNTER
Spoke to pt for TCM today. Pt d/c from hospital on 09/24/18 for Sinusitis, VCD, Neurogenic Cough and hx pneumonia. Pt states today she is having some SOB however is monitoring at this time. Pt states she is not having any chest pain.   Pt states she had

## 2018-09-25 ENCOUNTER — LAB ENCOUNTER (OUTPATIENT)
Dept: LAB | Age: 53
End: 2018-09-25
Attending: PHYSICIAN ASSISTANT
Payer: COMMERCIAL

## 2018-09-25 DIAGNOSIS — R79.89 ELEVATED LFTS: ICD-10-CM

## 2018-09-25 LAB
ALBUMIN SERPL-MCNC: 3.5 G/DL (ref 3.5–4.8)
ALBUMIN/GLOB SERPL: 1.1 {RATIO} (ref 1–2)
ALP LIVER SERPL-CCNC: 120 U/L (ref 41–108)
ALT SERPL-CCNC: 47 U/L (ref 14–54)
ANION GAP SERPL CALC-SCNC: 7 MMOL/L (ref 0–18)
AST SERPL-CCNC: 19 U/L (ref 15–41)
BILIRUB SERPL-MCNC: 0.5 MG/DL (ref 0.1–2)
BUN BLD-MCNC: 13 MG/DL (ref 8–20)
BUN/CREAT SERPL: 16.7 (ref 10–20)
CALCIUM BLD-MCNC: 9 MG/DL (ref 8.3–10.3)
CHLORIDE SERPL-SCNC: 102 MMOL/L (ref 101–111)
CO2 SERPL-SCNC: 33 MMOL/L (ref 22–32)
CREAT BLD-MCNC: 0.78 MG/DL (ref 0.55–1.02)
GLOBULIN PLAS-MCNC: 3.3 G/DL (ref 2.5–4)
GLUCOSE BLD-MCNC: 81 MG/DL (ref 70–99)
M PROTEIN MFR SERPL ELPH: 6.8 G/DL (ref 6.1–8.3)
OSMOLALITY SERPL CALC.SUM OF ELEC: 293 MOSM/KG (ref 275–295)
POTASSIUM SERPL-SCNC: 4.1 MMOL/L (ref 3.6–5.1)
SODIUM SERPL-SCNC: 142 MMOL/L (ref 136–144)

## 2018-09-25 PROCEDURE — 36415 COLL VENOUS BLD VENIPUNCTURE: CPT | Performed by: PHYSICIAN ASSISTANT

## 2018-09-25 PROCEDURE — 80053 COMPREHEN METABOLIC PANEL: CPT | Performed by: PHYSICIAN ASSISTANT

## 2018-09-25 NOTE — PAYOR COMM NOTE
--------------  DISCHARGE REVIEW    Payor: [facility]  Subscriber #:  [id number]  Authorization Number: N/A    Admit date: 9/20/18  Admit time:  2258  Discharge Date: 9/21/2018  4:00 PM     Admitting Physician: Ross Kumar DO is now on day 10 of 14 avelox. Sinuses still hurt and still sob with cough. Has not improved much at all. Brief Synopsis:   The patient was admitted for possible pneumonia. This was ruled out.   She remained stable and afebrile during her hospital sta by mouth 2 (two) times daily before meals. Refills:  0     Potassium Chloride ER 20 MEQ Tbcr  Commonly known as:  KLOR-CON M20      Take 1 tablet (20 mEq total) by mouth once daily.    Quantity:  90 tablet  Refills:  1     PROAIR  (90 Base) MCG/ACT

## 2018-09-25 NOTE — PAYOR COMM NOTE
--------------  CONTINUED STAY REVIEW    Payor: [employer]  Subscriber #:  [id number]  Authorization Number: N/A    Admit date: 9/20/18  Admit time: 2258    Admitting Physician: Gonzalez Marcano DO  Attending Physician:  Gisele att.  pr Levothyroxine Sodium  150 mcg Oral QAM AC   • enoxaparin  40 mg Subcutaneous Daily       FOR REVIEW/APPROVAL OF INPT ADMISSION    PLEASE FAX ALL INPT DAYS AS AUTHORIZED ALONG W/NRD

## 2018-09-26 ENCOUNTER — LAB ENCOUNTER (OUTPATIENT)
Dept: LAB | Age: 53
End: 2018-09-26
Attending: NURSE PRACTITIONER
Payer: COMMERCIAL

## 2018-09-26 DIAGNOSIS — K92.1 MELENA: ICD-10-CM

## 2018-09-26 DIAGNOSIS — R74.8 ELEVATED LIVER ENZYMES: ICD-10-CM

## 2018-09-26 LAB
BAND %: 2 %
BASOPHIL % MANUAL: 0 %
BASOPHIL ABSOLUTE MANUAL: 0 X10(3) UL (ref 0–0.1)
EOSINOPHIL % MANUAL: 0 %
EOSINOPHIL ABSOLUTE MANUAL: 0 X10(3) UL (ref 0–0.3)
ERYTHROCYTE [DISTWIDTH] IN BLOOD BY AUTOMATED COUNT: 14.6 % (ref 11.5–16)
HCT VFR BLD AUTO: 39.9 % (ref 34–50)
HGB BLD-MCNC: 12.7 G/DL (ref 12–16)
LYMPHOCYTE % MANUAL: 10 %
LYMPHOCYTE ABSOLUTE MANUAL: 1.2 X10(3) UL (ref 0.9–4)
MCH RBC QN AUTO: 30 PG (ref 27–33.2)
MCHC RBC AUTO-ENTMCNC: 31.8 G/DL (ref 31–37)
MCV RBC AUTO: 94.1 FL (ref 81–100)
MONOCYTE % MANUAL: 5 %
MONOCYTE ABSOLUTE MANUAL: 0.6 X10(3) UL (ref 0.1–1)
MORPHOLOGY: NORMAL
MYELOCYTE %: 1 %
MYELOCYTE ABSOLUTE MANUAL: 0.12 X10(3) UL (ref ?–0.01)
NEUTROPHIL ABS PRELIM: 8.99 X10 (3) UL (ref 1.3–6.7)
NEUTROPHIL ABSOLUTE MANUAL: 10.08 X10(3) UL (ref 1.3–6.7)
NEUTROPHILS % MANUAL: 82 %
PLATELET # BLD AUTO: 286 10(3)UL (ref 150–450)
PLATELET MORPHOLOGY: NORMAL
RBC # BLD AUTO: 4.24 X10(6)UL (ref 3.8–5.1)
RED CELL DISTRIBUTION WIDTH-SD: 50.5 FL (ref 35.1–46.3)
TOTAL CELLS COUNTED: 100
WBC # BLD AUTO: 12 X10(3) UL (ref 4–13)

## 2018-09-26 PROCEDURE — 84080 ASSAY ALKALINE PHOSPHATASES: CPT

## 2018-09-26 PROCEDURE — 85027 COMPLETE CBC AUTOMATED: CPT

## 2018-09-26 PROCEDURE — 84075 ASSAY ALKALINE PHOSPHATASE: CPT

## 2018-09-26 PROCEDURE — 36415 COLL VENOUS BLD VENIPUNCTURE: CPT

## 2018-09-26 PROCEDURE — 85025 COMPLETE CBC W/AUTO DIFF WBC: CPT

## 2018-09-26 PROCEDURE — 85007 BL SMEAR W/DIFF WBC COUNT: CPT

## 2018-09-28 NOTE — PROGRESS NOTES
Alk phos is elevated  Pt recently saw RAFAEL, who is running isoenzymes  Will await those results and discuss more at upcoming OV

## 2018-09-30 ENCOUNTER — HOSPITAL ENCOUNTER (OUTPATIENT)
Dept: CT IMAGING | Age: 53
Discharge: HOME | End: 2018-09-30
Attending: INTERNAL MEDICINE
Payer: COMMERCIAL

## 2018-09-30 DIAGNOSIS — J45.40 MODERATE PERSISTENT ASTHMA WITHOUT COMPLICATION: ICD-10-CM

## 2018-09-30 DIAGNOSIS — J32.9 CHRONIC SINUSITIS, UNSPECIFIED LOCATION: ICD-10-CM

## 2018-09-30 DIAGNOSIS — R06.00 DYSPNEA, UNSPECIFIED TYPE: ICD-10-CM

## 2018-09-30 PROCEDURE — 71250 CT THORAX DX C-: CPT | Performed by: INTERNAL MEDICINE

## 2018-09-30 PROCEDURE — 70486 CT MAXILLOFACIAL W/O DYE: CPT | Performed by: INTERNAL MEDICINE

## 2018-10-01 ENCOUNTER — MED REC SCAN ONLY (OUTPATIENT)
Dept: FAMILY MEDICINE CLINIC | Facility: CLINIC | Age: 53
End: 2018-10-01

## 2018-10-02 ENCOUNTER — HOSPITAL ENCOUNTER (OUTPATIENT)
Dept: ULTRASOUND IMAGING | Age: 53
Discharge: HOME | End: 2018-10-02
Attending: NURSE PRACTITIONER
Payer: COMMERCIAL

## 2018-10-02 DIAGNOSIS — R74.8 ELEVATED LIVER ENZYMES: ICD-10-CM

## 2018-10-02 PROCEDURE — 76700 US EXAM ABDOM COMPLETE: CPT | Performed by: NURSE PRACTITIONER

## 2018-10-03 ENCOUNTER — OFFICE VISIT (OUTPATIENT)
Dept: FAMILY MEDICINE CLINIC | Facility: CLINIC | Age: 53
End: 2018-10-03
Payer: COMMERCIAL

## 2018-10-03 ENCOUNTER — TELEPHONE (OUTPATIENT)
Dept: FAMILY MEDICINE CLINIC | Facility: CLINIC | Age: 53
End: 2018-10-03

## 2018-10-03 VITALS
DIASTOLIC BLOOD PRESSURE: 90 MMHG | OXYGEN SATURATION: 96 % | BODY MASS INDEX: 41.29 KG/M2 | WEIGHT: 233 LBS | HEIGHT: 63 IN | RESPIRATION RATE: 20 BRPM | TEMPERATURE: 98 F | HEART RATE: 94 BPM | SYSTOLIC BLOOD PRESSURE: 130 MMHG

## 2018-10-03 DIAGNOSIS — Z23 NEED FOR INFLUENZA VACCINATION: ICD-10-CM

## 2018-10-03 DIAGNOSIS — J45.41 MODERATE PERSISTENT ASTHMA WITH ACUTE EXACERBATION: ICD-10-CM

## 2018-10-03 DIAGNOSIS — J32.9 CHRONIC SINUSITIS, UNSPECIFIED LOCATION: ICD-10-CM

## 2018-10-03 DIAGNOSIS — J40 BRONCHITIS: Primary | ICD-10-CM

## 2018-10-03 PROCEDURE — 99495 TRANSJ CARE MGMT MOD F2F 14D: CPT | Performed by: FAMILY MEDICINE

## 2018-10-03 RX ORDER — DOXYCYCLINE HYCLATE 100 MG
100 TABLET ORAL 2 TIMES DAILY
Qty: 20 TABLET | Refills: 0 | Status: ON HOLD | OUTPATIENT
Start: 2018-10-03 | End: 2018-10-13

## 2018-10-03 RX ORDER — PREDNISONE 20 MG/1
20 TABLET ORAL DAILY
Qty: 21 TABLET | Refills: 0 | Status: SHIPPED | OUTPATIENT
Start: 2018-10-03 | End: 2018-10-10 | Stop reason: ALTCHOICE

## 2018-10-03 RX ORDER — PREDNISONE 10 MG/1
TABLET ORAL
Qty: 9 TABLET | Refills: 1 | Status: SHIPPED | OUTPATIENT
Start: 2018-10-03 | End: 2018-10-09

## 2018-10-03 NOTE — TELEPHONE ENCOUNTER
Please call the pharmacy. Instructions for prednisone taper is prednisone 10 mg 1 tablet twice a day for next 3 days followed by 10 mg 1 tablet daily for 3 days.

## 2018-10-03 NOTE — TELEPHONE ENCOUNTER
Myra Rojas from [address] [place] [address] called with questions on the prednisone prescriptions.  The 10 mg is a taper but they need clarification on the 20 mg instructions

## 2018-10-03 NOTE — PATIENT INSTRUCTIONS
Thank you for choosing Lynda Tyson MD at [place] [address]  To Do: [address]  1. Please take meds as directed. [facility] is located in [address]. Monday, Tuesday & Friday – 8 a.m. to 4 p.m. Wednesday, Thursday – 7 a.m. to 3 p. outweigh those potential risks and we strive to make you healthier and to improve your quality of life.     Referrals, and Radiology Information:    If your insurance requires a referral to a specialist, please allow 5 business days to process your referral

## 2018-10-03 NOTE — PROGRESS NOTES
HPI:    Ina Marquez is a 46year old female here today for hospital follow up.    She was discharged from Inpatient hospital, [facility] to Home   Admission Date: 9/20/18   Discharge Date: 9/21/18  Hospital Discharge Diagnoses (since 9/3/2018)   N and wheezing. Allergies:  She is allergic to amoxicillin; lyrica [pregabalin]; cat hair extract; and trees, box elder.     Current Meds:    Current Outpatient Medications on File Prior to Visit:  Amitriptyline HCl 50 MG Oral Tab Take 50 mg by mouth nightly Bloating (6/2018), Blood disorder (2003), Blood in the stool (9/20/2018), Blurred vision (not sure), Body piercing (1981), Calculus of kidney (25 yrs), Cancer ([facility] Change in hair (6/2018), Chronic cough (5/4/2018), Constipation (years), Disorder of thyro (N/A, 8/19/2016); and ESOPHAGOGASTRODUODENOSCOPY, COLONOSCOPY, POSSIBLE BIOPSY, POSSIBLE POLYPECTOMY 49513, 22287 (N/A, 8/28/2014). She family history includes ALS in her father; Alcohol and Other Disorders Associated in her brother;  Allergies in an oth following:    Height as of this encounter: 63\". Weight as of this encounter: 233 lb.    /90 (BP Location: Left arm, Patient Position: Sitting, Cuff Size: adult)   Pulse 94   Temp 98 °F (36.7 °C) (Temporal)   Resp 20   Ht 63\"   Wt 233 lb   SpO2 96 in 1 week or as needed  Orders Placed This Encounter      Flulaval 6 months and older 0.5 ml Quad PF [73924]      Meds & Refills for this Visit:  Requested Prescriptions     Signed Prescriptions Disp Refills   • Doxycycline Hyclate 100 MG Oral Tab 20 table

## 2018-10-03 NOTE — TELEPHONE ENCOUNTER
Called back the pharmacy and they received 2 scripts for prednisone for this patient and just wanted to clarify if you wanted patient to have both or just the taper?        Medication Quantity Refills Start End   predniSONE 20 MG Oral Tab 21 tablet 0 10/3/2

## 2018-10-07 ENCOUNTER — LAB ENCOUNTER (OUTPATIENT)
Dept: LAB | Facility: HOSPITAL | Age: 53
End: 2018-10-07
Attending: NURSE PRACTITIONER
Payer: COMMERCIAL

## 2018-10-07 DIAGNOSIS — R74.8 ELEVATED LIVER ENZYMES: ICD-10-CM

## 2018-10-07 DIAGNOSIS — R74.8 ELEVATED ALKALINE PHOSPHATASE LEVEL: ICD-10-CM

## 2018-10-07 DIAGNOSIS — R19.4 CHANGE IN BOWEL HABITS: ICD-10-CM

## 2018-10-07 PROCEDURE — 82103 ALPHA-1-ANTITRYPSIN TOTAL: CPT

## 2018-10-07 PROCEDURE — 36415 COLL VENOUS BLD VENIPUNCTURE: CPT

## 2018-10-07 PROCEDURE — 86706 HEP B SURFACE ANTIBODY: CPT

## 2018-10-07 PROCEDURE — 82728 ASSAY OF FERRITIN: CPT

## 2018-10-07 PROCEDURE — 83516 IMMUNOASSAY NONANTIBODY: CPT

## 2018-10-07 PROCEDURE — 82784 ASSAY IGA/IGD/IGG/IGM EACH: CPT

## 2018-10-07 PROCEDURE — 85025 COMPLETE CBC W/AUTO DIFF WBC: CPT

## 2018-10-07 PROCEDURE — 86704 HEP B CORE ANTIBODY TOTAL: CPT

## 2018-10-07 PROCEDURE — 82390 ASSAY OF CERULOPLASMIN: CPT

## 2018-10-07 PROCEDURE — 86708 HEPATITIS A ANTIBODY: CPT

## 2018-10-07 PROCEDURE — 86038 ANTINUCLEAR ANTIBODIES: CPT

## 2018-10-07 PROCEDURE — 86709 HEPATITIS A IGM ANTIBODY: CPT

## 2018-10-07 PROCEDURE — 80074 ACUTE HEPATITIS PANEL: CPT

## 2018-10-10 ENCOUNTER — HOSPITAL ENCOUNTER (INPATIENT)
Facility: HOSPITAL | Age: 53
LOS: 3 days | Discharge: HOME | DRG: 202 | End: 2018-10-13
Attending: EMERGENCY MEDICINE | Admitting: HOSPITALIST
Payer: COMMERCIAL

## 2018-10-10 ENCOUNTER — APPOINTMENT (OUTPATIENT)
Dept: CT IMAGING | Facility: HOSPITAL | Age: 53
DRG: 202 | End: 2018-10-10
Attending: HOSPITALIST
Payer: COMMERCIAL

## 2018-10-10 ENCOUNTER — APPOINTMENT (OUTPATIENT)
Dept: GENERAL RADIOLOGY | Facility: HOSPITAL | Age: 53
DRG: 202 | End: 2018-10-10
Payer: COMMERCIAL

## 2018-10-10 ENCOUNTER — OFFICE VISIT (OUTPATIENT)
Dept: FAMILY MEDICINE CLINIC | Facility: CLINIC | Age: 53
End: 2018-10-10
Payer: COMMERCIAL

## 2018-10-10 VITALS
OXYGEN SATURATION: 97 % | HEART RATE: 80 BPM | TEMPERATURE: 99 F | BODY MASS INDEX: 41.29 KG/M2 | RESPIRATION RATE: 20 BRPM | WEIGHT: 233 LBS | HEIGHT: 63 IN | DIASTOLIC BLOOD PRESSURE: 80 MMHG | SYSTOLIC BLOOD PRESSURE: 130 MMHG

## 2018-10-10 DIAGNOSIS — R06.02 SHORTNESS OF BREATH: Primary | ICD-10-CM

## 2018-10-10 DIAGNOSIS — J45.901 EXACERBATION OF ASTHMA, UNSPECIFIED ASTHMA SEVERITY, UNSPECIFIED WHETHER PERSISTENT: Primary | ICD-10-CM

## 2018-10-10 PROCEDURE — 71250 CT THORAX DX C-: CPT | Performed by: HOSPITALIST

## 2018-10-10 PROCEDURE — 71045 X-RAY EXAM CHEST 1 VIEW: CPT | Performed by: EMERGENCY MEDICINE

## 2018-10-10 PROCEDURE — 99213 OFFICE O/P EST LOW 20 MIN: CPT | Performed by: FAMILY MEDICINE

## 2018-10-10 PROCEDURE — 99223 1ST HOSP IP/OBS HIGH 75: CPT | Performed by: HOSPITALIST

## 2018-10-10 RX ORDER — ENOXAPARIN SODIUM 100 MG/ML
40 INJECTION SUBCUTANEOUS DAILY
Status: DISCONTINUED | OUTPATIENT
Start: 2018-10-10 | End: 2018-10-13

## 2018-10-10 RX ORDER — ACETAMINOPHEN 325 MG/1
650 TABLET ORAL EVERY 6 HOURS PRN
Status: DISCONTINUED | OUTPATIENT
Start: 2018-10-10 | End: 2018-10-13

## 2018-10-10 RX ORDER — GARLIC EXTRACT 500 MG
2 CAPSULE ORAL 2 TIMES DAILY WITH MEALS
Status: DISCONTINUED | OUTPATIENT
Start: 2018-10-10 | End: 2018-10-13

## 2018-10-10 RX ORDER — FUROSEMIDE 20 MG/1
20 TABLET ORAL DAILY
Status: DISCONTINUED | OUTPATIENT
Start: 2018-10-11 | End: 2018-10-13

## 2018-10-10 RX ORDER — AMITRIPTYLINE HYDROCHLORIDE 10 MG/1
30 TABLET, FILM COATED ORAL
Status: DISCONTINUED | OUTPATIENT
Start: 2018-10-11 | End: 2018-10-13

## 2018-10-10 RX ORDER — METHYLPREDNISOLONE SODIUM SUCCINATE 40 MG/ML
40 INJECTION, POWDER, LYOPHILIZED, FOR SOLUTION INTRAMUSCULAR; INTRAVENOUS EVERY 8 HOURS
Status: COMPLETED | OUTPATIENT
Start: 2018-10-10 | End: 2018-10-11

## 2018-10-10 RX ORDER — BUDESONIDE 0.5 MG/2ML
0.5 INHALANT ORAL
Status: DISCONTINUED | OUTPATIENT
Start: 2018-10-10 | End: 2018-10-13

## 2018-10-10 RX ORDER — IPRATROPIUM BROMIDE AND ALBUTEROL SULFATE 2.5; .5 MG/3ML; MG/3ML
3 SOLUTION RESPIRATORY (INHALATION)
Status: DISCONTINUED | OUTPATIENT
Start: 2018-10-10 | End: 2018-10-13

## 2018-10-10 RX ORDER — AMITRIPTYLINE HYDROCHLORIDE 50 MG/1
50 TABLET, FILM COATED ORAL NIGHTLY
Status: DISCONTINUED | OUTPATIENT
Start: 2018-10-10 | End: 2018-10-13

## 2018-10-10 RX ORDER — LEVOTHYROXINE SODIUM 0.15 MG/1
150 TABLET ORAL
Status: DISCONTINUED | OUTPATIENT
Start: 2018-10-11 | End: 2018-10-13

## 2018-10-10 RX ORDER — DOXYCYCLINE HYCLATE 100 MG/1
100 CAPSULE ORAL EVERY 12 HOURS SCHEDULED
Status: DISCONTINUED | OUTPATIENT
Start: 2018-10-10 | End: 2018-10-11

## 2018-10-10 RX ORDER — METOCLOPRAMIDE HYDROCHLORIDE 5 MG/ML
10 INJECTION INTRAMUSCULAR; INTRAVENOUS EVERY 8 HOURS PRN
Status: DISCONTINUED | OUTPATIENT
Start: 2018-10-10 | End: 2018-10-13

## 2018-10-10 RX ORDER — MELATONIN
400 DAILY
Status: DISCONTINUED | OUTPATIENT
Start: 2018-10-11 | End: 2018-10-13

## 2018-10-10 RX ORDER — ONDANSETRON 2 MG/ML
4 INJECTION INTRAMUSCULAR; INTRAVENOUS EVERY 6 HOURS PRN
Status: DISCONTINUED | OUTPATIENT
Start: 2018-10-10 | End: 2018-10-13

## 2018-10-10 RX ORDER — METHYLPREDNISOLONE SODIUM SUCCINATE 125 MG/2ML
125 INJECTION, POWDER, LYOPHILIZED, FOR SOLUTION INTRAMUSCULAR; INTRAVENOUS ONCE
Status: COMPLETED | OUTPATIENT
Start: 2018-10-10 | End: 2018-10-10

## 2018-10-10 NOTE — PROGRESS NOTES
HPI:    Patient ID: Sammi Sheppard is a 46year old female. HPI  Ms. Reyes is a pleasant 15-year-old female with known history of asthma, history of pneumonia, hypertension, hyperlipidemia, sinusitis, sleep apnea on CPAP who was recently hospitalized f Positive for weakness. Negative for dizziness, light-headedness, numbness and headaches. Current Outpatient Medications:  Doxycycline Hyclate 100 MG Oral Tab Take 1 tablet (100 mg total) by mouth 2 (two) times daily for 10 days.  Disp: 20 table 10,000 Units by mouth daily.  Takes 3 tabs daily  Disp:  Rfl:      Allergies:  Amoxicillin             HIVES    Comment:TABS  Lyrica [Pregabalin]     SWELLING  Cat Hair Extract        ASTHMA, Coughing, Runny nose,                            SHORTNESS OF TEN

## 2018-10-10 NOTE — H&P
[facility] HOSPITALIST  History and Physical     [facility]  Book Patient Status:  Inpatient    1965 MRN [id number]   [facility] 5NW-A Attending Darshan Parkinson MD   Hosp Day # 0 PCP Felix Zamarripa MD     Chief Complaint: Shortness of b Fatigue not sure    This has been going on for a while   • Fever 9/2/2018    off and on   • Food intolerance approx 15 yrs    dairy does not agree with me. My stomach bloats.    • GENITO-URINARY DISEASE    • Headache disorder years    I have always gotten History:   Past Surgical History:   Procedure Laterality Date   • ANESTH, SECTION     • BACK SURGERY     • BRONCHOSCOPY N/A 2016    Performed by Daren Duff MD at [facility] ENDOSCOPY   •   8075,0158,2940,    x3   • COLONOSCOPY otherwise unremarkable exam. Upper and lower esophageal biopsies taken (-) eosinophilic esophagitis, and empiric dilation to 57 Armenian    • UPPER GI ENDOSCOPY,BIOPSY N/A 8/28/2014    Procedure: ESOPHAGOGASTRODUODENOSCOPY, COLONOSCOPY, POSSIBLE BIOPSY, POSS Type 2   • Diabetes Paternal Aunt         Type 2   • Diabetes Maternal Uncle         Type 2   • Diabetes Paternal Uncle         Type 2   • Mental Disorder Paternal Aunt         Dimensia   • Other Sister         Sjogren's Syndrome (rheumatoid) / Thin ba Disp: 3 Bottle Rfl: 3   albuterol sulfate (2.5 MG/3ML) 0.083% Inhalation Nebu Soln Take 3 mL (2.5 mg total) by nebulization every 6 (six) hours as needed for Wheezing. Disp: 1 Box Rfl: 3   Folic Acid 5 MG Oral Cap Take 5 mg by mouth daily.    Disp:  Rfl: K  3.8   CL  105   CO2  26.0   ALKPHO  141*   AST  35   ALT  71*   BILT  0.4   TP  7.5       Estimated Creatinine Clearance: 69.8 mL/min (based on SCr of 0.78 mg/dL). No results for input(s): PTP, INR in the last 168 hours.     Recent Labs   Lab  10/10

## 2018-10-10 NOTE — ED PROVIDER NOTES
Patient Seen in: [facility] Emergency Department    History   Patient presents with:  Dyspnea DIGNA SOB (respiratory)    Stated Complaint: digna    HPI    Patient is a 66-year-old female comes emergency room for evaluation difficulty breathing.   Patient still have at times, but not as often. • Disorder of thyroid    • Dizziness 9/13/2018    off and on.    • Dyspnea 8/16/2016   • Endometriosis 12/1995    Hysterectomy 12/1995   • Enlarged lymph node 9/19/2018    lymph nodes in neck area   • Esophageal refl 1   • Uncomfortable fullness after meals 9/4/2018    Not sure if I get full quickly/ if due to swallowing issue   • Unspecified sleep apnea PSG 7-3-14    PSG 7-3-14 AHI 15 RDI 15 REM AHI 20 SaO2 payal 91%   • Visual impairment     just glasses for reading HISTORY      ablation of vaginal lesion   • SINUS SURGERY    03/28/2017   • THYROIDECTOMY  1/2008    complete   • TOTAL ABDOM HYSTERECTOMY     • UP GI ENDOSCOPY,SARAH STAHL  8/28/2014    Procedure: ;  Surgeon: Renato Hatch MD;  Location: [facility] Runner exudate. NECK: Supple, trachea midline, no lymphadenopathy. LUNG: Diminished breath sounds. Slight  wheeze at the end of expiratory phase phase  CARDIOVASCULAR: Regular rate and rhythm. Normal S1S2. No S3S4 or murmur.   ABDOMEN: Bowel sounds are prese Report. Rate: 97  Rhythm: Sinus Rhythm  Reading: No acute change              Xr Chest Ap Portable  (cpt=71045)    Result Date: 10/10/2018  PROCEDURE:  XR CHEST AP PORTABLE  (CPT=71045)  TECHNIQUE:  AP chest radiograph was obtained.   COMPARISON:  [facility] 10/10/2018  3:14 PM                 Disposition and Plan     Clinical Impression:  Exacerbation of asthma, unspecified asthma severity, unspecified whether persistent  (primary encounter diagnosis)    Disposition:  Admit  10/10/2018  3:14 pm    Follow-up:

## 2018-10-10 NOTE — ED INITIAL ASSESSMENT (HPI)
PT here from PCP office for progressively worsening ROYA X 1 month. Negative CXR neg CT. Patient breathing labored, even, rapid. 02 sat 98% RA. Patient reports intermittent fever X 1 month.

## 2018-10-10 NOTE — CONSULTS
Pulmonary / Critical Care H&P/Consult       NAME: Lamberto Hernandez - ROOM: [facility] - MRN: [id number] - Age: 46year old - :  1965    Date of Admission: 10/10/2018  2:01 PM  Admission Diagnosis: No admission diagnoses are documented for this encounter has been going on for a while   • Fever 9/2/2018    off and on   • Food intolerance approx 15 yrs    dairy does not agree with me. My stomach bloats.    • GENITO-URINARY DISEASE    • Headache disorder years    I have always gotten headaches   • Heartburn n Date   • ANESTH, SECTION     • BACK SURGERY     • BRONCHOSCOPY N/A 2016    Performed by Kar Rea MD at [facility] ENDOSCOPY   •   2592,9190,9408,    x3   • COLONOSCOPY  2014    1 cm cecal polyp s/p tattoo and removal was hyp biopsies taken (-) eosinophilic esophagitis, and empiric dilation to 57 Latvian    • UPPER GI ENDOSCOPY,BIOPSY N/A 8/28/2014    Procedure: ESOPHAGOGASTRODUODENOSCOPY, COLONOSCOPY, POSSIBLE BIOPSY, POSSIBLE POLYPECTOMY 23121,45381;  Surgeon: Cheryl Garcia Self-Exams: Not Asked    Social History Narrative      Not on file     Lifelong nonsmoker      Family History:  Family History   Problem Relation Age of Onset   • Hypertension Mother    • Other (Other) Mother    • Other (thyroid nodules) Mother    • Colon taking for the 10/10/18 encounter [facility] Encounter).     Scheduled Medication:  Continuous Infusing Medication:  • albuterol Sulfate       PRN Medication:     REVIEW OF SYSTEMS:   Reviewed and negative except per HPI    OBJECTIVE:   10/10/18  1359 10/10/ 105   CO2  26.0   BUN  17     Creatinine, Serum (mg/dL)   Date Value   09/24/2009 0.60     CREATININE (mg/dL)   Date Value   03/08/2014 0.58   03/07/2014 0.43   03/02/2014 0.69   08/11/2011 0.87     Creatinine (mg/dL)   Date Value   10/10/2018 0.78   09/25

## 2018-10-10 NOTE — ED NOTES
Received patient from holding with c/o dyspnea.  Patient states she has been to the ED multiple times in the past month as well as f/u with PMD. Recent pneumonia in September saw PMD for f/u today who sent patient to ED for further evaluation and probable a

## 2018-10-10 NOTE — PROGRESS NOTES
ADMISSION       Patient admitted via Cart. Oriented to room. Safety precautions initiated. Bed in low position. Call light in reach. Patient alert and oriented. Dyspneic with exertion. Maintaining O2 saturation >93% on 2L NC. Tele- NSR/ ST.  To

## 2018-10-10 NOTE — PATIENT INSTRUCTIONS
Thank you for choosing Lydia Goldberg MD at [place] [address]  To Do: [address]  1. Please go to [facility] is located in [address]. Monday, Tuesday & Friday – 8 a.m. to 4 p.m. Wednesday, Thursday – 7 a.m. to 3 p.m.   The lab is c potential risks and we strive to make you healthier and to improve your quality of life.     Referrals, and Radiology Information:    If your insurance requires a referral to a specialist, please allow 5 business days to process your referral request.    If

## 2018-10-11 PROCEDURE — 99232 SBSQ HOSP IP/OBS MODERATE 35: CPT | Performed by: HOSPITALIST

## 2018-10-11 RX ORDER — PREDNISONE 20 MG/1
40 TABLET ORAL
Status: DISCONTINUED | OUTPATIENT
Start: 2018-10-12 | End: 2018-10-13

## 2018-10-11 RX ORDER — KETOROLAC TROMETHAMINE 30 MG/ML
15 INJECTION, SOLUTION INTRAMUSCULAR; INTRAVENOUS EVERY 6 HOURS PRN
Status: ACTIVE | OUTPATIENT
Start: 2018-10-11 | End: 2018-10-13

## 2018-10-11 RX ORDER — KETOROLAC TROMETHAMINE 30 MG/ML
30 INJECTION, SOLUTION INTRAMUSCULAR; INTRAVENOUS EVERY 6 HOURS PRN
Status: DISPENSED | OUTPATIENT
Start: 2018-10-11 | End: 2018-10-13

## 2018-10-11 NOTE — PAYOR COMM NOTE
--------------  ADMISSION REVIEW     Payor: [address] [place] [address]PO  Subscriber #:  [id number]  Authorization Number: N/A    Admit date: 10/10/18  Admit time: 1633       Admitting Physician: Calderon Dawn MD  Attending Physician:  MD Riley Salinas Unspecified sleep apnea PSG 7-3-14     Past Surgical History:   • BRONCHOSCOPY N/A 8/19/2016   • OTHER  1/1/99    lithotomy, several kidney stones   • OTHER SURGICAL HISTORY      back surgery, L4-5 discectomy   • SINUS SURGERY    03/28/2017   • THYROIDECTO Clinical Impression: Exacerbation of asthma, unspecified asthma severity, unspecified whether persistent          [facility] HOSPITALIST  History and Physical     [facility] Patient Status:  Inpatient    1965 MRN [id number]   Location [facility]  albuterol sulfate (2.5 MG/3ML) 0.083% Inhalation Nebu Soln Take 3 mL (2.5 mg total) by nebulization every 6 (six) hours as needed for Wheezing. Folic Acid 5 MG Oral Cap Take 5 mg by mouth daily.      Fish Oil-Cholecalciferol (FISH OIL + D3) 7774-4263 MG she is finishing. However states breathing continued to worsen so came to the ER. ASSESSMENT/PLAN:   1. Dyspnea: secondary to asthma exac. Recent ct had some vague ggo, ? Atelectasis. - will repeat ct chest now to r/o progressive process.  If this is 10/10/2018 2019 Given 0.5 mg Nebulization Ish Merino      cholecalciferol (VITAMIN D3) cap/tab 1,000 Units     Date Action Dose Route User    10/11/2018 0906 Given 1000 Units Oral Ilya Almeida RN      Doxycycline Hyclate (VIBRAMYCIN) cap 100 Intravenous Tamika ELIZONDO RN      MethylPREDNISolone Sodium Succ (Solu-MEDROL) injection 40 mg     Date Action Dose Route User    10/11/2018 0616 Given 40 mg Intravenous Ronnell Ruvalcaba RN    10/10/2018 2311 Given 40 mg Intravenous Ronnell Ruvalcaba RN

## 2018-10-11 NOTE — CM/SW NOTE
10/11/18 1400   CM/SW Screening   Referral Source    Information Source Chart review;Nursing rounds   Patient's Mental Status Alert;Oriented   Patient's [address]   Patient lives with Spouse   Patient Status Prior to Admission

## 2018-10-11 NOTE — PROGRESS NOTES
[facility] HOSPITALIST  Progress Note     [address] Patient Status:  Inpatient    1965 MRN [id number]   [facility] 5NW-A Attending Serenity Bernstein MD   Hosp Day # 1 PCP Jeannine Bingham MD     Chief Complaint: SOB    S: Patient stil Daily   • budesonide  0.5 mg Nebulization 2 times daily   • Acidophilus/Pectin  2 capsule Oral BID with meals   • Amitriptyline HCl  30 mg Oral Noon   • Amitriptyline HCl  50 mg Oral Nightly   • furosemide  20 mg Oral Daily   • Levothyroxine Sodium  150 mc

## 2018-10-11 NOTE — PLAN OF CARE
Patient/Family Goals    • Patient/Family Long Term Goal Progressing    • Patient/Family Short Term Goal Progressing        RESPIRATORY - ADULT    • Achieves optimal ventilation and oxygenation Progressing        DX: ASTHMA/+RHINO/ENTEROVIRUS  PLAN OF CARE: Acid-fast bacteria present     Irritable bowel syndrome with constipation     Anemia     Iron deficiency     Normocytic anemia     Recurrent sinus infections     Vitamin D deficiency     Lung nodule     Vocal cord dysfunction     Hypogammaglobulinemia ([address]

## 2018-10-11 NOTE — PLAN OF CARE
Patient/Family Goals    • Patient/Family Long Term Goal Progressing    • Patient/Family Short Term Goal Progressing        RESPIRATORY - ADULT    • Achieves optimal ventilation and oxygenation Progressing          Patient alert,oriented here with Asthma Ex

## 2018-10-11 NOTE — PROGRESS NOTES
Pulmonary Progress Note     Assessment / Plan:  1. Dyspnea - secondary to asthma exac. RVP positive for rhinovirus  - wean O2 as able  - given minimal improvement in symptoms check d-dimer  - dc abx given negative PCT and CT chest with no e/o PNA  2.  Asthm

## 2018-10-12 PROCEDURE — 99232 SBSQ HOSP IP/OBS MODERATE 35: CPT | Performed by: HOSPITALIST

## 2018-10-12 RX ORDER — BENZONATATE 200 MG/1
200 CAPSULE ORAL 3 TIMES DAILY
Status: DISCONTINUED | OUTPATIENT
Start: 2018-10-12 | End: 2018-10-13

## 2018-10-12 NOTE — PROGRESS NOTES
[facility] HOSPITALIST  Progress Note     [address] Patient Status:  Inpatient    1965 MRN [id number]   [facility] 5NW-A Attending Mckay Wylie MD   Hosp Day # 2 PCP Lydia Goldberg MD     Chief Complaint: SOB    S: Patient stil Nebulization 2 times daily   • Acidophilus/Pectin  2 capsule Oral BID with meals   • Amitriptyline HCl  30 mg Oral Noon   • Amitriptyline HCl  50 mg Oral Nightly   • furosemide  20 mg Oral Daily   • Levothyroxine Sodium  150 mcg Oral Before breakfast   • c

## 2018-10-12 NOTE — PLAN OF CARE
Patient/Family Goals    • Patient/Family Long Term Goal Progressing    • Patient/Family Short Term Goal Progressing        RESPIRATORY - ADULT    • Achieves optimal ventilation and oxygenation Progressing            Patient is AOx4, on room air, and on tel

## 2018-10-12 NOTE — PROGRESS NOTES
Pulmonary Progress Note        NAME: [address] [facility]: [id number] - MRN: [id number] - Age: 46year old - : 1965        Last 24hrs: No events overnight, doesn't feel any worse than yesterday, still doesn't feel better    OBJECTIVE:   10/11/18 or edema    Recent Labs      10/10/18   1409  10/11/18   0628   WBC  7.7  14.7*   HGB  13.3  13.7   MCV  93.7  92.8   PLT  330.0  363.0   BAND   --   1       Recent Labs      10/10/18   1409  10/11/18   0628   NA  141  138   K  3.8  4.3   CL  105  105   CO Second trimester   0.30-4.10   Third trimester    0.40-2.70     Albumin   Date/Time Value Ref Range Status   10/11/2018 06:28 AM 4.0 3.1 - 4.5 g/dL Final   09/24/2009 06:23 PM 4.6 3.5 - 5.5 g/dL Final     ALBUMIN   Date/Time Value Ref Range Status   03/07/

## 2018-10-12 NOTE — PLAN OF CARE
Patient/Family Goals    • Patient/Family Short Term Goal Progressing        RESPIRATORY - ADULT    • Achieves optimal ventilation and oxygenation Progressing        Pt A/O x 4, no distress noted. Pt on 2L/min per nasal cannula, O2sat above 90%.  DARSHANA with CP

## 2018-10-12 NOTE — PROGRESS NOTES
Multidisciplinary Discharge Rounds held 10/12/2018. Treatment team members present today include , , Charge Nurse,  Nurse, RT, PT and Pharmacy caring for [employer].      Other care providers present:    Patient Active Prob Exacerbation of asthma     Exacerbation of asthma, unspecified asthma severity, unspecified whether persistent       Active issue(s) requiring resolution prior to discharge: ASTHMA & SOB    Anticipated discharge date: 10/13/18    Current discharge plan: WILBERT

## 2018-10-12 NOTE — PAYOR COMM NOTE
--------------  CONTINUED STAY REVIEW    Payor: [employer]  Subscriber #:  [id number]  Authorization Number: N/A    Admit date: 10/10/18  Admit time: 1633    10/11:    HOSPITALIST:  Patient still having SOB    Vital signs:  Temp:  [97.7 °F (3 CPAP —   10/11/18 2007 98.3 °F (36.8 °C) 97 20 144/65 96 % Nasal cannula 2 L/min   10/11/18 1643 98.3 °F (36.8 °C) — 20 122/61 — Nasal cannula 2 L/min   10/11/18 1511 — 95 18 — — — —   10/11/18 1115 98 °F (36.7 °C) — 22 116/69 — Nasal cannula 2 L/min   10/ Oral Cortney Frias, RN      Enoxaparin Sodium (LOVENOX) 40 MG/0.4ML injection 40 mg     Date Action Dose Route User    10/12/2018 0857 Given 40 mg Subcutaneous (Left Lower Abdomen) Socrates Nur, RN      Fluticasone Furoate-Vilanterol (BREO ELLIPTA) 200-

## 2018-10-13 VITALS
SYSTOLIC BLOOD PRESSURE: 144 MMHG | HEIGHT: 63 IN | RESPIRATION RATE: 20 BRPM | HEART RATE: 97 BPM | WEIGHT: 233.31 LBS | BODY MASS INDEX: 41.34 KG/M2 | TEMPERATURE: 98 F | OXYGEN SATURATION: 94 % | DIASTOLIC BLOOD PRESSURE: 76 MMHG

## 2018-10-13 PROCEDURE — 99239 HOSP IP/OBS DSCHRG MGMT >30: CPT | Performed by: HOSPITALIST

## 2018-10-13 RX ORDER — PREDNISONE 20 MG/1
TABLET ORAL
Qty: 12 TABLET | Refills: 0 | Status: SHIPPED | OUTPATIENT
Start: 2018-10-13 | End: 2018-10-22 | Stop reason: ALTCHOICE

## 2018-10-13 NOTE — PROGRESS NOTES
NURSING DISCHARGE NOTE    Discharged Home via Wheelchair. Accompanied by Family member  Belongings Taken by patient/family. Pt discharged home with personal belongings and stable vital signs.  Pt given discharge instructions and verbalizes [place]

## 2018-10-13 NOTE — PLAN OF CARE
Patient/Family Goals    • Patient/Family Long Term Goal Progressing    • Patient/Family Short Term Goal Progressing        RESPIRATORY - ADULT    • Achieves optimal ventilation and oxygenation Progressing          Problem:  Asthma and rhino/entero virus

## 2018-10-13 NOTE — PROGRESS NOTES
Pulmonary Progress Note     Assessment / Plan:  1. Dyspnea - secondary to asthma exac. D-dimer negative. RVP positive for rhinovirus  - wean O2 as able  - add tessalon to help w/ cough  2.  Asthma  - oral steroids  - bd protocol  - breo for symbicort  - con

## 2018-10-13 NOTE — DISCHARGE SUMMARY
[facility] HOSPITALIST  DISCHARGE SUMMARY     Naomi Reyes Patient Status:  Inpatient    1965 MRN [id number]   [facility] 5NW-A Attending No att. providers found   Hosp Day # 3 PCP Krystal Bass MD     Date of Admission: 10/10/2018 Discharge Medication List:     Discharge Medications      START taking these medications      Instructions Prescription details   Fluticasone Furoate-Vilanterol 200-25 MCG/INH Aepb  Commonly known as:  BREO ELLIPTA      Inhale 1 puff into the lungs siddharth Aers  Generic drug:  Albuterol Sulfate HFA      Inhale into the lungs every 6 (six) hours as needed for Wheezing.    Refills:  0     albuterol sulfate (2.5 MG/3ML) 0.083% Nebu  Commonly known as:  VENTOLIN      Take 3 mL (2.5 mg total) by nebulization every

## 2018-10-13 NOTE — PROGRESS NOTES
[facility] HOSPITALIST  Progress Note     [address] Patient Status:  Inpatient    1965 MRN [id number]   [facility] 5NW-A Attending Samy Louie MD   Hosp Day # 3 PCP Brittany Clemens MD     Chief Complaint: SOB    S: Patient stil Fluticasone Furoate-Vilanterol  1 puff Inhalation Daily   • budesonide  0.5 mg Nebulization 2 times daily   • Acidophilus/Pectin  2 capsule Oral BID with meals   • Amitriptyline HCl  30 mg Oral Noon   • Amitriptyline HCl  50 mg Oral Nightly   • furosemide

## 2018-10-15 ENCOUNTER — PATIENT OUTREACH (OUTPATIENT)
Dept: CASE MANAGEMENT | Age: 53
End: 2018-10-15

## 2018-10-15 NOTE — PAYOR COMM NOTE
--------------  DISCHARGE REVIEW    Payor: [facility]  Subscriber #:  [id number]  Authorization Number: N/A    Admit date: 10/10/18  Admit time:  5240  Discharge Date: 10/13/2018  1:40 PM     Admitting Physician: Shruti Harris MD  Attending [address]  [phone]    In 1 week      Tamiko Rojas MD  [address] [facility]  [address]  [phone]    In 1 week    ---------------------------------------------------------------------------------------  PATIENT DISCHARGE INSTRUCTIO

## 2018-10-16 ENCOUNTER — TELEPHONE (OUTPATIENT)
Dept: CASE MANAGEMENT | Age: 53
End: 2018-10-16

## 2018-10-16 PROCEDURE — 82785 ASSAY OF IGE: CPT | Performed by: INTERNAL MEDICINE

## 2018-10-16 PROCEDURE — 36415 COLL VENOUS BLD VENIPUNCTURE: CPT | Performed by: INTERNAL MEDICINE

## 2018-10-16 PROCEDURE — 82784 ASSAY IGA/IGD/IGG/IGM EACH: CPT | Performed by: INTERNAL MEDICINE

## 2018-10-16 PROCEDURE — 86235 NUCLEAR ANTIGEN ANTIBODY: CPT | Performed by: INTERNAL MEDICINE

## 2018-10-16 NOTE — PROGRESS NOTES
Current TCM Episode  Condition Update Post Discharge   Discharge Date: 10/13/18  Contact Date: 10/15/2018    Consent Verification:  Assessment Completed With: Patient  HIPAA Verified?   Yes    General:   • How have you been since your discharge from the hos Tab Take 50 mg by mouth nightly. 80mg/day  Disp:  Rfl:    NON FORMULARY Take 1 capsule by mouth 2 (two) times daily. GI-ENCAP   Disp:  Rfl:    Boswellia Juanjo (BOSWELLIA OR) Take 3 tablets by mouth daily.  Disp:  Rfl:    Multiple Vitamins-Minerals (K-PAX yes  o Do you have any questions about your new medication? No  • Did you  your discharge medications when you left the hospital? Yes  • Are there any reasons that keep you from taking your medication as prescribed?  No      Referrals/orders at D/C: [facility], [address] [place] [address], [facility] [address] [place] [address] [address]  [facility] [address] [phone] [facility] PULMONARY MEDICINE  [place] [id number]

## 2018-10-16 NOTE — TELEPHONE ENCOUNTER
NCM spoke to pt for hospital f/up today. Pt d/c on 10/13/18 dx asthma exacerbation and rhinovirus. Pt states she is taking prednisone, inhalers and using her nebs as instructed at d/c.   Pt states she is also taking OTC Equate night time cough syrup to he

## 2018-10-22 ENCOUNTER — OFFICE VISIT (OUTPATIENT)
Dept: FAMILY MEDICINE CLINIC | Facility: CLINIC | Age: 53
End: 2018-10-22
Payer: COMMERCIAL

## 2018-10-22 ENCOUNTER — APPOINTMENT (OUTPATIENT)
Dept: LAB | Age: 53
End: 2018-10-22
Attending: FAMILY MEDICINE
Payer: COMMERCIAL

## 2018-10-22 VITALS
BODY MASS INDEX: 41.46 KG/M2 | TEMPERATURE: 98 F | DIASTOLIC BLOOD PRESSURE: 100 MMHG | SYSTOLIC BLOOD PRESSURE: 150 MMHG | WEIGHT: 234 LBS | HEIGHT: 63 IN | RESPIRATION RATE: 20 BRPM | HEART RATE: 80 BPM

## 2018-10-22 DIAGNOSIS — Z23 NEED FOR INFLUENZA VACCINATION: ICD-10-CM

## 2018-10-22 DIAGNOSIS — R06.02 SHORTNESS OF BREATH: ICD-10-CM

## 2018-10-22 DIAGNOSIS — J45.41 MODERATE PERSISTENT ASTHMA WITH ACUTE EXACERBATION: Primary | ICD-10-CM

## 2018-10-22 DIAGNOSIS — R76.8 ANTINEUTROPHIL CYTOPLASMIC ANTIBODY (ANCA) POSITIVE: ICD-10-CM

## 2018-10-22 PROCEDURE — 99214 OFFICE O/P EST MOD 30 MIN: CPT | Performed by: FAMILY MEDICINE

## 2018-10-22 PROCEDURE — 81003 URINALYSIS AUTO W/O SCOPE: CPT

## 2018-10-22 PROCEDURE — 1111F DSCHRG MED/CURRENT MED MERGE: CPT | Performed by: FAMILY MEDICINE

## 2018-10-22 NOTE — PATIENT INSTRUCTIONS
Thank you for choosing Rm Andre MD at [place] [address]  To Do: [address]  1. Please take meds as directed. [facility] is located in [address]. Monday, Tuesday & Friday – 8 a.m. to 4 p.m. Wednesday, Thursday – 7 a.m. to 3 p. outweigh those potential risks and we strive to make you healthier and to improve your quality of life.     Referrals, and Radiology Information:    If your insurance requires a referral to a specialist, please allow 5 business days to process your referral

## 2018-10-22 NOTE — PROGRESS NOTES
HPI:    Patient ID: Gina Ley is a 46year old female. HPI  Ms. Reyes is a pleasant 66-year-old female with known history of asthma, history of pneumonia, hypertension, hyperlipidemia, sinusitis, sleep apnea on CPAP recently hospitalized for acute GI-ENCAP  2-4 tabs daily  Disp:  Rfl:    Boswellia Juanjo (BOSWELLIA OR) Take 3 tablets by mouth daily. Disp:  Rfl:    Multiple Vitamins-Minerals (K-PAX IMMUNE PROFESSIONAL ST OR) Take 8 tablets by mouth daily.  Disp:  Rfl:    Menaquinone-7 (VITAMIN K2 OR) Tympanic membrane, external ear and ear canal normal.   Left Ear: Tympanic membrane, external ear and ear canal normal.   Nose: Rhinorrhea present. Mouth/Throat: Oropharynx is clear and moist.   Eyes: Conjunctivae are normal. No scleral icterus.    Neck:

## 2018-10-24 PROBLEM — R74.8 ELEVATED LIVER ENZYMES: Status: ACTIVE | Noted: 2018-10-24

## 2018-11-02 ENCOUNTER — LAB ENCOUNTER (OUTPATIENT)
Dept: LAB | Age: 53
End: 2018-11-02
Attending: INTERNAL MEDICINE
Payer: COMMERCIAL

## 2018-11-02 DIAGNOSIS — E89.0 POSTOPERATIVE HYPOTHYROIDISM: ICD-10-CM

## 2018-11-02 PROCEDURE — 84481 FREE ASSAY (FT-3): CPT

## 2018-11-02 PROCEDURE — 84439 ASSAY OF FREE THYROXINE: CPT

## 2018-11-02 PROCEDURE — 36415 COLL VENOUS BLD VENIPUNCTURE: CPT

## 2018-11-02 PROCEDURE — 84432 ASSAY OF THYROGLOBULIN: CPT

## 2018-11-02 PROCEDURE — 84443 ASSAY THYROID STIM HORMONE: CPT

## 2018-11-02 PROCEDURE — 86800 THYROGLOBULIN ANTIBODY: CPT

## 2018-11-06 ENCOUNTER — HOSPITAL ENCOUNTER (OUTPATIENT)
Dept: ULTRASOUND IMAGING | Facility: HOSPITAL | Age: 53
Discharge: HOME | End: 2018-11-06
Attending: INTERNAL MEDICINE
Payer: COMMERCIAL

## 2018-11-06 DIAGNOSIS — M79.89 BILATERAL HAND SWELLING: ICD-10-CM

## 2018-11-06 PROCEDURE — 76881 US COMPL JOINT R-T W/IMG: CPT | Performed by: INTERNAL MEDICINE

## 2018-11-21 ENCOUNTER — OFFICE VISIT (OUTPATIENT)
Dept: SURGERY | Facility: CLINIC | Age: 53
End: 2018-11-21
Payer: COMMERCIAL

## 2018-11-21 ENCOUNTER — APPOINTMENT (OUTPATIENT)
Dept: LAB | Facility: HOSPITAL | Age: 53
End: 2018-11-21
Attending: INTERNAL MEDICINE
Payer: COMMERCIAL

## 2018-11-21 VITALS
BODY MASS INDEX: 42 KG/M2 | DIASTOLIC BLOOD PRESSURE: 95 MMHG | HEART RATE: 100 BPM | RESPIRATION RATE: 20 BRPM | OXYGEN SATURATION: 93 % | WEIGHT: 237 LBS | SYSTOLIC BLOOD PRESSURE: 171 MMHG

## 2018-11-21 DIAGNOSIS — R79.89 ABNORMAL LIVER FUNCTION TESTS: Primary | ICD-10-CM

## 2018-11-21 DIAGNOSIS — R79.89 ABNORMAL LIVER FUNCTION TESTS: ICD-10-CM

## 2018-11-21 PROCEDURE — 80076 HEPATIC FUNCTION PANEL: CPT

## 2018-11-21 PROCEDURE — 36415 COLL VENOUS BLD VENIPUNCTURE: CPT

## 2018-11-21 NOTE — PROGRESS NOTES
[facility]  [address] [facility], [address]  [address]  [facility], [address]  [phone] ([phone]) improved after starting k-pax   • Chronic cough 5/4/2018    dx: Neuropathic Sensory Cough   • Constipation years    still have at times, but not as often. • Disorder of thyroid    • Dizziness 9/13/2018    off and on.    • Dyspnea 8/16/2016   • Endometr incontinence 8/8/2018    normally NO. About a month ago this did happen.    • Thyroid cancer ([facility] [address] 2008    s/p PERRY, stage 1   • Uncomfortable fullness after meals 9/4/2018    Not sure if I get full quickly/ if due to swallowing issue   • Unspecified sleep Cancer Maternal Grandmother    • Colon Cancer Maternal Grandmother          during tx after colonostomy.    • Uterine Cancer Maternal Grandmother    • Heart Disorder Paternal Grandfather    • Diabetes Paternal Grandfather         Type 1   • Diabetes Sherrie Carvalho daily - BRAND, Disp: 90 tablet, Rfl: 1  •  Tiotropium Bromide Monohydrate (SPIRIVA RESPIMAT) 1.25 MCG/ACT Inhalation Aero Soln, Inhale 2 puffs into the lungs daily. , Disp: 3 Inhaler, Rfl: 3  •  albuterol sulfate (2.5 MG/3ML) 0.083% Inhalation Nebu Soln, Ta 50 MCG/ACT Nasal Suspension, 2 sprays by Each Nare route daily. , Disp: 3 Bottle, Rfl: 3  •  folic acid 990 MCG Oral Tab, Take 400 mcg by mouth daily.   , Disp: , Rfl:   •  Fish Oil-Cholecalciferol (FISH OIL + D3) 0266-0624 MG-UNIT Oral Cap, Take 1,000 Units

## 2018-11-30 PROCEDURE — 82785 ASSAY OF IGE: CPT | Performed by: INTERNAL MEDICINE

## 2018-12-06 ENCOUNTER — LAB ENCOUNTER (OUTPATIENT)
Dept: LAB | Age: 53
End: 2018-12-06
Attending: INTERNAL MEDICINE
Payer: COMMERCIAL

## 2018-12-06 DIAGNOSIS — E89.0 POST-SURGICAL HYPOTHYROIDISM: ICD-10-CM

## 2018-12-06 DIAGNOSIS — E89.0 POSTOPERATIVE HYPOTHYROIDISM: ICD-10-CM

## 2018-12-06 DIAGNOSIS — C73 THYROID CANCER (HCC): ICD-10-CM

## 2018-12-06 PROCEDURE — 84439 ASSAY OF FREE THYROXINE: CPT

## 2018-12-06 PROCEDURE — 84432 ASSAY OF THYROGLOBULIN: CPT

## 2018-12-06 PROCEDURE — 84443 ASSAY THYROID STIM HORMONE: CPT

## 2018-12-06 PROCEDURE — 86800 THYROGLOBULIN ANTIBODY: CPT

## 2018-12-06 PROCEDURE — 36415 COLL VENOUS BLD VENIPUNCTURE: CPT

## 2018-12-08 ENCOUNTER — APPOINTMENT (OUTPATIENT)
Dept: CT IMAGING | Age: 53
End: 2018-12-08
Attending: EMERGENCY MEDICINE
Payer: COMMERCIAL

## 2018-12-08 ENCOUNTER — APPOINTMENT (OUTPATIENT)
Dept: GENERAL RADIOLOGY | Age: 53
End: 2018-12-08
Attending: EMERGENCY MEDICINE
Payer: COMMERCIAL

## 2018-12-08 ENCOUNTER — HOSPITAL ENCOUNTER (EMERGENCY)
Age: 53
Discharge: HOME | End: 2018-12-08
Attending: EMERGENCY MEDICINE
Payer: COMMERCIAL

## 2018-12-08 VITALS
HEIGHT: 63 IN | OXYGEN SATURATION: 97 % | BODY MASS INDEX: 40.75 KG/M2 | SYSTOLIC BLOOD PRESSURE: 132 MMHG | HEART RATE: 75 BPM | WEIGHT: 230 LBS | RESPIRATION RATE: 18 BRPM | TEMPERATURE: 98 F | DIASTOLIC BLOOD PRESSURE: 67 MMHG

## 2018-12-08 DIAGNOSIS — J06.9 VIRAL UPPER RESPIRATORY TRACT INFECTION: Primary | ICD-10-CM

## 2018-12-08 PROCEDURE — 87040 BLOOD CULTURE FOR BACTERIA: CPT | Performed by: EMERGENCY MEDICINE

## 2018-12-08 PROCEDURE — 36415 COLL VENOUS BLD VENIPUNCTURE: CPT

## 2018-12-08 PROCEDURE — 71275 CT ANGIOGRAPHY CHEST: CPT | Performed by: EMERGENCY MEDICINE

## 2018-12-08 PROCEDURE — 93005 ELECTROCARDIOGRAM TRACING: CPT

## 2018-12-08 PROCEDURE — 99285 EMERGENCY DEPT VISIT HI MDM: CPT

## 2018-12-08 PROCEDURE — 80053 COMPREHEN METABOLIC PANEL: CPT | Performed by: EMERGENCY MEDICINE

## 2018-12-08 PROCEDURE — 93010 ELECTROCARDIOGRAM REPORT: CPT

## 2018-12-08 PROCEDURE — 96361 HYDRATE IV INFUSION ADD-ON: CPT

## 2018-12-08 PROCEDURE — 85025 COMPLETE CBC W/AUTO DIFF WBC: CPT | Performed by: EMERGENCY MEDICINE

## 2018-12-08 PROCEDURE — 71046 X-RAY EXAM CHEST 2 VIEWS: CPT | Performed by: EMERGENCY MEDICINE

## 2018-12-08 PROCEDURE — 96360 HYDRATION IV INFUSION INIT: CPT

## 2018-12-09 ENCOUNTER — PATIENT MESSAGE (OUTPATIENT)
Dept: FAMILY MEDICINE CLINIC | Facility: CLINIC | Age: 53
End: 2018-12-09

## 2018-12-09 NOTE — ED PROVIDER NOTES
Patient Seen in: [facility] Emergency Department In [place]    History   Patient presents with:  Cough/URI    Stated Complaint: cough, [facility] from [facility]     HPI    49-year-old white female who presents emergency room today for complaint o still have at times, but not as often. • Disorder of thyroid    • Dizziness 9/13/2018    off and on.    • Dyspnea 8/16/2016   • Endometriosis 12/1995    Hysterectomy 12/1995   • Enlarged lymph node 9/19/2018    lymph nodes in neck area   • Esophageal r stage 1   • Uncomfortable fullness after meals 9/4/2018    Not sure if I get full quickly/ if due to swallowing issue   • Unspecified sleep apnea PSG 7-3-14    PSG 7-3-14 AHI 15 RDI 15 REM AHI 20 SaO2 payal 91%   • Visual impairment     just glasses for re for stated complaint: cough, sinus infection from [facility]   Other systems are as noted in HPI. Constitutional and vital signs reviewed. All other systems reviewed and negative except as noted above.     Physical Exam     ED Triage Vitals [12/08/ Final result                 Please view results for these tests on the individual orders. BLOOD CULTURE   BLOOD CULTURE   CBC W/ DIFFERENTIAL     EKG    Rate, intervals and axes as noted on EKG Report.   Rate: 79 bpm  Rhythm: Sinus Rh significant wheezing good air exchange. CBC and chemistries are generally unremarkable. Chest x-ray showed no signs of pneumonia. Patient was sent for CT scan. CT scan showed no pulmonary embolism or other active infection.   I spoke to the patient's pu

## 2018-12-10 RX ORDER — MONTELUKAST SODIUM 10 MG/1
10 TABLET ORAL
Qty: 90 TABLET | Refills: 1 | Status: SHIPPED | OUTPATIENT
Start: 2018-12-10 | End: 2019-07-16

## 2018-12-10 NOTE — TELEPHONE ENCOUNTER
Requesting Montelukast  LOV: 10/22/18  RTC: prn  Last Relevant Labs: 9/6/18 ACT = 12  Filled: 8/23/17 #90 with 3 refills    Future Appointments   Date Time Provider Batsheva aHnsen   12/12/2018  2:00 PM Pedro Luis Lawrence MD [facility] AT [facility]   12/13/2018

## 2018-12-10 NOTE — TELEPHONE ENCOUNTER
From: Alyssia Clay Book  To: Leland Mosley MD  Sent: 12/9/2018 10:53 AM CST  Subject: Prescription Question    I am out of refills on Montelukast Sod 10mg tablet. If you could please send a 90 day supply to [facility] [place] on [address]. Thank you!

## 2018-12-11 RX ORDER — POTASSIUM CHLORIDE 20 MEQ/1
TABLET, EXTENDED RELEASE ORAL
Qty: 90 TABLET | Refills: 1 | Status: SHIPPED | OUTPATIENT
Start: 2018-12-11 | End: 2019-07-16

## 2018-12-11 NOTE — TELEPHONE ENCOUNTER
Requesting collette-geneva 20meq  LOV: 10/22/18  RTC: 2 months  Last Relevant Labs: 12/8/18  Filled: 6/1/18 #90 with 1 refills    Future Appointments   Date Time Provider Batsheva Hansen   12/12/2018  9:30 AM Shara Barker MD [facility] [address] [facility] [address]   12/12/2018

## 2018-12-12 ENCOUNTER — APPOINTMENT (OUTPATIENT)
Dept: LAB | Age: 53
End: 2018-12-12
Attending: FAMILY MEDICINE
Payer: COMMERCIAL

## 2018-12-12 ENCOUNTER — OFFICE VISIT (OUTPATIENT)
Dept: FAMILY MEDICINE CLINIC | Facility: CLINIC | Age: 53
End: 2018-12-12
Payer: COMMERCIAL

## 2018-12-12 VITALS
RESPIRATION RATE: 18 BRPM | BODY MASS INDEX: 41.99 KG/M2 | HEIGHT: 63 IN | TEMPERATURE: 98 F | SYSTOLIC BLOOD PRESSURE: 120 MMHG | DIASTOLIC BLOOD PRESSURE: 80 MMHG | HEART RATE: 84 BPM | WEIGHT: 237 LBS

## 2018-12-12 DIAGNOSIS — H69.83 DYSFUNCTION OF BOTH EUSTACHIAN TUBES: ICD-10-CM

## 2018-12-12 DIAGNOSIS — E87.6 HYPOKALEMIA: ICD-10-CM

## 2018-12-12 DIAGNOSIS — J06.9 VIRAL URI: Primary | ICD-10-CM

## 2018-12-12 PROCEDURE — 99214 OFFICE O/P EST MOD 30 MIN: CPT | Performed by: FAMILY MEDICINE

## 2018-12-12 PROCEDURE — 36415 COLL VENOUS BLD VENIPUNCTURE: CPT | Performed by: FAMILY MEDICINE

## 2018-12-12 PROCEDURE — 80048 BASIC METABOLIC PNL TOTAL CA: CPT | Performed by: FAMILY MEDICINE

## 2018-12-12 NOTE — PROGRESS NOTES
HPI:    Patient ID: Augustine Cline is a 48year old female. HPI  Ms. Reyes is a pleasant 51-year-old female with known history of asthma, hypothyroidism, DARSHANA who was seen at the emergency room recently for shortness of breath and cough secondary to vir daily. Disp: 60 ampule Rfl: 5   Montelukast Sodium 10 MG Oral Tab Take 1 tablet (10 mg total) by mouth once daily.  Disp: 90 tablet Rfl: 1   LEVOTHYROXINE SODIUM 200 MCG Oral Tab LEVOXYL BRAND 200 mcg daily - BRAND Disp: 90 tablet Rfl: 1   Tiotropium Bromid Physical Exam   Constitutional: No distress. HENT:   Right Ear: External ear and ear canal normal. Tympanic membrane is bulging. Tympanic membrane is not erythematous. A middle ear effusion is present.    Left Ear: External ear and ear canal normal. Tym

## 2018-12-12 NOTE — PATIENT INSTRUCTIONS
Thank you for choosing Lizz Fontanez MD at [place] [address]  To Do: [address]  1. Please take meds as directed. [facility] is located in [address]. Monday, Tuesday & Friday – 8 a.m. to 4 p.m. Wednesday, Thursday – 7 a.m. to 3 p. outweigh those potential risks and we strive to make you healthier and to improve your quality of life.     Referrals, and Radiology Information:    If your insurance requires a referral to a specialist, please allow 5 business days to process your referral

## 2018-12-19 ENCOUNTER — HOSPITAL ENCOUNTER (EMERGENCY)
Facility: HOSPITAL | Age: 53
Discharge: HOME | End: 2018-12-19
Attending: EMERGENCY MEDICINE
Payer: COMMERCIAL

## 2018-12-19 ENCOUNTER — APPOINTMENT (OUTPATIENT)
Dept: GENERAL RADIOLOGY | Facility: HOSPITAL | Age: 53
End: 2018-12-19
Attending: EMERGENCY MEDICINE
Payer: COMMERCIAL

## 2018-12-19 VITALS
SYSTOLIC BLOOD PRESSURE: 157 MMHG | DIASTOLIC BLOOD PRESSURE: 78 MMHG | HEIGHT: 63 IN | HEART RATE: 92 BPM | RESPIRATION RATE: 19 BRPM | OXYGEN SATURATION: 95 % | TEMPERATURE: 98 F | WEIGHT: 236 LBS | BODY MASS INDEX: 41.82 KG/M2

## 2018-12-19 DIAGNOSIS — R06.02 SHORTNESS OF BREATH: Primary | ICD-10-CM

## 2018-12-19 PROCEDURE — 85025 COMPLETE CBC W/AUTO DIFF WBC: CPT | Performed by: EMERGENCY MEDICINE

## 2018-12-19 PROCEDURE — 99285 EMERGENCY DEPT VISIT HI MDM: CPT

## 2018-12-19 PROCEDURE — 84484 ASSAY OF TROPONIN QUANT: CPT | Performed by: EMERGENCY MEDICINE

## 2018-12-19 PROCEDURE — 87798 DETECT AGENT NOS DNA AMP: CPT | Performed by: EMERGENCY MEDICINE

## 2018-12-19 PROCEDURE — 83880 ASSAY OF NATRIURETIC PEPTIDE: CPT | Performed by: EMERGENCY MEDICINE

## 2018-12-19 PROCEDURE — 87633 RESP VIRUS 12-25 TARGETS: CPT | Performed by: EMERGENCY MEDICINE

## 2018-12-19 PROCEDURE — 94640 AIRWAY INHALATION TREATMENT: CPT

## 2018-12-19 PROCEDURE — 71046 X-RAY EXAM CHEST 2 VIEWS: CPT | Performed by: EMERGENCY MEDICINE

## 2018-12-19 PROCEDURE — 80053 COMPREHEN METABOLIC PANEL: CPT | Performed by: EMERGENCY MEDICINE

## 2018-12-19 PROCEDURE — 36415 COLL VENOUS BLD VENIPUNCTURE: CPT

## 2018-12-19 PROCEDURE — 87581 M.PNEUMON DNA AMP PROBE: CPT | Performed by: EMERGENCY MEDICINE

## 2018-12-19 PROCEDURE — 87486 CHLMYD PNEUM DNA AMP PROBE: CPT | Performed by: EMERGENCY MEDICINE

## 2018-12-19 RX ORDER — PREDNISONE 20 MG/1
60 TABLET ORAL DAILY
Qty: 15 TABLET | Refills: 0 | Status: SHIPPED | OUTPATIENT
Start: 2018-12-19 | End: 2018-12-20 | Stop reason: ALTCHOICE

## 2018-12-19 RX ORDER — IPRATROPIUM BROMIDE AND ALBUTEROL SULFATE 2.5; .5 MG/3ML; MG/3ML
3 SOLUTION RESPIRATORY (INHALATION) ONCE
Status: COMPLETED | OUTPATIENT
Start: 2018-12-19 | End: 2018-12-19

## 2018-12-19 RX ORDER — BENZONATATE 200 MG/1
200 CAPSULE ORAL DAILY
COMMUNITY
Start: 2018-11-20 | End: 2019-01-18

## 2018-12-19 RX ORDER — OMEPRAZOLE 20 MG/1
20 CAPSULE, DELAYED RELEASE ORAL DAILY
COMMUNITY

## 2018-12-19 RX ORDER — FLUTICASONE PROPIONATE 50 MCG
2 SPRAY, SUSPENSION (ML) NASAL DAILY
COMMUNITY
Start: 2018-01-11 | End: 2018-12-19

## 2018-12-19 RX ORDER — FUROSEMIDE 20 MG/1
20 TABLET ORAL DAILY
Status: ON HOLD | COMMUNITY
End: 2019-01-24

## 2018-12-19 RX ORDER — MONTELUKAST SODIUM 10 MG/1
10 TABLET ORAL DAILY
COMMUNITY
Start: 2017-04-19 | End: 2018-12-19

## 2018-12-19 RX ORDER — LEVOTHYROXINE SODIUM 0.2 MG/1
200 TABLET ORAL
COMMUNITY
Start: 2018-12-04 | End: 2019-01-18

## 2018-12-19 RX ORDER — AMITRIPTYLINE HYDROCHLORIDE 10 MG/1
10 TABLET, FILM COATED ORAL 2 TIMES DAILY
COMMUNITY
Start: 2018-05-24 | End: 2018-12-19

## 2018-12-19 RX ORDER — BUDESONIDE 0.5 MG/2ML
0.5 INHALANT ORAL 2 TIMES DAILY
COMMUNITY
Start: 2018-11-30 | End: 2019-01-29

## 2018-12-19 RX ORDER — POTASSIUM CHLORIDE 1.5 G/1.77G
20 POWDER, FOR SOLUTION ORAL DAILY
COMMUNITY
Start: 2017-04-19 | End: 2019-01-29

## 2018-12-19 RX ORDER — ALBUTEROL SULFATE 2.5 MG/3ML
2.5 SOLUTION RESPIRATORY (INHALATION) EVERY 4 HOURS PRN
Qty: 30 AMPULE | Refills: 0 | Status: SHIPPED | OUTPATIENT
Start: 2018-12-19 | End: 2019-01-18

## 2018-12-19 NOTE — ED PROVIDER NOTES
Patient Seen in: [facility] Emergency Department    History   Patient presents with:  Dyspnea ROYA SOB (respiratory)    Stated Complaint: ROYA    HPI    77-year-old female with a history of asthma presents to the emergency department complaining of in lymph node 9/19/2018    lymph nodes in neck area   • Esophageal reflux    • Exposure to radiation     hx thyroid cancer   • Extrinsic asthma, unspecified    • Fatigue not sure    This has been going on for a while   • Fever 9/2/2018    off and on   • Food 20 SaO2 payal 91%   • Visual impairment     just glasses for reading   • Wears glasses 1980    need new ones, don't wear my old ones       Past Surgical History:   Procedure Laterality Date   • ANESTH, SECTION     • BACK SURGERY     • [facility] ED Triage Vitals [12/19/18 1456]   BP (!) 139/91   Pulse 97   Resp 22   Temp 97.9 °F (36.6 °C)   Temp src Temporal   SpO2 97 %   O2 Device None (Room air)       Current:/78   Pulse 92   Temp 97.9 °F (36.6 °C) (Temporal)   Resp 19   Ht 160 cm (5' DIFFERENTIAL          Xr Chest Pa + Lat Chest (cpt=71046)    Result Date: 12/19/2018  CONCLUSION:      Accentuation of central bronchovascular markings may reflect viral type inflammatory disease including bronchitis, but no sign of focal lobar type pneumo R-0    !! - Potential duplicate medications found. Please discuss with provider.

## 2018-12-20 ENCOUNTER — HOSPITAL ENCOUNTER (OUTPATIENT)
Dept: ULTRASOUND IMAGING | Age: 53
Discharge: HOME | End: 2018-12-20
Attending: INTERNAL MEDICINE
Payer: COMMERCIAL

## 2018-12-20 DIAGNOSIS — R79.89 ABNORMAL LIVER FUNCTION TESTS: ICD-10-CM

## 2018-12-20 PROCEDURE — 76705 ECHO EXAM OF ABDOMEN: CPT | Performed by: INTERNAL MEDICINE

## 2018-12-20 PROCEDURE — 0346T US LIVER WITH ELASTOGRAPHY (CPT=76705,0346T): CPT | Performed by: INTERNAL MEDICINE

## 2018-12-20 NOTE — ED NOTES
Pt able to ambulate using cane without assist from this RN. Pulse oximetry noted at 97% and above while ambulating.

## 2018-12-26 ENCOUNTER — TELEPHONE (OUTPATIENT)
Dept: SURGERY | Facility: CLINIC | Age: 53
End: 2018-12-26

## 2018-12-26 NOTE — TELEPHONE ENCOUNTER
Discussed elastography result with patient. Suggestive F3 fibrosis. May be worth liver biopsy at some point to confirm.  She has ongoing issues with pulmonary symptoms and will have her follow up in ~ 3 months and re-visit if we should proceed with liver bi

## 2018-12-28 ENCOUNTER — HOSPITAL ENCOUNTER (OUTPATIENT)
Dept: CV DIAGNOSTICS | Age: 53
Discharge: HOME | End: 2018-12-28
Attending: INTERNAL MEDICINE
Payer: COMMERCIAL

## 2018-12-28 ENCOUNTER — APPOINTMENT (OUTPATIENT)
Dept: LAB | Age: 53
End: 2018-12-28
Attending: INTERNAL MEDICINE
Payer: COMMERCIAL

## 2018-12-28 DIAGNOSIS — R91.1 LUNG NODULE: ICD-10-CM

## 2018-12-28 DIAGNOSIS — R06.02 SHORTNESS OF BREATH: ICD-10-CM

## 2018-12-28 DIAGNOSIS — R60.0 BILATERAL LEG EDEMA: ICD-10-CM

## 2018-12-28 DIAGNOSIS — R76.8 ANTINEUTROPHIL CYTOPLASMIC ANTIBODY (ANCA) POSITIVE: ICD-10-CM

## 2018-12-28 LAB
CRP SERPL-MCNC: 0.7 MG/DL (ref ?–1)
SED RATE-ML: 7 MM/HR (ref 0–25)

## 2018-12-28 PROCEDURE — 36415 COLL VENOUS BLD VENIPUNCTURE: CPT | Performed by: INTERNAL MEDICINE

## 2018-12-28 PROCEDURE — 86140 C-REACTIVE PROTEIN: CPT

## 2018-12-28 PROCEDURE — 85652 RBC SED RATE AUTOMATED: CPT

## 2018-12-28 PROCEDURE — 86255 FLUORESCENT ANTIBODY SCREEN: CPT

## 2018-12-28 PROCEDURE — 82530 CORTISOL FREE: CPT | Performed by: INTERNAL MEDICINE

## 2018-12-28 PROCEDURE — 83876 ASSAY MYELOPEROXIDASE: CPT

## 2018-12-28 PROCEDURE — 82570 ASSAY OF URINE CREATININE: CPT | Performed by: INTERNAL MEDICINE

## 2018-12-28 PROCEDURE — 36415 COLL VENOUS BLD VENIPUNCTURE: CPT

## 2018-12-28 PROCEDURE — 93306 TTE W/DOPPLER COMPLETE: CPT | Performed by: INTERNAL MEDICINE

## 2018-12-28 PROCEDURE — 83516 IMMUNOASSAY NONANTIBODY: CPT

## 2019-01-01 ENCOUNTER — EXTERNAL RECORD (OUTPATIENT)
Dept: HEALTH INFORMATION MANAGEMENT | Facility: OTHER | Age: 54
End: 2019-01-01

## 2019-01-01 LAB
MYELOPEROX ANTIBODIES, IGG: 0 AU/ML
SERINE PROTEASE3, IGG: 59 AU/ML

## 2019-01-02 NOTE — PROGRESS NOTES
Pt sent [employer] message inquiring about results, responded with Dr Niles Meckel notes.

## 2019-01-04 ENCOUNTER — TELEPHONE (OUTPATIENT)
Dept: SURGERY | Facility: CLINIC | Age: 54
End: 2019-01-04

## 2019-01-04 NOTE — TELEPHONE ENCOUNTER
Spoke with patient about pain and it has improved. Pain seems to be more focally over rib on right side. No trauma. Discussed if improving the will likely resolve.  If worsens then let me know or if develops abdominal pain, n/v then may need visit to ER for

## 2019-01-17 ENCOUNTER — HOSPITAL ENCOUNTER (EMERGENCY)
Facility: HOSPITAL | Age: 54
Discharge: HOME | End: 2019-01-17
Attending: EMERGENCY MEDICINE
Payer: COMMERCIAL

## 2019-01-17 ENCOUNTER — APPOINTMENT (OUTPATIENT)
Dept: GENERAL RADIOLOGY | Facility: HOSPITAL | Age: 54
End: 2019-01-17
Attending: EMERGENCY MEDICINE
Payer: COMMERCIAL

## 2019-01-17 VITALS
WEIGHT: 236 LBS | SYSTOLIC BLOOD PRESSURE: 141 MMHG | HEART RATE: 80 BPM | DIASTOLIC BLOOD PRESSURE: 66 MMHG | TEMPERATURE: 97 F | HEIGHT: 63 IN | BODY MASS INDEX: 41.82 KG/M2 | OXYGEN SATURATION: 97 % | RESPIRATION RATE: 22 BRPM

## 2019-01-17 DIAGNOSIS — J98.01 ACUTE BRONCHOSPASM: Primary | ICD-10-CM

## 2019-01-17 LAB
ALBUMIN SERPL-MCNC: 3.9 G/DL (ref 3.1–4.5)
ALBUMIN/GLOB SERPL: 1.1 {RATIO} (ref 1–2)
ALP LIVER SERPL-CCNC: 130 U/L (ref 41–108)
ALT SERPL-CCNC: 90 U/L (ref 14–54)
ANION GAP SERPL CALC-SCNC: 8 MMOL/L (ref 0–18)
AST SERPL-CCNC: 74 U/L (ref 15–41)
BASOPHILS # BLD AUTO: 0.08 X10(3) UL (ref 0–0.1)
BASOPHILS NFR BLD AUTO: 1.1 %
BILIRUB SERPL-MCNC: 0.5 MG/DL (ref 0.1–2)
BILIRUB UR QL STRIP.AUTO: NEGATIVE
BUN BLD-MCNC: 12 MG/DL (ref 8–20)
BUN/CREAT SERPL: 15.4 (ref 10–20)
CALCIUM BLD-MCNC: 8.9 MG/DL (ref 8.3–10.3)
CHLORIDE SERPL-SCNC: 108 MMOL/L (ref 101–111)
CLARITY UR REFRACT.AUTO: CLEAR
CO2 SERPL-SCNC: 26 MMOL/L (ref 22–32)
COLOR UR AUTO: COLORLESS
CREAT BLD-MCNC: 0.78 MG/DL (ref 0.55–1.02)
D-DIMER: 0.37 UG/ML FEU (ref 0–0.49)
EOSINOPHIL # BLD AUTO: 0.15 X10(3) UL (ref 0–0.3)
EOSINOPHIL NFR BLD AUTO: 2.1 %
ERYTHROCYTE [DISTWIDTH] IN BLOOD BY AUTOMATED COUNT: 12.8 % (ref 11.5–16)
GLOBULIN PLAS-MCNC: 3.5 G/DL (ref 2.8–4.4)
GLUCOSE BLD-MCNC: 99 MG/DL (ref 70–99)
GLUCOSE UR STRIP.AUTO-MCNC: NEGATIVE MG/DL
HCT VFR BLD AUTO: 44.9 % (ref 34–50)
HGB BLD-MCNC: 15.5 G/DL (ref 12–16)
IMMATURE GRANULOCYTE COUNT: 0.08 X10(3) UL (ref 0–1)
IMMATURE GRANULOCYTE RATIO %: 1.1 %
KETONES UR STRIP.AUTO-MCNC: NEGATIVE MG/DL
LEUKOCYTE ESTERASE UR QL STRIP.AUTO: NEGATIVE
LYMPHOCYTES # BLD AUTO: 1.67 X10(3) UL (ref 0.9–4)
LYMPHOCYTES NFR BLD AUTO: 23.5 %
M PROTEIN MFR SERPL ELPH: 7.4 G/DL (ref 6.4–8.2)
MCH RBC QN AUTO: 29.5 PG (ref 27–33.2)
MCHC RBC AUTO-ENTMCNC: 34.5 G/DL (ref 31–37)
MCV RBC AUTO: 85.4 FL (ref 81–100)
MONOCYTES # BLD AUTO: 0.53 X10(3) UL (ref 0.1–1)
MONOCYTES NFR BLD AUTO: 7.5 %
NEUTROPHIL ABS PRELIM: 4.59 X10 (3) UL (ref 1.3–6.7)
NEUTROPHILS # BLD AUTO: 4.59 X10(3) UL (ref 1.3–6.7)
NEUTROPHILS NFR BLD AUTO: 64.7 %
NITRITE UR QL STRIP.AUTO: NEGATIVE
OSMOLALITY SERPL CALC.SUM OF ELEC: 294 MOSM/KG (ref 275–295)
PH UR STRIP.AUTO: 7 [PH] (ref 4.5–8)
PLATELET # BLD AUTO: 335 10(3)UL (ref 150–450)
POTASSIUM SERPL-SCNC: 3.5 MMOL/L (ref 3.6–5.1)
PROT UR STRIP.AUTO-MCNC: NEGATIVE MG/DL
RBC # BLD AUTO: 5.26 X10(6)UL (ref 3.8–5.1)
RBC UR QL AUTO: NEGATIVE
RED CELL DISTRIBUTION WIDTH-SD: 39 FL (ref 35.1–46.3)
SODIUM SERPL-SCNC: 142 MMOL/L (ref 136–144)
SP GR UR STRIP.AUTO: <1.005 (ref 1–1.03)
TROPONIN I SERPL-MCNC: <0.046 NG/ML (ref ?–0.05)
UROBILINOGEN UR STRIP.AUTO-MCNC: <2 MG/DL
WBC # BLD AUTO: 7.1 X10(3) UL (ref 4–13)

## 2019-01-17 PROCEDURE — 94640 AIRWAY INHALATION TREATMENT: CPT

## 2019-01-17 PROCEDURE — 85378 FIBRIN DEGRADE SEMIQUANT: CPT | Performed by: EMERGENCY MEDICINE

## 2019-01-17 PROCEDURE — 93005 ELECTROCARDIOGRAM TRACING: CPT

## 2019-01-17 PROCEDURE — 99285 EMERGENCY DEPT VISIT HI MDM: CPT | Performed by: EMERGENCY MEDICINE

## 2019-01-17 PROCEDURE — 85025 COMPLETE CBC W/AUTO DIFF WBC: CPT | Performed by: EMERGENCY MEDICINE

## 2019-01-17 PROCEDURE — 94645 CONT INHLJ TX EACH ADDL HOUR: CPT

## 2019-01-17 PROCEDURE — 84484 ASSAY OF TROPONIN QUANT: CPT | Performed by: EMERGENCY MEDICINE

## 2019-01-17 PROCEDURE — 93010 ELECTROCARDIOGRAM REPORT: CPT | Performed by: EMERGENCY MEDICINE

## 2019-01-17 PROCEDURE — 96374 THER/PROPH/DIAG INJ IV PUSH: CPT | Performed by: EMERGENCY MEDICINE

## 2019-01-17 PROCEDURE — 71045 X-RAY EXAM CHEST 1 VIEW: CPT | Performed by: EMERGENCY MEDICINE

## 2019-01-17 PROCEDURE — 80053 COMPREHEN METABOLIC PANEL: CPT | Performed by: EMERGENCY MEDICINE

## 2019-01-17 PROCEDURE — 81003 URINALYSIS AUTO W/O SCOPE: CPT | Performed by: EMERGENCY MEDICINE

## 2019-01-17 RX ORDER — KETOROLAC TROMETHAMINE 30 MG/ML
15 INJECTION, SOLUTION INTRAMUSCULAR; INTRAVENOUS ONCE
Status: COMPLETED | OUTPATIENT
Start: 2019-01-17 | End: 2019-01-17

## 2019-01-17 RX ORDER — ALBUTEROL SULFATE 2.5 MG/3ML
10 SOLUTION RESPIRATORY (INHALATION) CONTINUOUS
Status: DISCONTINUED | OUTPATIENT
Start: 2019-01-17 | End: 2019-01-17

## 2019-01-17 RX ORDER — IPRATROPIUM BROMIDE AND ALBUTEROL SULFATE 2.5; .5 MG/3ML; MG/3ML
3 SOLUTION RESPIRATORY (INHALATION) ONCE
Status: COMPLETED | OUTPATIENT
Start: 2019-01-17 | End: 2019-01-17

## 2019-01-17 NOTE — ED PROVIDER NOTES
Patient Seen in: [facility] Emergency Department    History   Patient presents with:  Dyspnea ROYA SOB (respiratory)    Stated Complaint: sob    HPI    Patient is a 59-year-old female here for evaluation of shortness of breath and chest pain.   She wa This has been going on for a while   • Fever 9/2/2018    off and on   • Food intolerance approx 15 yrs    dairy does not agree with me. My stomach bloats.    • GENITO-URINARY DISEASE    • Headache disorder years    I have always gotten headaches   • Hea Laterality Date   • ANESTH, SECTION     • BACK SURGERY     • BRONCHOSCOPY N/A 2016    Performed by Lady Spencer MD at [facility] ENDOSCOPY   •   [id number],3087,    x3   • COLONOSCOPY  2014    1 cm cecal polyp s/p tattoo and lexii 141/66   Pulse 80   Temp 97 °F (36.1 °C)   Resp 22   Ht 160 cm (5' 3\")   Wt 107 kg   SpO2 97%   BMI 41.81 kg/m²         Physical Exam      Constitutional: No distress. Appears well-developed and well-nourished. Head: Normocephalic and atraumatic.    Nose PLATELET.   Procedure                               Abnormality         Status                     ---------                               -----------         ------                     CBC W/ DIFFERENTIAL[[id number]]          Abnormal            Final resul Daphne Palacios [address] [place] [address] 0737    In 2 days  As needed, If symptoms worsen        Medications Prescribed:  Discharge Medication List as of 1/17/2019  6:45 PM

## 2019-01-17 NOTE — ED INITIAL ASSESSMENT (HPI)
Pt c/o ROYA that started about 30 min ago and a sharp constant pain in \"her left lung. \"  Pt was seen at [address] on Sunday and dx w/ double ear infection and sinus infection and placed on Cefdinir antibiotic

## 2019-01-18 ENCOUNTER — PATIENT MESSAGE (OUTPATIENT)
Dept: FAMILY MEDICINE CLINIC | Facility: CLINIC | Age: 54
End: 2019-01-18

## 2019-01-18 LAB
ATRIAL RATE: 77 BPM
P AXIS: 51 DEGREES
P-R INTERVAL: 162 MS
Q-T INTERVAL: 404 MS
QRS DURATION: 104 MS
QTC CALCULATION (BEZET): 457 MS
R AXIS: 27 DEGREES
T AXIS: 31 DEGREES
VENTRICULAR RATE: 77 BPM

## 2019-01-18 RX ORDER — AMITRIPTYLINE HYDROCHLORIDE 10 MG/1
20 TABLET, FILM COATED ORAL NIGHTLY
COMMUNITY
End: 2019-02-18 | Stop reason: ALTCHOICE

## 2019-01-21 NOTE — TELEPHONE ENCOUNTER
Labs essentially unremarkable except for slightly low potassium.   Continue taking potassium supplements

## 2019-01-21 NOTE — TELEPHONE ENCOUNTER
From: Ela Prom Book  To: Tamiko Rojas MD  Sent: 1/18/2019 9:29 PM CST  Subject: Test Results Question    Dr Esthela Parikh ,  I was seen yesterday in the [facility] ER in [place].    I just wanted to let you know in case you wanted to take a look at my lab

## 2019-01-24 ENCOUNTER — ANESTHESIA EVENT (OUTPATIENT)
Dept: ENDOSCOPY | Facility: HOSPITAL | Age: 54
End: 2019-01-24
Payer: COMMERCIAL

## 2019-01-24 ENCOUNTER — HOSPITAL ENCOUNTER (OUTPATIENT)
Facility: HOSPITAL | Age: 54
Setting detail: HOSPITAL OUTPATIENT SURGERY
Discharge: HOME | End: 2019-01-24
Attending: INTERNAL MEDICINE | Admitting: INTERNAL MEDICINE
Payer: COMMERCIAL

## 2019-01-24 ENCOUNTER — ANESTHESIA (OUTPATIENT)
Dept: ENDOSCOPY | Facility: HOSPITAL | Age: 54
End: 2019-01-24
Payer: COMMERCIAL

## 2019-01-24 VITALS
HEIGHT: 63 IN | HEART RATE: 79 BPM | OXYGEN SATURATION: 92 % | TEMPERATURE: 99 F | SYSTOLIC BLOOD PRESSURE: 132 MMHG | BODY MASS INDEX: 41.99 KG/M2 | DIASTOLIC BLOOD PRESSURE: 76 MMHG | WEIGHT: 237 LBS | RESPIRATION RATE: 18 BRPM

## 2019-01-24 LAB
BASOPHIL BRONCHIAL WASHING: 0 %
EOSINOPHIL BRONCHIAL WASHING: 0 %
LYMPHOCYTE BRONCHIAL WASHING: 51 %
MON/MACROPHAGE BRONCHIAL WASH: 37 %
NEUTROPHILS BRONCHIAL WASHING: 10 %
OTHER CELL BRONCHIAL WASHING: 2 %
RBC BRONCHIAL WASHING: 11 /MM3
TOTAL CELLS COUNTED: 100
WBC BRONCHIAL WASHING: 60 /MM3

## 2019-01-24 PROCEDURE — 87116 MYCOBACTERIA CULTURE: CPT | Performed by: INTERNAL MEDICINE

## 2019-01-24 PROCEDURE — 89050 BODY FLUID CELL COUNT: CPT | Performed by: INTERNAL MEDICINE

## 2019-01-24 PROCEDURE — 88104 CYTOPATH FL NONGYN SMEARS: CPT | Performed by: INTERNAL MEDICINE

## 2019-01-24 PROCEDURE — 88305 TISSUE EXAM BY PATHOLOGIST: CPT | Performed by: INTERNAL MEDICINE

## 2019-01-24 PROCEDURE — 89051 BODY FLUID CELL COUNT: CPT | Performed by: INTERNAL MEDICINE

## 2019-01-24 PROCEDURE — 87529 HSV DNA AMP PROBE: CPT | Performed by: INTERNAL MEDICINE

## 2019-01-24 PROCEDURE — 87206 SMEAR FLUORESCENT/ACID STAI: CPT | Performed by: INTERNAL MEDICINE

## 2019-01-24 PROCEDURE — 87102 FUNGUS ISOLATION CULTURE: CPT | Performed by: INTERNAL MEDICINE

## 2019-01-24 PROCEDURE — 0BB58ZX EXCISION OF RIGHT MIDDLE LOBE BRONCHUS, VIA NATURAL OR ARTIFICIAL OPENING ENDOSCOPIC, DIAGNOSTIC: ICD-10-PCS | Performed by: INTERNAL MEDICINE

## 2019-01-24 PROCEDURE — 87071 CULTURE AEROBIC QUANT OTHER: CPT | Performed by: INTERNAL MEDICINE

## 2019-01-24 PROCEDURE — 0B9D8ZX DRAINAGE OF RIGHT MIDDLE LUNG LOBE, VIA NATURAL OR ARTIFICIAL OPENING ENDOSCOPIC, DIAGNOSTIC: ICD-10-PCS | Performed by: INTERNAL MEDICINE

## 2019-01-24 PROCEDURE — 87496 CYTOMEG DNA AMP PROBE: CPT | Performed by: INTERNAL MEDICINE

## 2019-01-24 PROCEDURE — 87205 SMEAR GRAM STAIN: CPT | Performed by: INTERNAL MEDICINE

## 2019-01-24 PROCEDURE — 87798 DETECT AGENT NOS DNA AMP: CPT | Performed by: INTERNAL MEDICINE

## 2019-01-24 RX ORDER — DIPHENHYDRAMINE HYDROCHLORIDE 50 MG/ML
12.5 INJECTION INTRAMUSCULAR; INTRAVENOUS AS NEEDED
Status: DISCONTINUED | OUTPATIENT
Start: 2019-01-24 | End: 2019-01-24 | Stop reason: HOSPADM

## 2019-01-24 RX ORDER — NALOXONE HYDROCHLORIDE 0.4 MG/ML
80 INJECTION, SOLUTION INTRAMUSCULAR; INTRAVENOUS; SUBCUTANEOUS AS NEEDED
Status: DISCONTINUED | OUTPATIENT
Start: 2019-01-24 | End: 2019-01-24 | Stop reason: HOSPADM

## 2019-01-24 RX ORDER — HYDROCODONE BITARTRATE AND ACETAMINOPHEN 5; 325 MG/1; MG/1
2 TABLET ORAL AS NEEDED
Status: DISCONTINUED | OUTPATIENT
Start: 2019-01-24 | End: 2019-01-24 | Stop reason: HOSPADM

## 2019-01-24 RX ORDER — MIDAZOLAM HYDROCHLORIDE 1 MG/ML
1 INJECTION INTRAMUSCULAR; INTRAVENOUS EVERY 5 MIN PRN
Status: DISCONTINUED | OUTPATIENT
Start: 2019-01-24 | End: 2019-01-24 | Stop reason: HOSPADM

## 2019-01-24 RX ORDER — HYDROMORPHONE HYDROCHLORIDE 1 MG/ML
0.4 INJECTION, SOLUTION INTRAMUSCULAR; INTRAVENOUS; SUBCUTANEOUS EVERY 5 MIN PRN
Status: DISCONTINUED | OUTPATIENT
Start: 2019-01-24 | End: 2019-01-24 | Stop reason: HOSPADM

## 2019-01-24 RX ORDER — SODIUM CHLORIDE, SODIUM LACTATE, POTASSIUM CHLORIDE, CALCIUM CHLORIDE 600; 310; 30; 20 MG/100ML; MG/100ML; MG/100ML; MG/100ML
INJECTION, SOLUTION INTRAVENOUS CONTINUOUS
Status: DISCONTINUED | OUTPATIENT
Start: 2019-01-24 | End: 2019-01-24 | Stop reason: HOSPADM

## 2019-01-24 RX ORDER — LABETALOL HYDROCHLORIDE 5 MG/ML
5 INJECTION, SOLUTION INTRAVENOUS EVERY 5 MIN PRN
Status: DISCONTINUED | OUTPATIENT
Start: 2019-01-24 | End: 2019-01-24 | Stop reason: HOSPADM

## 2019-01-24 RX ORDER — SODIUM CHLORIDE, SODIUM LACTATE, POTASSIUM CHLORIDE, CALCIUM CHLORIDE 600; 310; 30; 20 MG/100ML; MG/100ML; MG/100ML; MG/100ML
INJECTION, SOLUTION INTRAVENOUS CONTINUOUS
Status: DISCONTINUED | OUTPATIENT
Start: 2019-01-24 | End: 2019-01-24

## 2019-01-24 RX ORDER — HYDROCODONE BITARTRATE AND ACETAMINOPHEN 5; 325 MG/1; MG/1
1 TABLET ORAL AS NEEDED
Status: DISCONTINUED | OUTPATIENT
Start: 2019-01-24 | End: 2019-01-24 | Stop reason: HOSPADM

## 2019-01-24 RX ORDER — ONDANSETRON 2 MG/ML
4 INJECTION INTRAMUSCULAR; INTRAVENOUS AS NEEDED
Status: DISCONTINUED | OUTPATIENT
Start: 2019-01-24 | End: 2019-01-24 | Stop reason: HOSPADM

## 2019-01-24 RX ORDER — DEXAMETHASONE SODIUM PHOSPHATE 4 MG/ML
4 VIAL (ML) INJECTION AS NEEDED
Status: DISCONTINUED | OUTPATIENT
Start: 2019-01-24 | End: 2019-01-24 | Stop reason: HOSPADM

## 2019-01-24 NOTE — ANESTHESIA PREPROCEDURE EVALUATION
PRE-OP EVALUATION    Patient Name: Ina Marquez    Pre-op Diagnosis: DYSPNEA    Procedure(s):  BRONCHOSCOPY WITH BRONCHIAL ALVEOLAR LAVAGE, TRANSBRONCHIAL BIOPSIES,  ENDOBRONCHIAL BIOPSIES    Surgeon(s) and Role:     Dee Headley MD - Primary Sulfate HFA (PROAIR HFA) 108 (90 Base) MCG/ACT Inhalation Aero Soln Inhale into the lungs every 6 (six) hours as needed for Wheezing. Disp:  Rfl:    Linaclotide (LINZESS) 145 MCG Oral Cap Take 145 mcg by mouth daily.  Disp:  Rfl:        Allergies: Amoxicill Upper and lower esophageal biopsies taken (-) eosinophilic esophagitis, and empiric dilation to 62 Croatian      Social History    Tobacco Use      Smoking status: Never Smoker      Smokeless tobacco: Never Used      Tobacco comment: father was a smoker unti Admission:  **None**

## 2019-01-24 NOTE — ANESTHESIA POSTPROCEDURE EVALUATION
[address] [facility] Patient Status:  Hospital Outpatient Surgery   Age/Gender 48year old female MRN [id number]   Location [facility]. Attending Joanie Li MD   Hosp Day # 0 PCP Rell Bay MD       Anesthesia Post-op

## 2019-01-25 LAB — NON GYNE INTERPRETATION: NEGATIVE

## 2019-01-27 LAB — P. JIROVECII DETECTION BY PCR: NOT DETECTED

## 2019-01-28 DIAGNOSIS — R79.89 ABNORMAL LIVER FUNCTION TEST: Primary | ICD-10-CM

## 2019-01-28 LAB — CYTOMEGALOVIRUS DETECTION, PCR: NOT DETECTED

## 2019-01-29 ENCOUNTER — LAB ENCOUNTER (OUTPATIENT)
Dept: LAB | Age: 54
End: 2019-01-29
Attending: INTERNAL MEDICINE
Payer: COMMERCIAL

## 2019-01-29 DIAGNOSIS — R79.89 ABNORMAL LIVER FUNCTION TEST: ICD-10-CM

## 2019-01-29 LAB
ALBUMIN SERPL-MCNC: 3.7 G/DL (ref 3.1–4.5)
ALP LIVER SERPL-CCNC: 133 U/L (ref 41–108)
ALT SERPL-CCNC: 71 U/L (ref 14–54)
AST SERPL-CCNC: 46 U/L (ref 15–41)
BASOPHILS # BLD AUTO: 0.07 X10(3) UL (ref 0–0.2)
BASOPHILS NFR BLD AUTO: 1 %
BILIRUB DIRECT SERPL-MCNC: 0.1 MG/DL (ref 0.1–0.5)
BILIRUB SERPL-MCNC: 0.6 MG/DL (ref 0.1–2)
DEPRECATED RDW RBC AUTO: 40.1 FL (ref 35.1–46.3)
EOSINOPHIL # BLD AUTO: 0.18 X10(3) UL (ref 0–0.7)
EOSINOPHIL NFR BLD AUTO: 2.5 %
ERYTHROCYTE [DISTWIDTH] IN BLOOD BY AUTOMATED COUNT: 12.8 % (ref 11–15)
HCT VFR BLD AUTO: 44.2 % (ref 35–48)
HGB BLD-MCNC: 14.7 G/DL (ref 12–16)
HSV 1 SUBTYPE BY PCR: NOT DETECTED
HSV 2 SUBTYPE BY PCR: NOT DETECTED
IMM GRANULOCYTES # BLD AUTO: 0.13 X10(3) UL (ref 0–1)
IMM GRANULOCYTES NFR BLD: 1.8 %
INR BLD: 0.96 (ref 0.9–1.1)
LYMPHOCYTES # BLD AUTO: 1.6 X10(3) UL (ref 1–4)
LYMPHOCYTES NFR BLD AUTO: 22.4 %
M PROTEIN MFR SERPL ELPH: 6.7 G/DL (ref 6.4–8.2)
MCH RBC QN AUTO: 28.8 PG (ref 26–34)
MCHC RBC AUTO-ENTMCNC: 33.3 G/DL (ref 31–37)
MCV RBC AUTO: 86.7 FL (ref 80–100)
MONOCYTES # BLD AUTO: 0.49 X10(3) UL (ref 0.1–1)
MONOCYTES NFR BLD AUTO: 6.9 %
NEUTROPHILS # BLD AUTO: 4.66 X10 (3) UL (ref 1.5–7.7)
NEUTROPHILS # BLD AUTO: 4.66 X10(3) UL (ref 1.5–7.7)
NEUTROPHILS NFR BLD AUTO: 65.4 %
PLATELET # BLD AUTO: 336 10(3)UL (ref 150–450)
PSA SERPL DL<=0.01 NG/ML-MCNC: 13.2 SECONDS (ref 12.4–14.7)
RBC # BLD AUTO: 5.1 X10(6)UL (ref 3.8–5.3)
WBC # BLD AUTO: 7.1 X10(3) UL (ref 4–11)

## 2019-01-29 PROCEDURE — 85610 PROTHROMBIN TIME: CPT

## 2019-01-29 PROCEDURE — 36415 COLL VENOUS BLD VENIPUNCTURE: CPT

## 2019-01-29 PROCEDURE — 80076 HEPATIC FUNCTION PANEL: CPT

## 2019-01-29 PROCEDURE — 85025 COMPLETE CBC W/AUTO DIFF WBC: CPT

## 2019-01-29 NOTE — PROGRESS NOTES
Patient notified of results, verbalizes understanding. Pt inquring about continued use of inhalers and neb tx. Advised pt to continue treatments during cold weather this week to prevent exacerbation, will discuss long term plan with MD and call her back.  V

## 2019-02-06 ENCOUNTER — LAB ENCOUNTER (OUTPATIENT)
Dept: LAB | Facility: HOSPITAL | Age: 54
End: 2019-02-06
Attending: INTERNAL MEDICINE
Payer: COMMERCIAL

## 2019-02-06 ENCOUNTER — HOSPITAL ENCOUNTER (OUTPATIENT)
Dept: ULTRASOUND IMAGING | Facility: HOSPITAL | Age: 54
Discharge: HOME | End: 2019-02-06
Attending: INTERNAL MEDICINE
Payer: COMMERCIAL

## 2019-02-06 VITALS
TEMPERATURE: 98 F | HEART RATE: 81 BPM | SYSTOLIC BLOOD PRESSURE: 126 MMHG | RESPIRATION RATE: 16 BRPM | BODY MASS INDEX: 41.99 KG/M2 | WEIGHT: 237 LBS | HEIGHT: 63 IN | OXYGEN SATURATION: 92 % | DIASTOLIC BLOOD PRESSURE: 62 MMHG

## 2019-02-06 DIAGNOSIS — E89.0 POST-SURGICAL HYPOTHYROIDISM: ICD-10-CM

## 2019-02-06 DIAGNOSIS — C73 THYROID CANCER (HCC): ICD-10-CM

## 2019-02-06 DIAGNOSIS — R74.8 ELEVATED LIVER ENZYMES: ICD-10-CM

## 2019-02-06 DIAGNOSIS — R79.89 ABNORMAL LIVER FUNCTION TEST: ICD-10-CM

## 2019-02-06 DIAGNOSIS — R74.8 ELEVATED LIVER ENZYMES: Primary | ICD-10-CM

## 2019-02-06 LAB
DEPRECATED RDW RBC AUTO: 39.5 FL (ref 35.1–46.3)
ERYTHROCYTE [DISTWIDTH] IN BLOOD BY AUTOMATED COUNT: 13 % (ref 11–15)
HCT VFR BLD AUTO: 41.4 % (ref 35–48)
HGB BLD-MCNC: 14 G/DL (ref 12–16)
INR BLD: 1.01 (ref 0.9–1.1)
MCH RBC QN AUTO: 28.5 PG (ref 26–34)
MCHC RBC AUTO-ENTMCNC: 33.8 G/DL (ref 31–37)
MCV RBC AUTO: 84.1 FL (ref 80–100)
PLATELET # BLD AUTO: 275 10(3)UL (ref 150–450)
PSA SERPL DL<=0.01 NG/ML-MCNC: 13.7 SECONDS (ref 12.4–14.7)
RBC # BLD AUTO: 4.92 X10(6)UL (ref 3.8–5.3)
T3FREE SERPL-MCNC: 3.62 PG/ML (ref 2.3–4.2)
T4 FREE SERPL-MCNC: 1.2 NG/DL (ref 0.9–1.8)
TSI SER-ACNC: 0.08 MIU/ML (ref 0.35–5.5)
WBC # BLD AUTO: 8.3 X10(3) UL (ref 4–11)

## 2019-02-06 PROCEDURE — 84443 ASSAY THYROID STIM HORMONE: CPT

## 2019-02-06 PROCEDURE — 76942 ECHO GUIDE FOR BIOPSY: CPT | Performed by: INTERNAL MEDICINE

## 2019-02-06 PROCEDURE — 47000 NEEDLE BIOPSY OF LIVER PERQ: CPT | Performed by: INTERNAL MEDICINE

## 2019-02-06 PROCEDURE — 88313 SPECIAL STAINS GROUP 2: CPT | Performed by: INTERNAL MEDICINE

## 2019-02-06 PROCEDURE — 85027 COMPLETE CBC AUTOMATED: CPT

## 2019-02-06 PROCEDURE — 84481 FREE ASSAY (FT-3): CPT

## 2019-02-06 PROCEDURE — 84439 ASSAY OF FREE THYROXINE: CPT

## 2019-02-06 PROCEDURE — 85610 PROTHROMBIN TIME: CPT

## 2019-02-06 PROCEDURE — 88307 TISSUE EXAM BY PATHOLOGIST: CPT | Performed by: INTERNAL MEDICINE

## 2019-02-06 PROCEDURE — 99152 MOD SED SAME PHYS/QHP 5/>YRS: CPT

## 2019-02-06 PROCEDURE — 36415 COLL VENOUS BLD VENIPUNCTURE: CPT

## 2019-02-06 RX ORDER — FLUMAZENIL 0.1 MG/ML
INJECTION, SOLUTION INTRAVENOUS
Status: DISCONTINUED
Start: 2019-02-06 | End: 2019-02-06 | Stop reason: WASHOUT

## 2019-02-06 RX ORDER — FLUMAZENIL 0.1 MG/ML
0.2 INJECTION, SOLUTION INTRAVENOUS AS NEEDED
Status: DISCONTINUED | OUTPATIENT
Start: 2019-02-06 | End: 2019-02-08

## 2019-02-06 RX ORDER — NALOXONE HYDROCHLORIDE 0.4 MG/ML
80 INJECTION, SOLUTION INTRAMUSCULAR; INTRAVENOUS; SUBCUTANEOUS AS NEEDED
Status: DISCONTINUED | OUTPATIENT
Start: 2019-02-06 | End: 2019-02-08

## 2019-02-06 RX ORDER — SODIUM CHLORIDE 9 MG/ML
INJECTION, SOLUTION INTRAVENOUS CONTINUOUS
Status: DISCONTINUED | OUTPATIENT
Start: 2019-02-06 | End: 2019-02-08

## 2019-02-06 RX ORDER — NALOXONE HYDROCHLORIDE 0.4 MG/ML
INJECTION, SOLUTION INTRAMUSCULAR; INTRAVENOUS; SUBCUTANEOUS
Status: DISCONTINUED
Start: 2019-02-06 | End: 2019-02-06 | Stop reason: WASHOUT

## 2019-02-06 RX ORDER — MIDAZOLAM HYDROCHLORIDE 1 MG/ML
1 INJECTION INTRAMUSCULAR; INTRAVENOUS EVERY 5 MIN PRN
Status: ACTIVE | OUTPATIENT
Start: 2019-02-06 | End: 2019-02-06

## 2019-02-06 RX ORDER — MIDAZOLAM HYDROCHLORIDE 1 MG/ML
INJECTION INTRAMUSCULAR; INTRAVENOUS
Status: COMPLETED
Start: 2019-02-06 | End: 2019-02-06

## 2019-02-06 RX ADMIN — MIDAZOLAM HYDROCHLORIDE 1 MG: 1 INJECTION INTRAMUSCULAR; INTRAVENOUS at 09:27:00

## 2019-02-06 NOTE — IMAGING NOTE
Report called to LUDWIG Hayden in [facility]. VSS on RA. Pt tolerated procedure well. Dressing CDI to right abd. Presently denies pain. Pt awaiting transport to [facility]. Pt and family updated on plan of care.

## 2019-02-06 NOTE — PROCEDURES
[facility]  Procedure Note    [address] Patient Status:  Outpatient    1965 MRN [id number]   Location [address] [place] [address] Attending Israel Greenberg MD   Hosp Day # 0 PCP Lizz Fontanez MD     Procedure: Liver biopsy    Pre-Proc

## 2019-02-15 ENCOUNTER — HOSPITAL ENCOUNTER (OUTPATIENT)
Dept: CT IMAGING | Age: 54
Discharge: HOME | End: 2019-02-15
Attending: INTERNAL MEDICINE
Payer: COMMERCIAL

## 2019-02-15 DIAGNOSIS — R06.00 DYSPNEA, UNSPECIFIED TYPE: ICD-10-CM

## 2019-02-15 PROCEDURE — 71250 CT THORAX DX C-: CPT | Performed by: INTERNAL MEDICINE

## 2019-02-18 ENCOUNTER — OFFICE VISIT (OUTPATIENT)
Dept: FAMILY MEDICINE CLINIC | Facility: CLINIC | Age: 54
End: 2019-02-18
Payer: COMMERCIAL

## 2019-02-18 VITALS
BODY MASS INDEX: 43.05 KG/M2 | WEIGHT: 243 LBS | HEIGHT: 63 IN | RESPIRATION RATE: 16 BRPM | HEART RATE: 78 BPM | DIASTOLIC BLOOD PRESSURE: 76 MMHG | TEMPERATURE: 99 F | SYSTOLIC BLOOD PRESSURE: 122 MMHG

## 2019-02-18 DIAGNOSIS — E66.01 CLASS 3 SEVERE OBESITY DUE TO EXCESS CALORIES WITHOUT SERIOUS COMORBIDITY WITH BODY MASS INDEX (BMI) OF 40.0 TO 44.9 IN ADULT (HCC): Primary | ICD-10-CM

## 2019-02-18 PROCEDURE — 99213 OFFICE O/P EST LOW 20 MIN: CPT | Performed by: FAMILY MEDICINE

## 2019-02-18 RX ORDER — PHENTERMINE HYDROCHLORIDE 37.5 MG/1
37.5 TABLET ORAL
Qty: 30 TABLET | Refills: 2 | Status: SHIPPED | OUTPATIENT
Start: 2019-02-18 | End: 2019-03-20

## 2019-02-18 RX ORDER — TOPIRAMATE 50 MG/1
50 TABLET, FILM COATED ORAL 2 TIMES DAILY
Qty: 60 TABLET | Refills: 3 | Status: SHIPPED | OUTPATIENT
Start: 2019-02-18 | End: 2019-06-08

## 2019-02-18 NOTE — PROGRESS NOTES
HPI:    Patient ID: Augustine Cline is a 48year old female. HPI   Ms. Reyes is a pleasant 63-year-old female with known history of asthma??, hypothyroidism, DARSHANA, obesity here today for weight management.   She has been recently hospitalized for dyspnea Disp:  Rfl:    LEVOTHYROXINE SODIUM 200 MCG Oral Tab LEVOXYL BRAND 200 mcg 6 days per week - BRAND Disp: 90 tablet Rfl: 1   KLOR-CON M20 20 MEQ Oral Tab CR TAKE 1 TABLET (20 MEQ TOTAL) BY MOUTH ONCE DAILY.  Disp: 90 tablet Rfl: 1   Fluticasone Furoate-Fabiana respiratory distress. She has no wheezes. She has no rales. Abdominal: Soft. Bowel sounds are normal. She exhibits no distension. There is no tenderness. Musculoskeletal: She exhibits no edema. Lymphadenopathy:     She has no cervical adenopathy.    [place] Carolina

## 2019-02-18 NOTE — PATIENT INSTRUCTIONS
Thank you for choosing Jennifer Valentine MD at [place] [address]  To Do: [address]  1. Please take meds as directed. [facility] is located in [address]. Monday, Tuesday & Friday – 8 a.m. to 4 p.m. Wednesday, Thursday – 7 a.m. to 3 p. outweigh those potential risks and we strive to make you healthier and to improve your quality of life.     Referrals, and Radiology Information:    If your insurance requires a referral to a specialist, please allow 5 business days to process your referral

## 2019-02-19 NOTE — PROGRESS NOTES
Patient notified of results, verbalizes understanding.  Inquiring about Sapna Diamond, will start enrollment process

## 2019-02-27 DIAGNOSIS — I10 ESSENTIAL HYPERTENSION: ICD-10-CM

## 2019-02-27 RX ORDER — FUROSEMIDE 20 MG/1
TABLET ORAL
Qty: 90 TABLET | Refills: 1 | Status: SHIPPED | OUTPATIENT
Start: 2019-02-27 | End: 2019-07-17

## 2019-02-27 NOTE — TELEPHONE ENCOUNTER
Hypertension Medications Protocol Passed2/27 1:27 AM   CMP or BMP in past 12 months    Last serum creatinine< 2.0    Appointment in past 6 or next 3 months     Requesting furosemide   LOV: 2/18/19  RTC: 1 month  Last Relevant Labs: 1/17/19  Filled: 9/6/18

## 2019-03-13 ENCOUNTER — HOSPITAL ENCOUNTER (OUTPATIENT)
Dept: CV DIAGNOSTICS | Facility: HOSPITAL | Age: 54
Discharge: HOME | End: 2019-03-13
Attending: INTERNAL MEDICINE
Payer: COMMERCIAL

## 2019-03-13 DIAGNOSIS — R06.00 DYSPNEA, UNSPECIFIED TYPE: ICD-10-CM

## 2019-03-13 PROCEDURE — 94621 CARDIOPULM EXERCISE TESTING: CPT | Performed by: INTERNAL MEDICINE

## 2019-03-18 ENCOUNTER — LAB ENCOUNTER (OUTPATIENT)
Dept: LAB | Age: 54
End: 2019-03-18
Attending: INTERNAL MEDICINE
Payer: COMMERCIAL

## 2019-03-18 DIAGNOSIS — E89.0 POSTOPERATIVE HYPOTHYROIDISM: ICD-10-CM

## 2019-03-18 LAB
T3FREE SERPL-MCNC: 3.14 PG/ML (ref 2.4–4.2)
T4 FREE SERPL-MCNC: 1.3 NG/DL (ref 0.8–1.7)
TSI SER-ACNC: 0.09 MIU/ML (ref 0.36–3.74)

## 2019-03-18 PROCEDURE — 84481 FREE ASSAY (FT-3): CPT

## 2019-03-18 PROCEDURE — 36415 COLL VENOUS BLD VENIPUNCTURE: CPT

## 2019-03-18 PROCEDURE — 84439 ASSAY OF FREE THYROXINE: CPT

## 2019-03-18 PROCEDURE — 84443 ASSAY THYROID STIM HORMONE: CPT

## 2019-03-20 ENCOUNTER — OFFICE VISIT (OUTPATIENT)
Dept: FAMILY MEDICINE CLINIC | Facility: CLINIC | Age: 54
End: 2019-03-20
Payer: COMMERCIAL

## 2019-03-20 VITALS
HEART RATE: 80 BPM | SYSTOLIC BLOOD PRESSURE: 124 MMHG | RESPIRATION RATE: 18 BRPM | DIASTOLIC BLOOD PRESSURE: 80 MMHG | HEIGHT: 63 IN | BODY MASS INDEX: 39.69 KG/M2 | TEMPERATURE: 98 F | WEIGHT: 224 LBS

## 2019-03-20 DIAGNOSIS — R06.00 DYSPNEA, UNSPECIFIED TYPE: ICD-10-CM

## 2019-03-20 DIAGNOSIS — E66.9 OBESITY (BMI 30-39.9): Primary | ICD-10-CM

## 2019-03-20 PROCEDURE — 99213 OFFICE O/P EST LOW 20 MIN: CPT | Performed by: FAMILY MEDICINE

## 2019-03-20 RX ORDER — LEVOTHYROXINE SODIUM 0.15 MG/1
TABLET ORAL
Qty: 30 TABLET | Refills: 3 | Status: SHIPPED | OUTPATIENT
Start: 2019-03-20 | End: 2019-07-16

## 2019-03-20 RX ORDER — PHENTERMINE HYDROCHLORIDE 37.5 MG/1
37.5 TABLET ORAL
Qty: 30 TABLET | Refills: 2 | Status: SHIPPED | OUTPATIENT
Start: 2019-03-20 | End: 2019-06-19

## 2019-03-20 NOTE — PATIENT INSTRUCTIONS
Thank you for choosing Krystal Bass MD at [place] [address]  To Do: [address]  1. Please take meds as directed. [facility] is located in [address]. Monday, Tuesday & Friday – 8 a.m. to 4 p.m. Wednesday, Thursday – 7 a.m. to 3 p. outweigh those potential risks and we strive to make you healthier and to improve your quality of life.     Referrals, and Radiology Information:    If your insurance requires a referral to a specialist, please allow 5 business days to process your referral

## 2019-03-20 NOTE — PROGRESS NOTES
HPI:    Patient ID: Rosa M Segura is a 48year old female. HPI   Ms. Reyes is a pleasant 12-year-old female with known history of asthma, hypothyroidism, DARSHANA, obesity here today for weight management.   She was prescribed with phentermine 37.5 g daily, 20 MEQ Oral Tab CR TAKE 1 TABLET (20 MEQ TOTAL) BY MOUTH ONCE DAILY. Disp: 90 tablet Rfl: 1   Fluticasone Furoate-Vilanterol (BREO ELLIPTA) 200-25 MCG/INH Inhalation Aerosol Powder, Breath Activated Inhale 1 puff into the lungs daily.  Disp: 1 each Rfl: 6 cervical adenopathy. Neurological: She is alert. Psychiatric: She has a normal mood and affect. Her behavior is normal.   Vitals reviewed.          ASSESSMENT/PLAN:   Obesity (bmi 30-39.9)  (primary encounter diagnosis)  -Doing well; continue with donna

## 2019-03-27 ENCOUNTER — APPOINTMENT (OUTPATIENT)
Dept: GENERAL RADIOLOGY | Facility: HOSPITAL | Age: 54
End: 2019-03-27
Payer: COMMERCIAL

## 2019-03-27 ENCOUNTER — TELEPHONE (OUTPATIENT)
Dept: FAMILY MEDICINE CLINIC | Facility: CLINIC | Age: 54
End: 2019-03-27

## 2019-03-27 ENCOUNTER — HOSPITAL ENCOUNTER (EMERGENCY)
Facility: HOSPITAL | Age: 54
Discharge: HOME | End: 2019-03-27
Attending: EMERGENCY MEDICINE
Payer: COMMERCIAL

## 2019-03-27 VITALS
WEIGHT: 220 LBS | SYSTOLIC BLOOD PRESSURE: 141 MMHG | OXYGEN SATURATION: 95 % | BODY MASS INDEX: 38.98 KG/M2 | HEIGHT: 63 IN | TEMPERATURE: 97 F | RESPIRATION RATE: 18 BRPM | DIASTOLIC BLOOD PRESSURE: 76 MMHG | HEART RATE: 72 BPM

## 2019-03-27 DIAGNOSIS — R06.00 DYSPNEA ON EXERTION: Primary | ICD-10-CM

## 2019-03-27 LAB
ADENOVIRUS PCR:: NEGATIVE
ALBUMIN SERPL-MCNC: 4 G/DL (ref 3.4–5)
ALBUMIN/GLOB SERPL: 1.3 {RATIO} (ref 1–2)
ALP LIVER SERPL-CCNC: 166 U/L (ref 41–108)
ALT SERPL-CCNC: 41 U/L (ref 13–56)
ANION GAP SERPL CALC-SCNC: 10 MMOL/L (ref 0–18)
AST SERPL-CCNC: 21 U/L (ref 15–37)
ATRIAL RATE: 78 BPM
B PERT DNA SPEC QL NAA+PROBE: NEGATIVE
BASOPHILS # BLD AUTO: 0.06 X10(3) UL (ref 0–0.2)
BASOPHILS NFR BLD AUTO: 0.9 %
BILIRUB SERPL-MCNC: 0.3 MG/DL (ref 0.1–2)
BUN BLD-MCNC: 13 MG/DL (ref 7–18)
BUN/CREAT SERPL: 12.7 (ref 10–20)
C PNEUM DNA SPEC QL NAA+PROBE: NEGATIVE
CALCIUM BLD-MCNC: 9.4 MG/DL (ref 8.5–10.1)
CHLORIDE SERPL-SCNC: 109 MMOL/L (ref 98–107)
CO2 SERPL-SCNC: 22 MMOL/L (ref 21–32)
CORONAVIRUS 229E PCR:: NEGATIVE
CORONAVIRUS HKU1 PCR:: NEGATIVE
CORONAVIRUS NL63 PCR:: NEGATIVE
CORONAVIRUS OC43 PCR:: NEGATIVE
CREAT BLD-MCNC: 1.02 MG/DL (ref 0.55–1.02)
D-DIMER: <0.27 UG/ML FEU (ref 0–0.49)
DEPRECATED RDW RBC AUTO: 41.8 FL (ref 35.1–46.3)
EOSINOPHIL # BLD AUTO: 0.09 X10(3) UL (ref 0–0.7)
EOSINOPHIL NFR BLD AUTO: 1.4 %
ERYTHROCYTE [DISTWIDTH] IN BLOOD BY AUTOMATED COUNT: 13.8 % (ref 11–15)
FLUAV RNA SPEC QL NAA+PROBE: NEGATIVE
FLUBV RNA SPEC QL NAA+PROBE: NEGATIVE
GLOBULIN PLAS-MCNC: 3.2 G/DL (ref 2.8–4.4)
GLUCOSE BLD-MCNC: 118 MG/DL (ref 70–99)
HCT VFR BLD AUTO: 44.4 % (ref 35–48)
HGB BLD-MCNC: 14.9 G/DL (ref 12–16)
IMM GRANULOCYTES # BLD AUTO: 0.04 X10(3) UL (ref 0–1)
IMM GRANULOCYTES NFR BLD: 0.6 %
LYMPHOCYTES # BLD AUTO: 1.37 X10(3) UL (ref 1–4)
LYMPHOCYTES NFR BLD AUTO: 21.7 %
M PROTEIN MFR SERPL ELPH: 7.2 G/DL (ref 6.4–8.2)
MCH RBC QN AUTO: 27.7 PG (ref 26–34)
MCHC RBC AUTO-ENTMCNC: 33.6 G/DL (ref 31–37)
MCV RBC AUTO: 82.7 FL (ref 80–100)
METAPNEUMOVIRUS PCR:: NEGATIVE
MONOCYTES # BLD AUTO: 0.48 X10(3) UL (ref 0.1–1)
MONOCYTES NFR BLD AUTO: 7.6 %
MYCOPLASMA PNEUMONIA PCR:: NEGATIVE
NEUTROPHILS # BLD AUTO: 4.28 X10 (3) UL (ref 1.5–7.7)
NEUTROPHILS # BLD AUTO: 4.28 X10(3) UL (ref 1.5–7.7)
NEUTROPHILS NFR BLD AUTO: 67.8 %
NT-PROBNP SERPL-MCNC: 20 PG/ML (ref ?–125)
OSMOLALITY SERPL CALC.SUM OF ELEC: 293 MOSM/KG (ref 275–295)
P AXIS: 63 DEGREES
P-R INTERVAL: 172 MS
PARAINFLUENZA 1 PCR:: NEGATIVE
PARAINFLUENZA 2 PCR:: NEGATIVE
PARAINFLUENZA 3 PCR:: NEGATIVE
PARAINFLUENZA 4 PCR:: NEGATIVE
PLATELET # BLD AUTO: 355 10(3)UL (ref 150–450)
POTASSIUM SERPL-SCNC: 3 MMOL/L (ref 3.5–5.1)
Q-T INTERVAL: 404 MS
QRS DURATION: 112 MS
QTC CALCULATION (BEZET): 460 MS
R AXIS: 26 DEGREES
RBC # BLD AUTO: 5.37 X10(6)UL (ref 3.8–5.3)
RHINOVIRUS/ENTERO PCR:: NEGATIVE
RSV RNA SPEC QL NAA+PROBE: NEGATIVE
SODIUM SERPL-SCNC: 141 MMOL/L (ref 136–145)
T AXIS: 46 DEGREES
TROPONIN I SERPL-MCNC: <0.045 NG/ML (ref ?–0.04)
VENTRICULAR RATE: 78 BPM
WBC # BLD AUTO: 6.3 X10(3) UL (ref 4–11)

## 2019-03-27 PROCEDURE — 93005 ELECTROCARDIOGRAM TRACING: CPT

## 2019-03-27 PROCEDURE — 87798 DETECT AGENT NOS DNA AMP: CPT | Performed by: EMERGENCY MEDICINE

## 2019-03-27 PROCEDURE — 87486 CHLMYD PNEUM DNA AMP PROBE: CPT | Performed by: EMERGENCY MEDICINE

## 2019-03-27 PROCEDURE — 99285 EMERGENCY DEPT VISIT HI MDM: CPT

## 2019-03-27 PROCEDURE — 94640 AIRWAY INHALATION TREATMENT: CPT

## 2019-03-27 PROCEDURE — 85378 FIBRIN DEGRADE SEMIQUANT: CPT | Performed by: EMERGENCY MEDICINE

## 2019-03-27 PROCEDURE — 71046 X-RAY EXAM CHEST 2 VIEWS: CPT

## 2019-03-27 PROCEDURE — 94664 DEMO&/EVAL PT USE INHALER: CPT

## 2019-03-27 PROCEDURE — 87581 M.PNEUMON DNA AMP PROBE: CPT | Performed by: EMERGENCY MEDICINE

## 2019-03-27 PROCEDURE — 36415 COLL VENOUS BLD VENIPUNCTURE: CPT

## 2019-03-27 PROCEDURE — 87999 UNLISTED MICROBIOLOGY PX: CPT

## 2019-03-27 PROCEDURE — 84484 ASSAY OF TROPONIN QUANT: CPT | Performed by: EMERGENCY MEDICINE

## 2019-03-27 PROCEDURE — 93010 ELECTROCARDIOGRAM REPORT: CPT

## 2019-03-27 PROCEDURE — 87633 RESP VIRUS 12-25 TARGETS: CPT | Performed by: EMERGENCY MEDICINE

## 2019-03-27 PROCEDURE — 85025 COMPLETE CBC W/AUTO DIFF WBC: CPT | Performed by: EMERGENCY MEDICINE

## 2019-03-27 PROCEDURE — 80053 COMPREHEN METABOLIC PANEL: CPT | Performed by: EMERGENCY MEDICINE

## 2019-03-27 PROCEDURE — 83880 ASSAY OF NATRIURETIC PEPTIDE: CPT | Performed by: EMERGENCY MEDICINE

## 2019-03-27 RX ORDER — IPRATROPIUM BROMIDE AND ALBUTEROL SULFATE 2.5; .5 MG/3ML; MG/3ML
3 SOLUTION RESPIRATORY (INHALATION) ONCE
Status: COMPLETED | OUTPATIENT
Start: 2019-03-27 | End: 2019-03-27

## 2019-03-27 RX ORDER — POTASSIUM CHLORIDE 20 MEQ/1
40 TABLET, EXTENDED RELEASE ORAL ONCE
Status: COMPLETED | OUTPATIENT
Start: 2019-03-27 | End: 2019-03-27

## 2019-03-27 NOTE — ED INITIAL ASSESSMENT (HPI)
Pt presents to the ED with complaints of shortness of breath since August, worse since last night. pt states she has been seeing a pulmonologist and they are in the process of diagnosing her dyspnea. Pt c/o dry cough.  Pt c/o temp of 99.4, but states her tem

## 2019-03-27 NOTE — ED PROVIDER NOTES
Patient Seen in: [facility] Emergency Department    History   Patient presents with:  Dyspnea ROYA SOB (respiratory)  Cough/URI    Stated Complaint: dyspnea since last night, cough, low grade fever    HPI    The patient is a 42-year-old female multip Endometriosis 12/1995    Hysterectomy 12/1995   • Enlarged lymph node 9/19/2018    lymph nodes in neck area   • Esophageal reflux    • Exposure to radiation     hx thyroid cancer   • Extrinsic asthma, unspecified    • Fatigue not sure    This has been [employer] quickly/ if due to swallowing issue   • Unspecified sleep apnea PSG 7-3-14    PSG 7-3-14 AHI 15 RDI 15 REM AHI 20 SaO2 payal 91%   • Visual impairment     just glasses for reading   • Wears glasses 1980    need new ones, don't wear my old ones       Past S for stated complaint: dyspnea since last night, cough, low grade fever  Other systems are as noted in HPI. Constitutional and vital signs reviewed. All other systems reviewed and negative except as noted above.     Physical Exam     ED Triage Vitals [ Alkaline Phosphatase 166 (*)     All other components within normal limits   CBC W/ DIFFERENTIAL - Abnormal; Notable for the following components:    RBC 5.37 (*)     All other components within normal limits   TROPONIN I - Normal   D-DIMER - Normal    Yung to be hypokalemic. This was repleted in the ED.       MDM       XR CHEST PA + LAT CHEST (CPT=71046)   Final Result    PROCEDURE:  XR CHEST PA + LAT CHEST (CPT=71046)         INDICATIONS:  dyspnea since last night, cough, low grade fever         COMPARISON: 3:27 pm    Follow-up:  Cr Bustos MD  [address] [address] [id number]    Call in 1 day      Rivera Hdez MD  [address] [place] [address]  [phone]    Call in 1 day          Medications Prescri

## 2019-03-27 NOTE — TELEPHONE ENCOUNTER
+ Patient sounds short of breath while talking on the phone  +some rattling sound last night when breathing  +Pt does have asthma but they do not think that is what she has  +Vasculitis is what they think it is  +She did contact her Pulmonologist as well

## 2019-03-28 ENCOUNTER — TELEPHONE (OUTPATIENT)
Dept: CARDIOLOGY | Age: 54
End: 2019-03-28

## 2019-04-01 ENCOUNTER — PATIENT MESSAGE (OUTPATIENT)
Dept: FAMILY MEDICINE CLINIC | Facility: CLINIC | Age: 54
End: 2019-04-01

## 2019-04-01 NOTE — TELEPHONE ENCOUNTER
From: Emily Contreras Book  To: Chas Guadarrama MD  Sent: 4/1/2019 1:58 PM CDT  Subject: Test Results Question    As per your office. I was seen in the ER last Thursday.    Per the ER doctor I am scheduled April 10th w/ the Cardiologist to rule out any heart i

## 2019-04-02 ENCOUNTER — TELEPHONE (OUTPATIENT)
Dept: FAMILY MEDICINE CLINIC | Facility: CLINIC | Age: 54
End: 2019-04-02

## 2019-04-02 ENCOUNTER — OFFICE VISIT (OUTPATIENT)
Dept: FAMILY MEDICINE CLINIC | Facility: CLINIC | Age: 54
End: 2019-04-02
Payer: COMMERCIAL

## 2019-04-02 VITALS
BODY MASS INDEX: 39.51 KG/M2 | DIASTOLIC BLOOD PRESSURE: 80 MMHG | RESPIRATION RATE: 18 BRPM | WEIGHT: 223 LBS | TEMPERATURE: 98 F | HEIGHT: 63 IN | HEART RATE: 80 BPM | SYSTOLIC BLOOD PRESSURE: 126 MMHG

## 2019-04-02 DIAGNOSIS — R05.9 COUGH: ICD-10-CM

## 2019-04-02 DIAGNOSIS — E87.6 HYPOKALEMIA: ICD-10-CM

## 2019-04-02 DIAGNOSIS — R06.00 DYSPNEA, UNSPECIFIED TYPE: Primary | ICD-10-CM

## 2019-04-02 PROCEDURE — 99214 OFFICE O/P EST MOD 30 MIN: CPT | Performed by: FAMILY MEDICINE

## 2019-04-02 NOTE — PATIENT INSTRUCTIONS
Thank you for choosing Lizz Fontanez MD at [place] [address]  To Do: [address]  1. Please take meds as directed. [facility] is located in [address]. Monday, Tuesday & Friday – 8 a.m. to 4 p.m. Wednesday, Thursday – 7 a.m. to 3 p. outweigh those potential risks and we strive to make you healthier and to improve your quality of life.     Referrals, and Radiology Information:    If your insurance requires a referral to a specialist, please allow 5 business days to process your referral

## 2019-04-02 NOTE — PROGRESS NOTES
HPI:    Patient ID: Dejah Reyes is a 48year old female.     HPI    Ms. Reyes is a pleasant 59-year-old female with known history of asthma, hypothyroidism, DARSHANA on CPAP, obesity, recent admission for PCP pneumonia here today as she was recently seen at every morning before breakfast. Disp: 30 tablet Rfl: 2   LEVOTHYROXINE SODIUM 150 MCG Oral Tab LEVOXYL BRAND 150 mcg daily - BRAND Disp: 30 tablet Rfl: 3   FUROSEMIDE 20 MG Oral Tab TAKE 1 TABLET BY MOUTH EVERY DAY Disp: 90 tablet Rfl: 1   Naltrexone-Bupro Exam   HENT:   Right Ear: Tympanic membrane, external ear and ear canal normal.   Left Ear: Tympanic membrane, external ear and ear canal normal.   Flushing noted on her face. Eyes: Conjunctivae are normal. No scleral icterus. Neck: Neck supple.  No thy

## 2019-04-02 NOTE — TELEPHONE ENCOUNTER
Please advise pt. She tried to call [facility] to schedule with them but they told her they cannot schedule her without diagnosis.

## 2019-04-02 NOTE — TELEPHONE ENCOUNTER
Called pt, gave her this information and she expressed understanding and agreement:  Dx: Dyspnea, unspecified type (R06.00)  Cough (R05)

## 2019-04-09 ENCOUNTER — PATIENT MESSAGE (OUTPATIENT)
Dept: FAMILY MEDICINE CLINIC | Facility: CLINIC | Age: 54
End: 2019-04-09

## 2019-04-10 ENCOUNTER — OFFICE VISIT (OUTPATIENT)
Dept: CARDIOLOGY | Age: 54
End: 2019-04-10

## 2019-04-10 ENCOUNTER — TELEPHONE (OUTPATIENT)
Dept: CARDIOLOGY | Age: 54
End: 2019-04-10

## 2019-04-10 ENCOUNTER — PATIENT MESSAGE (OUTPATIENT)
Dept: FAMILY MEDICINE CLINIC | Facility: CLINIC | Age: 54
End: 2019-04-10

## 2019-04-10 VITALS
SYSTOLIC BLOOD PRESSURE: 140 MMHG | HEART RATE: 84 BPM | HEIGHT: 63 IN | DIASTOLIC BLOOD PRESSURE: 80 MMHG | WEIGHT: 222 LBS | BODY MASS INDEX: 39.34 KG/M2

## 2019-04-10 DIAGNOSIS — R79.89 ELEVATED LFTS: ICD-10-CM

## 2019-04-10 DIAGNOSIS — J45.901 ASTHMA WITH ACUTE EXACERBATION, UNSPECIFIED ASTHMA SEVERITY, UNSPECIFIED WHETHER PERSISTENT: ICD-10-CM

## 2019-04-10 DIAGNOSIS — R06.09 OTHER FORM OF DYSPNEA: ICD-10-CM

## 2019-04-10 DIAGNOSIS — F41.9 ANXIETY: ICD-10-CM

## 2019-04-10 DIAGNOSIS — R53.83 FATIGUE, UNSPECIFIED TYPE: ICD-10-CM

## 2019-04-10 DIAGNOSIS — R92.8 ABNORMAL ULTRASOUND OF BREAST: ICD-10-CM

## 2019-04-10 DIAGNOSIS — R07.9 CHEST PAIN SYNDROME: Primary | ICD-10-CM

## 2019-04-10 PROBLEM — R06.00 DYSPNEA: Status: ACTIVE | Noted: 2019-04-10

## 2019-04-10 PROCEDURE — 93000 ELECTROCARDIOGRAM COMPLETE: CPT | Performed by: INTERNAL MEDICINE

## 2019-04-10 PROCEDURE — 99245 OFF/OP CONSLTJ NEW/EST HI 55: CPT | Performed by: INTERNAL MEDICINE

## 2019-04-10 RX ORDER — PHENTERMINE HYDROCHLORIDE 37.5 MG/1
37.5 TABLET ORAL DAILY
COMMUNITY
Start: 2019-03-20

## 2019-04-10 RX ORDER — FUROSEMIDE 20 MG/1
20 TABLET ORAL DAILY
COMMUNITY
Start: 2017-04-19

## 2019-04-10 RX ORDER — SILODOSIN 8 MG/1
8 CAPSULE ORAL DAILY
COMMUNITY
Start: 2017-04-19

## 2019-04-10 RX ORDER — ALBUTEROL SULFATE 90 UG/1
1 AEROSOL, METERED RESPIRATORY (INHALATION) PRN
COMMUNITY

## 2019-04-10 RX ORDER — POTASSIUM CHLORIDE 20 MEQ/1
20 TABLET, EXTENDED RELEASE ORAL DAILY
COMMUNITY
Start: 2018-12-11

## 2019-04-10 RX ORDER — METFORMIN HYDROCHLORIDE 750 MG/1
750 TABLET, EXTENDED RELEASE ORAL DAILY
COMMUNITY
Start: 2017-04-19

## 2019-04-10 RX ORDER — DICYCLOMINE HCL 20 MG
1 TABLET ORAL 4 TIMES DAILY
COMMUNITY
Start: 2017-04-19

## 2019-04-10 RX ORDER — OMEPRAZOLE 20 MG/1
20 CAPSULE, DELAYED RELEASE ORAL DAILY
COMMUNITY

## 2019-04-10 RX ORDER — FLUTICASONE FUROATE AND VILANTEROL 200; 25 UG/1; UG/1
1 POWDER RESPIRATORY (INHALATION) DAILY
COMMUNITY
Start: 2018-10-22

## 2019-04-10 RX ORDER — LEVOTHYROXINE SODIUM 0.15 MG/1
150 TABLET ORAL DAILY
COMMUNITY
Start: 2019-03-20

## 2019-04-10 RX ORDER — MONTELUKAST SODIUM 10 MG/1
10 TABLET ORAL DAILY
COMMUNITY
Start: 2017-04-19

## 2019-04-10 RX ORDER — TOPIRAMATE 50 MG/1
50 TABLET, FILM COATED ORAL 2 TIMES DAILY
COMMUNITY
Start: 2017-04-19 | End: 2020-02-13

## 2019-04-10 RX ORDER — ALBUTEROL SULFATE 2.5 MG/3ML
2.5 SOLUTION RESPIRATORY (INHALATION) PRN
COMMUNITY
Start: 2018-11-20

## 2019-04-10 RX ORDER — RIBOFLAVIN (VITAMIN B2) 100 MG
100 TABLET ORAL DAILY
COMMUNITY
End: 2019-04-10

## 2019-04-10 RX ORDER — METOPROLOL TARTRATE 50 MG/1
50 TABLET, FILM COATED ORAL 2 TIMES DAILY
Qty: 1 TABLET | Refills: 0 | Status: SHIPPED | OUTPATIENT
Start: 2019-04-10

## 2019-04-10 RX ORDER — AMITRIPTYLINE HYDROCHLORIDE 10 MG/1
80 TABLET, FILM COATED ORAL DAILY
COMMUNITY
Start: 2018-05-24 | End: 2019-04-10

## 2019-04-10 RX ORDER — METOPROLOL TARTRATE 50 MG/1
TABLET, FILM COATED ORAL
Qty: 2 TABLET | Refills: 0 | Status: SHIPPED | OUTPATIENT
Start: 2019-04-10

## 2019-04-10 ASSESSMENT — ENCOUNTER SYMPTOMS
FEVER: 0
HEMATOCHEZIA: 0
CHILLS: 0
WEIGHT GAIN: 0
BRUISES/BLEEDS EASILY: 0
COUGH: 0
WEIGHT LOSS: 0
LIGHT-HEADEDNESS: 1
ALLERGIC/IMMUNOLOGIC COMMENTS: NO NEW FOOD ALLERGIES
HEMOPTYSIS: 0
SUSPICIOUS LESIONS: 0
BLURRED VISION: 1

## 2019-04-10 NOTE — TELEPHONE ENCOUNTER
Regarding: Visit Follow-up Question  Contact: [phone]  ----- Message from Background, Chante sent at 4/10/2019 11:50 AM CDT -----    Just following up. Just seen Cardiologist.  He ordered a special echocardiogram to check the arteries.     Otherwise

## 2019-04-10 NOTE — TELEPHONE ENCOUNTER
Regarding: Visit Follow-up Question  Contact: [phone]  ----- Message from Background, [employer] sent at 4/9/2019  5:22 PM CDT -----    Received a call from the appointment liaison at [facility] Pulmonology Department.       They were able to move my ap

## 2019-04-15 ENCOUNTER — APPOINTMENT (OUTPATIENT)
Dept: LAB | Age: 54
End: 2019-04-15
Attending: FAMILY MEDICINE
Payer: COMMERCIAL

## 2019-04-15 DIAGNOSIS — E87.6 HYPOKALEMIA: ICD-10-CM

## 2019-04-15 PROCEDURE — 80048 BASIC METABOLIC PNL TOTAL CA: CPT | Performed by: FAMILY MEDICINE

## 2019-04-15 PROCEDURE — 36415 COLL VENOUS BLD VENIPUNCTURE: CPT | Performed by: FAMILY MEDICINE

## 2019-04-15 RX ORDER — NALTREXONE HYDROCHLORIDE AND BUPROPION HYDROCHLORIDE 8; 90 MG/1; MG/1
TABLET, EXTENDED RELEASE ORAL
Qty: 120 TABLET | Refills: 1 | Status: SHIPPED | OUTPATIENT
Start: 2019-04-15 | End: 2019-06-08

## 2019-04-15 NOTE — TELEPHONE ENCOUNTER
Requesting CONTRAVE 8-90 MG  LOV: 4/2/19  RTC: 2 months  Last Relevant Labs: 3/27/19  Filled: 2/18/19 # 120 with 1 refills    Future Appointments   Date Time Provider Batsheva Hansen   4/17/2019 10:00 AM [facility] CT MAIN RM4 [facility] CT Natividad Khan   6/19/2019 11:00

## 2019-04-16 ENCOUNTER — TELEPHONE (OUTPATIENT)
Dept: CARDIOLOGY | Age: 54
End: 2019-04-16

## 2019-04-29 ENCOUNTER — TELEPHONE (OUTPATIENT)
Dept: CARDIOLOGY | Age: 54
End: 2019-04-29

## 2019-04-30 ENCOUNTER — E-ADVICE (OUTPATIENT)
Dept: CARDIOLOGY | Age: 54
End: 2019-04-30

## 2019-05-02 ENCOUNTER — TELEPHONE (OUTPATIENT)
Dept: CARDIOLOGY | Age: 54
End: 2019-05-02

## 2019-05-03 ENCOUNTER — LAB ENCOUNTER (OUTPATIENT)
Dept: LAB | Age: 54
End: 2019-05-03
Attending: INTERNAL MEDICINE
Payer: COMMERCIAL

## 2019-05-03 DIAGNOSIS — E89.0 POSTOPERATIVE HYPOTHYROIDISM: ICD-10-CM

## 2019-05-03 PROCEDURE — 84439 ASSAY OF FREE THYROXINE: CPT

## 2019-05-03 PROCEDURE — 84443 ASSAY THYROID STIM HORMONE: CPT

## 2019-05-03 PROCEDURE — 84481 FREE ASSAY (FT-3): CPT

## 2019-05-03 PROCEDURE — 36415 COLL VENOUS BLD VENIPUNCTURE: CPT

## 2019-05-06 ENCOUNTER — TELEPHONE (OUTPATIENT)
Dept: CARDIOLOGY | Age: 54
End: 2019-05-06

## 2019-05-07 ENCOUNTER — TELEPHONE (OUTPATIENT)
Dept: CARDIOLOGY | Age: 54
End: 2019-05-07

## 2019-05-07 ENCOUNTER — PATIENT MESSAGE (OUTPATIENT)
Dept: FAMILY MEDICINE CLINIC | Facility: CLINIC | Age: 54
End: 2019-05-07

## 2019-05-07 DIAGNOSIS — Z12.39 SCREENING FOR MALIGNANT NEOPLASM OF BREAST: Primary | ICD-10-CM

## 2019-05-07 NOTE — TELEPHONE ENCOUNTER
From: Amara Reed Book  To: Barbara Cervantes MD  Sent: 5/7/2019 10:08 AM CDT  Subject: Non-Urgent Medical Question    Just received a message telling me it's time to schedule my next mammogram and that my doctor needs to put the orders in.    I should be du

## 2019-05-14 ENCOUNTER — HOSPITAL ENCOUNTER (OUTPATIENT)
Dept: CT IMAGING | Facility: HOSPITAL | Age: 54
Discharge: HOME | End: 2019-05-14
Attending: INTERNAL MEDICINE
Payer: COMMERCIAL

## 2019-05-14 DIAGNOSIS — R07.9 CHEST PAIN, UNSPECIFIED: ICD-10-CM

## 2019-05-14 DIAGNOSIS — R06.09 OTHER FORM OF DYSPNEA: ICD-10-CM

## 2019-05-14 DIAGNOSIS — R53.83 FATIGUE: ICD-10-CM

## 2019-05-14 PROCEDURE — 75574 CT ANGIO HRT W/3D IMAGE: CPT | Performed by: INTERNAL MEDICINE

## 2019-05-14 RX ORDER — NITROGLYCERIN 0.4 MG/1
TABLET SUBLINGUAL
Status: COMPLETED
Start: 2019-05-14 | End: 2019-05-14

## 2019-05-14 RX ADMIN — NITROGLYCERIN 0.4 MG: 0.4 TABLET SUBLINGUAL at 10:19:00

## 2019-05-16 ENCOUNTER — TELEPHONE (OUTPATIENT)
Dept: CARDIOLOGY | Age: 54
End: 2019-05-16

## 2019-05-20 ENCOUNTER — APPOINTMENT (OUTPATIENT)
Dept: GENERAL RADIOLOGY | Age: 54
End: 2019-05-20
Attending: NURSE PRACTITIONER
Payer: COMMERCIAL

## 2019-05-20 ENCOUNTER — HOSPITAL ENCOUNTER (EMERGENCY)
Age: 54
Discharge: HOME | End: 2019-05-20
Attending: EMERGENCY MEDICINE
Payer: COMMERCIAL

## 2019-05-20 VITALS
OXYGEN SATURATION: 97 % | RESPIRATION RATE: 32 BRPM | DIASTOLIC BLOOD PRESSURE: 84 MMHG | HEART RATE: 88 BPM | TEMPERATURE: 98 F | BODY MASS INDEX: 38.27 KG/M2 | SYSTOLIC BLOOD PRESSURE: 149 MMHG | HEIGHT: 63 IN | WEIGHT: 216 LBS

## 2019-05-20 DIAGNOSIS — S90.32XA CONTUSION OF LEFT FOOT, INITIAL ENCOUNTER: Primary | ICD-10-CM

## 2019-05-20 PROCEDURE — 99283 EMERGENCY DEPT VISIT LOW MDM: CPT

## 2019-05-20 PROCEDURE — 73630 X-RAY EXAM OF FOOT: CPT | Performed by: NURSE PRACTITIONER

## 2019-05-20 NOTE — ED PROVIDER NOTES
Patient Seen in: [address] [facility] [facility] In [place]    History   Patient presents with:  Lower Extremity Injury (musculoskeletal)    Stated Complaint: Left foot injury;  Pt stated that she dropped her nebulizer machine on her foot;     55-year-old • Cancer [facility])    • Change in hair 6/2018    improved after starting k-pax   • Chronic cough 5/4/2018    dx: Neuropathic Sensory Cough   • Constipation years    still have at times, but not as often.    • Disorder of liver     fatty liver   • Disorder of th yes, sometimes due to food or drink, sick & times no clue   • Sleep disturbance 9/11/2018    probably due to steroids   • Stool incontinence 8/8/2018    normally NO. About a month ago this did happen.    • [facility]    s/p PERRY, stage 1   • Tobacco Use      Smoking status: Never Smoker      Smokeless tobacco: Never Used      Tobacco comment: father was a smoker until I was 13years old    Alcohol use:  Yes      Alcohol/week: 0.5 - 1.0 oz      Types: 1 - 2 Standard drinks or equivalent per we Result Date: 5/20/2019  PROCEDURE:  XR FOOT, COMPLETE (MIN 3 VIEWS), LEFT (CPT=73630)  TECHNIQUE:  AP, oblique, and lateral views were obtained. COMPARISON:  None. INDICATIONS:  Left foot injury;  Pt stated that she dropped her nebulizer machine on her fo Disposition and Plan     Clinical Impression:  Contusion of left foot, initial encounter  (primary encounter diagnosis)    Disposition:  Discharge  5/20/2019 12:11 pm    Follow-up:  Aniyah Lozano MD  [address] [address]

## 2019-05-20 NOTE — ED INITIAL ASSESSMENT (HPI)
Patient states she dropped a small nebulizer machine on left foot yesterday. +swelling & bruising to foot. States she cannot move great toe.   Drove from [place] yesterday

## 2019-05-20 NOTE — ED NOTES
I reviewed that chart and discussed the case. I have examined the patient and noted patient is a 66-year-old female who presents emergency room with a history of a left foot injury.   Patient states she dropped her nebulizer machine the top of her left raghav

## 2019-05-21 RX ORDER — POTASSIUM CHLORIDE 20 MEQ/1
TABLET, EXTENDED RELEASE ORAL
Qty: 90 TABLET | Refills: 1 | Status: SHIPPED | OUTPATIENT
Start: 2019-05-21 | End: 2020-01-28

## 2019-05-21 RX ORDER — MONTELUKAST SODIUM 10 MG/1
TABLET ORAL
Qty: 90 TABLET | Refills: 1 | Status: SHIPPED | OUTPATIENT
Start: 2019-05-21 | End: 2020-01-28

## 2019-05-21 NOTE — TELEPHONE ENCOUNTER
Requesting MONTELUKAST SODIUM 10 MG, KLOR-CON M20 20 MEQ   LOV: 4/2/19  RTC: 2 months  Last Relevant Labs: 4/15/19  Filled: 12/11/18  #90 with 1 refills    Future Appointments   Date Time Provider Batsheva Hansen   6/19/2019 11:00 AM Raymond Gentile MD

## 2019-05-29 ENCOUNTER — OFFICE VISIT (OUTPATIENT)
Dept: FAMILY MEDICINE CLINIC | Facility: CLINIC | Age: 54
End: 2019-05-29
Payer: COMMERCIAL

## 2019-05-29 ENCOUNTER — HOSPITAL ENCOUNTER (OUTPATIENT)
Dept: GENERAL RADIOLOGY | Age: 54
Discharge: HOME | End: 2019-05-29
Attending: FAMILY MEDICINE
Payer: COMMERCIAL

## 2019-05-29 VITALS
DIASTOLIC BLOOD PRESSURE: 74 MMHG | TEMPERATURE: 98 F | HEART RATE: 74 BPM | SYSTOLIC BLOOD PRESSURE: 120 MMHG | RESPIRATION RATE: 18 BRPM

## 2019-05-29 DIAGNOSIS — M79.672 LEFT FOOT PAIN: Primary | ICD-10-CM

## 2019-05-29 DIAGNOSIS — J32.9 SINUSITIS, UNSPECIFIED CHRONICITY, UNSPECIFIED LOCATION: ICD-10-CM

## 2019-05-29 DIAGNOSIS — M79.672 LEFT FOOT PAIN: ICD-10-CM

## 2019-05-29 PROCEDURE — 99214 OFFICE O/P EST MOD 30 MIN: CPT | Performed by: FAMILY MEDICINE

## 2019-05-29 PROCEDURE — 73630 X-RAY EXAM OF FOOT: CPT | Performed by: FAMILY MEDICINE

## 2019-05-29 RX ORDER — AZITHROMYCIN 250 MG/1
TABLET, FILM COATED ORAL
Qty: 6 TABLET | Refills: 0 | Status: SHIPPED | OUTPATIENT
Start: 2019-05-29 | End: 2019-06-17 | Stop reason: ALTCHOICE

## 2019-05-29 NOTE — PATIENT INSTRUCTIONS
Thank you for choosing Rell Bay MD at [place] [address]  To Do: [address]  1. Please take meds as directed. [facility] is located in [address]. Monday, Tuesday & Friday – 8 a.m. to 4 p.m. Wednesday, Thursday – 7 a.m. to 3 p. outweigh those potential risks and we strive to make you healthier and to improve your quality of life.     Referrals, and Radiology Information:    If your insurance requires a referral to a specialist, please allow 5 business days to process your referral

## 2019-05-29 NOTE — PROGRESS NOTES
HPI:    Patient ID: Dale Weaver is a 48year old female.     HPI   Ms. Reyes is a pleasant 69-year-old female with known history of asthma, hypothyroidism, DARSHANA on CPAP, obesity, recent admission for PCP pneumonia here today as she was seen at the emerge BENZONATATE 200 MG Oral Cap TAKE 1 CAPSULE BY MOUTH THREE TIMES DAILY AS NEEDED FOR COUGH Disp: 30 capsule Rfl: 2   CONTRAVE 8-90 MG Oral Tablet 12 Hr TAKE 2 TABLETS BY MOUTH TWICE A DAY Disp: 120 tablet Rfl: 1   Phentermine HCl 37.5 MG Oral Tab Take 1 tab Left Ear: External ear and ear canal normal. Tympanic membrane is bulging. Tympanic membrane is not erythematous. No middle ear effusion. Mouth/Throat: Oropharynx is clear and moist.   Eyes: Conjunctivae are normal. No scleral icterus.    Neck: Neck supp

## 2019-05-31 ENCOUNTER — PATIENT MESSAGE (OUTPATIENT)
Dept: FAMILY MEDICINE CLINIC | Facility: CLINIC | Age: 54
End: 2019-05-31

## 2019-06-03 RX ORDER — METHYLPREDNISOLONE 4 MG/1
TABLET ORAL
Qty: 1 KIT | Refills: 0 | Status: SHIPPED | OUTPATIENT
Start: 2019-06-03 | End: 2019-06-17 | Stop reason: ALTCHOICE

## 2019-06-03 NOTE — TELEPHONE ENCOUNTER
From: Alyssia Clay Book  To: Leland Mosley MD  Sent: 5/31/2019 4:27 PM CDT  Subject: Prescription Question    COPIED/ SENT TO DR Florentino Bass :  Just spoke w/ my insurance company.  They denied the Geraline Cutter stating I did not meet their criteria of 150 cells per m

## 2019-06-03 NOTE — PROGRESS NOTES
Rusty Bey MD 2 minutes ago (12:26 PM)         Thanks Sherwin Redding.    Already in contact with his office. Suzan Pollard keeping you in the loop.       Please let Dr Zina Michael know that I had a touch of the flu last Tuesday with vomiting.  He put me

## 2019-06-06 ENCOUNTER — HOSPITAL ENCOUNTER (OUTPATIENT)
Dept: GENERAL RADIOLOGY | Age: 54
Discharge: HOME | End: 2019-06-06
Attending: NURSE PRACTITIONER
Payer: COMMERCIAL

## 2019-06-06 DIAGNOSIS — J45.40 MODERATE PERSISTENT ASTHMA WITHOUT COMPLICATION: ICD-10-CM

## 2019-06-06 PROCEDURE — 71046 X-RAY EXAM CHEST 2 VIEWS: CPT | Performed by: NURSE PRACTITIONER

## 2019-06-10 RX ORDER — NALTREXONE HYDROCHLORIDE AND BUPROPION HYDROCHLORIDE 8; 90 MG/1; MG/1
TABLET, EXTENDED RELEASE ORAL
Qty: 120 TABLET | Refills: 2 | Status: SHIPPED | OUTPATIENT
Start: 2019-06-10 | End: 2019-09-12

## 2019-06-10 RX ORDER — TOPIRAMATE 50 MG/1
TABLET, FILM COATED ORAL
Qty: 60 TABLET | Refills: 2 | Status: SHIPPED | OUTPATIENT
Start: 2019-06-10 | End: 2019-09-08

## 2019-06-10 NOTE — TELEPHONE ENCOUNTER
Requesting TOPIRAMATE 50 MG, CONTRAVE 8-90 MG   LOV: 5/29/19  RTC: 1 month  Last Relevant Labs:        Future Appointments   Date Time Provider Batsheva Jade   6/19/2019 11:00 AM Almaz Steiner MD [facility] [address]   6/24/2019 10:00 AM Jomar Brody

## 2019-06-13 ENCOUNTER — HOSPITAL ENCOUNTER (OUTPATIENT)
Dept: CT IMAGING | Age: 54
Discharge: HOME | End: 2019-06-13
Attending: INTERNAL MEDICINE
Payer: COMMERCIAL

## 2019-06-13 DIAGNOSIS — R06.00 DYSPNEA, UNSPECIFIED TYPE: ICD-10-CM

## 2019-06-13 PROCEDURE — 71250 CT THORAX DX C-: CPT | Performed by: INTERNAL MEDICINE

## 2019-06-19 ENCOUNTER — OFFICE VISIT (OUTPATIENT)
Dept: FAMILY MEDICINE CLINIC | Facility: CLINIC | Age: 54
End: 2019-06-19
Payer: COMMERCIAL

## 2019-06-19 VITALS
TEMPERATURE: 98 F | SYSTOLIC BLOOD PRESSURE: 126 MMHG | WEIGHT: 221 LBS | RESPIRATION RATE: 16 BRPM | DIASTOLIC BLOOD PRESSURE: 80 MMHG | BODY MASS INDEX: 39.16 KG/M2 | HEART RATE: 74 BPM | HEIGHT: 63 IN

## 2019-06-19 DIAGNOSIS — E66.9 OBESITY (BMI 30-39.9): Primary | ICD-10-CM

## 2019-06-19 PROCEDURE — 99213 OFFICE O/P EST LOW 20 MIN: CPT | Performed by: FAMILY MEDICINE

## 2019-06-19 RX ORDER — PHENTERMINE HYDROCHLORIDE 37.5 MG/1
37.5 TABLET ORAL
Qty: 30 TABLET | Refills: 2 | Status: SHIPPED | OUTPATIENT
Start: 2019-06-19 | End: 2019-11-16

## 2019-06-19 NOTE — PATIENT INSTRUCTIONS
Thank you for choosing Jeannine Bingham MD at [place] [address]  To Do: [address]  1. Please take meds as directed. [facility] is located in [address]. Monday, Tuesday & Friday – 8 a.m. to 4 p.m. Wednesday, Thursday – 7 a.m. to 3 p. outweigh those potential risks and we strive to make you healthier and to improve your quality of life.     Referrals, and Radiology Information:    If your insurance requires a referral to a specialist, please allow 5 business days to process your referral

## 2019-06-19 NOTE — PROGRESS NOTES
HPI:    Patient ID: Bhargav Shaw is a 48year old female.     HPI   Ms. Reyes is a pleasant 63-year-old female with known history of asthma, hypothyroidism, DARSHANA on CPAP, obesity, recent admission for PCP pneumonia here today to follow-up for weight manag tablet Rfl: 2   LEVOTHYROXINE SODIUM 150 MCG Oral Tab LEVOXYL BRAND 150 mcg daily - BRAND Disp: 30 tablet Rfl: 3   FUROSEMIDE 20 MG Oral Tab TAKE 1 TABLET BY MOUTH EVERY DAY Disp: 90 tablet Rfl: 1   omeprazole 20 MG Oral Capsule Delayed Release Take 20 mg Vitals reviewed.              ASSESSMENT/PLAN:   Obesity (bmi 30-39.9)  (primary encounter diagnosis)  -Stable at this point; somewhat restricted with current respiratory issues; will continue with medications for weight loss for now and will monitor; fol

## 2019-06-23 ENCOUNTER — PATIENT MESSAGE (OUTPATIENT)
Dept: FAMILY MEDICINE CLINIC | Facility: CLINIC | Age: 54
End: 2019-06-23

## 2019-06-24 NOTE — TELEPHONE ENCOUNTER
From: Clare Barnes Book  To: Lakisha Mcguire MD  Sent: 6/23/2019 4:36 PM CDT  Subject: Other    Dr Radha Foley had told me that he would help me file for medical disability. Do you have a form that he can sign for this?  Or do I need to get one for him to si

## 2019-06-27 ENCOUNTER — NURSE ONLY (OUTPATIENT)
Dept: HEMATOLOGY/ONCOLOGY | Facility: HOSPITAL | Age: 54
End: 2019-06-27

## 2019-06-27 NOTE — PROGRESS NOTES
Received carleenala order from Dr. Binaca Kingsley (non-staff) from [facility] pulmonary dept. Faxed order to Dr. Brian Pickard office requesting MD co-sign order and fax to right fax- [phone].

## 2019-07-01 ENCOUNTER — MED REC SCAN ONLY (OUTPATIENT)
Dept: FAMILY MEDICINE CLINIC | Facility: CLINIC | Age: 54
End: 2019-07-01

## 2019-07-03 ENCOUNTER — APPOINTMENT (OUTPATIENT)
Dept: LAB | Age: 54
End: 2019-07-03
Attending: INTERNAL MEDICINE
Payer: COMMERCIAL

## 2019-07-03 ENCOUNTER — OFFICE VISIT (OUTPATIENT)
Dept: FAMILY MEDICINE CLINIC | Facility: CLINIC | Age: 54
End: 2019-07-03
Payer: COMMERCIAL

## 2019-07-03 VITALS
TEMPERATURE: 98 F | BODY MASS INDEX: 39.69 KG/M2 | HEART RATE: 80 BPM | WEIGHT: 224 LBS | RESPIRATION RATE: 18 BRPM | HEIGHT: 63 IN | SYSTOLIC BLOOD PRESSURE: 130 MMHG | DIASTOLIC BLOOD PRESSURE: 90 MMHG

## 2019-07-03 DIAGNOSIS — E89.0 POSTOPERATIVE HYPOTHYROIDISM: ICD-10-CM

## 2019-07-03 DIAGNOSIS — J01.00 ACUTE MAXILLARY SINUSITIS, RECURRENCE NOT SPECIFIED: Primary | ICD-10-CM

## 2019-07-03 LAB — TSI SER-ACNC: 0.59 MIU/ML (ref 0.36–3.74)

## 2019-07-03 PROCEDURE — 84443 ASSAY THYROID STIM HORMONE: CPT

## 2019-07-03 PROCEDURE — 36415 COLL VENOUS BLD VENIPUNCTURE: CPT

## 2019-07-03 PROCEDURE — 99213 OFFICE O/P EST LOW 20 MIN: CPT | Performed by: FAMILY MEDICINE

## 2019-07-03 RX ORDER — DOXYCYCLINE HYCLATE 100 MG/1
100 TABLET, DELAYED RELEASE ORAL 2 TIMES DAILY
Qty: 20 TABLET | Refills: 0 | Status: SHIPPED | OUTPATIENT
Start: 2019-07-03 | End: 2019-07-13

## 2019-07-03 RX ORDER — PROMETHAZINE HYDROCHLORIDE AND CODEINE PHOSPHATE 6.25; 1 MG/5ML; MG/5ML
2.5 SYRUP ORAL EVERY 6 HOURS PRN
Qty: 118 ML | Refills: 0 | Status: SHIPPED | OUTPATIENT
Start: 2019-07-03 | End: 2020-01-06 | Stop reason: ALTCHOICE

## 2019-07-03 NOTE — PATIENT INSTRUCTIONS
Thank you for choosing Selam Hernandez MD at [place] [address]  To Do: [address]  1. Please take meds as directed. [facility] is located in [address]. Monday, Tuesday & Friday – 8 a.m. to 4 p.m. Wednesday, Thursday – 7 a.m. to 3 p. outweigh those potential risks and we strive to make you healthier and to improve your quality of life.     Referrals, and Radiology Information:    If your insurance requires a referral to a specialist, please allow 5 business days to process your referral side. Imagine a triangle with one side extending along the bridge of the nose and another extending out from the base of the nose. Does your nose fit within this triangle? The tilt of the tip should also balance with the forehead and chin. · From beneath. rights reserved. This information is not intended as a substitute for professional medical care. Always follow your healthcare professional's instructions.

## 2019-07-03 NOTE — PROGRESS NOTES
HPI:    Patient ID: Ina Marquez is a 48year old female. HPI  Ms. Reyes is a pleasant 72-year-old female with known history of asthma, hypothyroidism, DARSHANA on CPAP, obesity,  PCP pneumonia here today for sinus congestion associated with fever for the Rfl: 1   omeprazole 20 MG Oral Capsule Delayed Release Take 20 mg by mouth daily. Disp:  Rfl:    Fluticasone Furoate-Vilanterol (BREO ELLIPTA) 200-25 MCG/INH Inhalation Aerosol Powder, Breath Activated Inhale 1 puff into the lungs daily.  Disp: 1 each Rfl: cervical adenopathy. Neurological: She is alert. Vitals reviewed.            ASSESSMENT/PLAN:   Acute maxillary sinusitis, recurrence not specified  (primary encounter diagnosis)  -We will go and treat with doxycycline 100 mg 1 tablet twice a day for th

## 2019-07-05 ENCOUNTER — OFFICE VISIT (OUTPATIENT)
Dept: HEMATOLOGY/ONCOLOGY | Age: 54
End: 2019-07-05
Attending: INTERNAL MEDICINE
Payer: COMMERCIAL

## 2019-07-05 ENCOUNTER — TELEPHONE (OUTPATIENT)
Dept: HEMATOLOGY/ONCOLOGY | Facility: HOSPITAL | Age: 54
End: 2019-07-05

## 2019-07-05 DIAGNOSIS — J45.41 MODERATE PERSISTENT ASTHMA WITH ACUTE EXACERBATION: Primary | ICD-10-CM

## 2019-07-05 PROCEDURE — 96372 THER/PROPH/DIAG INJ SC/IM: CPT

## 2019-07-10 ENCOUNTER — HOSPITAL ENCOUNTER (OUTPATIENT)
Dept: MAMMOGRAPHY | Age: 54
Discharge: HOME | End: 2019-07-10
Attending: FAMILY MEDICINE
Payer: COMMERCIAL

## 2019-07-10 DIAGNOSIS — Z12.39 SCREENING FOR MALIGNANT NEOPLASM OF BREAST: ICD-10-CM

## 2019-07-10 PROCEDURE — 77067 SCR MAMMO BI INCL CAD: CPT | Performed by: FAMILY MEDICINE

## 2019-07-10 PROCEDURE — 77063 BREAST TOMOSYNTHESIS BI: CPT | Performed by: FAMILY MEDICINE

## 2019-07-16 RX ORDER — POTASSIUM CHLORIDE 20 MEQ/1
TABLET, EXTENDED RELEASE ORAL
Qty: 90 TABLET | Refills: 1 | Status: SHIPPED | OUTPATIENT
Start: 2019-07-16 | End: 2019-07-17

## 2019-07-16 RX ORDER — MONTELUKAST SODIUM 10 MG/1
TABLET ORAL
Qty: 90 TABLET | Refills: 1 | Status: SHIPPED | OUTPATIENT
Start: 2019-07-16 | End: 2019-07-17

## 2019-07-16 NOTE — TELEPHONE ENCOUNTER
Requested Prescriptions     Pending Prescriptions Disp Refills   • KLOR-CON M20 20 MEQ Oral Tab CR [Pharmacy Med Name: KLOR-CON M20 TABLET] 90 tablet 1     Sig: TAKE 1 TABLET (20 MEQ TOTAL) BY MOUTH ONCE DAILY.    • MONTELUKAST SODIUM 10 MG Oral Tab [Pharma

## 2019-07-17 ENCOUNTER — OFFICE VISIT (OUTPATIENT)
Dept: FAMILY MEDICINE CLINIC | Facility: CLINIC | Age: 54
End: 2019-07-17
Payer: COMMERCIAL

## 2019-07-17 VITALS
HEIGHT: 63 IN | DIASTOLIC BLOOD PRESSURE: 82 MMHG | WEIGHT: 220 LBS | OXYGEN SATURATION: 98 % | BODY MASS INDEX: 38.98 KG/M2 | RESPIRATION RATE: 22 BRPM | HEART RATE: 90 BPM | TEMPERATURE: 98 F | SYSTOLIC BLOOD PRESSURE: 146 MMHG

## 2019-07-17 DIAGNOSIS — I10 ESSENTIAL HYPERTENSION: ICD-10-CM

## 2019-07-17 DIAGNOSIS — J32.0 MAXILLARY SINUSITIS, UNSPECIFIED CHRONICITY: Primary | ICD-10-CM

## 2019-07-17 PROCEDURE — 99213 OFFICE O/P EST LOW 20 MIN: CPT | Performed by: PHYSICIAN ASSISTANT

## 2019-07-17 RX ORDER — METHYLPREDNISOLONE 4 MG/1
TABLET ORAL
Qty: 1 PACKAGE | Refills: 0 | Status: SHIPPED | OUTPATIENT
Start: 2019-07-17 | End: 2019-08-12 | Stop reason: ALTCHOICE

## 2019-07-17 RX ORDER — BENZONATATE 200 MG/1
200 CAPSULE ORAL 2 TIMES DAILY PRN
COMMUNITY
Start: 2019-07-16 | End: 2019-12-18 | Stop reason: DRUGHIGH

## 2019-07-17 RX ORDER — CLARITHROMYCIN 500 MG/1
500 TABLET, COATED ORAL 2 TIMES DAILY
Qty: 20 TABLET | Refills: 0 | Status: SHIPPED | OUTPATIENT
Start: 2019-07-17 | End: 2019-07-27

## 2019-07-17 RX ORDER — AMITRIPTYLINE HYDROCHLORIDE 10 MG/1
10 TABLET, FILM COATED ORAL 2 TIMES DAILY
COMMUNITY
End: 2020-01-06

## 2019-07-17 NOTE — PROGRESS NOTES
CHIEF COMPLAINT:   Patient presents with:  Sinus Problem: facial pressure, ear pressure // x1 month, has been on doxy & zpack (saw PCP on 7/3/19 for sinusitis)      HPI:   Geraldine Opitz is a 48year old female who presents for cold symptoms for  2  billy Phentermine HCl 37.5 MG Oral Tab Take 1 tablet (37.5 mg total) by mouth every morning before breakfast. Disp: 30 tablet Rfl: 2   TOPIRAMATE 50 MG Oral Tab TAKE 1 TABLET BY MOUTH TWICE A DAY Disp: 60 tablet Rfl: 2   CONTRAVE 8-90 MG Oral Tablet 12 Hr TAKE focus at times.    • Body piercing 1981    ears, 2 holes each   • Calculus of kidney 25 yrs    had one last yr/ first one in 7 yrs   • Cancer [facility])    • Change in hair 6/2018    improved after starting k-pax   • Chronic cough 5/4/2018    dx: Neuropathic Trisha Syl unspecified(486)    • Prediabetes    • Problems with swallowing     food gets stuck   • Shortness of breath    • Skin blushing/flushing not sure    yes, sometimes due to food or drink, sick & times no clue   • Sleep disturbance 9/11/2018    probably due to exam. Upper and lower esophageal biopsies taken (-) eosinophilic esophagitis, and empiric dilation to 62 Azeri       Family History   Problem Relation Age of Onset   • Hypertension Mother    • Other (Other) Mother    • Other (thyroid nodules) Mother    • Smoker      Smokeless tobacco: Never Used      Tobacco comment: father was a smoker until I was 13years old    Alcohol use:  Yes      Alcohol/week: 0.5 - 1.0 oz      Types: 1 - 2 Standard drinks or equivalent per week      Comment: occasional    Drug use: with:      ASSESSMENT:  Maxillary sinusitis, unspecified chronicity  (primary encounter diagnosis)      PLAN: Meds as below.   Comfort care instructions as listed in Patient Instructions    Meds & Refills for this Visit:  Requested Prescriptions     Signed

## 2019-07-18 RX ORDER — FUROSEMIDE 20 MG/1
TABLET ORAL
Qty: 90 TABLET | Refills: 1 | Status: SHIPPED | OUTPATIENT
Start: 2019-07-18 | End: 2019-12-30

## 2019-07-18 NOTE — TELEPHONE ENCOUNTER
Requested Prescriptions     Pending Prescriptions Disp Refills   • FUROSEMIDE 20 MG Oral Tab [Pharmacy Med Name: FUROSEMIDE 20 MG TABLET] 90 tablet 1     Sig: TAKE 1 TABLET BY MOUTH EVERY DAY     LOV: 7/3/2019  RTC: AS SCHEDULED  Last Relevant Labs: 4/15/2

## 2019-07-30 ENCOUNTER — PATIENT MESSAGE (OUTPATIENT)
Dept: FAMILY MEDICINE CLINIC | Facility: CLINIC | Age: 54
End: 2019-07-30

## 2019-07-30 RX ORDER — SUMATRIPTAN 25 MG/1
25 TABLET, FILM COATED ORAL EVERY 2 HOUR PRN
Qty: 9 TABLET | Refills: 1 | Status: SHIPPED | OUTPATIENT
Start: 2019-07-30 | End: 2020-01-06

## 2019-07-30 NOTE — PROGRESS NOTES
Patient requesting alternative to Esvin Medrano to pharmacy and alternative are:  Imitrex 25, 50 and 100mg  Fioricet  Butalbital     Patient tried SUMAtriptan Succinate 25 MG Oral Tab did not help, she d/c  Also has tried Topiramate

## 2019-07-30 NOTE — TELEPHONE ENCOUNTER
From: Genesis Harp Book  To: Dequan Blue MD  Sent: 7/30/2019 11:37 AM CDT  Subject: Prescription Question    Dr Alyssia Roque prescribed me a rx in the past that was a powder for migraines. I haven't had one this bad in a while.  This is day 2.5 and the over t

## 2019-07-30 NOTE — TELEPHONE ENCOUNTER
From: Herman Castro Book  To: Tushar Cooper MD  Sent: 7/30/2019 2:32 PM CDT  Subject: Prescription Question    Thank you for sending the Rx over for Cambia. With my insurance the rx is a co-pay of $700. By chance do you have a sample pack I can get.

## 2019-07-30 NOTE — PROGRESS NOTES
Please advise     Medication Quantity Refills Start End   Diclofenac Potassium (CAMBIA) 50 MG Oral Powd Pack (Discontinued) 30 packet 3 2/26/2016 9/20/2016   Sig:   Use 1 packets prn headaches, max 2 per 24 hours       LOV 7/24/19

## 2019-08-02 ENCOUNTER — OFFICE VISIT (OUTPATIENT)
Dept: HEMATOLOGY/ONCOLOGY | Age: 54
End: 2019-08-02
Attending: INTERNAL MEDICINE
Payer: COMMERCIAL

## 2019-08-02 VITALS
DIASTOLIC BLOOD PRESSURE: 81 MMHG | TEMPERATURE: 99 F | RESPIRATION RATE: 16 BRPM | SYSTOLIC BLOOD PRESSURE: 143 MMHG | HEART RATE: 93 BPM | OXYGEN SATURATION: 96 %

## 2019-08-02 DIAGNOSIS — J45.41 MODERATE PERSISTENT ASTHMA WITH ACUTE EXACERBATION: Primary | ICD-10-CM

## 2019-08-02 PROCEDURE — 96372 THER/PROPH/DIAG INJ SC/IM: CPT

## 2019-08-09 ENCOUNTER — APPOINTMENT (OUTPATIENT)
Dept: CARDIOLOGY | Age: 54
End: 2019-08-09

## 2019-08-12 ENCOUNTER — OFFICE VISIT (OUTPATIENT)
Dept: FAMILY MEDICINE CLINIC | Facility: CLINIC | Age: 54
End: 2019-08-12
Payer: COMMERCIAL

## 2019-08-12 ENCOUNTER — TELEPHONE (OUTPATIENT)
Dept: FAMILY MEDICINE CLINIC | Facility: CLINIC | Age: 54
End: 2019-08-12

## 2019-08-12 VITALS
WEIGHT: 224 LBS | BODY MASS INDEX: 39.69 KG/M2 | HEIGHT: 63 IN | RESPIRATION RATE: 16 BRPM | HEART RATE: 80 BPM | DIASTOLIC BLOOD PRESSURE: 80 MMHG | TEMPERATURE: 98 F | SYSTOLIC BLOOD PRESSURE: 120 MMHG

## 2019-08-12 DIAGNOSIS — G43.809 OTHER MIGRAINE WITHOUT STATUS MIGRAINOSUS, NOT INTRACTABLE: Primary | ICD-10-CM

## 2019-08-12 PROCEDURE — 99213 OFFICE O/P EST LOW 20 MIN: CPT | Performed by: FAMILY MEDICINE

## 2019-08-12 NOTE — PATIENT INSTRUCTIONS
Thank you for choosing Lydia Goldberg MD at [place] [address]  To Do: [address]  1. Please take meds as directed. [facility] is located in [address]. Monday, Tuesday & Friday – 8 a.m. to 4 p.m. Wednesday, Thursday – 7 a.m. to 3 p. outweigh those potential risks and we strive to make you healthier and to improve your quality of life.     Referrals, and Radiology Information:    If your insurance requires a referral to a specialist, please allow 5 business days to process your referral

## 2019-08-12 NOTE — PROGRESS NOTES
HPI:    Patient ID: Kassandra Downing is a 48year old female. HPI  Ms. Reyes is a pleasant 59-year-old female with known history of asthma, hypothyroidism, DARSHANA on CPAP, obesity, migraines, PCP pneumonia here today for her management of migraines.   Recent EVERY 6 (SIX) HOURS AS NEEDED FOR WHEEZING.  Disp: 150 mL Rfl: 3   LEVOTHYROXINE SODIUM 150 MCG Oral Tab LEVOXYL BRAND 150 mcg daily - BRAND Disp: 90 tablet Rfl: 1   mepolizumab (NUCALA) 100 MG Subcutaneous Recon Soln Inject 100 mg into the skin every 30 (t WHEEZING   PHYSICAL EXAM:   Physical Exam  Constitutional: No distress. HENT: Bulging tympanic membranes bilaterally with no air-fluid level; tenderness in the bilateral frontal and maxillary sinuses.   Mouth/Throat: Oropharynx is clear and moist. No orop

## 2019-08-12 NOTE — TELEPHONE ENCOUNTER
Recd request from [facility], Disability Determination to find status of medical records request which was sent two weeks ago. Faxed to [employer] for processing.

## 2019-08-30 ENCOUNTER — OFFICE VISIT (OUTPATIENT)
Dept: HEMATOLOGY/ONCOLOGY | Age: 54
End: 2019-08-30
Attending: INTERNAL MEDICINE
Payer: COMMERCIAL

## 2019-08-30 ENCOUNTER — PATIENT MESSAGE (OUTPATIENT)
Dept: FAMILY MEDICINE CLINIC | Facility: CLINIC | Age: 54
End: 2019-08-30

## 2019-08-30 DIAGNOSIS — J45.41 MODERATE PERSISTENT ASTHMA WITH ACUTE EXACERBATION: Primary | ICD-10-CM

## 2019-08-30 PROCEDURE — 96372 THER/PROPH/DIAG INJ SC/IM: CPT

## 2019-09-05 ENCOUNTER — OFFICE VISIT (OUTPATIENT)
Dept: SURGERY | Facility: CLINIC | Age: 54
End: 2019-09-05
Payer: COMMERCIAL

## 2019-09-05 VITALS
WEIGHT: 221 LBS | SYSTOLIC BLOOD PRESSURE: 155 MMHG | HEART RATE: 77 BPM | BODY MASS INDEX: 39 KG/M2 | DIASTOLIC BLOOD PRESSURE: 91 MMHG | OXYGEN SATURATION: 94 % | RESPIRATION RATE: 16 BRPM

## 2019-09-05 DIAGNOSIS — K75.81 NASH (NONALCOHOLIC STEATOHEPATITIS): Primary | ICD-10-CM

## 2019-09-05 NOTE — PROGRESS NOTES
[facility]  [facility] [address]  [address]  [phone] ([phone]) 9/20/2018    Dark stools since 9/20/18   • Blurred vision not sure    hard to focus at times.    • Body piercing 1981    ears, 2 holes each   • Calculus of kidney 25 yrs    had one last yr/ first one in 7 yrs   • Cancer [facility])    • Change in hair 6/2018    i it gets so difficult that it does hurt.    • Pneumonia, organism unspecified(486)    • Prediabetes    • Problems with swallowing     food gets stuck   • Shortness of breath    • Skin blushing/flushing not sure    yes, sometimes due to food or drink, sick & UPPER GI ENDOSCOPY - REFERRAL  8/2014    otherwise unremarkable exam. Upper and lower esophageal biopsies taken (-) eosinophilic esophagitis, and empiric dilation to 62 Green[place]ic       Family History   Problem Relation Age of Onset   • Hypertension Mother Social History    Tobacco Use      Smoking status: Never Smoker      Smokeless tobacco: Never Used      Tobacco comment: father was a smoker until I was 13years old    Alcohol use:  Yes      Alcohol/week: 0.8 - 1.7 standard drinks      Types: 1 - 2 Stand (VITAMIN D3) 53393 units Oral Cap, Take 10,000 Units by mouth once a week. Sundays , Disp: , Rfl:   •  furosemide 20 MG Oral Tab, Take 1 tablet (20 mg total) by mouth daily. , Disp: 90 tablet, Rfl: 1  •  Amitriptyline HCl 10 MG Oral Tab, 80 mg Noon.  80mg/da [address], [place], [place],  [facility] (office)  [phone] (fax)  [phone] (mobile)  [email or web address]

## 2019-09-05 NOTE — TELEPHONE ENCOUNTER
From: [facility]  Sent: 9/4/2019 10:11 PM CDT  To: [facility] Clinical Staff  Subject: RE: Referral Request    [place],  It was a short question I asked Dr Magno Oneil when I was at the office with my .    It will probably be better to hold off a

## 2019-09-09 RX ORDER — TOPIRAMATE 50 MG/1
TABLET, FILM COATED ORAL
Qty: 180 TABLET | Refills: 1 | Status: SHIPPED | OUTPATIENT
Start: 2019-09-09 | End: 2020-02-17

## 2019-09-09 NOTE — TELEPHONE ENCOUNTER
Requesting TOPIRAMATE 50 MG   LOV: 8/12/19  RTC: 1 month  Last Relevant Labs: 4/15/19  Filled: 6/10/19 # 60 with 2 refills    Future Appointments   Date Time Provider Batsheva Jade   9/17/2019  9:30 AM Stanton Rodriguez MD [facility] [address]   9/17/201

## 2019-09-12 RX ORDER — NALTREXONE HYDROCHLORIDE AND BUPROPION HYDROCHLORIDE 8; 90 MG/1; MG/1
TABLET, EXTENDED RELEASE ORAL
Qty: 120 TABLET | Refills: 2 | Status: SHIPPED | OUTPATIENT
Start: 2019-09-12 | End: 2019-12-18 | Stop reason: DRUGHIGH

## 2019-09-12 NOTE — TELEPHONE ENCOUNTER
Requested Medications      Name from pharmacy: CONTRAVE ER 8-90 MG TABLET         Will file in chart as: CONTRAVE 8-90 MG Oral Tablet 12 Hr    Sig: TAKE 2 TABLETS BY MOUTH TWICE A DAY    Disp:  120 tablet    Refills:  2    Start: 9/12/2019    Class: Normal

## 2019-09-17 ENCOUNTER — APPOINTMENT (OUTPATIENT)
Dept: LAB | Age: 54
End: 2019-09-17
Attending: FAMILY MEDICINE
Payer: COMMERCIAL

## 2019-09-17 ENCOUNTER — HOSPITAL ENCOUNTER (EMERGENCY)
Age: 54
Discharge: HOME | End: 2019-09-17
Attending: EMERGENCY MEDICINE
Payer: COMMERCIAL

## 2019-09-17 ENCOUNTER — APPOINTMENT (OUTPATIENT)
Dept: GENERAL RADIOLOGY | Age: 54
End: 2019-09-17
Attending: EMERGENCY MEDICINE
Payer: COMMERCIAL

## 2019-09-17 ENCOUNTER — OFFICE VISIT (OUTPATIENT)
Dept: FAMILY MEDICINE CLINIC | Facility: CLINIC | Age: 54
End: 2019-09-17
Payer: COMMERCIAL

## 2019-09-17 VITALS
HEART RATE: 72 BPM | SYSTOLIC BLOOD PRESSURE: 138 MMHG | TEMPERATURE: 98 F | DIASTOLIC BLOOD PRESSURE: 80 MMHG | OXYGEN SATURATION: 99 % | BODY MASS INDEX: 38 KG/M2 | RESPIRATION RATE: 16 BRPM | WEIGHT: 216.94 LBS

## 2019-09-17 VITALS
DIASTOLIC BLOOD PRESSURE: 80 MMHG | SYSTOLIC BLOOD PRESSURE: 114 MMHG | WEIGHT: 217 LBS | RESPIRATION RATE: 20 BRPM | HEART RATE: 80 BPM | BODY MASS INDEX: 38.45 KG/M2 | HEIGHT: 63 IN

## 2019-09-17 DIAGNOSIS — S97.82XA CRUSHING INJURY OF LEFT FOOT, INITIAL ENCOUNTER: ICD-10-CM

## 2019-09-17 DIAGNOSIS — E66.9 OBESITY (BMI 30-39.9): Primary | ICD-10-CM

## 2019-09-17 DIAGNOSIS — R39.11 HESITANCY: ICD-10-CM

## 2019-09-17 DIAGNOSIS — S91.312A LACERATION OF LEFT FOOT, INITIAL ENCOUNTER: Primary | ICD-10-CM

## 2019-09-17 DIAGNOSIS — Z51.81 ENCOUNTER FOR THERAPEUTIC DRUG MONITORING: ICD-10-CM

## 2019-09-17 LAB
BILIRUB UR QL STRIP.AUTO: NEGATIVE
GLUCOSE UR STRIP.AUTO-MCNC: NEGATIVE MG/DL
KETONES UR STRIP.AUTO-MCNC: NEGATIVE MG/DL
LEUKOCYTE ESTERASE UR QL STRIP.AUTO: NEGATIVE
NITRITE UR QL STRIP.AUTO: NEGATIVE
PH UR STRIP.AUTO: 6 [PH] (ref 4.5–8)
PROT UR STRIP.AUTO-MCNC: NEGATIVE MG/DL
RBC UR QL AUTO: NEGATIVE
SP GR UR STRIP.AUTO: 1.01 (ref 1–1.03)
UROBILINOGEN UR STRIP.AUTO-MCNC: <2 MG/DL

## 2019-09-17 PROCEDURE — 12001 RPR S/N/AX/GEN/TRNK 2.5CM/<: CPT | Performed by: EMERGENCY MEDICINE

## 2019-09-17 PROCEDURE — 99214 OFFICE O/P EST MOD 30 MIN: CPT | Performed by: FAMILY MEDICINE

## 2019-09-17 PROCEDURE — 90471 IMMUNIZATION ADMIN: CPT | Performed by: EMERGENCY MEDICINE

## 2019-09-17 PROCEDURE — 73630 X-RAY EXAM OF FOOT: CPT | Performed by: EMERGENCY MEDICINE

## 2019-09-17 PROCEDURE — 96372 THER/PROPH/DIAG INJ SC/IM: CPT | Performed by: EMERGENCY MEDICINE

## 2019-09-17 PROCEDURE — 99284 EMERGENCY DEPT VISIT MOD MDM: CPT | Performed by: EMERGENCY MEDICINE

## 2019-09-17 RX ORDER — HYDROCODONE BITARTRATE AND ACETAMINOPHEN 5; 325 MG/1; MG/1
1-2 TABLET ORAL EVERY 6 HOURS PRN
Qty: 10 TABLET | Refills: 0 | Status: SHIPPED | OUTPATIENT
Start: 2019-09-17 | End: 2019-09-24

## 2019-09-17 RX ORDER — HYDROMORPHONE HYDROCHLORIDE 1 MG/ML
1 INJECTION, SOLUTION INTRAMUSCULAR; INTRAVENOUS; SUBCUTANEOUS ONCE
Status: COMPLETED | OUTPATIENT
Start: 2019-09-17 | End: 2019-09-17

## 2019-09-17 RX ORDER — CEPHALEXIN 500 MG/1
500 CAPSULE ORAL 4 TIMES DAILY
Qty: 40 CAPSULE | Refills: 0 | Status: SHIPPED | OUTPATIENT
Start: 2019-09-17 | End: 2019-09-27

## 2019-09-17 NOTE — PROGRESS NOTES
HPI:    Patient ID: Odalys Sharpe is a 48year old female. HPI  Ms. Reyes is a pleasant 14-year-old female with known history of asthma, hypothyroidism, DARSHANA on CPAP, obesity, migraines, PCP pneumonia here today  for weight management.   I started on ph (two) hours as needed for Migraine.  Use at onset; repeat once after 2 HRS-ONLY 2 IN 24 HR MAX Disp: 9 tablet Rfl: 1   FUROSEMIDE 20 MG Oral Tab TAKE 1 TABLET BY MOUTH EVERY DAY Disp: 90 tablet Rfl: 1   benzonatate 200 MG Oral Cap Take 200 mg by mouth 2 (tw Take 145 mcg by mouth daily.  Disp:  Rfl:      Allergies:  Amoxicillin             HIVES    Comment:TABS  Lyrica [Pregabalin]     SWELLING  Cat Hair Extract        ASTHMA, Coughing, Runny nose,                            SHORTNESS OF BREATH, WHEEZING  [place]

## 2019-09-17 NOTE — PATIENT INSTRUCTIONS
Thank you for choosing Avinash Pérez MD at [place] [address]  To Do: [address]  1. Please take meds as directed. [facility] is located in [address]. Monday, Tuesday & Friday – 8 a.m. to 4 p.m. Wednesday, Thursday – 7 a.m. to 3 p. outweigh those potential risks and we strive to make you healthier and to improve your quality of life.     Referrals, and Radiology Information:    If your insurance requires a referral to a specialist, please allow 5 business days to process your referral

## 2019-09-18 NOTE — ED PROVIDER NOTES
Patient Seen in: THE [facility] Department In [place]      History   Patient presents with:  Lower Extremity Injury (musculoskeletal): left foot    Stated Complaint: dropped trailer on foot    HPI    22-year-old female presents to the emergency depa yrs    dairy does not agree with me. My stomach bloats. • GENITO-URINARY DISEASE    • Headache disorder years    I have always gotten headaches   • Heartburn not sure    I think so.    • Heavy menses 12/1995    I did   • History of blood transfusion 9/16 87/91/92   • BACK SURGERY     • BRONCHOSCOPY N/A 2019    Performed by Kenia Pineda MD at [facility] ENDOSCOPY   • BRONCHOSCOPY N/A 2016    Performed by Kenia Pineda MD at [facility] ENDOSCOPY   •   8850,6539,3934,    x3   • COLONOSCOPY  2014 °C)   Temp src Oral   SpO2 99 %   O2 Device None (Room air)       Current:/80   Pulse 72   Temp 98.4 °F (36.9 °C) (Oral)   Resp 16   Wt 98.4 kg   SpO2 99%   BMI 38.43 kg/m²          Physical Exam    Very pleasant 29-year-old woman she is being wheele OTHER:  Negative.         =====    CONCLUSION:  Stable chronic changes. No acute fracture.               Dictated by: Tiffanie Verma MD on 9/17/2019 at 20:37         Approved by: Tiffanie Verma MD                         [facility]     Tetanus updated, fo

## 2019-09-27 ENCOUNTER — OFFICE VISIT (OUTPATIENT)
Dept: HEMATOLOGY/ONCOLOGY | Age: 54
End: 2019-09-27
Attending: INTERNAL MEDICINE
Payer: COMMERCIAL

## 2019-09-27 VITALS
RESPIRATION RATE: 18 BRPM | HEART RATE: 87 BPM | DIASTOLIC BLOOD PRESSURE: 76 MMHG | OXYGEN SATURATION: 95 % | SYSTOLIC BLOOD PRESSURE: 131 MMHG | TEMPERATURE: 97 F

## 2019-09-27 DIAGNOSIS — J45.41 MODERATE PERSISTENT ASTHMA WITH ACUTE EXACERBATION: Primary | ICD-10-CM

## 2019-09-27 PROCEDURE — 96372 THER/PROPH/DIAG INJ SC/IM: CPT

## 2019-09-27 NOTE — PROGRESS NOTES
Education Record    Learner:  Patient    Disease / Diagnosis: Nucala-Asthma    Barriers / Limitations:  None   Comments:    Method:  Discussion   Comments:    General Topics:  Medication, Side effects and symptom management and Plan of care reviewed   Comm

## 2019-10-01 ENCOUNTER — HOSPITAL ENCOUNTER (EMERGENCY)
Age: 54
Discharge: HOME | End: 2019-10-01
Payer: COMMERCIAL

## 2019-10-01 ENCOUNTER — APPOINTMENT (OUTPATIENT)
Dept: GENERAL RADIOLOGY | Age: 54
End: 2019-10-01
Attending: PHYSICIAN ASSISTANT
Payer: COMMERCIAL

## 2019-10-01 ENCOUNTER — HOSPITAL ENCOUNTER (EMERGENCY)
Age: 54
Discharge: STILL ADMITTED OR ADMINISTRATIVE | End: 2019-10-01
Payer: COMMERCIAL

## 2019-10-01 VITALS
DIASTOLIC BLOOD PRESSURE: 93 MMHG | TEMPERATURE: 98 F | HEART RATE: 100 BPM | WEIGHT: 212 LBS | BODY MASS INDEX: 37.56 KG/M2 | HEIGHT: 63 IN | OXYGEN SATURATION: 94 % | RESPIRATION RATE: 16 BRPM | SYSTOLIC BLOOD PRESSURE: 145 MMHG

## 2019-10-01 DIAGNOSIS — Z48.02 ENCOUNTER FOR REMOVAL OF SUTURES: Primary | ICD-10-CM

## 2019-10-01 DIAGNOSIS — G89.29 CHRONIC FOOT PAIN, LEFT: ICD-10-CM

## 2019-10-01 DIAGNOSIS — M79.672 CHRONIC FOOT PAIN, LEFT: ICD-10-CM

## 2019-10-01 PROCEDURE — 99283 EMERGENCY DEPT VISIT LOW MDM: CPT

## 2019-10-01 PROCEDURE — 73630 X-RAY EXAM OF FOOT: CPT | Performed by: PHYSICIAN ASSISTANT

## 2019-10-01 NOTE — ED PROVIDER NOTES
Patient Seen in: [facility] Emergency Department In [place]      History   Patient presents with:  Sut Stap Anisha (ingtegumentary)    Stated Complaint: suture removal on left foot    HPI    55-year-old female who presents to the emergency room after agree with me. My stomach bloats. • GENITO-URINARY DISEASE    • Headache disorder years    I have always gotten headaches   • Heartburn not sure    I think so.    • Heavy menses 12/1995    I did   • History of blood transfusion 9/16/2016    HGB 7.1 with SURGERY     • BRONCHOSCOPY N/A 2019    Performed by Tucker Mccabe MD at [facility] ENDOSCOPY   • BRONCHOSCOPY N/A 2016    Performed by Tucker Mccabe MD at [facility] ENDOSCOPY   •   7002,0432,1130,    x3   • COLONOSCOPY  2014    1 cm cecal polyp Temp src    SpO2 94 %   O2 Device None (Room air)       Current:BP (!) 145/93   Pulse 100   Temp 98.1 °F (36.7 °C)   Resp 16   Ht 160 cm (5' 3\")   Wt 96.2 kg   SpO2 94%   BMI 37.55 kg/m²         Physical Exam  Very pleasant 69-year-old woman she is bein COMPARISON:  [place], XR FOOT, COMPLETE (MIN 3 VIEWS), LEFT (CPT=73630), 5/29/2019, 11:10. [place], XR FOOT, COMPLETE (MIN 3 VIEWS), LEFT (CPT=73630), 5/20/2019, 11:52.   INDICATIONS:  dropped trailer on foot  PATIENT STATED HISTORY: (As transcribed B100  [facility] [address] [facility], [address] [place] [id number] [phone]    Schedule an appointment as soon as possible for a visit      Lizzie Young MD  [address] [place] [address]

## 2019-10-15 ENCOUNTER — OFFICE VISIT (OUTPATIENT)
Dept: FAMILY MEDICINE CLINIC | Facility: CLINIC | Age: 54
End: 2019-10-15
Payer: COMMERCIAL

## 2019-10-15 VITALS
OXYGEN SATURATION: 100 % | HEART RATE: 82 BPM | SYSTOLIC BLOOD PRESSURE: 138 MMHG | DIASTOLIC BLOOD PRESSURE: 70 MMHG | BODY MASS INDEX: 37.92 KG/M2 | HEIGHT: 63 IN | RESPIRATION RATE: 12 BRPM | WEIGHT: 214 LBS | TEMPERATURE: 98 F

## 2019-10-15 DIAGNOSIS — E66.9 OBESITY (BMI 30-39.9): Primary | ICD-10-CM

## 2019-10-15 PROCEDURE — 99213 OFFICE O/P EST LOW 20 MIN: CPT | Performed by: FAMILY MEDICINE

## 2019-10-15 NOTE — PROGRESS NOTES
HPI:    Patient ID: Dejah Reyes is a 48year old female. HPI  Ms. Reyes is a pleasant 35-year-old female with known history of asthma, hypothyroidism, DARSHANA on CPAP, obesity, migraines, PCP pneumonia here today  for weight management.   She unfortunate 20 MG Oral Tab, TAKE 1 TABLET BY MOUTH EVERY DAY, Disp: 90 tablet, Rfl: 1  benzonatate 200 MG Oral Cap, Take 200 mg by mouth 2 (two) times daily as needed.   , Disp: , Rfl:   Loratadine (LORATADINE ALLERGY RELIEF) 10 MG Oral Tablet Dispersible, Take 10 mg b SHORTNESS OF BREATH, WHEEZING  [place], [place]        ASTHMA, Runny nose, WHEEZING   PHYSICAL EXAM:   Physical Exam  Constitutional: No distress. HENT:   Mouth/Throat: Oropharynx is clear and moist. No oropharyngeal exudate.    Eyes:

## 2019-10-15 NOTE — PATIENT INSTRUCTIONS
Thank you for choosing Apollo Jack MD at [place] [address]  To Do: [address]  1. Please take meds as directed. Apollo Barros is located in [address]. Monday, Tuesday & Friday – 8 a.m. to 4 p.m. Wednesday, Thursday – 7 a.m. to 3 p. outweigh those potential risks and we strive to make you healthier and to improve your quality of life.     Referrals, and Radiology Information:    If your insurance requires a referral to a specialist, please allow 5 business days to process your referral

## 2019-10-25 ENCOUNTER — OFFICE VISIT (OUTPATIENT)
Dept: HEMATOLOGY/ONCOLOGY | Age: 54
End: 2019-10-25
Attending: INTERNAL MEDICINE
Payer: COMMERCIAL

## 2019-10-25 DIAGNOSIS — J45.41 MODERATE PERSISTENT ASTHMA WITH ACUTE EXACERBATION: Primary | ICD-10-CM

## 2019-10-25 PROCEDURE — 96372 THER/PROPH/DIAG INJ SC/IM: CPT

## 2019-10-25 NOTE — PROGRESS NOTES
Education Record    Learner:  Patient    Disease / Clinton Dilling persistent asthma with acute exacerbation     Barriers / Limitations:  None   Comments:    Method:  Brief focused   Comments:    General Topics:  Infection, Medication, Pain, Precautions

## 2019-11-06 ENCOUNTER — HOSPITAL ENCOUNTER (OUTPATIENT)
Dept: GENERAL RADIOLOGY | Age: 54
Discharge: HOME | End: 2019-11-06
Attending: FAMILY MEDICINE
Payer: COMMERCIAL

## 2019-11-06 ENCOUNTER — OFFICE VISIT (OUTPATIENT)
Dept: FAMILY MEDICINE CLINIC | Facility: CLINIC | Age: 54
End: 2019-11-06
Payer: COMMERCIAL

## 2019-11-06 VITALS
HEIGHT: 63 IN | SYSTOLIC BLOOD PRESSURE: 124 MMHG | OXYGEN SATURATION: 98 % | WEIGHT: 212 LBS | RESPIRATION RATE: 18 BRPM | BODY MASS INDEX: 37.56 KG/M2 | DIASTOLIC BLOOD PRESSURE: 80 MMHG | HEART RATE: 82 BPM | TEMPERATURE: 98 F

## 2019-11-06 DIAGNOSIS — R05.9 COUGH: ICD-10-CM

## 2019-11-06 DIAGNOSIS — J01.01 ACUTE RECURRENT MAXILLARY SINUSITIS: Primary | ICD-10-CM

## 2019-11-06 PROCEDURE — 99214 OFFICE O/P EST MOD 30 MIN: CPT | Performed by: FAMILY MEDICINE

## 2019-11-06 PROCEDURE — 71046 X-RAY EXAM CHEST 2 VIEWS: CPT | Performed by: FAMILY MEDICINE

## 2019-11-06 RX ORDER — LEVOFLOXACIN 250 MG/1
250 TABLET ORAL DAILY
Qty: 10 TABLET | Refills: 0 | Status: SHIPPED | OUTPATIENT
Start: 2019-11-06 | End: 2019-11-16

## 2019-11-06 RX ORDER — BUDESONIDE 0.5 MG/2ML
INHALANT ORAL
Refills: 5 | COMMUNITY
Start: 2019-10-13 | End: 2020-01-10

## 2019-11-06 NOTE — PROGRESS NOTES
HPI:    Patient ID: Ayanna Pennington is a 48year old female. HPI  Ms. Reyes is a pleasant 26-year-old female with known history of asthma, hypothyroidism, DARSHANA on CPAP, obesity, migraines, PCP pneumonia here today for cough and sinus congestion.   She has 3 (three) times daily as needed. 90 tablet 3   • SUMAtriptan Succinate (IMITREX) 25 MG Oral Tab Take 1 tablet (25 mg total) by mouth every 2 (two) hours as needed for Migraine.  Use at onset; repeat once after 2 HRS-ONLY 2 IN 24 HR MAX 9 tablet 1   • FUROSE membrane, external ear and ear canal normal.   Left Ear: Tympanic membrane, external ear and ear canal normal.   Nose: Right sinus exhibits maxillary sinus tenderness and frontal sinus tenderness.  Left sinus exhibits maxillary sinus tenderness and frontal

## 2019-11-06 NOTE — PROGRESS NOTES
Imaging report reviewed and shows no concerning findings.  Please notify patient.    -Dr. Lola Bellamy

## 2019-11-06 NOTE — PATIENT INSTRUCTIONS
Thank you for choosing Karin Louie MD at [place] [address]  To Do: [address]  1. Please take meds as directed. Apollo Cortez Esposito is located in [address]. Monday, Tuesday & Friday – 8 a.m. to 4 p.m. Wednesday, Thursday – 7 a.m. to 3 p. outweigh those potential risks and we strive to make you healthier and to improve your quality of life.     Referrals, and Radiology Information:    If your insurance requires a referral to a specialist, please allow 5 business days to process your referral the nose, sinuses, and throat. It warms and moistens the air you breathe in. It also makes the sticky mucus that helps clean the air of dust and other small particles.  The turbinates on each side of the nose are curved, bony ridges lined with mucous Domitila Cesar

## 2019-11-13 ENCOUNTER — OFFICE VISIT (OUTPATIENT)
Dept: FAMILY MEDICINE CLINIC | Facility: CLINIC | Age: 54
End: 2019-11-13
Payer: COMMERCIAL

## 2019-11-13 VITALS
SYSTOLIC BLOOD PRESSURE: 120 MMHG | WEIGHT: 208 LBS | TEMPERATURE: 98 F | DIASTOLIC BLOOD PRESSURE: 80 MMHG | HEIGHT: 63 IN | RESPIRATION RATE: 16 BRPM | BODY MASS INDEX: 36.86 KG/M2 | HEART RATE: 88 BPM

## 2019-11-13 DIAGNOSIS — J01.90 ACUTE SINUSITIS, RECURRENCE NOT SPECIFIED, UNSPECIFIED LOCATION: Primary | ICD-10-CM

## 2019-11-13 PROCEDURE — 99213 OFFICE O/P EST LOW 20 MIN: CPT | Performed by: FAMILY MEDICINE

## 2019-11-13 RX ORDER — METHYLPREDNISOLONE 4 MG/1
TABLET ORAL
Qty: 1 KIT | Refills: 0 | Status: SHIPPED | OUTPATIENT
Start: 2019-11-13 | End: 2019-12-18 | Stop reason: DRUGHIGH

## 2019-11-13 NOTE — PATIENT INSTRUCTIONS
Thank you for choosing Alta MD John at [place] [address]  To Do: [address]  1. Please take meds as directed. Apollo Cortez Esposito is located in [address]. Monday, Tuesday & Friday – 8 a.m. to 4 p.m. Wednesday, Thursday – 7 a.m. to 3 p. outweigh those potential risks and we strive to make you healthier and to improve your quality of life.     Referrals, and Radiology Information:    If your insurance requires a referral to a specialist, please allow 5 business days to process your referral allergies  · Long-term nasal swelling and congestion not caused by allergies  · Blockage in the nose  Symptoms of ABRS  The symptoms of ABRS may be different for each person and include:  · Nasal congestion or blockage  · Pain or pressure in the face  · Th following:  · Symptoms that don’t get better, or get worse  · Symptoms that don’t get better after 3 to 5 days on antibiotics  · Trouble seeing  · Swelling around your eyes  · Confusion or trouble staying awake   Date Last Reviewed: 5/1/2017  © 8971-7470 T

## 2019-11-13 NOTE — PROGRESS NOTES
HPI:    Patient ID: Lamberto Hernandez is a 48year old female. HPI  Ms. Reyes is a pleasant 27-year-old female with known history of asthma, hypothyroidism, DARSHANA on CPAP, obesity, migraines, PCP pneumonia who I saw last week for acute sinusitis and was sta MG Oral Tab EC Take 1 tablet (50 mg total) by mouth 3 (three) times daily as needed. 90 tablet 3   • SUMAtriptan Succinate (IMITREX) 25 MG Oral Tab Take 1 tablet (25 mg total) by mouth every 2 (two) hours as needed for Migraine.  Use at onset; repeat once a Constitutional: No distress.    HENT:   Right Ear: Tympanic membrane, external ear and ear canal normal.   Left Ear: Tympanic membrane, external ear and ear canal normal.   Mouth/Throat: Oropharynx is clear and moist.   Eyes: Conjunctivae are normal. No s

## 2019-11-18 RX ORDER — PHENTERMINE HYDROCHLORIDE 37.5 MG/1
TABLET ORAL
Qty: 30 TABLET | Refills: 2 | Status: SHIPPED | OUTPATIENT
Start: 2019-11-18 | End: 2020-01-16

## 2019-11-21 ENCOUNTER — TELEPHONE (OUTPATIENT)
Dept: HEMATOLOGY/ONCOLOGY | Facility: HOSPITAL | Age: 54
End: 2019-11-21

## 2019-11-21 NOTE — TELEPHONE ENCOUNTER
Per Delilah in pharmacy, Nucala injection not yet delivered from [facility] pharmacy. Spoke with patient. She requested refill late on Monday. She will call charge RN to confirm RX received tomorrow morning or reschedule. Pharmacy aware.

## 2019-11-22 ENCOUNTER — OFFICE VISIT (OUTPATIENT)
Dept: HEMATOLOGY/ONCOLOGY | Age: 54
End: 2019-11-22
Attending: INTERNAL MEDICINE
Payer: COMMERCIAL

## 2019-11-22 VITALS
RESPIRATION RATE: 18 BRPM | TEMPERATURE: 99 F | OXYGEN SATURATION: 97 % | HEART RATE: 84 BPM | SYSTOLIC BLOOD PRESSURE: 123 MMHG | DIASTOLIC BLOOD PRESSURE: 80 MMHG

## 2019-11-22 DIAGNOSIS — J45.41 MODERATE PERSISTENT ASTHMA WITH ACUTE EXACERBATION: Primary | ICD-10-CM

## 2019-12-02 NOTE — TELEPHONE ENCOUNTER
Requesting DICLOFENAC SODIUM 50 MG   LOV: 11/13/19  RTC: 3 months  Last Relevant Labs: 4/15/19  Filled: 8/12/19 # 90 with 3 refills    Future Appointments   Date Time Provider Batsheva Hansen   12/11/2019 10:20 Michael Segura MD [facility] AT [facility]   1

## 2019-12-06 ENCOUNTER — PATIENT MESSAGE (OUTPATIENT)
Dept: FAMILY MEDICINE CLINIC | Facility: CLINIC | Age: 54
End: 2019-12-06

## 2019-12-06 DIAGNOSIS — R89.4 ABNORMAL CELIAC ANTIBODY PANEL: Primary | ICD-10-CM

## 2019-12-06 NOTE — TELEPHONE ENCOUNTER
From: Lexa Hernandez Book  To: Mekhi Coon MD  Sent: 12/6/2019 10:56 AM CST  Subject: Visit Follow-up Question    Talked w/ my Pulminologist PA @ [facility] yesterday @ my appt. A) Put me on antibiotic for sinus infection.    B) Talked to her about glu

## 2019-12-09 ENCOUNTER — LAB ENCOUNTER (OUTPATIENT)
Dept: LAB | Age: 54
End: 2019-12-09
Attending: INTERNAL MEDICINE
Payer: COMMERCIAL

## 2019-12-09 DIAGNOSIS — C73 THYROID CANCER (HCC): ICD-10-CM

## 2019-12-09 DIAGNOSIS — E89.0 POSTOPERATIVE HYPOTHYROIDISM: ICD-10-CM

## 2019-12-09 PROCEDURE — 36415 COLL VENOUS BLD VENIPUNCTURE: CPT

## 2019-12-09 PROCEDURE — 84443 ASSAY THYROID STIM HORMONE: CPT

## 2019-12-09 PROCEDURE — 84432 ASSAY OF THYROGLOBULIN: CPT

## 2019-12-09 PROCEDURE — 84439 ASSAY OF FREE THYROXINE: CPT

## 2019-12-09 PROCEDURE — 86800 THYROGLOBULIN ANTIBODY: CPT

## 2019-12-09 PROCEDURE — 84481 FREE ASSAY (FT-3): CPT

## 2019-12-20 ENCOUNTER — HOSPITAL ENCOUNTER (EMERGENCY)
Age: 54
Discharge: HOME | End: 2019-12-20
Attending: EMERGENCY MEDICINE
Payer: COMMERCIAL

## 2019-12-20 ENCOUNTER — APPOINTMENT (OUTPATIENT)
Dept: GENERAL RADIOLOGY | Age: 54
End: 2019-12-20
Attending: PHYSICIAN ASSISTANT
Payer: COMMERCIAL

## 2019-12-20 ENCOUNTER — OFFICE VISIT (OUTPATIENT)
Dept: HEMATOLOGY/ONCOLOGY | Age: 54
End: 2019-12-20
Attending: INTERNAL MEDICINE
Payer: COMMERCIAL

## 2019-12-20 VITALS
BODY MASS INDEX: 37.21 KG/M2 | RESPIRATION RATE: 18 BRPM | TEMPERATURE: 97 F | HEART RATE: 86 BPM | SYSTOLIC BLOOD PRESSURE: 166 MMHG | HEIGHT: 63 IN | DIASTOLIC BLOOD PRESSURE: 90 MMHG | OXYGEN SATURATION: 99 % | WEIGHT: 210 LBS

## 2019-12-20 DIAGNOSIS — J45.41 MODERATE PERSISTENT ASTHMA WITH ACUTE EXACERBATION: Primary | ICD-10-CM

## 2019-12-20 DIAGNOSIS — S92.535A CLOSED NONDISPLACED FRACTURE OF DISTAL PHALANX OF LESSER TOE OF LEFT FOOT, INITIAL ENCOUNTER: Primary | ICD-10-CM

## 2019-12-20 PROCEDURE — 99283 EMERGENCY DEPT VISIT LOW MDM: CPT

## 2019-12-20 PROCEDURE — 28510 TREATMENT OF TOE FRACTURE: CPT

## 2019-12-20 PROCEDURE — 73630 X-RAY EXAM OF FOOT: CPT | Performed by: PHYSICIAN ASSISTANT

## 2019-12-20 PROCEDURE — 96372 THER/PROPH/DIAG INJ SC/IM: CPT

## 2019-12-20 RX ORDER — TRAMADOL HYDROCHLORIDE 50 MG/1
TABLET ORAL EVERY 6 HOURS PRN
Qty: 10 TABLET | Refills: 0 | Status: SHIPPED | OUTPATIENT
Start: 2019-12-20 | End: 2019-12-27

## 2019-12-20 NOTE — ED PROVIDER NOTES
Patient Seen in: THE [facility] Emergency Department In [place]      History   Patient presents with:   Toe Injury    Stated Complaint: left 5th toe injury     14-year-old  female with a history of a 3 months ago presents to the ER today with complain • GENITO-URINARY DISEASE    • Headache disorder years    I have always gotten headaches   • Heartburn not sure    I think so.    • Heavy menses 12/1995    I did   • History of blood transfusion 9/16/2016    HGB 7.1 with 2 units to be given   • History of de • BRONCHOSCOPY N/A 2019    Performed by Jennifer Paz MD at [facility] ENDOSCOPY   • BRONCHOSCOPY N/A 2016    Performed by Jennifer Paz MD at [facility] ENDOSCOPY   •   [id number],[id number],    x3   • COLONOSCOPY  2014    1 cm cecal polyp s/p tattoo SpO2 99 %   O2 Device None (Room air)       Current:BP (!) 166/90   Pulse 86   Temp 97 °F (36.1 °C) (Temporal)   Resp 18   Ht 160 cm (5' 3\")   Wt 95.3 kg   SpO2 99%   BMI 37.20 kg/m²         Physical Exam  Vitals signs and nursing note reviewed.    Constit CONCLUSION:  1. Nondisplaced fracture of the proximal phalanx of the 5th digit. 2. Osteoarthritic changes.     Dictated by: Silvia Yan MD on 12/20/2019 at 13:45     Approved by: Silvia Yan MD on 12/20/2019 at 13:47                [facility]     This pa

## 2019-12-30 DIAGNOSIS — I10 ESSENTIAL HYPERTENSION: ICD-10-CM

## 2019-12-30 RX ORDER — FUROSEMIDE 20 MG/1
TABLET ORAL
Qty: 90 TABLET | Refills: 1 | Status: SHIPPED | OUTPATIENT
Start: 2019-12-30 | End: 2020-07-09

## 2019-12-30 NOTE — TELEPHONE ENCOUNTER
Requested Prescriptions     Pending Prescriptions Disp Refills   • FUROSEMIDE 20 MG Oral Tab [Pharmacy Med Name: FUROSEMIDE 20 MG TABLET] 90 tablet 1     Sig: TAKE 1 TABLET BY MOUTH EVERY DAY     LOV: 11/13/2019  RTC:  PRN   Last Relevant Labs: 4/15/2019

## 2020-01-06 ENCOUNTER — OFFICE VISIT (OUTPATIENT)
Dept: FAMILY MEDICINE CLINIC | Facility: CLINIC | Age: 55
End: 2020-01-06
Payer: COMMERCIAL

## 2020-01-06 VITALS
WEIGHT: 209 LBS | OXYGEN SATURATION: 93 % | DIASTOLIC BLOOD PRESSURE: 84 MMHG | SYSTOLIC BLOOD PRESSURE: 158 MMHG | TEMPERATURE: 98 F | BODY MASS INDEX: 37.03 KG/M2 | HEIGHT: 63 IN | HEART RATE: 97 BPM | RESPIRATION RATE: 22 BRPM

## 2020-01-06 DIAGNOSIS — H66.002 ACUTE SUPPURATIVE OTITIS MEDIA OF LEFT EAR WITHOUT SPONTANEOUS RUPTURE OF TYMPANIC MEMBRANE, RECURRENCE NOT SPECIFIED: ICD-10-CM

## 2020-01-06 DIAGNOSIS — J01.00 ACUTE MAXILLARY SINUSITIS, RECURRENCE NOT SPECIFIED: ICD-10-CM

## 2020-01-06 DIAGNOSIS — R06.02 SOB (SHORTNESS OF BREATH): ICD-10-CM

## 2020-01-06 DIAGNOSIS — I10 ESSENTIAL HYPERTENSION: ICD-10-CM

## 2020-01-06 DIAGNOSIS — R05.9 COUGH: Primary | ICD-10-CM

## 2020-01-06 PROCEDURE — 99214 OFFICE O/P EST MOD 30 MIN: CPT | Performed by: PHYSICIAN ASSISTANT

## 2020-01-06 RX ORDER — CLINDAMYCIN HYDROCHLORIDE 300 MG/1
300 CAPSULE ORAL 3 TIMES DAILY
Qty: 30 CAPSULE | Refills: 0 | Status: SHIPPED | OUTPATIENT
Start: 2020-01-06 | End: 2020-03-17 | Stop reason: ALTCHOICE

## 2020-01-06 RX ORDER — PREDNISONE 10 MG/1
TABLET ORAL
Qty: 50 TABLET | Refills: 0 | Status: SHIPPED | OUTPATIENT
Start: 2020-01-06 | End: 2020-03-17 | Stop reason: ALTCHOICE

## 2020-01-06 NOTE — PROGRESS NOTES
[facility] Internal Medicine Progress Note    CC:  Patient presents with:  Shortness Of Breath  Cough      HPI:   HPI  [place] pulmon[place]. [place], she has been having problems with her nebulizer, she is waiting for ins TWICE A DAY, Disp: 180 tablet, Rfl: 1  Loratadine (LORATADINE ALLERGY RELIEF) 10 MG Oral Tablet Dispersible, Take 10 mg by mouth daily. , Disp: , Rfl:   mepolizumab (NUCALA) 100 MG Subcutaneous Recon Soln, Inject 100 mg into the skin every 30 (thirty) days. Cardiovascular: Normal rate, regular rhythm and normal heart sounds. Exam reveals no gallop and no friction rub. No murmur heard. Pulmonary/Chest: Effort normal and breath sounds normal. No respiratory distress. She has no wheezes. She has no rales. polyarthritis, multiple sites     Migraine     Insomnia     Unspecified adjustment reaction     GERD (gastroesophageal reflux disease)     Hypothyroidism     Essential hypertension     Obesity (BMI 30-39. 9)     Malignant neoplasm of thyroid gland ([address]

## 2020-01-06 NOTE — PATIENT INSTRUCTIONS
Thank you for choosing Miki Bronson PA-C at [place] [address]  To Do: [address]  1. Begin medications as prescribed  2. Follow-up in 2 weeks for blood pressure recheck, sooner if problems  3.   Contact pulmonologist    • Please signup for M you that the insurance company approved your testing, please call Central Scheduling at [phone]  Please allow our office 5 business days to contact you regarding any testing results.     Refill policies:   Allow 3 business days for refills; controlled

## 2020-01-16 ENCOUNTER — OFFICE VISIT (OUTPATIENT)
Dept: FAMILY MEDICINE CLINIC | Facility: CLINIC | Age: 55
End: 2020-01-16
Payer: COMMERCIAL

## 2020-01-16 VITALS
DIASTOLIC BLOOD PRESSURE: 82 MMHG | TEMPERATURE: 98 F | BODY MASS INDEX: 37.21 KG/M2 | OXYGEN SATURATION: 97 % | HEIGHT: 63 IN | WEIGHT: 210 LBS | SYSTOLIC BLOOD PRESSURE: 136 MMHG | RESPIRATION RATE: 16 BRPM | HEART RATE: 96 BPM

## 2020-01-16 DIAGNOSIS — E66.9 OBESITY (BMI 30-39.9): Primary | ICD-10-CM

## 2020-01-16 DIAGNOSIS — I10 ESSENTIAL HYPERTENSION: ICD-10-CM

## 2020-01-16 DIAGNOSIS — G47.00 INSOMNIA, UNSPECIFIED TYPE: ICD-10-CM

## 2020-01-16 PROCEDURE — 99214 OFFICE O/P EST MOD 30 MIN: CPT | Performed by: FAMILY MEDICINE

## 2020-01-16 RX ORDER — PHENTERMINE HYDROCHLORIDE 37.5 MG/1
TABLET ORAL
Qty: 30 TABLET | Refills: 2 | Status: SHIPPED | OUTPATIENT
Start: 2020-01-16 | End: 2020-04-16

## 2020-01-16 RX ORDER — ALPRAZOLAM 0.25 MG/1
0.25 TABLET ORAL NIGHTLY PRN
Qty: 30 TABLET | Refills: 1 | Status: SHIPPED | OUTPATIENT
Start: 2020-01-16

## 2020-01-16 NOTE — PATIENT INSTRUCTIONS
Thank you for choosing Mamadou Barnett MD at [place] [address]  To Do: [address]  1. Please take meds as directed. Apollo Barros is located in [address]. Monday, Tuesday & Friday – 8 a.m. to 4 p.m. Wednesday, Thursday – 7 a.m. to 3 p. outweigh those potential risks and we strive to make you healthier and to improve your quality of life.     Referrals, and Radiology Information:    If your insurance requires a referral to a specialist, please allow 5 business days to process your referral

## 2020-01-16 NOTE — PROGRESS NOTES
HPI:    Patient ID: Thong Escobedo is a 47year old female. HPI  Ms. Reyes is a pleasant 45-year-old female with known history of asthma, hypothyroidism, DARSHANA on CPAP, obesity, migraines, PCP pneumonia here today to follow-up for weight management.   She Oral Cap Take 1 capsule (300 mg total) by mouth 3 (three) times daily.  30 capsule 0   • FUROSEMIDE 20 MG Oral Tab TAKE 1 TABLET BY MOUTH EVERY DAY 90 tablet 1   • LEVOTHYROXINE SODIUM 150 MCG Oral Tab LEVOXYL BRAND 150 mcg daily - BRAND 90 tablet 3   • ALB normal heart sounds. No murmur heard. Pulmonary/Chest: Effort normal and breath sounds normal. No respiratory distress. She has no wheezes. She has no rales. Abdominal: Soft. Bowel sounds are normal. She exhibits no distension. There is no tenderness.

## 2020-01-17 ENCOUNTER — OFFICE VISIT (OUTPATIENT)
Dept: HEMATOLOGY/ONCOLOGY | Age: 55
End: 2020-01-17
Attending: INTERNAL MEDICINE
Payer: COMMERCIAL

## 2020-01-17 VITALS
RESPIRATION RATE: 18 BRPM | OXYGEN SATURATION: 97 % | DIASTOLIC BLOOD PRESSURE: 83 MMHG | TEMPERATURE: 99 F | SYSTOLIC BLOOD PRESSURE: 133 MMHG | HEART RATE: 82 BPM

## 2020-01-17 DIAGNOSIS — J45.41 MODERATE PERSISTENT ASTHMA WITH ACUTE EXACERBATION: Primary | ICD-10-CM

## 2020-01-17 PROCEDURE — 96372 THER/PROPH/DIAG INJ SC/IM: CPT

## 2020-01-28 RX ORDER — MONTELUKAST SODIUM 10 MG/1
TABLET ORAL
Qty: 90 TABLET | Refills: 1 | Status: SHIPPED | OUTPATIENT
Start: 2020-01-28 | End: 2020-08-03

## 2020-01-28 RX ORDER — POTASSIUM CHLORIDE 20 MEQ/1
TABLET, EXTENDED RELEASE ORAL
Qty: 90 TABLET | Refills: 1 | Status: SHIPPED | OUTPATIENT
Start: 2020-01-28 | End: 2020-08-03

## 2020-01-28 NOTE — TELEPHONE ENCOUNTER
Asthma & COPD Medication Protocol Failed1/27 10:38 PM   Asthma Action Score greater than or equal to 20    AAP/ACT given in last 12 months    Appointment in past 6 or next 3 months      Requesting MONTELUKAST SODIUM 10 MG, KLOR-CON M20 20 MEQ   LOV: 1/16/2

## 2020-02-03 ENCOUNTER — TELEPHONE (OUTPATIENT)
Dept: HEMATOLOGY/ONCOLOGY | Facility: HOSPITAL | Age: 55
End: 2020-02-03

## 2020-02-03 NOTE — TELEPHONE ENCOUNTER
Called Dr. Lakshmi Friend office and left message for RN to call back regarding co-signature from MD on [facility] MD order for [place].

## 2020-02-05 ENCOUNTER — TELEPHONE (OUTPATIENT)
Dept: HEMATOLOGY/ONCOLOGY | Facility: HOSPITAL | Age: 55
End: 2020-02-05

## 2020-02-05 NOTE — TELEPHONE ENCOUNTER
Received call from RN at [facility] pulmonary office. Patient has order from [facility] MD that needs in network cosign for nucala injection. Previously [facility] pulmonary MD, Dr. Hortense Goldberg, cosigned. However, MD does not want to re-cosign.  This RN informed

## 2020-02-14 ENCOUNTER — APPOINTMENT (OUTPATIENT)
Dept: HEMATOLOGY/ONCOLOGY | Age: 55
End: 2020-02-14
Attending: INTERNAL MEDICINE
Payer: COMMERCIAL

## 2020-02-17 RX ORDER — TOPIRAMATE 50 MG/1
TABLET, FILM COATED ORAL
Qty: 180 TABLET | Refills: 1 | Status: SHIPPED | OUTPATIENT
Start: 2020-02-17 | End: 2020-07-06

## 2020-02-17 NOTE — TELEPHONE ENCOUNTER
Requesting TOPIRAMATE 50 MG   LOV: 1/16/20  RTC: 3 months  Last Relevant Labs: 12/9/19  Filled: 9/9/19  # 180with 1 refills    Future Appointments   Date Time Provider Batsheva Hansen   3/17/2020 10:45 AM Philip Dunlap MD [facility] [address]   4/16/20

## 2020-04-09 ENCOUNTER — PATIENT MESSAGE (OUTPATIENT)
Dept: FAMILY MEDICINE CLINIC | Facility: CLINIC | Age: 55
End: 2020-04-09

## 2020-04-09 ENCOUNTER — APPOINTMENT (OUTPATIENT)
Dept: GENERAL RADIOLOGY | Facility: HOSPITAL | Age: 55
End: 2020-04-09
Attending: NURSE PRACTITIONER
Payer: COMMERCIAL

## 2020-04-09 ENCOUNTER — HOSPITAL ENCOUNTER (EMERGENCY)
Facility: HOSPITAL | Age: 55
Discharge: HOME | End: 2020-04-09
Attending: EMERGENCY MEDICINE
Payer: COMMERCIAL

## 2020-04-09 ENCOUNTER — VIRTUAL PHONE E/M (OUTPATIENT)
Dept: FAMILY MEDICINE CLINIC | Facility: CLINIC | Age: 55
End: 2020-04-09
Payer: COMMERCIAL

## 2020-04-09 VITALS
HEART RATE: 89 BPM | WEIGHT: 210 LBS | BODY MASS INDEX: 37.21 KG/M2 | DIASTOLIC BLOOD PRESSURE: 88 MMHG | HEIGHT: 63 IN | TEMPERATURE: 98 F | SYSTOLIC BLOOD PRESSURE: 142 MMHG | OXYGEN SATURATION: 97 % | RESPIRATION RATE: 22 BRPM

## 2020-04-09 DIAGNOSIS — E87.6 LOW SERUM POTASSIUM: Primary | ICD-10-CM

## 2020-04-09 DIAGNOSIS — R05.9 COUGH: ICD-10-CM

## 2020-04-09 DIAGNOSIS — R06.02 SOB (SHORTNESS OF BREATH): Primary | ICD-10-CM

## 2020-04-09 DIAGNOSIS — B34.9 VIRAL SYNDROME: Primary | ICD-10-CM

## 2020-04-09 DIAGNOSIS — R50.9 FEVER, UNSPECIFIED FEVER CAUSE: ICD-10-CM

## 2020-04-09 PROCEDURE — 84145 PROCALCITONIN (PCT): CPT | Performed by: NURSE PRACTITIONER

## 2020-04-09 PROCEDURE — 86140 C-REACTIVE PROTEIN: CPT | Performed by: NURSE PRACTITIONER

## 2020-04-09 PROCEDURE — 99283 EMERGENCY DEPT VISIT LOW MDM: CPT

## 2020-04-09 PROCEDURE — 87999 UNLISTED MICROBIOLOGY PX: CPT

## 2020-04-09 PROCEDURE — 85025 COMPLETE CBC W/AUTO DIFF WBC: CPT | Performed by: NURSE PRACTITIONER

## 2020-04-09 PROCEDURE — 83615 LACTATE (LD) (LDH) ENZYME: CPT | Performed by: NURSE PRACTITIONER

## 2020-04-09 PROCEDURE — 82550 ASSAY OF CK (CPK): CPT | Performed by: NURSE PRACTITIONER

## 2020-04-09 PROCEDURE — 87040 BLOOD CULTURE FOR BACTERIA: CPT | Performed by: NURSE PRACTITIONER

## 2020-04-09 PROCEDURE — 82728 ASSAY OF FERRITIN: CPT | Performed by: NURSE PRACTITIONER

## 2020-04-09 PROCEDURE — 99214 OFFICE O/P EST MOD 30 MIN: CPT | Performed by: PHYSICIAN ASSISTANT

## 2020-04-09 PROCEDURE — 36415 COLL VENOUS BLD VENIPUNCTURE: CPT

## 2020-04-09 PROCEDURE — 80053 COMPREHEN METABOLIC PANEL: CPT | Performed by: NURSE PRACTITIONER

## 2020-04-09 PROCEDURE — 85379 FIBRIN DEGRADATION QUANT: CPT | Performed by: NURSE PRACTITIONER

## 2020-04-09 PROCEDURE — 71045 X-RAY EXAM CHEST 1 VIEW: CPT | Performed by: NURSE PRACTITIONER

## 2020-04-09 NOTE — PATIENT INSTRUCTIONS
Thank you for choosing Yuki Gaytan PA-C at [place] [address]  To Do: [address]  1.  Pt to go to ER for further eval and imaging    • Please signup for MY CHART, which is electronic access to your record if you have not done so.  All your res [phone]  Please allow our office 5 business days to contact you regarding any testing results. Refill policies:   Allow 3 business days for refills; controlled substances may take longer and must be picked up from the office in person.   Narcotic me

## 2020-04-09 NOTE — ED PROVIDER NOTES
Patient Seen in: [facility] Emergency Department      History   Patient presents with:  Dyspnea ROYA SOB    Stated Complaint: Cough started 3 weeks ago. Tmax 100.6.  Denies travel, or exposure    24-year-old female who presents to the emergency room w not as often. • Disorder of liver     fatty liver   • Disorder of thyroid    • Dizziness 9/13/2018    off and on.    • Dyspnea 8/16/2016   • Endometriosis 12/1995    Hysterectomy 12/1995   • Enlarged lymph node 9/19/2018    lymph nodes in neck area   • Es this did happen.    • Thyroid cancer ([facility] [address] 2008    s/p PERRY, stage 1   • Uncomfortable fullness after meals 9/4/2018    Not sure if I get full quickly/ if due to swallowing issue   • Unspecified sleep apnea PSG 7-3-14    PSG 7-3-14 AHI 15 RDI 15 REM AHI 20 S Alcohol/week: 0.8 - 1.7 standard drinks      Types: 1 - 2 Standard drinks or equivalent per week      Comment: occasional    Drug use: No             Review of Systems   Constitutional: Positive for chills and fever. HENT: Positive for congestion.     Res Neurological:      General: No focal deficit present. Mental Status: She is alert and oriented to person, place, and time.    Psychiatric:         Mood and Affect: Mood normal.         Behavior: Behavior normal.         ED Course   IV was established PATIENT STATED HISTORY: (As transcribed by Technologist)  Patient offered no additional history at this time. FINDINGS:  Lung volumes are satisfactory. No new consolidation or pleural effusion.   Heart and pulmonary vessels appear stable, normal caliber

## 2020-04-09 NOTE — ED INITIAL ASSESSMENT (HPI)
Pt with c/o digna and fever ongoing for the past 3 weeks. Pt was on antibiotic and steroid 3 weeks ago. Pt states getting worse over the last few days. Pt did a phone visit this morning and was sent here. History of pneumonia.

## 2020-04-09 NOTE — ED PROVIDER NOTES
I reviewed that chart and discussed the case. I have examined the patient and noted 70-year-old female who presents with 3 weeks of illness. She saw her pulmonologist Dr. Gail Leblanc  who placed her on clarithromycin and steroids.   She states she is never f

## 2020-04-09 NOTE — PROGRESS NOTES
Virtual Telephone Check-In    Bhakti Fulton verbally consents to a Virtual/Telephone Check-In visit on 04/09/20. Patient understands and accepts financial responsibility for any deductible, co-insurance and/or co-pays associated with this service. Sleep. 30 tablet 1   • Phentermine HCl 37.5 MG Oral Tab TAKE 1 TABLET BY MOUTH EVERY MORNING BEFORE BREAKFAST 30 tablet 2   • BUDESONIDE 0.5 MG/2ML Inhalation Suspension TAKE 2 ML (0.5 MG TOTAL) BY NEBULIZATION 2 (TWO) TIMES DAILY.  120 mL 5   • FUROSEMIDE k-pax   • Chronic cough 5/4/2018    dx: Neuropathic Sensory Cough   • Constipation years    still have at times, but not as often. • Disorder of liver     fatty liver   • Disorder of thyroid    • Dizziness 9/13/2018    off and on.    • Dyspnea 8/16/2016 disturbance 9/11/2018    probably due to steroids   • Stool incontinence 8/8/2018    normally NO. About a month ago this did happen.    • [facility] ([facility] 2008    s/p PERRY, stage 1   • Uncomfortable fullness after meals 9/4/2018    Not sure if I get ful REVIEW OF SYSTEMS:   GENERAL HEALTH: + fever, + fatigue  RESPIRATORY: + SOB, + cough  CARDIOVASCULAR: + chest pain at times with deep breath  GI: denies abdominal pain, nausea or vomiting    EXAM:   There were no vitals taken for this visit.   GENERAL:

## 2020-04-10 NOTE — TELEPHONE ENCOUNTER
See attached e-mail.   SARS-COV-2 Final Negative  Blood Cultures In Process    Potassium 3.2  1/28/20 Klor-con 20meq one daily #90 (1)

## 2020-04-10 NOTE — TELEPHONE ENCOUNTER
----- Message from [place]. Book sent at 4/9/2020 11:54 PM CDT -----  Regarding: Visit Follow-up Question  Contact: [phone]  Lucas Pate to the ER as you suggested today. The dx on my After Visit Summary: Viral Syndrome.   They did blood work, c

## 2020-04-10 NOTE — TELEPHONE ENCOUNTER
We can increase potassium to 40MEQ daily so she can take 2 of what she has, repeat BMP on Monday. I would keep appointment with Dr. Sesar Tellez that she has. Is she currently taking any dosage on prednisone?

## 2020-04-13 ENCOUNTER — LAB ENCOUNTER (OUTPATIENT)
Dept: LAB | Age: 55
End: 2020-04-13
Attending: PHYSICIAN ASSISTANT
Payer: COMMERCIAL

## 2020-04-13 DIAGNOSIS — E87.6 LOW SERUM POTASSIUM: ICD-10-CM

## 2020-04-13 PROCEDURE — 80048 BASIC METABOLIC PNL TOTAL CA: CPT

## 2020-04-13 PROCEDURE — 36415 COLL VENOUS BLD VENIPUNCTURE: CPT

## 2020-04-13 NOTE — TELEPHONE ENCOUNTER
Name from pharmacy: DICLOFENAC SOD EC 50 MG TAB          Will file in chart as: DICLOFENAC SODIUM 50 MG Oral Tab EC         Sig: TAKE 1 TABLET (50 MG TOTAL) BY MOUTH 3 (THREE) TIMES DAILY AS NEEDED.     Disp:  90 tablet    Refills:  3    Start: 4/12/2020

## 2020-04-16 ENCOUNTER — VIRTUAL PHONE E/M (OUTPATIENT)
Dept: FAMILY MEDICINE CLINIC | Facility: CLINIC | Age: 55
End: 2020-04-16
Payer: COMMERCIAL

## 2020-04-16 DIAGNOSIS — R06.00 DYSPNEA, UNSPECIFIED TYPE: ICD-10-CM

## 2020-04-16 DIAGNOSIS — L65.9 HAIR LOSS: ICD-10-CM

## 2020-04-16 DIAGNOSIS — E66.9 OBESITY (BMI 30-39.9): ICD-10-CM

## 2020-04-16 PROCEDURE — 99214 OFFICE O/P EST MOD 30 MIN: CPT | Performed by: FAMILY MEDICINE

## 2020-04-16 RX ORDER — PHENTERMINE HYDROCHLORIDE 37.5 MG/1
TABLET ORAL
Qty: 30 TABLET | Refills: 2 | Status: SHIPPED | OUTPATIENT
Start: 2020-04-16 | End: 2020-07-06

## 2020-04-16 NOTE — PROGRESS NOTES
Virtual Telephone Check-In    Morteza Minus verbally consents to a Virtual/Telephone Check-In visit on 04/16/20. Patient understands and accepts financial responsibility for any deductible, co-insurance and/or co-pays associated with this service. TOPIRAMATE 50 MG Oral Tab TAKE 1 TABLET BY MOUTH TWICE A  tablet 1   • MONTELUKAST SODIUM 10 MG Oral Tab TAKE 1 TABLET BY MOUTH EVERY DAY 90 tablet 1   • KLOR-CON M20 20 MEQ Oral Tab CR TAKE 1 TABLET (20 MEQ TOTAL) BY MOUTH ONCE DAILY.  90 tablet 1 kidney 25 yrs    had one last yr/ first one in 7 yrs   • Cancer [facility])    • Change in hair 6/2018    improved after starting k-pax   • Chronic cough 5/4/2018    dx: Neuropathic Sensory Cough   • Constipation years    still have at times, but not as often. Shortness of breath    • Skin blushing/flushing not sure    yes, sometimes due to food or drink, sick & times no clue   • Sleep disturbance 9/11/2018    probably due to steroids   • Stool incontinence 8/8/2018    normally NO.   About a month ago this did ha phentermine; she tries to do light exercises at home but given her pulmonary issues, she is somewhat restricted    3.   Hair loss–unclear as to etiology; will monitor for now; probably stress related    Follow-up as scheduled or as needed or call sooner if

## 2020-04-16 NOTE — PATIENT INSTRUCTIONS
Thank you for choosing Lor Murphy MD at [place] [address]  To Do: [address]  1. Please take meds as directed. [facility] Ran[facility]ne is located in [address]. Monday, Tuesday & Friday – 8 a.m. to 4 p.m. Wednesday, Thursday – 7 a.m. to 3 p. outweigh those potential risks and we strive to make you healthier and to improve your quality of life.     Referrals, and Radiology Information:    If your insurance requires a referral to a specialist, please allow 5 business days to process your referral

## 2020-04-21 ENCOUNTER — PATIENT MESSAGE (OUTPATIENT)
Dept: FAMILY MEDICINE CLINIC | Facility: CLINIC | Age: 55
End: 2020-04-21

## 2020-04-21 NOTE — TELEPHONE ENCOUNTER
From: Sartell Salts Book  To: Ananya Harry MD  Sent: 4/21/2020 4:20 PM CDT  Subject: Visit Follow-up Question    Dr Kanchan Goins,    1. Not sure if we talked about my cough. I think that the Neuropathic Sensory Cough may be what is some what out of control.  Bárbara Rizvi

## 2020-04-21 NOTE — TELEPHONE ENCOUNTER
Ok to renew to amitriptyline 80 mg night. Might also help tingling with hands and feet    Ok to renew handicap placard for 1 year    You can as long as it is safe to go considering current health crisis and as long is not a hot spot.

## 2020-04-22 ENCOUNTER — TELEPHONE (OUTPATIENT)
Dept: FAMILY MEDICINE CLINIC | Facility: CLINIC | Age: 55
End: 2020-04-22

## 2020-04-22 RX ORDER — AMITRIPTYLINE HYDROCHLORIDE 10 MG/1
TABLET, FILM COATED ORAL
Qty: 240 TABLET | Refills: 0 | Status: SHIPPED | OUTPATIENT
Start: 2020-04-22 | End: 2020-05-19

## 2020-04-22 NOTE — TELEPHONE ENCOUNTER
See TE 4/22/20. Parking Placard form partially completed, placed on Dr. Caroline Reyes shelf on desk,  awaiting Dr. Caroline Reyes signature w/Marie TABARES's help.

## 2020-04-22 NOTE — TELEPHONE ENCOUNTER
Dr Rupert Marquez:  Regarding the 4/21/20 e-mail:  Alissa Vela MD    4:42 PM   Note      Ok to renew to amitriptyline 80 mg night.  Might also help tingling with hands and feet     Ok to renew handicap placard for 1 year     You can as long as it is safe to

## 2020-04-22 NOTE — TELEPHONE ENCOUNTER
----- Message from [place]. Book sent at 4/22/2020 12:36 PM CDT -----  Regarding: RE: Visit Follow-up Question  Contact: [phone]  [place] expires end of month. But due to covid 19 I think they would let it slide since [facility]mv has been closed.     As f dosage, not for since I think last summer. ----- Message -----  From: Julia Reyna  Sent: 4/22/20, 9:33 AM  To: Maverick Pozo Book  Subject: RE: Visit Follow-up Question    Ro Hare,  Dr. Nemo Ruff has reviewed this e-mail.    Regarding the Amitripty

## 2020-04-22 NOTE — TELEPHONE ENCOUNTER
----- Message from [place]. Book sent at 4/22/2020 10:46 AM CDT -----  Regarding: RE: Visit Follow-up Question  Contact: [phone]  Current dosage is 10 mg.     I believe Dr Chilo Chavez has the dx down as Dyspnea.      ----- Message -----  From: [place] again? 1 yr?  or longer? 5. My  is talking about a trip to [facility] end of August w/ group of friends. Thoughts of me going? Thank you!

## 2020-04-22 NOTE — TELEPHONE ENCOUNTER
Please refill for amitriptyline 80 mg at nighttime. As far as diagnosis goes for handicap placard, it would be chronic dyspnea and asthma.

## 2020-04-30 NOTE — TELEPHONE ENCOUNTER
Spoke to patient. Will mail the completed parking placard to her home. She will complete her portion and send to [facility].

## 2020-05-13 ENCOUNTER — PATIENT MESSAGE (OUTPATIENT)
Dept: FAMILY MEDICINE CLINIC | Facility: CLINIC | Age: 55
End: 2020-05-13

## 2020-05-13 NOTE — TELEPHONE ENCOUNTER
From: Naomi Langford Book  To: Krystal Bass MD  Sent: 5/13/2020 11:42 AM CDT  Subject: Non-Urgent Medical Question    I think I have pinkeye in my left eye. Plus, I have a really bad migraine today. Making me nauseous, sensitive to light.  Just took jammie

## 2020-05-19 RX ORDER — AMITRIPTYLINE HYDROCHLORIDE 10 MG/1
TABLET, FILM COATED ORAL
Qty: 240 TABLET | Refills: 5 | Status: SHIPPED | OUTPATIENT
Start: 2020-05-19 | End: 2020-12-08

## 2020-05-19 NOTE — TELEPHONE ENCOUNTER
Requesting : AMITRIPTYLINE HCL 10 MG   LOV: 4/16/20  RTC: 3 months  Last Relevant Labs: 4/13/20  Filled: 4/22/20  # 240  with 0 refills    Future Appointments   Date Time Provider Batsheva Hansen   12/9/2020  9:20 Leobardo Tripp MD [facility]

## 2020-06-17 ENCOUNTER — TELEPHONE (OUTPATIENT)
Dept: FAMILY MEDICINE CLINIC | Facility: CLINIC | Age: 55
End: 2020-06-17

## 2020-06-17 NOTE — TELEPHONE ENCOUNTER
Nancy Streeter- Rec'd incoming fax request for Medical records Claim # [id number]    Sent to scan stat.

## 2020-06-21 ENCOUNTER — APPOINTMENT (OUTPATIENT)
Dept: GENERAL RADIOLOGY | Facility: HOSPITAL | Age: 55
End: 2020-06-21
Attending: UROLOGY
Payer: COMMERCIAL

## 2020-06-21 ENCOUNTER — APPOINTMENT (OUTPATIENT)
Dept: CT IMAGING | Facility: HOSPITAL | Age: 55
End: 2020-06-21
Attending: EMERGENCY MEDICINE
Payer: COMMERCIAL

## 2020-06-21 ENCOUNTER — ANESTHESIA EVENT (OUTPATIENT)
Dept: SURGERY | Facility: HOSPITAL | Age: 55
End: 2020-06-21
Payer: COMMERCIAL

## 2020-06-21 ENCOUNTER — HOSPITAL ENCOUNTER (OUTPATIENT)
Facility: HOSPITAL | Age: 55
Setting detail: OBSERVATION
Discharge: HOME | End: 2020-06-22
Attending: EMERGENCY MEDICINE | Admitting: HOSPITALIST
Payer: COMMERCIAL

## 2020-06-21 ENCOUNTER — ANESTHESIA (OUTPATIENT)
Dept: SURGERY | Facility: HOSPITAL | Age: 55
End: 2020-06-21
Payer: COMMERCIAL

## 2020-06-21 DIAGNOSIS — N20.0 KIDNEY STONE ON RIGHT SIDE: ICD-10-CM

## 2020-06-21 DIAGNOSIS — N13.2 URETERAL STONE WITH HYDRONEPHROSIS: Primary | ICD-10-CM

## 2020-06-21 DIAGNOSIS — R10.9 RIGHT FLANK PAIN: ICD-10-CM

## 2020-06-21 PROBLEM — E87.6 HYPOKALEMIA: Status: ACTIVE | Noted: 2020-06-21

## 2020-06-21 PROCEDURE — BT1D1ZZ FLUOROSCOPY OF RIGHT KIDNEY, URETER AND BLADDER USING LOW OSMOLAR CONTRAST: ICD-10-PCS | Performed by: UROLOGY

## 2020-06-21 PROCEDURE — 99220 INITIAL OBSERVATION CARE,LEVL III: CPT | Performed by: INTERNAL MEDICINE

## 2020-06-21 PROCEDURE — 0T768DZ DILATION OF RIGHT URETER WITH INTRALUMINAL DEVICE, VIA NATURAL OR ARTIFICIAL OPENING ENDOSCOPIC: ICD-10-PCS | Performed by: UROLOGY

## 2020-06-21 PROCEDURE — 0TC38ZZ EXTIRPATION OF MATTER FROM RIGHT KIDNEY PELVIS, VIA NATURAL OR ARTIFICIAL OPENING ENDOSCOPIC: ICD-10-PCS | Performed by: UROLOGY

## 2020-06-21 PROCEDURE — 0TF68ZZ FRAGMENTATION IN RIGHT URETER, VIA NATURAL OR ARTIFICIAL OPENING ENDOSCOPIC: ICD-10-PCS | Performed by: UROLOGY

## 2020-06-21 PROCEDURE — 0TC68ZZ EXTIRPATION OF MATTER FROM RIGHT URETER, VIA NATURAL OR ARTIFICIAL OPENING ENDOSCOPIC: ICD-10-PCS | Performed by: UROLOGY

## 2020-06-21 PROCEDURE — 74176 CT ABD & PELVIS W/O CONTRAST: CPT | Performed by: EMERGENCY MEDICINE

## 2020-06-21 DEVICE — URETERAL STENT
Type: IMPLANTABLE DEVICE | Site: URETER | Status: FUNCTIONAL
Brand: ASCERTA™
Removed: 2020-06-21

## 2020-06-21 RX ORDER — ACETAMINOPHEN 325 MG/1
650 TABLET ORAL EVERY 4 HOURS PRN
Status: DISCONTINUED | OUTPATIENT
Start: 2020-06-21 | End: 2020-06-22

## 2020-06-21 RX ORDER — ONDANSETRON 2 MG/ML
4 INJECTION INTRAMUSCULAR; INTRAVENOUS ONCE
Status: COMPLETED | OUTPATIENT
Start: 2020-06-21 | End: 2020-06-21

## 2020-06-21 RX ORDER — AMITRIPTYLINE HYDROCHLORIDE 10 MG/1
8 TABLET, FILM COATED ORAL NIGHTLY PRN
Status: DISCONTINUED | OUTPATIENT
Start: 2020-06-21 | End: 2020-06-21

## 2020-06-21 RX ORDER — TAMSULOSIN HYDROCHLORIDE 0.4 MG/1
0.4 CAPSULE ORAL EVERY EVENING
Qty: 10 CAPSULE | Refills: 0 | Status: SHIPPED | OUTPATIENT
Start: 2020-06-21 | End: 2020-07-01

## 2020-06-21 RX ORDER — ONDANSETRON 2 MG/ML
4 INJECTION INTRAMUSCULAR; INTRAVENOUS EVERY 6 HOURS PRN
Status: DISCONTINUED | OUTPATIENT
Start: 2020-06-21 | End: 2020-06-21

## 2020-06-21 RX ORDER — LEVOTHYROXINE SODIUM 0.15 MG/1
150 TABLET ORAL
Status: DISCONTINUED | OUTPATIENT
Start: 2020-06-22 | End: 2020-06-22

## 2020-06-21 RX ORDER — HYDROMORPHONE HYDROCHLORIDE 1 MG/ML
0.4 INJECTION, SOLUTION INTRAMUSCULAR; INTRAVENOUS; SUBCUTANEOUS EVERY 2 HOUR PRN
Status: DISCONTINUED | OUTPATIENT
Start: 2020-06-21 | End: 2020-06-21

## 2020-06-21 RX ORDER — LIDOCAINE HYDROCHLORIDE 10 MG/ML
INJECTION, SOLUTION EPIDURAL; INFILTRATION; INTRACAUDAL; PERINEURAL AS NEEDED
Status: DISCONTINUED | OUTPATIENT
Start: 2020-06-21 | End: 2020-06-21 | Stop reason: SURG

## 2020-06-21 RX ORDER — POLYETHYLENE GLYCOL 3350 17 G/17G
17 POWDER, FOR SOLUTION ORAL DAILY PRN
Status: DISCONTINUED | OUTPATIENT
Start: 2020-06-21 | End: 2020-06-22

## 2020-06-21 RX ORDER — SODIUM CHLORIDE, SODIUM LACTATE, POTASSIUM CHLORIDE, CALCIUM CHLORIDE 600; 310; 30; 20 MG/100ML; MG/100ML; MG/100ML; MG/100ML
INJECTION, SOLUTION INTRAVENOUS CONTINUOUS
Status: DISCONTINUED | OUTPATIENT
Start: 2020-06-21 | End: 2020-06-21 | Stop reason: HOSPADM

## 2020-06-21 RX ORDER — SODIUM CHLORIDE, SODIUM LACTATE, POTASSIUM CHLORIDE, CALCIUM CHLORIDE 600; 310; 30; 20 MG/100ML; MG/100ML; MG/100ML; MG/100ML
INJECTION, SOLUTION INTRAVENOUS CONTINUOUS PRN
Status: DISCONTINUED | OUTPATIENT
Start: 2020-06-21 | End: 2020-06-21 | Stop reason: SURG

## 2020-06-21 RX ORDER — ONDANSETRON 2 MG/ML
4 INJECTION INTRAMUSCULAR; INTRAVENOUS EVERY 4 HOURS PRN
Status: DISCONTINUED | OUTPATIENT
Start: 2020-06-21 | End: 2020-06-21

## 2020-06-21 RX ORDER — SODIUM CHLORIDE 9 MG/ML
INJECTION, SOLUTION INTRAVENOUS CONTINUOUS
Status: DISCONTINUED | OUTPATIENT
Start: 2020-06-21 | End: 2020-06-22

## 2020-06-21 RX ORDER — ENOXAPARIN SODIUM 100 MG/ML
40 INJECTION SUBCUTANEOUS NIGHTLY
Status: DISCONTINUED | OUTPATIENT
Start: 2020-06-21 | End: 2020-06-22

## 2020-06-21 RX ORDER — DEXAMETHASONE SODIUM PHOSPHATE 4 MG/ML
VIAL (ML) INJECTION AS NEEDED
Status: DISCONTINUED | OUTPATIENT
Start: 2020-06-21 | End: 2020-06-21 | Stop reason: SURG

## 2020-06-21 RX ORDER — ONDANSETRON 2 MG/ML
INJECTION INTRAMUSCULAR; INTRAVENOUS AS NEEDED
Status: DISCONTINUED | OUTPATIENT
Start: 2020-06-21 | End: 2020-06-21 | Stop reason: SURG

## 2020-06-21 RX ORDER — OXYBUTYNIN CHLORIDE 5 MG/1
5 TABLET ORAL EVERY 6 HOURS PRN
Status: DISCONTINUED | OUTPATIENT
Start: 2020-06-21 | End: 2020-06-22

## 2020-06-21 RX ORDER — HYDROMORPHONE HYDROCHLORIDE 1 MG/ML
0.8 INJECTION, SOLUTION INTRAMUSCULAR; INTRAVENOUS; SUBCUTANEOUS EVERY 2 HOUR PRN
Status: DISCONTINUED | OUTPATIENT
Start: 2020-06-21 | End: 2020-06-21

## 2020-06-21 RX ORDER — DOCUSATE SODIUM 100 MG/1
100 CAPSULE, LIQUID FILLED ORAL 2 TIMES DAILY
Status: DISCONTINUED | OUTPATIENT
Start: 2020-06-21 | End: 2020-06-22

## 2020-06-21 RX ORDER — HYDROMORPHONE HYDROCHLORIDE 1 MG/ML
1 INJECTION, SOLUTION INTRAMUSCULAR; INTRAVENOUS; SUBCUTANEOUS EVERY 2 HOUR PRN
Status: DISCONTINUED | OUTPATIENT
Start: 2020-06-21 | End: 2020-06-22

## 2020-06-21 RX ORDER — AMITRIPTYLINE HYDROCHLORIDE 10 MG/1
8 TABLET, FILM COATED ORAL NIGHTLY PRN
Status: DISCONTINUED | OUTPATIENT
Start: 2020-06-21 | End: 2020-06-22

## 2020-06-21 RX ORDER — CEFAZOLIN SODIUM/WATER 2 G/20 ML
SYRINGE (ML) INTRAVENOUS AS NEEDED
Status: DISCONTINUED | OUTPATIENT
Start: 2020-06-21 | End: 2020-06-21 | Stop reason: SURG

## 2020-06-21 RX ORDER — PANTOPRAZOLE SODIUM 20 MG/1
20 TABLET, DELAYED RELEASE ORAL
Status: DISCONTINUED | OUTPATIENT
Start: 2020-06-22 | End: 2020-06-22

## 2020-06-21 RX ORDER — POTASSIUM CHLORIDE 20 MEQ/1
40 TABLET, EXTENDED RELEASE ORAL EVERY 4 HOURS
Status: COMPLETED | OUTPATIENT
Start: 2020-06-21 | End: 2020-06-21

## 2020-06-21 RX ORDER — DIAZEPAM 5 MG/1
5 TABLET ORAL EVERY 8 HOURS PRN
Status: DISCONTINUED | OUTPATIENT
Start: 2020-06-21 | End: 2020-06-22

## 2020-06-21 RX ORDER — METOCLOPRAMIDE HYDROCHLORIDE 5 MG/ML
10 INJECTION INTRAMUSCULAR; INTRAVENOUS EVERY 8 HOURS PRN
Status: DISCONTINUED | OUTPATIENT
Start: 2020-06-21 | End: 2020-06-22

## 2020-06-21 RX ORDER — PHENAZOPYRIDINE HYDROCHLORIDE 200 MG/1
200 TABLET, FILM COATED ORAL 3 TIMES DAILY PRN
Qty: 12 TABLET | Refills: 1 | Status: SHIPPED | OUTPATIENT
Start: 2020-06-21 | End: 2020-07-06 | Stop reason: ALTCHOICE

## 2020-06-21 RX ORDER — NALOXONE HYDROCHLORIDE 0.4 MG/ML
80 INJECTION, SOLUTION INTRAMUSCULAR; INTRAVENOUS; SUBCUTANEOUS AS NEEDED
Status: DISCONTINUED | OUTPATIENT
Start: 2020-06-21 | End: 2020-06-21 | Stop reason: HOSPADM

## 2020-06-21 RX ORDER — HYDROCODONE BITARTRATE AND ACETAMINOPHEN 5; 325 MG/1; MG/1
2 TABLET ORAL EVERY 4 HOURS PRN
Status: DISCONTINUED | OUTPATIENT
Start: 2020-06-21 | End: 2020-06-22

## 2020-06-21 RX ORDER — HYDROMORPHONE HYDROCHLORIDE 1 MG/ML
0.2 INJECTION, SOLUTION INTRAMUSCULAR; INTRAVENOUS; SUBCUTANEOUS EVERY 2 HOUR PRN
Status: DISCONTINUED | OUTPATIENT
Start: 2020-06-21 | End: 2020-06-21

## 2020-06-21 RX ORDER — KETOROLAC TROMETHAMINE 30 MG/ML
INJECTION, SOLUTION INTRAMUSCULAR; INTRAVENOUS AS NEEDED
Status: DISCONTINUED | OUTPATIENT
Start: 2020-06-21 | End: 2020-06-21 | Stop reason: SURG

## 2020-06-21 RX ORDER — HYDROMORPHONE HYDROCHLORIDE 1 MG/ML
0.4 INJECTION, SOLUTION INTRAMUSCULAR; INTRAVENOUS; SUBCUTANEOUS EVERY 5 MIN PRN
Status: DISCONTINUED | OUTPATIENT
Start: 2020-06-21 | End: 2020-06-21 | Stop reason: HOSPADM

## 2020-06-21 RX ORDER — ONDANSETRON 2 MG/ML
4 INJECTION INTRAMUSCULAR; INTRAVENOUS EVERY 6 HOURS PRN
Status: DISCONTINUED | OUTPATIENT
Start: 2020-06-21 | End: 2020-06-22

## 2020-06-21 RX ORDER — SODIUM CHLORIDE 9 MG/ML
INJECTION, SOLUTION INTRAVENOUS CONTINUOUS
Status: ACTIVE | OUTPATIENT
Start: 2020-06-21 | End: 2020-06-21

## 2020-06-21 RX ORDER — HYDROCODONE BITARTRATE AND ACETAMINOPHEN 5; 325 MG/1; MG/1
1 TABLET ORAL EVERY 4 HOURS PRN
Status: DISCONTINUED | OUTPATIENT
Start: 2020-06-21 | End: 2020-06-22

## 2020-06-21 RX ORDER — ONDANSETRON 4 MG/1
4 TABLET, FILM COATED ORAL EVERY 6 HOURS PRN
Status: DISCONTINUED | OUTPATIENT
Start: 2020-06-21 | End: 2020-06-22

## 2020-06-21 RX ORDER — LEVOFLOXACIN 5 MG/ML
500 INJECTION, SOLUTION INTRAVENOUS EVERY 24 HOURS
Status: DISCONTINUED | OUTPATIENT
Start: 2020-06-21 | End: 2020-06-22

## 2020-06-21 RX ORDER — PHENAZOPYRIDINE HYDROCHLORIDE 200 MG/1
200 TABLET, FILM COATED ORAL 3 TIMES DAILY PRN
Status: DISCONTINUED | OUTPATIENT
Start: 2020-06-21 | End: 2020-06-22

## 2020-06-21 RX ORDER — DIAZEPAM 5 MG/1
5 TABLET ORAL EVERY 8 HOURS PRN
Qty: 20 TABLET | Refills: 0 | Status: SHIPPED | OUTPATIENT
Start: 2020-06-21 | End: 2020-06-28

## 2020-06-21 RX ORDER — HYDROMORPHONE HYDROCHLORIDE 1 MG/ML
0.5 INJECTION, SOLUTION INTRAMUSCULAR; INTRAVENOUS; SUBCUTANEOUS EVERY 2 HOUR PRN
Status: DISCONTINUED | OUTPATIENT
Start: 2020-06-21 | End: 2020-06-22

## 2020-06-21 RX ORDER — BISACODYL 10 MG
10 SUPPOSITORY, RECTAL RECTAL
Status: DISCONTINUED | OUTPATIENT
Start: 2020-06-21 | End: 2020-06-22

## 2020-06-21 RX ORDER — TOPIRAMATE 25 MG/1
50 TABLET ORAL 2 TIMES DAILY
Status: DISCONTINUED | OUTPATIENT
Start: 2020-06-21 | End: 2020-06-22

## 2020-06-21 RX ORDER — SODIUM CHLORIDE, SODIUM LACTATE, POTASSIUM CHLORIDE, CALCIUM CHLORIDE 600; 310; 30; 20 MG/100ML; MG/100ML; MG/100ML; MG/100ML
INJECTION, SOLUTION INTRAVENOUS CONTINUOUS
Status: DISCONTINUED | OUTPATIENT
Start: 2020-06-21 | End: 2020-06-22

## 2020-06-21 RX ORDER — KETOROLAC TROMETHAMINE 30 MG/ML
15 INJECTION, SOLUTION INTRAMUSCULAR; INTRAVENOUS ONCE
Status: COMPLETED | OUTPATIENT
Start: 2020-06-21 | End: 2020-06-21

## 2020-06-21 RX ORDER — SODIUM PHOSPHATE, DIBASIC AND SODIUM PHOSPHATE, MONOBASIC 7; 19 G/133ML; G/133ML
1 ENEMA RECTAL ONCE AS NEEDED
Status: DISCONTINUED | OUTPATIENT
Start: 2020-06-21 | End: 2020-06-22

## 2020-06-21 RX ORDER — METOCLOPRAMIDE HYDROCHLORIDE 5 MG/ML
INJECTION INTRAMUSCULAR; INTRAVENOUS AS NEEDED
Status: DISCONTINUED | OUTPATIENT
Start: 2020-06-21 | End: 2020-06-21 | Stop reason: SURG

## 2020-06-21 RX ORDER — HYDROMORPHONE HYDROCHLORIDE 1 MG/ML
1 INJECTION, SOLUTION INTRAMUSCULAR; INTRAVENOUS; SUBCUTANEOUS ONCE
Status: COMPLETED | OUTPATIENT
Start: 2020-06-21 | End: 2020-06-21

## 2020-06-21 RX ORDER — DOCUSATE SODIUM 100 MG/1
100 CAPSULE, LIQUID FILLED ORAL 2 TIMES DAILY
Status: DISCONTINUED | OUTPATIENT
Start: 2020-06-21 | End: 2020-06-21

## 2020-06-21 RX ORDER — HYDROMORPHONE HYDROCHLORIDE 1 MG/ML
0.5 INJECTION, SOLUTION INTRAMUSCULAR; INTRAVENOUS; SUBCUTANEOUS EVERY 30 MIN PRN
Status: ACTIVE | OUTPATIENT
Start: 2020-06-21 | End: 2020-06-21

## 2020-06-21 RX ADMIN — LIDOCAINE HYDROCHLORIDE 100 MG: 10 INJECTION, SOLUTION EPIDURAL; INFILTRATION; INTRACAUDAL; PERINEURAL at 11:38:00

## 2020-06-21 RX ADMIN — SODIUM CHLORIDE, SODIUM LACTATE, POTASSIUM CHLORIDE, CALCIUM CHLORIDE: 600; 310; 30; 20 INJECTION, SOLUTION INTRAVENOUS at 11:30:00

## 2020-06-21 RX ADMIN — SODIUM CHLORIDE, SODIUM LACTATE, POTASSIUM CHLORIDE, CALCIUM CHLORIDE: 600; 310; 30; 20 INJECTION, SOLUTION INTRAVENOUS at 12:48:00

## 2020-06-21 RX ADMIN — KETOROLAC TROMETHAMINE 30 MG: 30 INJECTION, SOLUTION INTRAMUSCULAR; INTRAVENOUS at 12:48:00

## 2020-06-21 RX ADMIN — SODIUM CHLORIDE, SODIUM LACTATE, POTASSIUM CHLORIDE, CALCIUM CHLORIDE: 600; 310; 30; 20 INJECTION, SOLUTION INTRAVENOUS at 11:57:00

## 2020-06-21 RX ADMIN — SODIUM CHLORIDE, SODIUM LACTATE, POTASSIUM CHLORIDE, CALCIUM CHLORIDE: 600; 310; 30; 20 INJECTION, SOLUTION INTRAVENOUS at 11:43:00

## 2020-06-21 RX ADMIN — METOCLOPRAMIDE HYDROCHLORIDE 10 MG: 5 INJECTION INTRAMUSCULAR; INTRAVENOUS at 11:43:00

## 2020-06-21 RX ADMIN — DEXAMETHASONE SODIUM PHOSPHATE 4 MG: 4 MG/ML VIAL (ML) INJECTION at 11:38:00

## 2020-06-21 RX ADMIN — ONDANSETRON 4 MG: 2 INJECTION INTRAMUSCULAR; INTRAVENOUS at 11:43:00

## 2020-06-21 RX ADMIN — CEFAZOLIN SODIUM/WATER 2 G: 2 G/20 ML SYRINGE (ML) INTRAVENOUS at 11:42:00

## 2020-06-21 NOTE — H&P
[facility]  History and Physical     Berdine Fire Book Patient Status:  Emergency    1965 MRN [id number]   Location [address] Attending Joseph Shannon MD   [facility] Day # 0 PCP Rachel Pollard MD     Chief Complaint: has been going on for a while   • Fever 9/2/2018    off and on   • Food intolerance approx 15 yrs    dairy does not agree with me. My stomach bloats.    • GENITO-URINARY DISEASE    • Headache disorder years    I have always gotten headaches   • Heartburn n Past Surgical History:   Past Surgical History:   Procedure Laterality Date   • ANESTH, SECTION     • BACK SURGERY     • BRONCHOSCOPY N/A 2019    Performed by Abraham Pace MD at [facility] ENDOSCOPY   • BRONCHOSCOPY N/A 2016 after colonostomy.    • Uterine Cancer Maternal Grandmother    • Heart Disorder Paternal Grandfather    • Diabetes Paternal Grandfather         Type 1   • Diabetes Paternal Grandmother         Type 2   • Stroke Paternal Grandmother    • Other Paternal [address] MORNING BEFORE BREAKFAST, Disp: 30 tablet, Rfl: 2  Diclofenac Sodium 50 MG Oral Tab EC, Take 1 tablet (50 mg total) by mouth 3 (three) times daily as needed. , Disp: 90 tablet, Rfl: 3  Fluticasone-Umeclidin-Vilant (TRELEGY ELLIPTA) 100-62.5-25 MCG/INH Inhal nightly as needed for Sleep. (Patient not taking: Reported on 6/21/2020 ), Disp: 30 tablet, Rfl: 1  mepolizumab (NUCALA) 100 MG Subcutaneous Recon Soln, Inject 100 mg into the skin every 30 (thirty) days. , Disp: , Rfl:         Review of Systems:   ARSENIO wolfe enzymes with radiographic evidence of fatty liver (Is being monitored outpt)  1. Lipid panel/A1c  4. History of thyroid cancer s/p resection, now on synthroid  5. Asthma   6.  HTN    Quality:  · DVT Prophylaxis: Lovenox  · CODE status: Full  · Coy: no

## 2020-06-21 NOTE — PROGRESS NOTES
NURSING ADMISSION NOTE      Patient admitted via Cart  Oriented to room. Safety precautions initiated. Bed in low position. Call light in reach. PT ARRIVED TO ROOM FROM E.R. IN STABLE CONDITION. A+OX3. VITALS STABLE. NPO. DTV. IV HL.  DR Ulysses Kaminski ON U

## 2020-06-21 NOTE — ED INITIAL ASSESSMENT (HPI)
Patient presents to ED with complaints of right flank pain since AM. States hx of kidney stones and that pain feels similar.

## 2020-06-21 NOTE — ANESTHESIA PREPROCEDURE EVALUATION
PRE-OP EVALUATION    Patient Name: Claiborne Moritz    Pre-op Diagnosis: Kidney stone on right side [N20.0]    Procedure(s):  CYSTOSCOPY, URETEROSCOPY,  RETROGRADE, PYELOGRAM, STONE MANIPULATION WITH HOLIUM  LASER AND LITHOTRIPSY AND INSERTION  RIGHT Sheri Norton LEVOXYL BRAND 150 mcg daily - BRAND, Disp: 90 tablet, Rfl: 3  ALBUTEROL SULFATE (2.5 MG/3ML) 0.083% Inhalation Nebu Soln, USE 1 VIAL VIA NEBULIZER (2.5 MG TOTAL) EVERY 6 (SIX) HOURS AS NEEDED FOR WHEEZING., Disp: 150 mL, Rfl: 3  Loratadine (LORATADINE TONIO 1 cm cecal polyp s/p tattoo and removal was hyperplastic.  Smaller polyps in descending were adenomas/hp's. repeat 2017, MACe   • COLONOSCOPY N/A 9/19/2016    Performed by Chuyita Pierre DO at [facility] ENDOSCOPY   • ESOPHAGOGASTRODUODENOSCOPY (EGD) N/A 9/19 Cardiovascular    Cardiovascular exam normal.         Dental    No notable dental history. Pulmonary    Pulmonary exam normal.                 Other findings            ASA: 3   Plan: general           Comment: Plan for general anesthetic with LMA.

## 2020-06-21 NOTE — ANESTHESIA POSTPROCEDURE EVALUATION
[address] [facility] Patient Status:  Observation   Age/Gender 47year old female MRN [id number]   [facility] SURGERY Attending Araseli Juarez MD   Hosp Day # 0 PCP Lor Murphy MD       Anesthesia Post-op Note    Procedure(

## 2020-06-21 NOTE — PROGRESS NOTES
6/21/2020 Pt left unit for O.R. at this time. No order for consent noted. DEION PAGE. LYLE. Preop holding staff informed that no orders present and admission database has not yet been completed as pt just arrived to room from E.R. All questions answered.  Cont

## 2020-06-21 NOTE — PROGRESS NOTES
NURSING ADMISSION NOTE      Patient admitted via Cart  Oriented to room. Safety precautions initiated. Bed in low position. Call light in reach. PT RETURNED TO ROOM FROM PACU AT THIS TIME IN STABLE CONDITION. A+OX4.  VITALS STABLE. RA. CPOX AND SCD'S

## 2020-06-21 NOTE — ANESTHESIA PROCEDURE NOTES
Airway  Date/Time: 6/21/2020 11:38 AM  Urgency: elective    Airway not difficult    General Information and Staff    Patient location during procedure: OR  Anesthesiologist: Iman Fernandes MD  Performed: anesthesiologist     Indications and Patient [place]

## 2020-06-21 NOTE — CONSULTS
[facility]  Report of Consultation    Shannon Wilde Book Patient Status:  Observation    1965 MRN [id number]   [facility] SURGERY Attending Jeannette Hare MD   Hosp Day # 0 PCP Alta Lopez MD     Reason for Consultation:  Edi GENITO-URINARY DISEASE    • Headache disorder years    I have always gotten headaches   • Heartburn not sure    I think so.    • Heavy menses 12/1995    I did   • History of blood transfusion 9/16/2016    HGB 7.1 with 2 units to be given   • History of depr Performed by Lady Spencer MD at [facility] ENDOSCOPY   • BRONCHOSCOPY N/A 2016    Performed by Lady Spencer MD at [facility] ENDOSCOPY   •   [id number],,[id number],    x3   • COLONOSCOPY  2014    1 cm cecal polyp s/p tattoo and removal was hyperplastic.  Sm (CHF) Other         family hx   • Diabetes Other         family hx   • Other (nephrolithiasis) Other         family hx   • Other (oavrian cancer) Other         family hx   • Other (stroke syndrome) Other         family hx   • Other (thyroid cancer) Sister injection 10 mg, 10 mg, Intravenous, Q8H PRN  •  HYDROmorphone HCl (DILAUDID) 1 MG/ML injection 0.2 mg, 0.2 mg, Intravenous, Q2H PRN **OR** HYDROmorphone HCl (DILAUDID) 1 MG/ML injection 0.4 mg, 0.4 mg, Intravenous, Q2H PRN **OR** HYDROmorphone HCl (DILAUD ALT 78 06/21/2020     Lab Results   Component Value Date    COLORUR Red 06/21/2020    CLARITY Clear 06/21/2020    SPECGRAVITY <1.005 06/21/2020    GLUUR Negative 06/21/2020    BILUR Negative 06/21/2020    KETUR Negative 06/21/2020    BLOODURINE Large 06 Right flank pain     Nephrolithiasis           Recommendations:  Cysto, right RPG, RIGHT URETEROSCOPY WITH LASER LITHOTRIPSY, RIGHT STENT, FLUOROSCOPY      Thank you for allowing me to participate in the care of your patient.     Pablito Brizuela  6/21/2020  1

## 2020-06-21 NOTE — BRIEF OP NOTE
Pre-Operative Diagnosis: Kidney stone right side [N20.0]     Post-Operative Diagnosis: Kidney stone right side [N20.0]      Procedure Performed:   Procedure(s):  CYSTOSCOPY, B.  RETROGRADE PYELOGRAM, RIGHT URETEROSCOPY AND NEPHROSCOPY WITH HOLMIUM  LASER  L

## 2020-06-21 NOTE — ED PROVIDER NOTES
Patient Seen in: [facility] Emergency Department      History   Patient presents with:  Abdomen/Flank Pain    Stated Complaint: patient states she has a hx of kidney stones and has had sytoms for the last fe*    HPI    Severe right flank pain that s and on   • Food intolerance approx 15 yrs    dairy does not agree with me. My stomach bloats. • GENITO-URINARY DISEASE    • Headache disorder years    I have always gotten headaches   • Heartburn not sure    I think so.    • Heavy menses 12/1995    I did Laterality Date   • ANESTH, SECTION     • BACK SURGERY     • BRONCHOSCOPY N/A 2019    Performed by Magnus Olivares MD at [facility] ENDOSCOPY   • BRONCHOSCOPY N/A 2016    Performed by Magnus Olivares MD at [facility] ENDOSCOPY   •   19 and vital signs reviewed. All other systems reviewed and negative except as noted above.     Physical Exam     ED Triage Vitals   BP 06/21/20 0649 (!) 173/94   Pulse 06/21/20 0649 74   Resp 06/21/20 0649 20   Temp 06/21/20 0649 97.8 °F (36.6 °C)   Temp No cervical adenopathy. Skin:     General: Skin is warm and dry. Coloration: Skin is not pale. Findings: No erythema or rash. Neurological:      Mental Status: She is alert and oriented to person, place, and time.       Cranial Nerves: No cran normal limits   POTASSIUM - Normal   T4, FREE (S) - Normal   RAPID SARS-COV-2 BY PCR - Normal   CBC WITH DIFFERENTIAL WITH PLATELET    Narrative: The following orders were created for panel order CBC WITH DIFFERENTIAL WITH PLATELET.   Procedure renal calcifications are     seen. There is a calcification or sclerotic focus seen projecting over     the left sacrum measuring 14 x 4 mm. No obvious right ureteral calculi     are seen however these could be     obscured. CONCLUSION:      1. multislice CT scanning from above the kidneys to     below the urinary bladder. 2D rendering are generated on the CT scanner     workstation to localize potential stones in the cranio-caudal plane. Dose     reduction techniques were used.      Dose inform kidney, left ureter, or bladder. 2. Fatty infiltration of the liver.                 Dictated by: Valery Nix MD on 6/21/2020 at 7:54 AM         Finalized by: Valery Nix MD on 6/21/2020 at 8:02 AM                     MDM     She with a [facility]) cap  Take 1 capsule (0.4 mg total) by mouth every evening for 10 days. , Print Script, Disp-10 capsule, R-0                         Present on Admission  Date Reviewed: 4/9/2020          ICD-10-CM Noted POA    * (Principal) Ureteral stone with hydr

## 2020-06-22 ENCOUNTER — APPOINTMENT (OUTPATIENT)
Dept: GENERAL RADIOLOGY | Facility: HOSPITAL | Age: 55
End: 2020-06-22
Attending: PHYSICIAN ASSISTANT
Payer: COMMERCIAL

## 2020-06-22 VITALS
WEIGHT: 210 LBS | HEIGHT: 63 IN | HEART RATE: 68 BPM | RESPIRATION RATE: 18 BRPM | SYSTOLIC BLOOD PRESSURE: 125 MMHG | OXYGEN SATURATION: 97 % | TEMPERATURE: 98 F | DIASTOLIC BLOOD PRESSURE: 74 MMHG | BODY MASS INDEX: 37.21 KG/M2

## 2020-06-22 PROCEDURE — 74018 RADEX ABDOMEN 1 VIEW: CPT | Performed by: PHYSICIAN ASSISTANT

## 2020-06-22 PROCEDURE — 99217 OBSERVATION CARE DISCHARGE: CPT | Performed by: INTERNAL MEDICINE

## 2020-06-22 RX ORDER — POLYETHYLENE GLYCOL 3350 17 G/17G
17 POWDER, FOR SOLUTION ORAL DAILY PRN
Qty: 14 EACH | Refills: 0 | Status: SHIPPED | OUTPATIENT
Start: 2020-06-22 | End: 2020-08-03

## 2020-06-22 RX ORDER — HYDROCODONE BITARTRATE AND ACETAMINOPHEN 5; 325 MG/1; MG/1
1 TABLET ORAL EVERY 6 HOURS PRN
Qty: 14 TABLET | Refills: 0 | Status: SHIPPED | OUTPATIENT
Start: 2020-06-22 | End: 2020-08-03

## 2020-06-22 RX ORDER — LEVOFLOXACIN 500 MG/1
500 TABLET, FILM COATED ORAL DAILY
Qty: 3 TABLET | Refills: 0 | Status: SHIPPED | OUTPATIENT
Start: 2020-06-22 | End: 2020-06-25

## 2020-06-22 NOTE — OPERATIVE REPORT
[facility]    PATIENT'S NAME: MADISON LOBATO   ATTENDING PHYSICIAN: Donna Fowler DO   OPERATING PHYSICIAN: Sergey Garcia M.D.    PATIENT ACCOUNT#:   [de-identified]    LOCATION:  [address] [place] [address]  MEDICAL RECORD #:   [id number]       DATE OF BIRTH:  [birth year] then used to dilate the distal ureter. The dilator was then withdrawn and a flexible ureteroscope was advanced up to the level of the stones.   A 200 micron holmium laser was then used to fragment the stones nicely to effectively treat and then remove all

## 2020-06-22 NOTE — PLAN OF CARE
PT CURRENTLY ASLEEP, RESTING COMFORTABLE IN BED. PT DENIES SOB, ROYA, N/V, CP AT THIS TIME. VSS, AFEBRILE. PT REPORTS PAIN IN RIGHT FLANK MANAGED WITH MEDICATION; SEE MAR.  UPON START OF SHIFT PT EXPLAINED THAT WHEN SHE WAS WIPING DURING [address] HER STENT evaluate response  - Consider cultural and social influences on pain and pain management  - Manage/alleviate anxiety  - Utilize distraction and/or relaxation techniques  - Monitor for opioid side effects  - Notify MD/LIP if interventions unsuccessful or pa

## 2020-06-22 NOTE — PROGRESS NOTES
06/22/20 1650   Clinical Encounter Type   Visited With Patient   Routine Visit Introduction   Continue Visiting No  ( remains available at pager [phone])   Patient's Supportive Strategies/Resources pt requesting spiritual care   [facility] Encounter

## 2020-06-22 NOTE — PROGRESS NOTES
[facility]    Progress Note    Elco Rhyme Book Patient Status:  Observation    1965 MRN [id number]   [facility] 3NW-A Attending Elysia Todd DO   Hosp Day # 0 PCP Porfirio Townsend MD         Subjective:   Bhargav Shaw is a( ALT 78 (H) 06/21/2020    PTT 27.4 08/26/2017    INR 1.01 02/06/2019    PT 13.2 02/23/2011    T4F 1.2 06/22/2020    TSH 0.349 (L) 06/22/2020    DDIMER 0.40 04/09/2020    ESRML 7 12/28/2018    CRP 0.79 (H) 04/09/2020    BNP 22 03/07/2014    MG 2.8 (H) 09/21/

## 2020-06-22 NOTE — RESPIRATORY THERAPY NOTE
DARSHANA - Equipment Use Daily Summary:  · Set Mode: CFLEX  · Usage in hours: 4.32  · 90% Pressure (EPAP) level: 8.7  · 90% Insp Pressure (IPAP):   · AHI: 9.1  · Supplemental Oxygen:   · Comments:

## 2020-06-22 NOTE — PLAN OF CARE
Problem: Patient/Family Goals  Goal: Patient/Family Long Term Goal  Description  Patient's Long Term Goal: TO GO HOME    Interventions:  - HAVE PAIN CONTROLLED  -MONITOR LABS  -MONITOR I+O'S  - See additional Care Plan goals for specific interventions

## 2020-06-22 NOTE — DISCHARGE SUMMARY
[facility]  DISCHARGE SUMMARY     [address] Patient Status:  Observation    1965 MRN [id number]   [facility] 3NW-A Attending Danishtania Casanova,    Hosp Day # 0 PCP Geena Vergara MD     Date of Admission: 2020  Date and recommendations (brief descriptions):  • n/a    Lab/Test results pending at Discharge:   · n/a    Consultants:  • Urology    Discharge Medication List:     Discharge Medications      START taking these medications      Instructions Prescription details Trelegy Ellipta      Inhale 1 puff into the lungs daily.    Quantity:  1 each  Refills:  5     furosemide 20 MG Tabs  Commonly known as:  LASIX      TAKE 1 TABLET BY MOUTH EVERY DAY   Quantity:  90 tablet  Refills:  1     Klor-Con M20 20 MEQ Tbcr  Generic Medications      These medications were sent to [facility], [phone], [phone]  [address] [place] [address], [address] [place] [address],  Yoly Londono    Phone:  [phone]   · HYDROcodone-acetaminophen 5-325 MG T

## 2020-06-23 ENCOUNTER — PATIENT OUTREACH (OUTPATIENT)
Dept: CASE MANAGEMENT | Age: 55
End: 2020-06-23

## 2020-06-23 ENCOUNTER — TELEPHONE (OUTPATIENT)
Dept: FAMILY MEDICINE CLINIC | Facility: CLINIC | Age: 55
End: 2020-06-23

## 2020-06-23 DIAGNOSIS — Z02.9 ENCOUNTERS FOR UNSPECIFIED ADMINISTRATIVE PURPOSE: ICD-10-CM

## 2020-06-23 PROCEDURE — 1111F DSCHRG MED/CURRENT MED MERGE: CPT

## 2020-06-23 NOTE — TELEPHONE ENCOUNTER
Can we scheduled pt for TCM? Thanks! Please route back to me once scheduled and will route to Dr. Adi Ibrahim for pain recommendations.

## 2020-06-23 NOTE — TELEPHONE ENCOUNTER
Future Appointments   Date Time Provider Batsheva Jade   7/1/2020 11:30 AM Ruba Cherry MD [facility] [address]   12/9/2020  9:20 Jaclyn Samayoa MD [facility]

## 2020-06-23 NOTE — PROGRESS NOTES
Initial Post Discharge Follow Up   Discharge Date: 6/22/20  Contact Date: 6/23/2020    Consent Verification:  Assessment Completed With: Patient  HIPAA Verified?   Yes    Discharge Dx:   Ureteral stone with hydronephrosis    Was TCC ordered: no    Genera needed (1-2 Q8 H PRN URETERAL SPASM/PAIN). 20 tablet 0   • Phenazopyridine HCl (PYRIDIUM) 200 MG Oral Tab Take 1 tablet (200 mg total) by mouth 3 (three) times daily as needed for Pain.  12 tablet 1   • tamsulosin (FLOMAX) cap Take 1 capsule (0.4 mg total) (PROAIR HFA) 108 (90 Base) MCG/ACT Inhalation Aero Soln Inhale 1 puff into the lungs every 6 (six) hours as needed for Wheezing. • Linaclotide (LINZESS) 145 MCG Oral Cap Take 145 mcg by mouth daily.        • (NCM)  Were there any medication changes no Interventions by NCM:   All d/c instructions reviewed with pt. Reviewed when to call MD vs when to go to ER/call 911. Educated pt on the importance of taking all meds as prescribed as well as close f/u with PCP/specialists.   Pt verbalized understanding

## 2020-06-23 NOTE — TELEPHONE ENCOUNTER
Zeenat Chu, RNRegistered NurseSigned  11:50 AM                Contacted pt for [facility] [place] and pt states currently her pain is 12/10. Pt states pain is on her right flank and has been hurting since being discharged yesterday.  Pt states norco and valium

## 2020-06-23 NOTE — TELEPHONE ENCOUNTER
Contacted pt for TCM outreach and pt states currently her pain is 12/10. Pt states pain is on her right flank and has been hurting since being discharged yesterday.  Pt states norco and valium gave her a short period of relief and a heating pad also helped

## 2020-07-01 ENCOUNTER — OFFICE VISIT (OUTPATIENT)
Dept: FAMILY MEDICINE CLINIC | Facility: CLINIC | Age: 55
End: 2020-07-01
Payer: COMMERCIAL

## 2020-07-01 ENCOUNTER — PATIENT MESSAGE (OUTPATIENT)
Dept: FAMILY MEDICINE CLINIC | Facility: CLINIC | Age: 55
End: 2020-07-01

## 2020-07-01 VITALS
RESPIRATION RATE: 16 BRPM | HEIGHT: 63 IN | SYSTOLIC BLOOD PRESSURE: 150 MMHG | OXYGEN SATURATION: 97 % | BODY MASS INDEX: 38.27 KG/M2 | WEIGHT: 216 LBS | HEART RATE: 100 BPM | TEMPERATURE: 98 F | DIASTOLIC BLOOD PRESSURE: 100 MMHG

## 2020-07-01 DIAGNOSIS — E66.9 OBESITY (BMI 30-39.9): ICD-10-CM

## 2020-07-01 DIAGNOSIS — L98.9 SKIN LESION OF BACK: ICD-10-CM

## 2020-07-01 DIAGNOSIS — N20.0 NEPHROLITHIASIS: Primary | ICD-10-CM

## 2020-07-01 DIAGNOSIS — Z12.31 ENCOUNTER FOR SCREENING MAMMOGRAM FOR MALIGNANT NEOPLASM OF BREAST: Primary | ICD-10-CM

## 2020-07-01 DIAGNOSIS — M89.9 BONE DISORDER: ICD-10-CM

## 2020-07-01 DIAGNOSIS — R73.03 PREDIABETES: ICD-10-CM

## 2020-07-01 PROCEDURE — 1111F DSCHRG MED/CURRENT MED MERGE: CPT | Performed by: FAMILY MEDICINE

## 2020-07-01 PROCEDURE — 99495 TRANSJ CARE MGMT MOD F2F 14D: CPT | Performed by: FAMILY MEDICINE

## 2020-07-01 RX ORDER — PEN NEEDLE, DIABETIC 30 GX3/16"
1 NEEDLE, DISPOSABLE MISCELLANEOUS
Qty: 30 EACH | Refills: 0 | Status: SHIPPED | OUTPATIENT
Start: 2020-07-01 | End: 2021-04-12 | Stop reason: ALTCHOICE

## 2020-07-01 NOTE — PATIENT INSTRUCTIONS
Thank you for choosing Lor Murphy MD at [facility]  To Do: [address]  1. Please take meds as directed. [facility] is located in [address]. Monday, Tuesday & Friday – 8 a.m. to 4 p.m. Wednesday, Thursday – 7 a.m. to 3 p. outweigh those potential risks and we strive to make you healthier and to improve your quality of life.     Referrals, and Radiology Information:    If your insurance requires a referral to a specialist, please allow 5 business days to process your referral

## 2020-07-01 NOTE — TELEPHONE ENCOUNTER
From: Shannon Wilde Book  To: Alta Lopez MD  Sent: 7/1/2020 12:36 PM CDT  Subject: Visit Follow-up Question    Dr Gloria Saucedo,  We talked about a bone density scan. Are you ordering that?    Also, are the orders on for my mammogram? I received a letter from

## 2020-07-01 NOTE — PROGRESS NOTES
HPI:    Dejha Reyes is a 47year old female here today for hospital follow up.    She was discharged from Inpatient hospital, [facility] to Home   Admission Date: 6/21/20   Discharge Date: 6/22/20  Hospital Discharge Diagnoses (since 6/1/2020)   N She is allergic to amoxicillin; lyrica [pregabalin]; cat hair extract; and trees, box elder. Current Meds:  HYDROcodone-acetaminophen 5-325 MG Oral Tab, Take 1 tablet by mouth every 6 (six) hours as needed.   PEG 3350 Oral Powd Pack, Take 17 g by mouth d Albuterol Sulfate HFA (PROAIR HFA) 108 (90 Base) MCG/ACT Inhalation Aero Soln, Inhale 1 puff into the lungs every 6 (six) hours as needed for Wheezing. Linaclotide (LINZESS) 145 MCG Oral Cap, Take 145 mcg by mouth daily.     No current facility-administe She  has a past medical history of Abdominal distention (6/2018), Anemia (2016), Anxiety, Asthma, Atypical mole (1987), Back pain (varies), Back problem, Bloating (6/2018), Blood disorder (2003), Blood in the stool (9/20/2018), Blurred vision (not sure), B She  has a past surgical history that includes hysterectomy (); other surgical history; other surgical history; anesth, section (); other (99); other (); back surgery; colonoscopy (2014); upper gi endoscopy - referral (2014) She  reports that she has never smoked. She has never used smokeless tobacco. She reports previous alcohol use of about 0.8 - 1.7 standard drinks of alcohol per week. She reports that she does not use drugs.      ROS:   Review of Systems   Constitutional: N -Try to maintain low-carb and low-salt diet. She is somewhat restricted with doing activities as she is currently being seen by podiatry for ongoing management of left foot fracture.   Hopefully Ozempic would be helpful    Prediabetes  Other orders  -

## 2020-07-09 DIAGNOSIS — I10 ESSENTIAL HYPERTENSION: ICD-10-CM

## 2020-07-09 RX ORDER — FUROSEMIDE 20 MG/1
TABLET ORAL
Qty: 90 TABLET | Refills: 0 | Status: SHIPPED | OUTPATIENT
Start: 2020-07-09 | End: 2020-08-03

## 2020-07-09 NOTE — TELEPHONE ENCOUNTER
Requested Prescriptions     Pending Prescriptions Disp Refills   • FUROSEMIDE 20 MG Oral Tab [Pharmacy Med Name: FUROSEMIDE 20 MG TABLET] 90 tablet 1     Sig: TAKE 1 TABLET BY MOUTH EVERY DAY       LOV: 7/1/2020  RTC: 4 weeks  Last Relevant Labs: 6/21/2020

## 2020-07-13 ENCOUNTER — LAB ENCOUNTER (OUTPATIENT)
Dept: LAB | Age: 55
End: 2020-07-13
Attending: UROLOGY
Payer: COMMERCIAL

## 2020-07-13 ENCOUNTER — HOSPITAL ENCOUNTER (OUTPATIENT)
Dept: GENERAL RADIOLOGY | Age: 55
Discharge: HOME | End: 2020-07-13
Attending: UROLOGY
Payer: COMMERCIAL

## 2020-07-13 DIAGNOSIS — R39.9 UTI SYMPTOMS: ICD-10-CM

## 2020-07-13 DIAGNOSIS — N20.0 NEPHROLITHIASIS: ICD-10-CM

## 2020-07-13 LAB
BILIRUB UR QL STRIP.AUTO: NEGATIVE
GLUCOSE UR STRIP.AUTO-MCNC: NEGATIVE MG/DL
KETONES UR STRIP.AUTO-MCNC: NEGATIVE MG/DL
NITRITE UR QL STRIP.AUTO: NEGATIVE
PH UR STRIP.AUTO: 6 [PH] (ref 4.5–8)
PROT UR STRIP.AUTO-MCNC: NEGATIVE MG/DL
SP GR UR STRIP.AUTO: 1.01 (ref 1–1.03)
UROBILINOGEN UR STRIP.AUTO-MCNC: <2 MG/DL

## 2020-07-13 PROCEDURE — 74018 RADEX ABDOMEN 1 VIEW: CPT | Performed by: UROLOGY

## 2020-07-13 PROCEDURE — 87086 URINE CULTURE/COLONY COUNT: CPT

## 2020-07-13 PROCEDURE — 81001 URINALYSIS AUTO W/SCOPE: CPT

## 2020-07-21 ENCOUNTER — HOSPITAL ENCOUNTER (OUTPATIENT)
Dept: ULTRASOUND IMAGING | Age: 55
Discharge: HOME | End: 2020-07-21
Attending: OBSTETRICS & GYNECOLOGY
Payer: COMMERCIAL

## 2020-07-21 DIAGNOSIS — N20.0 KIDNEY STONES: ICD-10-CM

## 2020-07-21 DIAGNOSIS — R10.9 RIGHT FLANK PAIN: ICD-10-CM

## 2020-07-21 PROCEDURE — 76770 US EXAM ABDO BACK WALL COMP: CPT | Performed by: OBSTETRICS & GYNECOLOGY

## 2020-07-22 ENCOUNTER — HOSPITAL ENCOUNTER (OUTPATIENT)
Dept: MAMMOGRAPHY | Age: 55
Discharge: HOME | End: 2020-07-22
Attending: FAMILY MEDICINE
Payer: COMMERCIAL

## 2020-07-22 ENCOUNTER — HOSPITAL ENCOUNTER (OUTPATIENT)
Dept: BONE DENSITY | Age: 55
Discharge: HOME | End: 2020-07-22
Attending: FAMILY MEDICINE
Payer: COMMERCIAL

## 2020-07-22 ENCOUNTER — EKG ENCOUNTER (OUTPATIENT)
Dept: LAB | Age: 55
End: 2020-07-22
Attending: FAMILY MEDICINE
Payer: COMMERCIAL

## 2020-07-22 DIAGNOSIS — I10 ESSENTIAL HYPERTENSION, MALIGNANT: ICD-10-CM

## 2020-07-22 DIAGNOSIS — M89.9 BONE DISORDER: ICD-10-CM

## 2020-07-22 DIAGNOSIS — N20.0 KIDNEY STONE: Primary | ICD-10-CM

## 2020-07-22 DIAGNOSIS — Z12.31 ENCOUNTER FOR SCREENING MAMMOGRAM FOR MALIGNANT NEOPLASM OF BREAST: ICD-10-CM

## 2020-07-22 DIAGNOSIS — N20.0 KIDNEY STONE: ICD-10-CM

## 2020-07-22 DIAGNOSIS — I10 ESSENTIAL HYPERTENSION: ICD-10-CM

## 2020-07-22 LAB
ATRIAL RATE: 75 BPM
P AXIS: 54 DEGREES
P-R INTERVAL: 154 MS
Q-T INTERVAL: 402 MS
QRS DURATION: 104 MS
QTC CALCULATION (BEZET): 448 MS
R AXIS: 26 DEGREES
T AXIS: 49 DEGREES
VENTRICULAR RATE: 75 BPM

## 2020-07-22 PROCEDURE — 77080 DXA BONE DENSITY AXIAL: CPT | Performed by: FAMILY MEDICINE

## 2020-07-22 PROCEDURE — 77063 BREAST TOMOSYNTHESIS BI: CPT | Performed by: FAMILY MEDICINE

## 2020-07-22 PROCEDURE — 77067 SCR MAMMO BI INCL CAD: CPT | Performed by: FAMILY MEDICINE

## 2020-07-22 PROCEDURE — 93005 ELECTROCARDIOGRAM TRACING: CPT

## 2020-07-22 PROCEDURE — 93010 ELECTROCARDIOGRAM REPORT: CPT | Performed by: INTERNAL MEDICINE

## 2020-07-24 ENCOUNTER — PATIENT MESSAGE (OUTPATIENT)
Dept: FAMILY MEDICINE CLINIC | Facility: CLINIC | Age: 55
End: 2020-07-24

## 2020-07-24 RX ORDER — TOPIRAMATE 50 MG/1
TABLET, FILM COATED ORAL
Qty: 180 TABLET | Refills: 1 | Status: SHIPPED | OUTPATIENT
Start: 2020-07-24 | End: 2020-08-03

## 2020-07-24 RX ORDER — ALENDRONATE SODIUM 70 MG/1
70 TABLET ORAL WEEKLY
Qty: 12 TABLET | Refills: 3 | Status: SHIPPED | OUTPATIENT
Start: 2020-07-24 | End: 2021-06-15

## 2020-07-24 RX ORDER — MONTELUKAST SODIUM 5 MG/1
TABLET, CHEWABLE ORAL
Qty: 180 TABLET | Refills: 0 | Status: SHIPPED | OUTPATIENT
Start: 2020-07-24 | End: 2020-10-20

## 2020-07-24 NOTE — TELEPHONE ENCOUNTER
From: Shannon Wilde Book  To: Alta Lopez MD  Sent: 7/24/2020 12:07 PM CDT  Subject: Test Results Question    I have a question about DEXA Scan resulted on 7/22/20, 3:22 PM.    Any treatment for Osteopenia?

## 2020-07-24 NOTE — TELEPHONE ENCOUNTER
From: Chato Congress Book  To:  Jeannine Bingham MD  Sent: 7/24/2020 12:22 PM CDT  Subject: Test Results Question    Yes I would like to try the medication for Osteopenia

## 2020-07-24 NOTE — TELEPHONE ENCOUNTER
Requested Prescriptions     Pending Prescriptions Disp Refills   • MONTELUKAST SODIUM 5 MG Oral Chew Tab [Pharmacy Med Name: MONTELUKAST SOD 5 MG TAB CHEW] 180 tablet 0     Sig: CHEW AND SWALLOW 2 TABLETS BY MOUTH EVERY DAY   • TOPIRAMATE 50 MG Oral Tab [P

## 2020-07-27 ENCOUNTER — HOSPITAL ENCOUNTER (OUTPATIENT)
Dept: MAMMOGRAPHY | Facility: HOSPITAL | Age: 55
Discharge: HOME | End: 2020-07-27
Attending: FAMILY MEDICINE
Payer: COMMERCIAL

## 2020-07-27 ENCOUNTER — PATIENT MESSAGE (OUTPATIENT)
Dept: FAMILY MEDICINE CLINIC | Facility: CLINIC | Age: 55
End: 2020-07-27

## 2020-07-27 DIAGNOSIS — R92.2 INCONCLUSIVE MAMMOGRAM: ICD-10-CM

## 2020-07-27 PROCEDURE — 77061 BREAST TOMOSYNTHESIS UNI: CPT | Performed by: FAMILY MEDICINE

## 2020-07-27 PROCEDURE — 76642 ULTRASOUND BREAST LIMITED: CPT | Performed by: FAMILY MEDICINE

## 2020-07-27 PROCEDURE — 77065 DX MAMMO INCL CAD UNI: CPT | Performed by: FAMILY MEDICINE

## 2020-07-27 RX ORDER — ERGOCALCIFEROL 1.25 MG/1
50000 CAPSULE ORAL WEEKLY
Qty: 12 CAPSULE | Refills: 0 | Status: SHIPPED | OUTPATIENT
Start: 2020-07-27 | End: 2020-10-13

## 2020-07-27 NOTE — PROGRESS NOTES
Please review- ordered due to severe right flank pain, with known renal calculus and upcoming ESWL wanted to r/o ureteral stone. No hydronephrosis. She was wondering if can switch to other provider to get done earlier due to pain.

## 2020-07-27 NOTE — TELEPHONE ENCOUNTER
From: Ginny Haynes Book  To: Olivia David MD  Sent: 7/27/2020 3:47 PM CDT  Subject: Prescription Question    I seen Dr Olayinka Wong (podiatrist) today. He told me that he would like me to take Vitamin D 50,000 once a week.  But I should check in w

## 2020-07-31 ENCOUNTER — PATIENT MESSAGE (OUTPATIENT)
Dept: FAMILY MEDICINE CLINIC | Facility: CLINIC | Age: 55
End: 2020-07-31

## 2020-07-31 NOTE — TELEPHONE ENCOUNTER
From: Ade Ramirez Book  To: Deuce Linares MD  Sent: 7/31/2020 11:31 AM CDT  Subject: Prescription Question    Hi! I am scheduled on Monday 8/3. I have one more injection left in my Ozempic pen.  I was told to let you know when I am almost out to see i

## 2020-08-03 ENCOUNTER — OFFICE VISIT (OUTPATIENT)
Dept: FAMILY MEDICINE CLINIC | Facility: CLINIC | Age: 55
End: 2020-08-03
Payer: COMMERCIAL

## 2020-08-03 VITALS
TEMPERATURE: 98 F | BODY MASS INDEX: 38.8 KG/M2 | WEIGHT: 219 LBS | RESPIRATION RATE: 16 BRPM | HEIGHT: 63 IN | OXYGEN SATURATION: 98 % | HEART RATE: 76 BPM | SYSTOLIC BLOOD PRESSURE: 122 MMHG | DIASTOLIC BLOOD PRESSURE: 72 MMHG

## 2020-08-03 DIAGNOSIS — Z01.818 PREOP EXAMINATION: Primary | ICD-10-CM

## 2020-08-03 DIAGNOSIS — E66.9 OBESITY (BMI 30-39.9): ICD-10-CM

## 2020-08-03 DIAGNOSIS — J01.10 ACUTE FRONTAL SINUSITIS, RECURRENCE NOT SPECIFIED: ICD-10-CM

## 2020-08-03 LAB — AMB EXT COVID-19 RESULT: NOT DETECTED

## 2020-08-03 PROCEDURE — 3078F DIAST BP <80 MM HG: CPT | Performed by: FAMILY MEDICINE

## 2020-08-03 PROCEDURE — 3074F SYST BP LT 130 MM HG: CPT | Performed by: FAMILY MEDICINE

## 2020-08-03 PROCEDURE — 99215 OFFICE O/P EST HI 40 MIN: CPT | Performed by: FAMILY MEDICINE

## 2020-08-03 PROCEDURE — 3008F BODY MASS INDEX DOCD: CPT | Performed by: FAMILY MEDICINE

## 2020-08-03 RX ORDER — AZITHROMYCIN 250 MG/1
TABLET, FILM COATED ORAL
Qty: 6 TABLET | Refills: 1 | Status: SHIPPED | OUTPATIENT
Start: 2020-08-03 | End: 2020-08-08

## 2020-08-03 NOTE — PATIENT INSTRUCTIONS
Thank you for choosing Ananya Harry MD at [place] [address]  To Do: [address]  1. Considerations in the Preoperative period:   Surgery is a big deal.  Anesthesia and your medicines and medical issues all stress out your body.   Therefore read a reuptake inhibitors like zoloft, effexor, paxil, prozac, citalopram, remeron etc. For mood should be held 3 weeks before surgery if high risk of bleeding as these may increase risk of bleeding  Most other psychiatric meds like antipsychotic meds, anxiety m done so.  All your results will post on there.  [email or web address]  • You can NOW use [employer] to book your appointments with us, or consider using open access scheduling which is through the edward website [email or web address] and type i please call Central Scheduling at [phone]  Please allow our office 5 business days to contact you regarding any testing results.     Refill policies:   Allow 3 business days for refills; controlled substances may take longer and must be picked up from

## 2020-08-03 NOTE — PROGRESS NOTES
Preoperative History and Physical Internal Medicine    CC: Patient presents with:  Pre-Op Exam: lithotripsy/ 8/7/20/ Dr Dudley Post  Weight Check      Ana Spencer is 47year old presenting for a preoperative exam.    This patient is having surgery on date: not sure exactly   • Blood disorder 2003    MTHFR   • Blood in the stool 9/20/2018    Dark stools since 9/20/18   • Blurred vision not sure    hard to focus at times.    • Body piercing 1981    ears, 2 holes each   • Calculus of kidney 25 yrs    had one las sometimes my knees bother me   • Painful swallowing 9/9/2018    At times it gets so difficult that it does hurt.    • Pneumonia, organism unspecified(486)    • Prediabetes    • Problems with swallowing     food gets stuck   • Shortness of breath    • Skin b • OTHER SURGICAL HISTORY      ablation of vaginal lesion   • SINUS SURGERY    03/28/2017   • THYROIDECTOMY  1/2008    complete   • TOTAL ABDOM HYSTERECTOMY     • UPPER GI ENDOSCOPY - REFERRAL  8/2014    otherwise unremarkable exam. Upper and lower esopha Female 47        estimate  2nd cousin   • Other (thyroid cancer) Daughter    • Alcohol and Other Disorders Associated Brother         alchoholic   • Diabetes Brother         Type 2   • Diabetes Paternal Aunt         Type 2   • Diabetes Maternal Uncle Inhalation Aero Soln, Inhale 1 puff into the lungs every 6 (six) hours as needed for Wheezing., Disp: 1 Inhaler, Rfl: 11  •  Insulin Pen Needle (PEN NEEDLES) 32G X 4 MM Does not apply Misc, 1 each by Does not apply route every 7 days. , Disp: 30 each, Rfl: distress. Eyes: Conjunctivae are normal. No scleral icterus. Neck: Neck supple. Cardiovascular: Normal rate, regular rhythm and normal heart sounds. No murmur heard. Edema not present.   Pulmonary/Chest: Effort normal and breath sounds normal. No • [facility] Urine 07/06/2020 7.0    • Protein Urine 07/06/2020 neg    • Urobilinogen Urine 07/06/2020 0.2    • Nitrite Urine 07/06/2020 neg    • Leukocyte Esterase Urine 07/06/2020 neg    • Multistix Lot# 07/06/2020 [id number]    • Multistix Expiration Date 07/06/ 850 MG Oral Tab 180 tablet 1     Sig: Take 1 tablet (850 mg total) by mouth 2 (two) times daily with meals.      OFFICE/OUTPT Axel Pérez

## 2020-08-10 ENCOUNTER — TELEPHONE (OUTPATIENT)
Dept: FAMILY MEDICINE CLINIC | Facility: CLINIC | Age: 55
End: 2020-08-10

## 2020-08-10 NOTE — TELEPHONE ENCOUNTER
Bubba Workman- Pt is having shortness of breath and had a procedure none this last Friday with urologist blasting stones. Urology office told pt to let Bubba Workman know the breathing is ok if sitting still with not a lot of movement.     Please advise Ph.8

## 2020-08-11 ENCOUNTER — PATIENT MESSAGE (OUTPATIENT)
Dept: FAMILY MEDICINE CLINIC | Facility: CLINIC | Age: 55
End: 2020-08-11

## 2020-08-11 NOTE — TELEPHONE ENCOUNTER
Sent patient a [employer] message asking her to call office to make a virtual appt with Dr Kevan Shine to discuss further

## 2020-08-11 NOTE — TELEPHONE ENCOUNTER
From: [place] Book  To: Lor Murphy MD  Sent: 8/11/2020 12:24 PM CDT  Subject: Visit Follow-up Question    I phoned the office yesterday and left a message for the nurse. I had the ESWL last Friday, and am on my way for the follow up.    As of Sun

## 2020-08-12 NOTE — TELEPHONE ENCOUNTER
Informed pt that Dr. Antonio Acosta recommended pt go to the ER. Pt states these symptoms are her \"norm half the time. \"  Informed pt she should be seen in the ER, pt verbalizes understanding.

## 2020-08-12 NOTE — TELEPHONE ENCOUNTER
Pt states she is still SOB and uis having more difficulty taking deep breaths, pt states she has been running an intermittent low grade fever.   Pt has not started the azithromicyn due to her procedure and the pt was given one dose of an antibiotic yesterda

## 2020-08-13 ENCOUNTER — PATIENT MESSAGE (OUTPATIENT)
Dept: FAMILY MEDICINE CLINIC | Facility: CLINIC | Age: 55
End: 2020-08-13

## 2020-08-13 NOTE — TELEPHONE ENCOUNTER
From: Shannon Wilde Book  To: Alta Lopez MD  Sent: 8/13/2020 3:49 PM CDT  Subject: Visit Follow-up Question    Have you gotten any Ozempic samples in yet? I am due to take it tomorrow.

## 2020-08-14 ENCOUNTER — APPOINTMENT (OUTPATIENT)
Dept: GENERAL RADIOLOGY | Age: 55
DRG: 920 | End: 2020-08-14
Attending: EMERGENCY MEDICINE
Payer: COMMERCIAL

## 2020-08-14 ENCOUNTER — HOSPITAL ENCOUNTER (INPATIENT)
Facility: HOSPITAL | Age: 55
LOS: 2 days | Discharge: HOME | DRG: 920 | End: 2020-08-16
Attending: EMERGENCY MEDICINE | Admitting: HOSPITALIST
Payer: COMMERCIAL

## 2020-08-14 ENCOUNTER — APPOINTMENT (OUTPATIENT)
Dept: CT IMAGING | Age: 55
DRG: 920 | End: 2020-08-14
Attending: PHYSICIAN ASSISTANT
Payer: COMMERCIAL

## 2020-08-14 DIAGNOSIS — S37.011A HEMATOMA OF RIGHT KIDNEY, INITIAL ENCOUNTER: Primary | ICD-10-CM

## 2020-08-14 DIAGNOSIS — N39.0 URINARY TRACT INFECTION WITHOUT HEMATURIA, SITE UNSPECIFIED: ICD-10-CM

## 2020-08-14 LAB
ALBUMIN SERPL-MCNC: 3.7 G/DL (ref 3.4–5)
ALBUMIN/GLOB SERPL: 1 {RATIO} (ref 1–2)
ALP LIVER SERPL-CCNC: 146 U/L (ref 41–108)
ALT SERPL-CCNC: 101 U/L (ref 13–56)
ANION GAP SERPL CALC-SCNC: 8 MMOL/L (ref 0–18)
AST SERPL-CCNC: 54 U/L (ref 15–37)
BASOPHILS # BLD AUTO: 0.09 X10(3) UL (ref 0–0.2)
BASOPHILS NFR BLD AUTO: 0.8 %
BILIRUB SERPL-MCNC: 1.1 MG/DL (ref 0.1–2)
BILIRUB UR QL STRIP.AUTO: NEGATIVE
BUN BLD-MCNC: 9 MG/DL (ref 7–18)
BUN/CREAT SERPL: 14.3 (ref 10–20)
CALCIUM BLD-MCNC: 8.4 MG/DL (ref 8.5–10.1)
CHLORIDE SERPL-SCNC: 106 MMOL/L (ref 98–112)
CLARITY UR REFRACT.AUTO: CLEAR
CO2 SERPL-SCNC: 25 MMOL/L (ref 21–32)
COLOR UR AUTO: YELLOW
CREAT BLD-MCNC: 0.63 MG/DL (ref 0.55–1.02)
DEPRECATED RDW RBC AUTO: 44.9 FL (ref 35.1–46.3)
EOSINOPHIL # BLD AUTO: 0.23 X10(3) UL (ref 0–0.7)
EOSINOPHIL NFR BLD AUTO: 2 %
ERYTHROCYTE [DISTWIDTH] IN BLOOD BY AUTOMATED COUNT: 13.8 % (ref 11–15)
GLOBULIN PLAS-MCNC: 3.6 G/DL (ref 2.8–4.4)
GLUCOSE BLD-MCNC: 101 MG/DL (ref 70–99)
GLUCOSE BLD-MCNC: 120 MG/DL (ref 70–99)
GLUCOSE UR STRIP.AUTO-MCNC: NEGATIVE MG/DL
HCT VFR BLD AUTO: 34.3 % (ref 35–48)
HGB BLD-MCNC: 11.2 G/DL (ref 12–16)
HGB BLD-MCNC: 11.8 G/DL (ref 12–16)
IMM GRANULOCYTES # BLD AUTO: 0.39 X10(3) UL (ref 0–1)
IMM GRANULOCYTES NFR BLD: 3.4 %
KETONES UR STRIP.AUTO-MCNC: NEGATIVE MG/DL
LACTATE SERPL-SCNC: 0.8 MMOL/L (ref 0.4–2)
LIPASE SERPL-CCNC: 85 U/L (ref 73–393)
LYMPHOCYTES # BLD AUTO: 2.04 X10(3) UL (ref 1–4)
LYMPHOCYTES NFR BLD AUTO: 17.8 %
M PROTEIN MFR SERPL ELPH: 7.3 G/DL (ref 6.4–8.2)
MCH RBC QN AUTO: 29.8 PG (ref 26–34)
MCHC RBC AUTO-ENTMCNC: 32.7 G/DL (ref 31–37)
MCV RBC AUTO: 91.2 FL (ref 80–100)
MONOCYTES # BLD AUTO: 0.7 X10(3) UL (ref 0.1–1)
MONOCYTES NFR BLD AUTO: 6.1 %
NEUTROPHILS # BLD AUTO: 8.03 X10 (3) UL (ref 1.5–7.7)
NEUTROPHILS # BLD AUTO: 8.03 X10(3) UL (ref 1.5–7.7)
NEUTROPHILS NFR BLD AUTO: 69.9 %
NITRITE UR QL STRIP.AUTO: POSITIVE
OSMOLALITY SERPL CALC.SUM OF ELEC: 287 MOSM/KG (ref 275–295)
PH UR STRIP.AUTO: 7 [PH] (ref 4.5–8)
PLATELET # BLD AUTO: 414 10(3)UL (ref 150–450)
POTASSIUM SERPL-SCNC: 3.8 MMOL/L (ref 3.5–5.1)
PROT UR STRIP.AUTO-MCNC: NEGATIVE MG/DL
RBC # BLD AUTO: 3.76 X10(6)UL (ref 3.8–5.3)
SARS-COV-2 RNA RESP QL NAA+PROBE: NOT DETECTED
SODIUM SERPL-SCNC: 139 MMOL/L (ref 136–145)
SP GR UR STRIP.AUTO: 1.01 (ref 1–1.03)
UROBILINOGEN UR STRIP.AUTO-MCNC: 0.2 MG/DL
WBC # BLD AUTO: 11.5 X10(3) UL (ref 4–11)

## 2020-08-14 PROCEDURE — 71045 X-RAY EXAM CHEST 1 VIEW: CPT | Performed by: EMERGENCY MEDICINE

## 2020-08-14 PROCEDURE — 5A09357 ASSISTANCE WITH RESPIRATORY VENTILATION, LESS THAN 24 CONSECUTIVE HOURS, CONTINUOUS POSITIVE AIRWAY PRESSURE: ICD-10-PCS | Performed by: HOSPITALIST

## 2020-08-14 PROCEDURE — 99223 1ST HOSP IP/OBS HIGH 75: CPT | Performed by: HOSPITALIST

## 2020-08-14 PROCEDURE — 74177 CT ABD & PELVIS W/CONTRAST: CPT | Performed by: PHYSICIAN ASSISTANT

## 2020-08-14 RX ORDER — PANTOPRAZOLE SODIUM 20 MG/1
20 TABLET, DELAYED RELEASE ORAL NIGHTLY
Status: DISCONTINUED | OUTPATIENT
Start: 2020-08-14 | End: 2020-08-16

## 2020-08-14 RX ORDER — DEXTROSE MONOHYDRATE 25 G/50ML
50 INJECTION, SOLUTION INTRAVENOUS
Status: DISCONTINUED | OUTPATIENT
Start: 2020-08-14 | End: 2020-08-16

## 2020-08-14 RX ORDER — HYDROMORPHONE HYDROCHLORIDE 1 MG/ML
0.5 INJECTION, SOLUTION INTRAMUSCULAR; INTRAVENOUS; SUBCUTANEOUS ONCE
Status: COMPLETED | OUTPATIENT
Start: 2020-08-14 | End: 2020-08-14

## 2020-08-14 RX ORDER — HYDROMORPHONE HYDROCHLORIDE 1 MG/ML
0.4 INJECTION, SOLUTION INTRAMUSCULAR; INTRAVENOUS; SUBCUTANEOUS EVERY 2 HOUR PRN
Status: DISCONTINUED | OUTPATIENT
Start: 2020-08-14 | End: 2020-08-16

## 2020-08-14 RX ORDER — ONDANSETRON 2 MG/ML
4 INJECTION INTRAMUSCULAR; INTRAVENOUS EVERY 4 HOURS PRN
Status: DISCONTINUED | OUTPATIENT
Start: 2020-08-14 | End: 2020-08-14

## 2020-08-14 RX ORDER — SODIUM CHLORIDE, SODIUM LACTATE, POTASSIUM CHLORIDE, CALCIUM CHLORIDE 600; 310; 30; 20 MG/100ML; MG/100ML; MG/100ML; MG/100ML
INJECTION, SOLUTION INTRAVENOUS CONTINUOUS
Status: DISCONTINUED | OUTPATIENT
Start: 2020-08-14 | End: 2020-08-16

## 2020-08-14 RX ORDER — SODIUM CHLORIDE 9 MG/ML
INJECTION, SOLUTION INTRAVENOUS CONTINUOUS
Status: DISCONTINUED | OUTPATIENT
Start: 2020-08-14 | End: 2020-08-14

## 2020-08-14 RX ORDER — HYDROMORPHONE HYDROCHLORIDE 1 MG/ML
0.2 INJECTION, SOLUTION INTRAMUSCULAR; INTRAVENOUS; SUBCUTANEOUS EVERY 2 HOUR PRN
Status: DISCONTINUED | OUTPATIENT
Start: 2020-08-14 | End: 2020-08-16

## 2020-08-14 RX ORDER — ONDANSETRON 2 MG/ML
4 INJECTION INTRAMUSCULAR; INTRAVENOUS EVERY 6 HOURS PRN
Status: DISCONTINUED | OUTPATIENT
Start: 2020-08-14 | End: 2020-08-16

## 2020-08-14 RX ORDER — CETIRIZINE HYDROCHLORIDE 10 MG/1
10 TABLET ORAL NIGHTLY
Status: DISCONTINUED | OUTPATIENT
Start: 2020-08-14 | End: 2020-08-16

## 2020-08-14 RX ORDER — ACETAMINOPHEN 325 MG/1
650 TABLET ORAL EVERY 4 HOURS PRN
Status: DISCONTINUED | OUTPATIENT
Start: 2020-08-14 | End: 2020-08-16

## 2020-08-14 RX ORDER — ALBUTEROL SULFATE 2.5 MG/3ML
2.5 SOLUTION RESPIRATORY (INHALATION)
Status: DISCONTINUED | OUTPATIENT
Start: 2020-08-14 | End: 2020-08-16

## 2020-08-14 RX ORDER — BUDESONIDE 0.5 MG/2ML
0.5 INHALANT ORAL 2 TIMES DAILY
Status: DISCONTINUED | OUTPATIENT
Start: 2020-08-14 | End: 2020-08-16

## 2020-08-14 RX ORDER — ALPRAZOLAM 0.25 MG/1
0.25 TABLET ORAL NIGHTLY PRN
Status: DISCONTINUED | OUTPATIENT
Start: 2020-08-14 | End: 2020-08-16

## 2020-08-14 RX ORDER — METOCLOPRAMIDE HYDROCHLORIDE 5 MG/ML
10 INJECTION INTRAMUSCULAR; INTRAVENOUS EVERY 8 HOURS PRN
Status: DISCONTINUED | OUTPATIENT
Start: 2020-08-14 | End: 2020-08-16

## 2020-08-14 RX ORDER — HYDROCODONE BITARTRATE AND ACETAMINOPHEN 5; 325 MG/1; MG/1
1 TABLET ORAL EVERY 4 HOURS PRN
Status: DISCONTINUED | OUTPATIENT
Start: 2020-08-14 | End: 2020-08-16

## 2020-08-14 RX ORDER — HYDROCODONE BITARTRATE AND ACETAMINOPHEN 5; 325 MG/1; MG/1
2 TABLET ORAL EVERY 4 HOURS PRN
Status: DISCONTINUED | OUTPATIENT
Start: 2020-08-14 | End: 2020-08-16

## 2020-08-14 RX ORDER — MONTELUKAST SODIUM 5 MG/1
10 TABLET, CHEWABLE ORAL DAILY
Status: DISCONTINUED | OUTPATIENT
Start: 2020-08-14 | End: 2020-08-16

## 2020-08-14 RX ORDER — ONDANSETRON 2 MG/ML
4 INJECTION INTRAMUSCULAR; INTRAVENOUS ONCE
Status: COMPLETED | OUTPATIENT
Start: 2020-08-14 | End: 2020-08-14

## 2020-08-14 RX ORDER — HYDROMORPHONE HYDROCHLORIDE 1 MG/ML
0.5 INJECTION, SOLUTION INTRAMUSCULAR; INTRAVENOUS; SUBCUTANEOUS EVERY 30 MIN PRN
Status: DISCONTINUED | OUTPATIENT
Start: 2020-08-14 | End: 2020-08-14

## 2020-08-14 RX ORDER — LEVOTHYROXINE SODIUM 0.15 MG/1
150 TABLET ORAL
Status: DISCONTINUED | OUTPATIENT
Start: 2020-08-15 | End: 2020-08-16

## 2020-08-14 RX ORDER — HYDROMORPHONE HYDROCHLORIDE 1 MG/ML
0.8 INJECTION, SOLUTION INTRAMUSCULAR; INTRAVENOUS; SUBCUTANEOUS EVERY 2 HOUR PRN
Status: DISCONTINUED | OUTPATIENT
Start: 2020-08-14 | End: 2020-08-16

## 2020-08-14 RX ORDER — AMITRIPTYLINE HYDROCHLORIDE 10 MG/1
20 TABLET, FILM COATED ORAL NIGHTLY
Status: DISCONTINUED | OUTPATIENT
Start: 2020-08-14 | End: 2020-08-16

## 2020-08-14 NOTE — ED PROVIDER NOTES
Patient Seen in: [facility] [place]      History   Patient presents with:  Dyspnea ROYA SOB  Abdomen/Flank Pain  Postop/Procedure Problem    Stated Complaint: sob, rlq abd pain, lithotripsy last week     70-year-old obese  Chronic cough 5/4/2018    dx: Neuropathic Sensory Cough   • Constipation years    still have at times, but not as often. • Disorder of liver     fatty liver   • Disorder of thyroid    • Dizziness 9/13/2018    off and on.    • Dyspnea 8/16/2016   • Endomet disturbance 9/11/2018    probably due to steroids   • Stool incontinence 8/8/2018    normally NO. About a month ago this did happen.    • [facility]    s/p PERRY, stage 1   • Uncomfortable fullness after meals 9/4/2018    Not sure if I get ful otherwise unremarkable exam. Upper and lower esophageal biopsies taken (-) eosinophilic esophagitis, and empiric dilation to 62 [place]                     Social History    Tobacco Use      Smoking status: Never Smoker      Smokeless tobacco: Never Used McBurney's sign. Negative signs include Dial's sign. Comments: There is mild right upper quadrant tenderness and mild right CVA tenderness. There is moderate to market tenderness over the right lower quadrant.    Musculoskeletal: Normal range of mot PLATELET.   Procedure                               Abnormality         Status                     ---------                               -----------         ------                     CBC W/ DIFFERENTIAL[[id number]]          Abnormal            Final resul air-fluid levels. No free intraperitoneal air is evident. IMPRESSION: 1. Interval placement of right ureteral stent. Previously noted calculi in the right kidney and right distal ureter are not identified on this study.  2. Nonobstructive bowel gas patte Ap Portable  (cpt=71045)    Result Date: 8/14/2020  PROCEDURE:  XR CHEST AP PORTABLE  (CPT=71045)  TECHNIQUE:  AP chest radiograph was obtained.   COMPARISON:  [facility] , XR, XR CHEST AP PORTABLE  (CPT=71045), 4/09/2020, 1:52 PM.  INDICATIONS:  sob, rlq abd p FRAX = 8.6%/0.5%. CONCLUSION:  Bone mineral density values for lumbar spine and left femoral neck are compatible with the diagnosis of osteopenia by WHO definition (T score between -1.0 and -2.5).   If therapy is initiated, follow-up study is recommended Sharmin Song MD on 7/27/2020 at 11:34 AM     Finalized by (CST): Bonnie Lee MD on 7/27/2020 at 11:35 AM       [facility] Gini 2d+3d Screening Bilat (BKK=46559/27942)    Result Date: 7/22/2020  PROCEDURE:  [facility] GINI 2D+3D SCREENING BILAT (CPT=77067/07455)  COMPARISON lithotripsy last week  TECHNIQUE:  CT scanning was performed from the dome of the diaphragm to the pubic symphysis with non-ionic intravenous contrast material. Post contrast coronal MPR imaging was performed. Dose reduction techniques were used.  Dose inf OTHER:  Negative. CONCLUSION:   1. Large subcapsular hematoma of the right kidney without active extravasation.   There is compressive forces on the renal parenchyma and therefore could be a Page kidney and correlation with patient's blood pressur

## 2020-08-14 NOTE — ED INITIAL ASSESSMENT (HPI)
SINCE Monday WITH FEVER AND SOB. REPORTS RLQ ABD PAIN SINCE YESTERDAY.   C/O NAUSEA WITHOUT VOMITING

## 2020-08-14 NOTE — H&P
[facility]PITAL[facility]  History and Physical     Jennifer Ayala Book Patient Status:  Emergency    1965 MRN [id number]   Location [facility] Attending Narciso Ahn MD   Hosp Day # 0 PCP Harrison Abbasi MD     Chief Complai reflux    • Exposure to radiation     hx thyroid cancer   • Extrinsic asthma, unspecified    • Fatigue not sure    This has been going on for a while   • Fever 9/2/2018    off and on   • Food intolerance approx 15 yrs    dairy does not agree with me.   My s payal 91%   • Visual impairment     just glasses for reading   • Wears glasses 1980    need new ones, don't wear my old ones        Past Surgical History:   Past Surgical History:   Procedure Laterality Date   • ANESTH, SECTION     • BACK S drugs.     Family History:   Family History   Problem Relation Age of Onset   • Hypertension Mother    • Colon Polyps Mother         polyps   • Other (Other) Mother    • Other (thyroid nodules) Mother    • Other (ALS) Father    • Other (Other) Father Comment:TABS  Lyrica [Pregabalin]     SWELLING  Cat Hair Extract        ASTHMA, Coughing, Runny nose,                            SHORTNESS OF BREATH, WHEEZING  [place]        ASTHMA, Runny nose, WHEEZING    Medications:  No current facility-admini Suspension, TAKE 2 ML (0.5 MG TOTAL) BY NEBULIZATION 2 (TWO) TIMES DAILY. , Disp: 120 mL, Rfl: 5  LEVOTHYROXINE SODIUM 150 MCG Oral Tab, LEVOXYL BRAND 150 mcg daily - BRAND, Disp: 90 tablet, Rfl: 3  ALBUTEROL SULFATE (2.5 MG/3ML) 0.083% Inhalation Nebu Soln Epic.      ASSESSMENT / PLAN:     1. Large subcapsular hematoma of right kidney without active extravasation   1. Serial H&H  2. Monitor hemodynamics  3. Telemetry  4. Urology on consult  2. Acute blood loss anemia  1. Check iron, ferritin  3.  Nephrolithia

## 2020-08-14 NOTE — PROGRESS NOTES
1856: PAGED DR. MCCALLUM TO NOTIFY OF PATIENTS ARRIVAL AND NEED FOR ADMISSION ORDERS.  ARRIVED TO SEE PATIENT. 1857: PAGED  (ON CALL FOR [facility]) TO NOTIFY OF PATIENTS ARRIVAL AND TO REQUEST ORDERS. ORDERS RECEIVED.

## 2020-08-15 ENCOUNTER — APPOINTMENT (OUTPATIENT)
Dept: ULTRASOUND IMAGING | Facility: HOSPITAL | Age: 55
DRG: 920 | End: 2020-08-15
Attending: UROLOGY
Payer: COMMERCIAL

## 2020-08-15 LAB
ALBUMIN SERPL-MCNC: 3.2 G/DL (ref 3.4–5)
ALBUMIN/GLOB SERPL: 1 {RATIO} (ref 1–2)
ALP LIVER SERPL-CCNC: 119 U/L (ref 41–108)
ALT SERPL-CCNC: 76 U/L (ref 13–56)
ANION GAP SERPL CALC-SCNC: 2 MMOL/L (ref 0–18)
AST SERPL-CCNC: 31 U/L (ref 15–37)
BASOPHILS # BLD AUTO: 0.1 X10(3) UL (ref 0–0.2)
BASOPHILS NFR BLD AUTO: 1.1 %
BILIRUB SERPL-MCNC: 0.9 MG/DL (ref 0.1–2)
BUN BLD-MCNC: 13 MG/DL (ref 7–18)
BUN/CREAT SERPL: 19.1 (ref 10–20)
CALCIUM BLD-MCNC: 8.4 MG/DL (ref 8.5–10.1)
CHLORIDE SERPL-SCNC: 106 MMOL/L (ref 98–112)
CO2 SERPL-SCNC: 30 MMOL/L (ref 21–32)
CREAT BLD-MCNC: 0.68 MG/DL (ref 0.55–1.02)
DEPRECATED HBV CORE AB SER IA-ACNC: 90.2 NG/ML (ref 18–340)
DEPRECATED RDW RBC AUTO: 47.3 FL (ref 35.1–46.3)
EOSINOPHIL # BLD AUTO: 0.23 X10(3) UL (ref 0–0.7)
EOSINOPHIL NFR BLD AUTO: 2.6 %
ERYTHROCYTE [DISTWIDTH] IN BLOOD BY AUTOMATED COUNT: 14 % (ref 11–15)
GLOBULIN PLAS-MCNC: 3.1 G/DL (ref 2.8–4.4)
GLUCOSE BLD-MCNC: 100 MG/DL (ref 70–99)
GLUCOSE BLD-MCNC: 107 MG/DL (ref 70–99)
GLUCOSE BLD-MCNC: 122 MG/DL (ref 70–99)
GLUCOSE BLD-MCNC: 96 MG/DL (ref 70–99)
GLUCOSE BLD-MCNC: 98 MG/DL (ref 70–99)
HCT VFR BLD AUTO: 31.4 % (ref 35–48)
HGB BLD-MCNC: 10.6 G/DL (ref 12–16)
HGB BLD-MCNC: 9.8 G/DL (ref 12–16)
IMM GRANULOCYTES # BLD AUTO: 0.17 X10(3) UL (ref 0–1)
IMM GRANULOCYTES NFR BLD: 1.9 %
IRON SATURATION: 15 % (ref 15–50)
IRON SERPL-MCNC: 58 UG/DL (ref 50–170)
LYMPHOCYTES # BLD AUTO: 2.27 X10(3) UL (ref 1–4)
LYMPHOCYTES NFR BLD AUTO: 25.6 %
M PROTEIN MFR SERPL ELPH: 6.3 G/DL (ref 6.4–8.2)
MCH RBC QN AUTO: 29.2 PG (ref 26–34)
MCHC RBC AUTO-ENTMCNC: 31.2 G/DL (ref 31–37)
MCV RBC AUTO: 93.5 FL (ref 80–100)
MONOCYTES # BLD AUTO: 0.63 X10(3) UL (ref 0.1–1)
MONOCYTES NFR BLD AUTO: 7.1 %
NEUTROPHILS # BLD AUTO: 5.45 X10 (3) UL (ref 1.5–7.7)
NEUTROPHILS # BLD AUTO: 5.45 X10(3) UL (ref 1.5–7.7)
NEUTROPHILS NFR BLD AUTO: 61.7 %
OSMOLALITY SERPL CALC.SUM OF ELEC: 286 MOSM/KG (ref 275–295)
PLATELET # BLD AUTO: 382 10(3)UL (ref 150–450)
POTASSIUM SERPL-SCNC: 3.8 MMOL/L (ref 3.5–5.1)
RBC # BLD AUTO: 3.36 X10(6)UL (ref 3.8–5.3)
SODIUM SERPL-SCNC: 138 MMOL/L (ref 136–145)
TOTAL IRON BINDING CAPACITY: 395 UG/DL (ref 240–450)
TRANSFERRIN SERPL-MCNC: 265 MG/DL (ref 200–360)
WBC # BLD AUTO: 8.9 X10(3) UL (ref 4–11)

## 2020-08-15 PROCEDURE — 76775 US EXAM ABDO BACK WALL LIM: CPT | Performed by: UROLOGY

## 2020-08-15 PROCEDURE — 99232 SBSQ HOSP IP/OBS MODERATE 35: CPT | Performed by: HOSPITALIST

## 2020-08-15 NOTE — PROGRESS NOTES
[facility]  Progress Note     [address] Patient Status:  Inpatient    1965 MRN [id number]   [facility] 3NW-A Attending Radha Clemens, [address] [facility] Day # 1 PCP Hillary Hennessy MD     Chief Complaint: Abdominal pain    S: Pa Imaging: Imaging data reviewed in Epic.     Medications:   • albuterol sulfate  2.5 mg Nebulization 4 times per day   • Amitriptyline HCl  20 mg Oral Nightly   • Fluticasone-Umeclidin-Vilant  1 puff Inhalation Daily   • budesonide  0.5 mg Nebulization B

## 2020-08-15 NOTE — PLAN OF CARE
NURSING ADMISSION NOTE      Patient admitted via Cart  Oriented to room. Safety precautions initiated. Bed in low position. Call light in reach. Received patient awake and oriented. On room air, breath sounds clear, CPAP overnight.  On tele, SR. Med

## 2020-08-15 NOTE — RESPIRATORY THERAPY NOTE
Nursing to administer 1 puff Trelegy-Ellipta 100/62.5//25 mcg daily . First dose given today by RT at [phone] . Next dose due tomorrow 8/16/2020 by RN at 0900 . Per resp. Protocol. Please have patient rinse mouth  & spit post rx.  Thank you

## 2020-08-15 NOTE — CONSULTS
[facility]  Report of Consultation    [address] Patient Status:  Inpatient    1965 MRN [id number]   [facility] 3NW-A Attending Judith Spencer, [address] [facility] [address] Day # 1 PCP Nisha Ny MD     Reason for Consultation:  James Ray liver   • Disorder of thyroid    • Dizziness 9/13/2018    off and on.    • Dyspnea 8/16/2016   • Endometriosis 12/1995    Hysterectomy 12/1995   • Enlarged lymph node 9/19/2018    lymph nodes in neck area   • Esophageal reflux    • Exposure to radiation • Thyroid cancer ([facility] 2008    s/p PERRY, stage 1   • Uncomfortable fullness after meals 9/4/2018    Not sure if I get full quickly/ if due to swallowing issue   • Unspecified sleep apnea PSG 7-3-14    PSG 7-3-14 AHI 15 RDI 15 REM AHI 20 SaO2 payal 91%   • History   Problem Relation Age of Onset   • Hypertension Mother    • Colon Polyps Mother         polyps   • Other (Other) Mother    • Other (thyroid nodules) Mother    • Other (ALS) Father    • Other (Other) Father          from [address]   • Cancer Maternal alcohol use of about 0.8 - 1.7 standard drinks of alcohol per week. She reports that she does not use drugs.     Allergies:    Amoxicillin             HIVES    Comment:TABS  Lyrica [Pregabalin]     SWELLING  Cat Hair Extract        ASTHMA, Coughing, Runny n (DILAUDID) 1 MG/ML injection 0.4 mg, 0.4 mg, Intravenous, Q2H PRN **OR** HYDROmorphone HCl (DILAUDID) 1 MG/ML injection 0.8 mg, 0.8 mg, Intravenous, Q2H PRN  •  ondansetron HCl (ZOFRAN) injection 4 mg, 4 mg, Intravenous, Q6H PRN  •  Metoclopramide HCl (REG Negative 08/14/2020    UROBILINOGEN 0.2 08/14/2020    NITRITE Positive 08/14/2020    LEUUR Small 08/14/2020       Imaging:  CT Scan FINDINGS:    LIVER:  There is fatty infiltration of the liver. BILIARY:  No visible dilatation or calcification.     PANCREA multiple sites     Migraine     Insomnia     Unspecified adjustment reaction     GERD (gastroesophageal reflux disease)     Hypothyroidism     Essential hypertension     Obesity (BMI 30-39. 9)     Malignant neoplasm of thyroid gland (HCC)     Allergic rhini SOB    Thank you for allowing me to participate in the care of your patient.     Darin Mcguire PA-C  8/15/2020  8:35 AM

## 2020-08-15 NOTE — RESPIRATORY THERAPY NOTE
DARSHANA : EQUIPMENT USE: DAILY SUMMARY                                            SET MODE: CPAP WITH CFLEX                                          USAGE IN HOURS:8:03                                          90% EXP.

## 2020-08-16 VITALS
TEMPERATURE: 98 F | SYSTOLIC BLOOD PRESSURE: 133 MMHG | WEIGHT: 220 LBS | BODY MASS INDEX: 38.98 KG/M2 | HEIGHT: 63 IN | DIASTOLIC BLOOD PRESSURE: 73 MMHG | HEART RATE: 72 BPM | RESPIRATION RATE: 18 BRPM | OXYGEN SATURATION: 96 %

## 2020-08-16 LAB
GLUCOSE BLD-MCNC: 101 MG/DL (ref 70–99)
GLUCOSE BLD-MCNC: 112 MG/DL (ref 70–99)
HGB BLD-MCNC: 10.1 G/DL (ref 12–16)
HGB BLD-MCNC: 10.4 G/DL (ref 12–16)

## 2020-08-16 PROCEDURE — 99239 HOSP IP/OBS DSCHRG MGMT >30: CPT | Performed by: HOSPITALIST

## 2020-08-16 RX ORDER — HYDROCODONE BITARTRATE AND ACETAMINOPHEN 5; 325 MG/1; MG/1
1 TABLET ORAL EVERY 6 HOURS PRN
Qty: 20 TABLET | Refills: 0 | Status: ON HOLD | OUTPATIENT
Start: 2020-08-16 | End: 2020-10-01

## 2020-08-16 NOTE — PROGRESS NOTES
[facility]  Progress Note     [address] Patient Status:  Inpatient    1965 MRN [id number]   [facility] 3NW-A Attending Magui Thomas, [address] [facility] Day # 2 PCP Lydia Goldberg MD     Chief Complaint: Abdominal pain    S: Pa PTP, INR in the last 168 hours. No results for input(s): TROP, CK in the last 168 hours. Imaging: Imaging data reviewed in Epic.     Medications:   • albuterol sulfate  2.5 mg Nebulization 4 times per day   • Amitriptyline HCl  20 mg Oral Nightly

## 2020-08-16 NOTE — PLAN OF CARE
Problem: PAIN - ADULT  Goal: Verbalizes/displays adequate comfort level or patient's stated pain goal  Description  INTERVENTIONS:  - Encourage pt to monitor pain and request assistance  - Assess pain using appropriate pain scale  - Administer analgesics nonpharmacologic comfort measures as appropriate  - Advance diet as tolerated, if ordered  - Obtain nutritional consult as needed  - Evaluate fluid balance  Outcome: Progressing  Goal: Maintains or returns to baseline bowel function  Description  INTERVENT REGLAN GIVEN AND VERBALZIES RELIEF AND WILL ORDER LUNCH, SANGEETHA GIVEN THIS FOR C/O RIGHT ABD AND FLANK PAIN AND VERBALIZES RELIEF, UP IN ROOM AND WALKS IN CHEUNG WITH STEADY GAIT, DENIES ANY FEVER OR CHILLS,

## 2020-08-16 NOTE — DISCHARGE SUMMARY
[facility]  DISCHARGE SUMMARY     Naomi Reyes Patient Status:  Inpatient    1965 MRN [id number]   [facility] 3NW-A Attending Maxi Sorenson, [address] [facility] Day # 2 PCP Krystal Bass MD     Date of Admission: 2020  Date o Discharge Medications      CONTINUE taking these medications      Instructions Prescription details   albuterol sulfate (2.5 MG/3ML) 0.083% Nebu  Commonly known as:  VENTOLIN      USE 1 VIAL VIA NEBULIZER (2.5 MG TOTAL) EVERY 6 (SIX) HOURS AS NEEDED FOR Allergy Relief 10 MG Tbdp  Generic drug:  Loratadine      Take 10 mg by mouth daily. Refills:  0     metFORMIN HCl 850 MG Tabs  Commonly known as:  GLUCOPHAGE      Take 1 tablet (850 mg total) by mouth 2 (two) times daily with meals.    Quantity:  180 tab PM  PATIENT VISIT, NON PHYSICIAN [8010] 15 min.  [address], [place]    Patient Instructions:          Location Instructions:      [address] [facility] [address]                --------------------------

## 2020-08-16 NOTE — RESPIRATORY THERAPY NOTE
DARSHANA : EQUIPMENT USE: DAILY SUMMARY                                            SET MODE:CPAP WITH CFLEX                                          USAGE IN HOURS:7:12                                          90% EXP. P None

## 2020-08-16 NOTE — PLAN OF CARE
Problem: PAIN - ADULT  Goal: Verbalizes/displays adequate comfort level or patient's stated pain goal  Description  INTERVENTIONS:  - Encourage pt to monitor pain and request assistance  - Assess pain using appropriate pain scale  - Administer analgesics Progressing     Problem: GASTROINTESTINAL - ADULT  Goal: Minimal or absence of nausea and vomiting  Description  INTERVENTIONS:  - Maintain adequate hydration with IV or PO as ordered and tolerated  - Nasogastric tube to low intermittent suction as ordered transfers and ambulation using safe patient handling equipment as needed  - Ensure adequate protection for wounds/incisions during mobilization  - Obtain PT/OT consults as needed  - Advance activity as appropriate  - Communicate ordered activity level and

## 2020-08-16 NOTE — PROGRESS NOTES
PT DENIES ANY FURTHER NAUSEA, STATES PAIN IS TOLERABLE WITH PO MEDS, PT HAD QUESTION ABOUT TRAVELING TO [place] THIS WEEK, DR Merriam Severs PAGED WITH QUESTION

## 2020-08-16 NOTE — PROGRESS NOTES
Pt seen for scheduled resp therapy. Tolerated w/o complaints.  Still has some crackles in bases  Room air 99%

## 2020-08-16 NOTE — PLAN OF CARE
Pt is Ax4, vss, afebrile, DARSHANA-CPAP, /Tele, A/C QID, tolerating 1800ADA diet, IVF infusing, receiving nebs Q6H from respiratory, voids, denies any n/v at this time, up w/ sb asst, wears walking boot d/t 2 stress fractures to L foot.  Pt states pain is sti perform bladder scan as needed  - Follow urinary retention protocol/standard of care  - Consider collaborating with pharmacy to review patient's medication profile  - Implement strategies to promote bladder emptying  Outcome: Progressing     Problem: MUSCU Consider collaborating with pharmacy to review patient's medication profile  Outcome: Not Progressing

## 2020-08-17 NOTE — PROGRESS NOTES
PT DISCHARGED TO HOME PER WHEELCHAIR WITH DISCHARGE INSTRUCTIONS, PT DIRECTED TO  [facility] WHICH WAS SENT TO HER PHARMACY, PT GIVEN INFO FOR [facility], PT VERBALIZES UNDERSTANDING OF ALL INSTRUCTIONS AND MEDICATIONS,

## 2020-08-17 NOTE — PAYOR COMM NOTE
--------------  ADMISSION REVIEW     Payor: [address] [place] [address] Road #:  [id number]  Authorization Number: [id number]        ED Provider Notes        Patient Seen in: [facility] In [place]      Bonilla   P 1981    ears, 2 holes each   • Calculus of kidney 25 yrs    had one last yr/ first one in 7 yrs   • Cancer [facility])    • Change in hair 6/2018    improved after starting k-pax   • Chronic cough 5/4/2018    dx: Neuropathic Sensory Cough   • Constipation years with swallowing     food gets stuck   • Shortness of breath    • Skin blushing/flushing not sure    yes, sometimes due to food or drink, sick & times no clue   • Sleep disturbance 9/11/2018    probably due to steroids   • Stool incontinence 8/8/2018    nor vaginal lesion   • SINUS SURGERY    03/28/2017   • THYROIDECTOMY  1/2008    complete   • TOTAL ABDOM HYSTERECTOMY     • UPPER GI ENDOSCOPY - REFERRAL  8/2014    otherwise unremarkable exam. Upper and lower esophageal biopsies taken (-) eosinophilic esophag quadrant. Musculoskeletal: Normal range of motion. Right lower leg: She exhibits no tenderness. No edema. Left lower leg: She exhibits no tenderness. No edema.       Comments: Patient has a walking boot at the left lower extremity due to stress 08/14/2020    BILT 1.1 08/14/2020    TP 7.3 08/14/2020    AST 54 08/14/2020     08/14/2020    LIP 85 08/14/2020       Lab Results   Component Value Date    WBC 11.5 08/14/2020    HGB 11.2 08/14/2020    HCT 34.3 08/14/2020    .0 08/14/2020 transcribed by Technologist)  Patient presents with lower right and left back pain. FINDINGS:   RIGHT KIDNEY MEASUREMENTS:  Bipolar Size 10.0 cm. ECHOGENICITY:  Normal. HYDRONEPHROSIS:  None. CYSTS/STONES/MASSES:  2.4 cm cortical cyst lower pole.   This on 8/14/2020 at [facility] by (CST): Smooth Mckinney MD on 8/14/2020 at 1:53 PM           Ct Abdomen Pelvis Iv Contrast, No Oral (er)    Result Date: 8/14/2020  PROCEDURE:  CT ABDOMEN PELVIS IV CONTRAST, NO ORAL (ER)  COMPARISON:  [facility] No visible mass, obstruction, or bowel wall thickening. ABDOMINAL WALL:  No mass or hernia. URINARY BLADDER:  No visible focal wall thickening, lesion, or calculus. PELVIC NODES:  No adenopathy. PELVIC ORGANS:  No visible mass.   Pelvic organs appropria systems was completed. Pertinent positives and negatives noted in the HPI.     Physical Exam:    /56   Pulse 85   Temp 98.9 °F (37.2 °C) (Temporal)   Resp 22   Ht 5' 3\" (1.6 m)   Wt 220 lb (99.8 kg)   SpO2 100%   BMI 38.97 kg/m²    General: No acu evaluate/manage SOB            8/15 HOSP    Temp:  [97.4 °F (36.3 °C)-98.5 °F (36.9 °C)] 98.2 °F (36.8 °C)  Pulse:  [66-87] 66  Resp:  [18-22] 18  BP: (112-147)/(49-64) 129/64    1.  Large subcapsular hematoma of right kidney without active extravasation

## 2020-08-17 NOTE — PROGRESS NOTES
DR Elysia Henley HERE AND ASKED ABOUT IF PT ABLE TO TRAVEL TO [place] THIS WEEK AND STATES OK TO TRAVEL, PT CALLED VIA PHONE AND UPDATED ON OK TO TRAVEL AND PT TO FOLLOW UP AT APPOINTMENT WHICH SHE HAS WITH PA ON September 14,

## 2020-08-18 ENCOUNTER — VIRTUAL PHONE E/M (OUTPATIENT)
Dept: FAMILY MEDICINE CLINIC | Facility: CLINIC | Age: 55
End: 2020-08-18
Payer: COMMERCIAL

## 2020-08-18 ENCOUNTER — TELEPHONE (OUTPATIENT)
Dept: FAMILY MEDICINE CLINIC | Facility: CLINIC | Age: 55
End: 2020-08-18

## 2020-08-18 ENCOUNTER — PATIENT OUTREACH (OUTPATIENT)
Dept: CASE MANAGEMENT | Age: 55
End: 2020-08-18

## 2020-08-18 DIAGNOSIS — Z02.9 ENCOUNTERS FOR UNSPECIFIED ADMINISTRATIVE PURPOSE: ICD-10-CM

## 2020-08-18 DIAGNOSIS — S37.011D HEMATOMA OF RIGHT KIDNEY, SUBSEQUENT ENCOUNTER: Primary | ICD-10-CM

## 2020-08-18 PROCEDURE — 99214 OFFICE O/P EST MOD 30 MIN: CPT | Performed by: FAMILY MEDICINE

## 2020-08-18 PROCEDURE — 1111F DSCHRG MED/CURRENT MED MERGE: CPT

## 2020-08-18 NOTE — PROGRESS NOTES
HPI:    Patient ID: Dana Harmon is a 47year old female. HPI  Ms. Simeon Leung is a pleasant 59-year-old female with multiple medical conditions presenting for a phone visit follow-up after she was hospitalized on 8/14/2020 at [facility] for a large  AND SWALLOW 2 TABLETS BY MOUTH EVERY  tablet 0   • alendronate 70 MG Oral Tab Take 1 tablet (70 mg total) by mouth once a week.  12 tablet 3   • Potassium Citrate ER (UROCIT-K 15) 15 MEQ (1620 MG) Oral Tab CR Take one tablet  tablet 4   • Albu ASSESSMENT/PLAN:   Hematoma of right kidney, subsequent encounter  (primary encounter diagnosis)  -Clinically improved; however I did advise her to try and stay away from blood thinners and NSAIDs for the time being; I assured her that this should get

## 2020-08-18 NOTE — TELEPHONE ENCOUNTER
Spoke to pt for TCM today to follow up on recent hospitalization and care for a right kidney hematoma/ruled out UTI/anemia. Pt does not have HFU appt scheduled at this time. TCM/HFU appt recommended by 8/23/2020 as pt is a high risk for readmission.  The boot and decreased energy. The patient also expressed concern with the ongoing low grade fevers. TRIAGE:  Please f/u with pt and schedule an appointment/provide Dr. Chandrika Bailey recommendations. Thank you!

## 2020-08-18 NOTE — PATIENT INSTRUCTIONS
Thank you for choosing Rm Andre MD at [place] [address]  To Do: [address]  1. Please take meds as directed. [facility] is located in [address]. Monday, Tuesday & Friday – 8 a.m. to 4 p.m. Wednesday, Thursday – 7 a.m. to 3 p. outweigh those potential risks and we strive to make you healthier and to improve your quality of life.     Referrals, and Radiology Information:    If your insurance requires a referral to a specialist, please allow 5 business days to process your referral

## 2020-08-18 NOTE — PROGRESS NOTES
Virtual Telephone Check-In    Jonah Moritz verbally consents to a Virtual/Telephone Check-In visit on 08/18/20. Patient has been referred to the [facility] website at [email or web address] to review the yearly Consent to Treat document.     Patient unders

## 2020-08-18 NOTE — PROGRESS NOTES
Initial Post Discharge Follow Up   Discharge Date: 8/16/20  Contact Date: 8/18/2020    Consent Verification:  Assessment Completed With: Patient  HIPAA Verified?   Yes    Discharge Dx:     Large subcapsular hematoma of right kidney without active extrava patient is also taking Norco. The patient verbalized understanding. The patient was instructed to call the PCP if a fever is at 100.4 or higher. The patient verbalized understanding. A TE was sent to the PCP's office to provide an update on symptoms.  The p were your discharge instructions explained to you?   o On a scale of 1-5   1- Very Poor and 5- Very well   o Very well  • Do you have any questions about your discharge instructions? No  • Before leaving the hospital was your diagnoses explained to you?  Samuel Armas apply route every 7 days.  30 each 0   • Amitriptyline HCl 10 MG Oral Tab TAKE 8 TABLETS BY MOUTH AT BEDTIME 240 tablet 5   • Fluticasone-Umeclidin-Vilant (TRELEGY ELLIPTA) 100-62.5-25 MCG/INH Inhalation Aerosol Powder, Breath Activated Inhale 1 puff into t The patient has been completing deep breathing exercises daily as directed. The patient will be bringing this to [place] as well.      Needs post D/C:   Now that you are home, are there any needs or concerns you need addressed before your next visit with alex this information with the appointment request.      NCM Reviewed/scheduled/rescheduled PCP TCM/HFU appointment with pt:  Yes      Have you made all of your follow up appointments? no; as mentioned above.      Is there any reason as to why you cannot make yo

## 2020-08-31 RX ORDER — AZITHROMYCIN 250 MG/1
TABLET, FILM COATED ORAL
Qty: 6 TABLET | Refills: 1 | OUTPATIENT
Start: 2020-08-31

## 2020-09-01 ENCOUNTER — OFFICE VISIT (OUTPATIENT)
Dept: FAMILY MEDICINE CLINIC | Facility: CLINIC | Age: 55
End: 2020-09-01
Payer: COMMERCIAL

## 2020-09-01 VITALS
DIASTOLIC BLOOD PRESSURE: 84 MMHG | WEIGHT: 219 LBS | BODY MASS INDEX: 38.8 KG/M2 | OXYGEN SATURATION: 98 % | RESPIRATION RATE: 16 BRPM | TEMPERATURE: 97 F | HEIGHT: 63 IN | SYSTOLIC BLOOD PRESSURE: 122 MMHG | HEART RATE: 80 BPM

## 2020-09-01 DIAGNOSIS — I87.2 VENOUS INSUFFICIENCY: ICD-10-CM

## 2020-09-01 DIAGNOSIS — S37.011D HEMATOMA OF RIGHT KIDNEY, SUBSEQUENT ENCOUNTER: Primary | ICD-10-CM

## 2020-09-01 DIAGNOSIS — E66.9 OBESITY (BMI 30-39.9): ICD-10-CM

## 2020-09-01 PROCEDURE — 99214 OFFICE O/P EST MOD 30 MIN: CPT | Performed by: FAMILY MEDICINE

## 2020-09-01 PROCEDURE — 3074F SYST BP LT 130 MM HG: CPT | Performed by: FAMILY MEDICINE

## 2020-09-01 PROCEDURE — 3008F BODY MASS INDEX DOCD: CPT | Performed by: FAMILY MEDICINE

## 2020-09-01 PROCEDURE — 3079F DIAST BP 80-89 MM HG: CPT | Performed by: FAMILY MEDICINE

## 2020-09-01 NOTE — PATIENT INSTRUCTIONS
Thank you for choosing Geena Vergara MD at [place] [address]  To Do: [address]  1. Please take meds as directed. [facility] is located in [address]. Monday, Tuesday & Friday – 8 a.m. to 4 p.m. Wednesday, Thursday – 7 a.m. to 3 p. outweigh those potential risks and we strive to make you healthier and to improve your quality of life.     Referrals, and Radiology Information:    If your insurance requires a referral to a specialist, please allow 5 business days to process your referral

## 2020-09-01 NOTE — PROGRESS NOTES
HPI:    Patient ID: Bhakti Fulton is a 47year old female. HPI  Ms. Reyes is a pleasant 30-year-old female with multiple medical conditions presenting for a phone visit follow-up after she was hospitalized on 8/14/2020 at [facility] for a large r TABLETS BY MOUTH EVERY  tablet 0   • alendronate 70 MG Oral Tab Take 1 tablet (70 mg total) by mouth once a week.  12 tablet 3   • Potassium Citrate ER (UROCIT-K 15) 15 MEQ (1620 MG) Oral Tab CR Take one tablet  tablet 4   • Albuterol Sulfate supple. Cardiovascular: Normal rate, regular rhythm and normal heart sounds. No murmur heard. Pulmonary/Chest: Effort normal. No respiratory distress. She has no wheezes. She has no rales. Abdominal: Soft.  Bowel sounds are normal. She exhibits no di

## 2020-09-08 ENCOUNTER — HOSPITAL ENCOUNTER (OUTPATIENT)
Dept: ULTRASOUND IMAGING | Age: 55
Discharge: HOME | End: 2020-09-08
Attending: OBSTETRICS & GYNECOLOGY
Payer: COMMERCIAL

## 2020-09-08 DIAGNOSIS — S37.019A PERINEPHRIC HEMATOMA: ICD-10-CM

## 2020-09-08 PROCEDURE — 76770 US EXAM ABDO BACK WALL COMP: CPT | Performed by: OBSTETRICS & GYNECOLOGY

## 2020-09-09 NOTE — PROGRESS NOTES
Dr. Irvin De Los Santos- please review- mildly worse from initial imaging 8/14/20  With persistent significant flank pain none

## 2020-09-11 ENCOUNTER — PATIENT MESSAGE (OUTPATIENT)
Dept: FAMILY MEDICINE CLINIC | Facility: CLINIC | Age: 55
End: 2020-09-11

## 2020-09-11 NOTE — TELEPHONE ENCOUNTER
From: Vivienne Mixon Book  To: Geena Vergara MD  Sent: 9/11/2020 4:22 AM CDT  Subject: Other    Dr Saira Gross,  I am having concerns. For the past 3 days I have had diarrhea along with nausea. Last night I was running a fever, but so far today it is down.

## 2020-09-18 ENCOUNTER — TELEPHONE (OUTPATIENT)
Dept: FAMILY MEDICINE CLINIC | Facility: CLINIC | Age: 55
End: 2020-09-18

## 2020-09-18 ENCOUNTER — OFFICE VISIT (OUTPATIENT)
Dept: FAMILY MEDICINE CLINIC | Facility: CLINIC | Age: 55
End: 2020-09-18
Payer: COMMERCIAL

## 2020-09-18 VITALS
TEMPERATURE: 98 F | WEIGHT: 219 LBS | HEART RATE: 68 BPM | RESPIRATION RATE: 18 BRPM | SYSTOLIC BLOOD PRESSURE: 124 MMHG | DIASTOLIC BLOOD PRESSURE: 82 MMHG | OXYGEN SATURATION: 95 % | HEIGHT: 63 IN | BODY MASS INDEX: 38.8 KG/M2

## 2020-09-18 DIAGNOSIS — S37.011D HEMATOMA OF RIGHT KIDNEY, SUBSEQUENT ENCOUNTER: ICD-10-CM

## 2020-09-18 DIAGNOSIS — Z01.818 PREOPERATIVE CLEARANCE: Primary | ICD-10-CM

## 2020-09-18 PROCEDURE — 3079F DIAST BP 80-89 MM HG: CPT | Performed by: FAMILY MEDICINE

## 2020-09-18 PROCEDURE — 99214 OFFICE O/P EST MOD 30 MIN: CPT | Performed by: FAMILY MEDICINE

## 2020-09-18 PROCEDURE — 3008F BODY MASS INDEX DOCD: CPT | Performed by: FAMILY MEDICINE

## 2020-09-18 PROCEDURE — 3074F SYST BP LT 130 MM HG: CPT | Performed by: FAMILY MEDICINE

## 2020-09-18 RX ORDER — AZITHROMYCIN 250 MG/1
TABLET, FILM COATED ORAL
Qty: 6 TABLET | Refills: 0 | Status: SHIPPED | OUTPATIENT
Start: 2020-09-18 | End: 2020-09-23

## 2020-09-18 NOTE — PROGRESS NOTES
Preoperative History and Physical Internal Medicine    CC: Patient presents with:  Pre-Op Exam: drainage of hematoma with Dr. Savannah Cortez on 9/23/2020      Dana Harmon is 47year old presenting for a preoperative exam.    This patient is having surgery on d • Blurred vision not sure    hard to focus at times.    • Body piercing 1981    ears, 2 holes each   • Calculus of kidney 25 yrs    had one last yr/ first one in 7 yrs   • [facility]    • Change in hair 6/2018    improved after starting k-pax   • Chroni difficult that it does hurt.    • Pneumonia, organism unspecified(486)    • Prediabetes    • Problems with swallowing     food gets stuck   • Shortness of breath    • Skin blushing/flushing not sure    yes, sometimes due to food or drink, sick & times no cl lithotomy, several kidney stones   • OTHER  2008    thyroidectomy   • OTHER SURGICAL HISTORY      back surgery, L4-5 discectomy   • OTHER SURGICAL HISTORY      ablation of vaginal lesion   • SINUS SURGERY    03/28/2017   • THYROIDECTOMY  1/2008    complete stroke (blood clot to stem of brain)   • Other (MS) Sister    • Cancer Self         THYROID   • Breast Cancer Paternal Cousin Female 47        estimate  2nd cousin   • Other (thyroid cancer) Daughter    • Alcohol and Other Disorders Associated Brother SODIUM 5 MG Oral Chew Tab, CHEW AND SWALLOW 2 TABLETS BY MOUTH EVERY DAY, Disp: 180 tablet, Rfl: 0  •  alendronate 70 MG Oral Tab, Take 1 tablet (70 mg total) by mouth once a week., Disp: 12 tablet, Rfl: 3  •  Potassium Citrate ER (UROCIT-K 15) 15 MEQ (162 throat and trouble swallowing. Respiratory: Negative for cough and shortness of breath. Cardiovascular: Negative for chest pain, palpitations and leg swelling. Gastrointestinal: Negative for nausea, vomiting and diarrhea.    Neurological: Negative f • Multistix Expiration Date 09/14/2020 6/2,021        Assessment and Plan:  Paula Carroll is a 47year old female here for Patient presents with:  Pre-Op Exam: drainage of hematoma with Dr. Kelli Garcia on 9/23/2020    Preoperative clearance  (primary encoun

## 2020-09-18 NOTE — TELEPHONE ENCOUNTER
Surgical clearance faxed to Dr. Malave Lankin office at [phone] with fax confirmation received.

## 2020-09-18 NOTE — PATIENT INSTRUCTIONS
Thank you for choosing Vasyl Faulkner MD at [place] [address]  To Do: [address]  1. Considerations in the Preoperative period:   Surgery is a big deal.  Anesthesia and your medicines and medical issues all stress out your body.   Therefore read a reuptake inhibitors like zoloft, effexor, paxil, prozac, citalopram, remeron etc. For mood should be held 3 weeks before surgery if high risk of bleeding as these may increase risk of bleeding  Most other psychiatric meds like antipsychotic meds, anxiety m done so.  All your results will post on there.  [email or web address]  • You can NOW use BO.LT to book your appointments with us, or consider using open access scheduling which is through the edward website [email or web address] and type i please call Central Scheduling at [phone]  Please allow our office 5 business days to contact you regarding any testing results.     Refill policies:   Allow 3 business days for refills; controlled substances may take longer and must be picked up from

## 2020-09-21 ENCOUNTER — APPOINTMENT (OUTPATIENT)
Dept: LAB | Age: 55
End: 2020-09-21
Attending: OBSTETRICS & GYNECOLOGY
Payer: COMMERCIAL

## 2020-09-21 DIAGNOSIS — S37.011D HEMATOMA OF RIGHT KIDNEY, SUBSEQUENT ENCOUNTER: ICD-10-CM

## 2020-09-22 LAB — SARS-COV-2 RNA RESP QL NAA+PROBE: NOT DETECTED

## 2020-09-23 ENCOUNTER — NURSE ONLY (OUTPATIENT)
Dept: LAB | Facility: HOSPITAL | Age: 55
End: 2020-09-23
Attending: OBSTETRICS & GYNECOLOGY
Payer: COMMERCIAL

## 2020-09-23 ENCOUNTER — HOSPITAL ENCOUNTER (OUTPATIENT)
Dept: CT IMAGING | Facility: HOSPITAL | Age: 55
Discharge: HOME | End: 2020-09-23
Attending: OBSTETRICS & GYNECOLOGY
Payer: COMMERCIAL

## 2020-09-23 VITALS
WEIGHT: 219 LBS | RESPIRATION RATE: 18 BRPM | BODY MASS INDEX: 38.8 KG/M2 | OXYGEN SATURATION: 100 % | DIASTOLIC BLOOD PRESSURE: 79 MMHG | HEART RATE: 66 BPM | HEIGHT: 63 IN | SYSTOLIC BLOOD PRESSURE: 134 MMHG | TEMPERATURE: 98 F

## 2020-09-23 DIAGNOSIS — S37.011D HEMATOMA OF RIGHT KIDNEY, SUBSEQUENT ENCOUNTER: ICD-10-CM

## 2020-09-23 DIAGNOSIS — S37.011D HEMATOMA OF RIGHT KIDNEY, SUBSEQUENT ENCOUNTER: Primary | ICD-10-CM

## 2020-09-23 LAB
DEPRECATED RDW RBC AUTO: 43.7 FL (ref 35.1–46.3)
ERYTHROCYTE [DISTWIDTH] IN BLOOD BY AUTOMATED COUNT: 13.3 % (ref 11–15)
GLUCOSE BLD-MCNC: 83 MG/DL (ref 70–99)
HCT VFR BLD AUTO: 45 %
HGB BLD-MCNC: 14.4 G/DL
INR BLD: 1 (ref 0.89–1.11)
MCH RBC QN AUTO: 28.7 PG (ref 26–34)
MCHC RBC AUTO-ENTMCNC: 32 G/DL (ref 31–37)
MCV RBC AUTO: 89.6 FL
PLATELET # BLD AUTO: 338 10(3)UL (ref 150–450)
PSA SERPL DL<=0.01 NG/ML-MCNC: 13.5 SECONDS (ref 12.4–14.6)
RBC # BLD AUTO: 5.02 X10(6)UL
WBC # BLD AUTO: 8.3 X10(3) UL (ref 4–11)

## 2020-09-23 PROCEDURE — 76937 US GUIDE VASCULAR ACCESS: CPT

## 2020-09-23 PROCEDURE — 87102 FUNGUS ISOLATION CULTURE: CPT | Performed by: OBSTETRICS & GYNECOLOGY

## 2020-09-23 PROCEDURE — 87205 SMEAR GRAM STAIN: CPT | Performed by: OBSTETRICS & GYNECOLOGY

## 2020-09-23 PROCEDURE — 36415 COLL VENOUS BLD VENIPUNCTURE: CPT

## 2020-09-23 PROCEDURE — 87070 CULTURE OTHR SPECIMN AEROBIC: CPT | Performed by: OBSTETRICS & GYNECOLOGY

## 2020-09-23 PROCEDURE — 87206 SMEAR FLUORESCENT/ACID STAI: CPT | Performed by: OBSTETRICS & GYNECOLOGY

## 2020-09-23 PROCEDURE — 36410 VNPNXR 3YR/> PHY/QHP DX/THER: CPT

## 2020-09-23 PROCEDURE — 87075 CULTR BACTERIA EXCEPT BLOOD: CPT | Performed by: OBSTETRICS & GYNECOLOGY

## 2020-09-23 PROCEDURE — 85027 COMPLETE CBC AUTOMATED: CPT

## 2020-09-23 PROCEDURE — 82962 GLUCOSE BLOOD TEST: CPT

## 2020-09-23 PROCEDURE — 87116 MYCOBACTERIA CULTURE: CPT | Performed by: OBSTETRICS & GYNECOLOGY

## 2020-09-23 PROCEDURE — 85610 PROTHROMBIN TIME: CPT

## 2020-09-23 PROCEDURE — 99152 MOD SED SAME PHYS/QHP 5/>YRS: CPT | Performed by: OBSTETRICS & GYNECOLOGY

## 2020-09-23 PROCEDURE — 49405 IMAGE CATH FLUID COLXN VISC: CPT | Performed by: OBSTETRICS & GYNECOLOGY

## 2020-09-23 RX ORDER — SODIUM CHLORIDE 9 MG/ML
INJECTION, SOLUTION INTRAVENOUS CONTINUOUS
Status: DISCONTINUED | OUTPATIENT
Start: 2020-09-23 | End: 2020-09-25

## 2020-09-23 RX ORDER — NALOXONE HYDROCHLORIDE 0.4 MG/ML
80 INJECTION, SOLUTION INTRAMUSCULAR; INTRAVENOUS; SUBCUTANEOUS AS NEEDED
Status: DISCONTINUED | OUTPATIENT
Start: 2020-09-23 | End: 2020-09-25

## 2020-09-23 RX ORDER — MIDAZOLAM HYDROCHLORIDE 1 MG/ML
INJECTION INTRAMUSCULAR; INTRAVENOUS
Status: COMPLETED
Start: 2020-09-23 | End: 2020-09-23

## 2020-09-23 RX ORDER — MIDAZOLAM HYDROCHLORIDE 1 MG/ML
1 INJECTION INTRAMUSCULAR; INTRAVENOUS EVERY 5 MIN PRN
Status: ACTIVE | OUTPATIENT
Start: 2020-09-23 | End: 2020-09-23

## 2020-09-23 RX ORDER — FLUMAZENIL 0.1 MG/ML
0.2 INJECTION, SOLUTION INTRAVENOUS AS NEEDED
Status: DISCONTINUED | OUTPATIENT
Start: 2020-09-23 | End: 2020-09-25

## 2020-09-23 RX ADMIN — SODIUM CHLORIDE 200 ML: 9 INJECTION, SOLUTION INTRAVENOUS at 10:38:00

## 2020-09-23 RX ADMIN — MIDAZOLAM HYDROCHLORIDE 0.5 MG: 1 INJECTION INTRAMUSCULAR; INTRAVENOUS at 10:48:00

## 2020-09-23 RX ADMIN — MIDAZOLAM HYDROCHLORIDE 1 MG: 1 INJECTION INTRAMUSCULAR; INTRAVENOUS at 10:41:00

## 2020-09-23 NOTE — IMAGING NOTE
Patient tolerated procedure well, VSS on RA, presently denies pain, Sorbaview dressing to right lower back area is CDI with KIRAN bulb drain.  Patient and  updated on procedure and plan of care, report called to [facility] RN and transported to room 225

## 2020-09-23 NOTE — PROCEDURES
[facility]  Procedure Note    [address] Patient Status:  Outpatient    1965 MRN [id number]   [facility] CT Attending Julia Clinton, [address] Day # 0 PCP Hillary Hennessy MD     Procedure: CT guided drain placement R renal

## 2020-09-29 ENCOUNTER — APPOINTMENT (OUTPATIENT)
Dept: CT IMAGING | Facility: HOSPITAL | Age: 55
End: 2020-09-29
Attending: EMERGENCY MEDICINE
Payer: COMMERCIAL

## 2020-09-29 ENCOUNTER — HOSPITAL ENCOUNTER (OUTPATIENT)
Facility: HOSPITAL | Age: 55
Setting detail: OBSERVATION
Discharge: HOME | End: 2020-10-01
Attending: EMERGENCY MEDICINE | Admitting: INTERNAL MEDICINE
Payer: COMMERCIAL

## 2020-09-29 ENCOUNTER — HOSPITAL ENCOUNTER (OUTPATIENT)
Dept: CT IMAGING | Facility: HOSPITAL | Age: 55
Discharge: HOME | End: 2020-09-29
Attending: OBSTETRICS & GYNECOLOGY
Payer: COMMERCIAL

## 2020-09-29 DIAGNOSIS — S37.011A HEMATOMA OF RIGHT KIDNEY, INITIAL ENCOUNTER: Primary | ICD-10-CM

## 2020-09-29 DIAGNOSIS — S37.011S HEMATOMA OF RIGHT KIDNEY, SEQUELA: ICD-10-CM

## 2020-09-29 PROBLEM — D72.829 LEUKOCYTOSIS: Status: ACTIVE | Noted: 2020-09-29

## 2020-09-29 PROCEDURE — 74177 CT ABD & PELVIS W/CONTRAST: CPT | Performed by: EMERGENCY MEDICINE

## 2020-09-29 PROCEDURE — 49424 ASSESS CYST CONTRAST INJECT: CPT | Performed by: OBSTETRICS & GYNECOLOGY

## 2020-09-29 PROCEDURE — 99220 INITIAL OBSERVATION CARE,LEVL III: CPT | Performed by: INTERNAL MEDICINE

## 2020-09-29 PROCEDURE — 76080 X-RAY EXAM OF FISTULA: CPT | Performed by: OBSTETRICS & GYNECOLOGY

## 2020-09-29 RX ORDER — SODIUM CHLORIDE 9 MG/ML
INJECTION, SOLUTION INTRAVENOUS CONTINUOUS
Status: CANCELLED | OUTPATIENT
Start: 2020-09-29 | End: 2020-09-30

## 2020-09-29 RX ORDER — ONDANSETRON 2 MG/ML
4 INJECTION INTRAMUSCULAR; INTRAVENOUS EVERY 4 HOURS PRN
Status: CANCELLED | OUTPATIENT
Start: 2020-09-29

## 2020-09-29 RX ORDER — MORPHINE SULFATE 4 MG/ML
4 INJECTION, SOLUTION INTRAMUSCULAR; INTRAVENOUS EVERY 30 MIN PRN
Status: CANCELLED | OUTPATIENT
Start: 2020-09-29 | End: 2020-09-30

## 2020-09-29 RX ORDER — MORPHINE SULFATE 4 MG/ML
4 INJECTION, SOLUTION INTRAMUSCULAR; INTRAVENOUS EVERY 30 MIN PRN
Status: DISCONTINUED | OUTPATIENT
Start: 2020-09-29 | End: 2020-09-30

## 2020-09-30 PROCEDURE — 99225 SUBSEQUENT OBSERVATION CARE: CPT | Performed by: HOSPITALIST

## 2020-09-30 RX ORDER — LEVOTHYROXINE SODIUM 0.15 MG/1
150 TABLET ORAL
Status: DISCONTINUED | OUTPATIENT
Start: 2020-09-30 | End: 2020-10-01

## 2020-09-30 RX ORDER — PANTOPRAZOLE SODIUM 20 MG/1
20 TABLET, DELAYED RELEASE ORAL
Status: DISCONTINUED | OUTPATIENT
Start: 2020-09-30 | End: 2020-10-01

## 2020-09-30 RX ORDER — HYDROCODONE BITARTRATE AND ACETAMINOPHEN 5; 325 MG/1; MG/1
2 TABLET ORAL EVERY 4 HOURS PRN
Status: DISCONTINUED | OUTPATIENT
Start: 2020-09-30 | End: 2020-09-30

## 2020-09-30 RX ORDER — POLYETHYLENE GLYCOL 3350 17 G/17G
17 POWDER, FOR SOLUTION ORAL DAILY
Status: DISCONTINUED | OUTPATIENT
Start: 2020-09-30 | End: 2020-10-01

## 2020-09-30 RX ORDER — OXYCODONE HYDROCHLORIDE 10 MG/1
10 TABLET ORAL EVERY 4 HOURS PRN
Status: DISCONTINUED | OUTPATIENT
Start: 2020-09-30 | End: 2020-10-01

## 2020-09-30 RX ORDER — MORPHINE SULFATE 2 MG/ML
1 INJECTION, SOLUTION INTRAMUSCULAR; INTRAVENOUS EVERY 2 HOUR PRN
Status: DISCONTINUED | OUTPATIENT
Start: 2020-09-30 | End: 2020-09-30

## 2020-09-30 RX ORDER — BISACODYL 10 MG
10 SUPPOSITORY, RECTAL RECTAL
Status: DISCONTINUED | OUTPATIENT
Start: 2020-09-30 | End: 2020-10-01

## 2020-09-30 RX ORDER — MONTELUKAST SODIUM 5 MG/1
10 TABLET, CHEWABLE ORAL DAILY
Status: DISCONTINUED | OUTPATIENT
Start: 2020-09-30 | End: 2020-10-01

## 2020-09-30 RX ORDER — MORPHINE SULFATE 2 MG/ML
2 INJECTION, SOLUTION INTRAMUSCULAR; INTRAVENOUS EVERY 2 HOUR PRN
Status: DISCONTINUED | OUTPATIENT
Start: 2020-09-30 | End: 2020-09-30

## 2020-09-30 RX ORDER — CETIRIZINE HYDROCHLORIDE 10 MG/1
10 TABLET ORAL DAILY
Status: DISCONTINUED | OUTPATIENT
Start: 2020-09-30 | End: 2020-10-01

## 2020-09-30 RX ORDER — MORPHINE SULFATE 4 MG/ML
4 INJECTION, SOLUTION INTRAMUSCULAR; INTRAVENOUS EVERY 2 HOUR PRN
Status: DISCONTINUED | OUTPATIENT
Start: 2020-09-30 | End: 2020-09-30

## 2020-09-30 RX ORDER — AMITRIPTYLINE HYDROCHLORIDE 10 MG/1
30 TABLET, FILM COATED ORAL NIGHTLY PRN
Status: DISCONTINUED | OUTPATIENT
Start: 2020-09-30 | End: 2020-10-01

## 2020-09-30 RX ORDER — HYDROMORPHONE HYDROCHLORIDE 1 MG/ML
0.6 INJECTION, SOLUTION INTRAMUSCULAR; INTRAVENOUS; SUBCUTANEOUS
Status: DISCONTINUED | OUTPATIENT
Start: 2020-09-30 | End: 2020-10-01

## 2020-09-30 RX ORDER — POLYETHYLENE GLYCOL 3350 17 G/17G
17 POWDER, FOR SOLUTION ORAL DAILY PRN
Status: DISCONTINUED | OUTPATIENT
Start: 2020-09-30 | End: 2020-09-30

## 2020-09-30 RX ORDER — DEXTROSE MONOHYDRATE 25 G/50ML
50 INJECTION, SOLUTION INTRAVENOUS
Status: DISCONTINUED | OUTPATIENT
Start: 2020-09-30 | End: 2020-10-01

## 2020-09-30 RX ORDER — SODIUM PHOSPHATE, DIBASIC AND SODIUM PHOSPHATE, MONOBASIC 7; 19 G/133ML; G/133ML
1 ENEMA RECTAL ONCE AS NEEDED
Status: DISCONTINUED | OUTPATIENT
Start: 2020-09-30 | End: 2020-10-01

## 2020-09-30 RX ORDER — ALPRAZOLAM 0.25 MG/1
0.25 TABLET ORAL NIGHTLY PRN
Status: DISCONTINUED | OUTPATIENT
Start: 2020-09-30 | End: 2020-10-01

## 2020-09-30 RX ORDER — POTASSIUM CHLORIDE 14.9 MG/ML
20 INJECTION INTRAVENOUS ONCE
Status: COMPLETED | OUTPATIENT
Start: 2020-09-30 | End: 2020-09-30

## 2020-09-30 RX ORDER — HYDROCODONE BITARTRATE AND ACETAMINOPHEN 5; 325 MG/1; MG/1
1 TABLET ORAL EVERY 4 HOURS PRN
Status: DISCONTINUED | OUTPATIENT
Start: 2020-09-30 | End: 2020-09-30

## 2020-09-30 RX ORDER — ACETAMINOPHEN 500 MG
1000 TABLET ORAL EVERY 8 HOURS
Status: DISCONTINUED | OUTPATIENT
Start: 2020-09-30 | End: 2020-10-01

## 2020-09-30 RX ORDER — PROCHLORPERAZINE EDISYLATE 5 MG/ML
10 INJECTION INTRAMUSCULAR; INTRAVENOUS EVERY 6 HOURS PRN
Status: DISCONTINUED | OUTPATIENT
Start: 2020-09-30 | End: 2020-10-01

## 2020-09-30 RX ORDER — ACETAMINOPHEN 325 MG/1
650 TABLET ORAL EVERY 4 HOURS PRN
Status: DISCONTINUED | OUTPATIENT
Start: 2020-09-30 | End: 2020-09-30

## 2020-09-30 RX ORDER — ACETAMINOPHEN 325 MG/1
650 TABLET ORAL EVERY 6 HOURS PRN
Status: DISCONTINUED | OUTPATIENT
Start: 2020-09-30 | End: 2020-09-30

## 2020-09-30 RX ORDER — SENNOSIDES 8.6 MG
8.6 TABLET ORAL DAILY
Status: DISCONTINUED | OUTPATIENT
Start: 2020-09-30 | End: 2020-10-01

## 2020-09-30 RX ORDER — ALBUTEROL SULFATE 90 UG/1
1 AEROSOL, METERED RESPIRATORY (INHALATION) EVERY 6 HOURS PRN
Status: DISCONTINUED | OUTPATIENT
Start: 2020-09-30 | End: 2020-10-01

## 2020-09-30 RX ORDER — DOCUSATE SODIUM 100 MG/1
100 CAPSULE, LIQUID FILLED ORAL 2 TIMES DAILY
Status: DISCONTINUED | OUTPATIENT
Start: 2020-09-30 | End: 2020-09-30

## 2020-09-30 RX ORDER — ONDANSETRON 2 MG/ML
4 INJECTION INTRAMUSCULAR; INTRAVENOUS EVERY 6 HOURS PRN
Status: DISCONTINUED | OUTPATIENT
Start: 2020-09-30 | End: 2020-10-01

## 2020-09-30 NOTE — H&P
[facility]  History and Physical     Jennifer Ayala Book Patient Status:  Emergency    1965 MRN [id number]   Location [address] Attending No att. providers found   [address] Day # 0 PCP Harrison Abbasi MD     Chief Comp 7 yrs   • Cancer [facility])    • Change in hair 6/2018    improved after starting k-pax   • Chronic cough 5/4/2018    dx: Neuropathic Sensory Cough   • Constipation years    still have at times, but not as often.    • Disorder of liver     fatty liver   • Disord Skin blushing/flushing not sure    yes, sometimes due to food or drink, sick & times no clue   • Sleep disturbance 9/11/2018    probably due to steroids   • Stool incontinence 8/8/2018    normally NO. About a month ago this did happen.    • Thyroid cancer HISTORY      ablation of vaginal lesion   • SINUS SURGERY    03/28/2017   • THYROIDECTOMY  1/2008    complete   • TOTAL ABDOM HYSTERECTOMY     • UPPER GI ENDOSCOPY - REFERRAL  8/2014    otherwise unremarkable exam. Upper and lower esophageal biopsies taken alchoholic   • Diabetes Brother         Type 2   • Diabetes Paternal Aunt         Type 2   • Diabetes Maternal Uncle         Type 2   • Diabetes Paternal Uncle         Type 2   • Mental Disorder Paternal Aunt         Dimensia   • Other (Other) Sister Insulin Pen Needle (PEN NEEDLES) 32G X 4 MM Does not apply Misc, 1 each by Does not apply route every 7 days. , Disp: 30 each, Rfl: 0    •  Amitriptyline HCl 10 MG Oral Tab, TAKE 8 TABLETS BY MOUTH AT BEDTIME, Disp: 240 tablet, Rfl: 5    •  Fluticasone-Ume organomegaly. Neurologic: No focal neurological deficits. CNII-XII grossly intact. Musculoskeletal: Moves all extremities. Extremities: No edema or cyanosis. Integument: No rashes or lesions. Psychiatric: Appropriate mood and affect.       Diagnostic

## 2020-09-30 NOTE — RESPIRATORY THERAPY NOTE
DARSHANA : EQUIPMENT USE: DAILY SUMMARY                                            SET MODE: CPAP WITH CFLEX                                          USAGE IN HOURS:3:45                                          90% EXP.

## 2020-09-30 NOTE — ED INITIAL ASSESSMENT (HPI)
Patient had a lithrotripsy on 8/7. Patient developed hematoma around kidney and required drain placement on 9/23. Today, patient had drain removed. Patient states she felt good, went home and napped.  After waking from nap, she developed pain where drain wa

## 2020-09-30 NOTE — CONSULTS
[facility]    Report of Consultation    Nikki Reyes Patient Status:  Observation    1965 MRN [id number]   [facility] 3NW-A Attending Patrecia Cooks, MD   Hosp Day # 0 PCP Lizz Fontanez MD     Date of Admission:   hard to focus at times.    • Body piercing 1981    ears, 2 holes each   • Calculus of kidney 25 yrs    had one last yr/ first one in 7 yrs   • Cancer ([facility]    • Change in hair 6/2018    improved after starting k-pax   • Chronic cough 5/4/2018    dx: Hannah Leonard • Pneumonia, organism unspecified(486)    • Prediabetes    • Problems with swallowing     food gets stuck   • Shortness of breath    • Skin blushing/flushing not sure    yes, sometimes due to food or drink, sick & times no clue   • Sleep disturbance 9/11 OTHER  2008    thyroidectomy   • OTHER SURGICAL HISTORY      back surgery, L4-5 discectomy   • OTHER SURGICAL HISTORY      ablation of vaginal lesion   • SINUS SURGERY    03/28/2017   • THYROIDECTOMY  1/2008    complete   • TOTAL ABDOM HYSTERECTOMY     • U Diabetes Paternal Uncle         Type 2   • Mental Disorder Paternal Aunt         Dimensia   • Other (Other) Sister         Sjogren's Syndrome (rheumatoid) / Thin basement membrane disease (kidney) / Papillary Carcinoma Stage 1   • Other (Other) Daughter tab 650 mg, 650 mg, Oral, Q4H PRN **OR** HYDROcodone-acetaminophen (NORCO) 5-325 MG per tab 1 tablet, 1 tablet, Oral, Q4H PRN **OR** HYDROcodone-acetaminophen (NORCO) 5-325 MG per tab 2 tablet, 2 tablet, Oral, Q4H PRN  •  morphINE sulfate (PF) 2 MG/ML inje normal  Psych appropriate affect and mood    Laboratory Data:  Lab Results   Component Value Date    WBC 18.2 09/30/2020    HGB 13.8 09/30/2020    HCT 42.1 09/30/2020    .0 09/30/2020    CREATSERUM 0.97 09/30/2020    BUN 13 09/30/2020     09/3 hypertension     Obesity (BMI 30-39. 9)     Malignant neoplasm of thyroid gland (HCC)     Allergic rhinitis     Other and unspecified coagulation defects     Anxiety     Fatigue     Hyperlipidemia     Encounter for therapeutic drug monitoring     Elevated L controlled with IV pain medications    Leukocytosis  Reactive vs infectious  Low grade temps of 99.7  UA negative  Blood cultures pending    Nephrolithiasis  Non obstructing  No acute intervention indicated    Recommendations:  Await cultures, start Roceph

## 2020-09-30 NOTE — ED NOTES
Call made to [facility] charge RN regarding bed status, Hang Mcfarland RN states room is ready and ok to send pt. Transport arranged. Pt sleeping at this time.

## 2020-09-30 NOTE — PLAN OF CARE
Pt alert and orientatedx4. Room air. Pulse Ox. Tele- NSR. DARSHANA w/ CPAP. SCDs. Voiding. NPO w/ ice chips and sips w/ meds. Morphine used for pain management. Zofran given for nausea. Up ad pelon. Accucheck Q6H. Saline locked.  Dressing on lower right back, 2x2s Provide assistive devices as appropriate  - Consider OT/PT consult to assist with strengthening/mobility  - Encourage toileting schedule  Outcome: Progressing     Problem: DISCHARGE PLANNING  Goal: Discharge to home or other facility with appropriate resou

## 2020-09-30 NOTE — ED PROVIDER NOTES
Patient Seen in: [facility] Emergency Department      History   Patient presents with:  Pain    Stated Complaint: had drain removed, painful and swelling now    HPI    51-year-old with a history of asthma, kidney stones, hypothyroidism, recent drain 5/4/2018    dx: Neuropathic Sensory Cough   • Constipation years    still have at times, but not as often. • Disorder of liver     fatty liver   • Disorder of thyroid    • Dizziness 9/13/2018    off and on.    • Dyspnea 8/16/2016   • Endometriosis 12/1995 Sleep disturbance 9/11/2018    probably due to steroids   • Stool incontinence 8/8/2018    normally NO. About a month ago this did happen.    • Thyroid cancer ([facility] 2008    s/p PERRY, stage 1   • Type 2 diabetes mellitus with other specified complication (HC TOTAL ABDOM HYSTERECTOMY     • UPPER GI ENDOSCOPY - REFERRAL  8/2014    otherwise unremarkable exam. Upper and lower esophageal biopsies taken (-) eosinophilic esophagitis, and empiric dilation to 62 Arabic                     Social History    Tobacco Use Notable for the following components:    WBC 15.3 (*)     RBC 5.37 (*)     Neutrophil Absolute Prelim 12.45 (*)     Neutrophil Absolute 12.45 (*)     All other components within normal limits   CBC WITH DIFFERENTIAL WITH PLATELET    Narrative:      The foll distended limiting evaluation. PELVIC NODES:  No adenopathy. PELVIC ORGANS:  Uterus has been surgically removed. No free fluid. BONES:  Stable including degenerative change. No bony lesion or fracture.     LUNG BASES:  Bibasilar dependent atelectat

## 2020-09-30 NOTE — PROGRESS NOTES
[facility]  Progress Note     [address] Patient Status:  Observation    1965 MRN [id number]   [facility] 3NW-A Attending Estefania Powell MD   Hosp Day # 0 PCP Harrison Abbasi MD     Chief Complaint: subcapsular hematoma input(s): CRP, KOMAL, LDH, DDIMER in the last 168 hours. Imaging: Imaging data reviewed in Epic.     Medications:   • Fluticasone-Umeclidin-Vilant  1 puff Inhalation Daily   • Levothyroxine Sodium  150 mcg Oral Before breakfast   • cetirizine  10 mg Oral D

## 2020-09-30 NOTE — ED NOTES
Report given to Batsheva Garcia RN. Awaiting room to be cleaned. Pt updated with plan, currently resting on bed with no c/o at this time.

## 2020-09-30 NOTE — PLAN OF CARE
Problem: PAIN - ADULT  Goal: Verbalizes/displays adequate comfort level or patient's stated pain goal  Description: INTERVENTIONS:  - Encourage pt to monitor pain and request assistance  - Assess pain using appropriate pain scale  - Administer analgesics appropriate  - Identify discharge learning needs (meds, wound care, etc)  - Arrange for interpreters to assist at discharge as needed  - Consider post-discharge preferences of patient/family/discharge partner  - Complete POLST form as appropriate  - Assess

## 2020-09-30 NOTE — PROGRESS NOTES
1220: Spoke with IR; was told drain will not be placed today. 1230: Called Khalida Gonzalez to notify of pt update. Left message. 1300: Pain management paged regarding new consult. 1415: Spoke with pain management with Dr. Martina Boxer.  Hannah Crane spoke wit

## 2020-10-01 VITALS
RESPIRATION RATE: 18 BRPM | DIASTOLIC BLOOD PRESSURE: 59 MMHG | HEIGHT: 63 IN | OXYGEN SATURATION: 93 % | SYSTOLIC BLOOD PRESSURE: 122 MMHG | BODY MASS INDEX: 38.8 KG/M2 | WEIGHT: 219 LBS | HEART RATE: 89 BPM | TEMPERATURE: 98 F

## 2020-10-01 PROCEDURE — 99217 OBSERVATION CARE DISCHARGE: CPT | Performed by: HOSPITALIST

## 2020-10-01 RX ORDER — OXYCODONE HYDROCHLORIDE 10 MG/1
10 TABLET ORAL EVERY 4 HOURS PRN
Qty: 30 TABLET | Refills: 0 | Status: ON HOLD | OUTPATIENT
Start: 2020-10-01 | End: 2020-10-10

## 2020-10-01 RX ORDER — POTASSIUM CHLORIDE 20 MEQ/1
40 TABLET, EXTENDED RELEASE ORAL EVERY 4 HOURS
Status: COMPLETED | OUTPATIENT
Start: 2020-10-01 | End: 2020-10-01

## 2020-10-01 RX ORDER — POLYETHYLENE GLYCOL 3350 17 G/17G
17 POWDER, FOR SOLUTION ORAL DAILY PRN
Refills: 0 | Status: SHIPPED | COMMUNITY
Start: 2020-10-01 | End: 2020-12-03 | Stop reason: ALTCHOICE

## 2020-10-01 RX ORDER — LEVOFLOXACIN 500 MG/1
500 TABLET, FILM COATED ORAL DAILY
Qty: 10 TABLET | Refills: 0 | Status: ON HOLD | OUTPATIENT
Start: 2020-10-01 | End: 2020-10-09

## 2020-10-01 NOTE — RESPIRATORY THERAPY NOTE
DARSHANA Equipment Usage Summary :            Set Mode :CPAP W FLEX          Usage in Hours:7;34          90% Pressure (EPAP) : 12           90% Insp Pressure (IPAP);           AHI : 4.2

## 2020-10-01 NOTE — PROGRESS NOTES
Patient requested for prescription for Levofloxacin to be faxed to [address] [place] [address]. Fax sent to pharmacy at [phone] and confirmed with pharmacy staff that prescription has been received. Fax confirmation on physical chart.

## 2020-10-01 NOTE — PROGRESS NOTES
[facility]    Progress Note    Zacsebastián Dell Patient Status:  Observation    1965 MRN [id number]   [facility] 3NW-A Attending Ayaz Mario MD   Hosp Day # 0 PCP Lydia Goldberg MD         Subjective:   Odalys Sharpe is a Results:     Lab Results   Component Value Date    WBC 14.7 (H) 10/01/2020    HGB 13.4 10/01/2020    HCT 42.3 10/01/2020    .0 10/01/2020    CREATSERUM 1.00 10/01/2020    BUN 12 10/01/2020     10/01/2020    K 3.5 10/01/2020     Finalized by (CST): Parker Gandara MD on 9/30/2020 at 10:53 AM             Result Date: 9/30/2020  CONCLUSION:  1.  When compared to limited CT images from recent percutaneous drain removal, there is increased density at the level of the posterior inferior righ

## 2020-10-01 NOTE — PLAN OF CARE
PT A/O X4, 94% ON RA, CPAP AT NIGHT, SR, UP VOIDING IN BATHROOM, RT FLANK SWOLLEN/PAINFUL, GIVEN OXY 10, SCDS ON, LABS IN AM, INSTRUCTED PT ON POC    Problem: PAIN - ADULT  Goal: Verbalizes/displays adequate comfort level or patient's stated pain goal  Agustin

## 2020-10-01 NOTE — PROGRESS NOTES
NURSING DISCHARGE NOTE    Discharged Home via Wheelchair. Accompanied by Family member  Belongings Taken by patient/family. Patient discharged in stable condition via wheelchair accompanied by family.  Signs and symptoms to be concerned of discussed

## 2020-10-01 NOTE — PLAN OF CARE
Problem: PAIN - ADULT  Goal: Verbalizes/displays adequate comfort level or patient's stated pain goal  Description: INTERVENTIONS:  - Encourage pt to monitor pain and request assistance  - Assess pain using appropriate pain scale  - Administer analgesics PLANNING  Goal: Discharge to home or other facility with appropriate resources  Description: INTERVENTIONS:  - Identify barriers to discharge w/pt and caregiver  - Include patient/family/discharge partner in discharge planning  - Arrange for needed dischar

## 2020-10-02 ENCOUNTER — PATIENT OUTREACH (OUTPATIENT)
Dept: CASE MANAGEMENT | Age: 55
End: 2020-10-02

## 2020-10-02 DIAGNOSIS — Z02.9 ENCOUNTERS FOR UNSPECIFIED ADMINISTRATIVE PURPOSE: ICD-10-CM

## 2020-10-02 PROCEDURE — 1111F DSCHRG MED/CURRENT MED MERGE: CPT

## 2020-10-02 NOTE — PROGRESS NOTES
Initial Post Discharge Follow Up   Discharge Date: 10/1/20  Contact Date: 10/2/2020    Consent Verification:  Assessment Completed With: Patient  HIPAA Verified?   Yes    Discharge Dx:    Subcapsular  Hematoma    Was TCC ordered: yes    General:   • How no    Medications:   Current Outpatient Medications   Medication Sig Dispense Refill   • OxyCODONE HCl IR 10 MG Oral Tab Take 1 tablet (10 mg total) by mouth every 4 (four) hours as needed.  30 tablet 0   • levofloxacin 500 MG Oral Tab Take 1 tablet (500 mg NEBULIZATION 2 (TWO) TIMES DAILY.  120 mL 5   • LEVOTHYROXINE SODIUM 150 MCG Oral Tab LEVOXYL BRAND 150 mcg daily - BRAND 90 tablet 3   • ALBUTEROL SULFATE (2.5 MG/3ML) 0.083% Inhalation Nebu Soltania USE 1 VIAL VIA NEBULIZER (2.5 MG TOTAL) EVERY 6 (SIX) HOURS scheduled appointment time. Fasting instructions for adults 25years of age and older:    Please do not eat or drink anything for 6 hours prior to your exam. If you need to take oral medication in the morning, please take it with plain water only.  Not f as well as wound care r/t where the drain was located. Educated pt on the importance of taking all meds as prescribed and completing entire course of abx, as well as close f/u with PCP/specialists.   Pt verbalized understanding and will contact office with

## 2020-10-03 ENCOUNTER — HOSPITAL ENCOUNTER (OUTPATIENT)
Dept: CT IMAGING | Age: 55
Discharge: HOME | End: 2020-10-03
Attending: PHYSICIAN ASSISTANT
Payer: COMMERCIAL

## 2020-10-03 DIAGNOSIS — S37.012A HEMATOMA OF LEFT KIDNEY, INITIAL ENCOUNTER: ICD-10-CM

## 2020-10-03 DIAGNOSIS — N12 PYELONEPHRITIS: ICD-10-CM

## 2020-10-03 PROCEDURE — 74178 CT ABD&PLV WO CNTR FLWD CNTR: CPT | Performed by: PHYSICIAN ASSISTANT

## 2020-10-03 NOTE — DISCHARGE SUMMARY
[facility]  DISCHARGE SUMMARY     Lynnea Cowden Book Patient Status:  Observation    1965 MRN [id number]   [facility] 3NW-A Attending No att. providers found   Hosp Day # 0 PCP Selam Hernandez MD     Date of Admission: [facility]. TCM Follow-Up Recommendation:  LACE > 58:  High Risk of readmission after discharge from the hospital.      Consultants:  •     Discharge Medication List:     Discharge Medications      START taking these medications      In TIMES DAILY. Quantity: 120 mL  Refills: 5     ergocalciferol 1.25 MG (82096 UT) Caps  Commonly known as: DRISDOL/VITAMIN D2  Notes to patient: Take as prescribed. Take 1 capsule (50,000 Units total) by mouth once a week for 12 doses.    Stop taking o [address] [place] [address]    Phone: [phone]   · OxyCODONE HCl IR 10 MG Tabs     Please  your prescriptions at the location directed by your doctor or nurse    Bring a paper prescript guidance. The telephone number is [phone]. Masks are required to be worn upon entering any [address] facility.     [facility]) reserves the right to restrict and prohibit the use of video, recording, and photography approximately 1/2 mile [address] [facility] [address] on [address] at the corner of [address] and [address]. Please park in the [address], enter through [address] and follow the posted signs for guidance.                     Vital signs:       Physical Exam:

## 2020-10-04 ENCOUNTER — APPOINTMENT (OUTPATIENT)
Dept: CT IMAGING | Facility: HOSPITAL | Age: 55
DRG: 689 | End: 2020-10-04
Attending: EMERGENCY MEDICINE
Payer: COMMERCIAL

## 2020-10-04 ENCOUNTER — APPOINTMENT (OUTPATIENT)
Dept: GENERAL RADIOLOGY | Facility: HOSPITAL | Age: 55
DRG: 689 | End: 2020-10-04
Attending: EMERGENCY MEDICINE
Payer: COMMERCIAL

## 2020-10-04 ENCOUNTER — HOSPITAL ENCOUNTER (INPATIENT)
Facility: HOSPITAL | Age: 55
LOS: 6 days | Discharge: HOME HEALTH | DRG: 689 | End: 2020-10-10
Attending: EMERGENCY MEDICINE | Admitting: HOSPITALIST
Payer: COMMERCIAL

## 2020-10-04 DIAGNOSIS — N15.1 KIDNEY, PERINEPHRIC ABSCESS: Primary | ICD-10-CM

## 2020-10-04 DIAGNOSIS — R50.9 FEVER, UNSPECIFIED FEVER CAUSE: ICD-10-CM

## 2020-10-04 DIAGNOSIS — R06.02 SHORTNESS OF BREATH: ICD-10-CM

## 2020-10-04 DIAGNOSIS — R06.00 DYSPNEA, UNSPECIFIED TYPE: ICD-10-CM

## 2020-10-04 PROBLEM — E87.1 HYPONATREMIA: Status: ACTIVE | Noted: 2020-10-04

## 2020-10-04 PROCEDURE — 71045 X-RAY EXAM CHEST 1 VIEW: CPT | Performed by: EMERGENCY MEDICINE

## 2020-10-04 PROCEDURE — 71275 CT ANGIOGRAPHY CHEST: CPT | Performed by: EMERGENCY MEDICINE

## 2020-10-04 PROCEDURE — 74176 CT ABD & PELVIS W/O CONTRAST: CPT | Performed by: EMERGENCY MEDICINE

## 2020-10-04 PROCEDURE — 99223 1ST HOSP IP/OBS HIGH 75: CPT | Performed by: HOSPITALIST

## 2020-10-04 RX ORDER — LEVOTHYROXINE SODIUM 0.15 MG/1
150 TABLET ORAL
Status: DISCONTINUED | OUTPATIENT
Start: 2020-10-05 | End: 2020-10-10

## 2020-10-04 RX ORDER — VANCOMYCIN HYDROCHLORIDE
15 EVERY 12 HOURS
Status: DISCONTINUED | OUTPATIENT
Start: 2020-10-05 | End: 2020-10-04 | Stop reason: DRUGHIGH

## 2020-10-04 RX ORDER — ALBUTEROL SULFATE 90 UG/1
1 AEROSOL, METERED RESPIRATORY (INHALATION) EVERY 6 HOURS PRN
Status: DISCONTINUED | OUTPATIENT
Start: 2020-10-04 | End: 2020-10-10

## 2020-10-04 RX ORDER — DEXTROSE, SODIUM CHLORIDE, SODIUM LACTATE, POTASSIUM CHLORIDE, AND CALCIUM CHLORIDE 5; .6; .31; .03; .02 G/100ML; G/100ML; G/100ML; G/100ML; G/100ML
INJECTION, SOLUTION INTRAVENOUS CONTINUOUS
Status: DISCONTINUED | OUTPATIENT
Start: 2020-10-04 | End: 2020-10-07

## 2020-10-04 RX ORDER — OXYCODONE HYDROCHLORIDE 10 MG/1
10 TABLET ORAL EVERY 4 HOURS PRN
Status: DISCONTINUED | OUTPATIENT
Start: 2020-10-04 | End: 2020-10-10

## 2020-10-04 RX ORDER — MORPHINE SULFATE 4 MG/ML
4 INJECTION, SOLUTION INTRAMUSCULAR; INTRAVENOUS ONCE
Status: COMPLETED | OUTPATIENT
Start: 2020-10-04 | End: 2020-10-04

## 2020-10-04 RX ORDER — ALPRAZOLAM 0.25 MG/1
0.25 TABLET ORAL NIGHTLY PRN
Status: DISCONTINUED | OUTPATIENT
Start: 2020-10-04 | End: 2020-10-10

## 2020-10-04 RX ORDER — VANCOMYCIN/0.9 % SOD CHLORIDE 1.75 G/5
25 PLASTIC BAG, INJECTION (ML) INTRAVENOUS ONCE
Status: COMPLETED | OUTPATIENT
Start: 2020-10-04 | End: 2020-10-04

## 2020-10-04 RX ORDER — HYDROMORPHONE HYDROCHLORIDE 1 MG/ML
0.2 INJECTION, SOLUTION INTRAMUSCULAR; INTRAVENOUS; SUBCUTANEOUS EVERY 2 HOUR PRN
Status: DISCONTINUED | OUTPATIENT
Start: 2020-10-04 | End: 2020-10-10

## 2020-10-04 RX ORDER — HYDROMORPHONE HYDROCHLORIDE 1 MG/ML
0.8 INJECTION, SOLUTION INTRAMUSCULAR; INTRAVENOUS; SUBCUTANEOUS EVERY 2 HOUR PRN
Status: DISCONTINUED | OUTPATIENT
Start: 2020-10-04 | End: 2020-10-10

## 2020-10-04 RX ORDER — ONDANSETRON 2 MG/ML
4 INJECTION INTRAMUSCULAR; INTRAVENOUS EVERY 6 HOURS PRN
Status: DISCONTINUED | OUTPATIENT
Start: 2020-10-04 | End: 2020-10-10

## 2020-10-04 RX ORDER — PANTOPRAZOLE SODIUM 20 MG/1
20 TABLET, DELAYED RELEASE ORAL
Status: DISCONTINUED | OUTPATIENT
Start: 2020-10-05 | End: 2020-10-10

## 2020-10-04 RX ORDER — MONTELUKAST SODIUM 5 MG/1
10 TABLET, CHEWABLE ORAL DAILY
Status: DISCONTINUED | OUTPATIENT
Start: 2020-10-05 | End: 2020-10-10

## 2020-10-04 RX ORDER — HYDROMORPHONE HYDROCHLORIDE 1 MG/ML
0.4 INJECTION, SOLUTION INTRAMUSCULAR; INTRAVENOUS; SUBCUTANEOUS EVERY 2 HOUR PRN
Status: DISCONTINUED | OUTPATIENT
Start: 2020-10-04 | End: 2020-10-10

## 2020-10-04 RX ORDER — POLYETHYLENE GLYCOL 3350 17 G/17G
17 POWDER, FOR SOLUTION ORAL DAILY
Status: DISCONTINUED | OUTPATIENT
Start: 2020-10-04 | End: 2020-10-10

## 2020-10-04 RX ORDER — MORPHINE SULFATE 4 MG/ML
4 INJECTION, SOLUTION INTRAMUSCULAR; INTRAVENOUS ONCE
Status: DISCONTINUED | OUTPATIENT
Start: 2020-10-04 | End: 2020-10-04

## 2020-10-04 RX ORDER — AMITRIPTYLINE HYDROCHLORIDE 10 MG/1
30 TABLET, FILM COATED ORAL NIGHTLY
Status: DISCONTINUED | OUTPATIENT
Start: 2020-10-04 | End: 2020-10-10

## 2020-10-04 RX ORDER — DEXTROSE MONOHYDRATE 25 G/50ML
50 INJECTION, SOLUTION INTRAVENOUS
Status: DISCONTINUED | OUTPATIENT
Start: 2020-10-04 | End: 2020-10-06

## 2020-10-04 RX ORDER — ACETAMINOPHEN 325 MG/1
650 TABLET ORAL EVERY 6 HOURS PRN
Status: DISCONTINUED | OUTPATIENT
Start: 2020-10-04 | End: 2020-10-10

## 2020-10-04 NOTE — ED NOTES
Friend dropped off 2 personal belongings bags for pt. Bags handed to [facility] to walk back to pt.

## 2020-10-04 NOTE — ED INITIAL ASSESSMENT (HPI)
Pt presents to ER with fever and SOB. Pt was seen here aug 7th for lithotripsy, had a drain in right kidney removed last Tuesday, pt was just discharged from hospital Thursday. Pt developed fever Friday, SOB Saturday, pain to right flank. Dry cough.  Yes na

## 2020-10-04 NOTE — ED PROVIDER NOTES
Patient Seen in: [facility] Emergency Department      History   Patient presents with:  Difficulty Breathing    Stated Complaint: Temp 102.4 today, Back pain, ROYA     HPI    55-year-old female presents emergency department who has been having fever off and on.    • Dyspnea 8/16/2016   • Endometriosis 12/1995    Hysterectomy 12/1995   • Enlarged lymph node 9/19/2018    lymph nodes in neck area   • Esophageal reflux    • Exposure to radiation     hx thyroid cancer   • Extrinsic asthma, unspecified    • diabetes mellitus with other specified complication ([facility]    • Uncomfortable fullness after meals 9/4/2018    Not sure if I get full quickly/ if due to swallowing issue   • Unspecified sleep apnea PSG 7-3-14    PSG 7-3-14 AHI 15 RDI 15 REM AHI 20 SaO2 nadi Maltese                     Social History    Tobacco Use      Smoking status: Never Smoker      Smokeless tobacco: Never Used      Tobacco comment: father was a smoker until I was 13years old    Alcohol use: Not Currently      Alcohol/week: 0.8 - 1.7 genny use  Abdomen: Soft nontender nondistended, no rebound no guarding  no hepatosplenomegaly bowel sounds are present , no pulsatile mass, right flank does show an area where the drain was it does seem still indurated.   Slightly tender to touch  Extremities: N -----------         ------                     CBC W/ DIFFERENTIAL[[id number]]          Abnormal            Final result                 Please view results for these tests on the individual orders.    [address] [facility] Unremarkable LIMITED ABDOMEN:    Fatty infiltration of the partially imaged liver.   Redemonstration of perinephric collection that is incompletely visualized on the CT of the chest and better seen on the dedicated CT of the abdomen performed on the same da unremarkable ADRENALS:  Unremarkable. KIDNEYS:  There is persistent right perinephric stranding. There is a mixed attenuation collection surrounding the right kidney that appears to have a well-defined wall, areas of fluid attenuation, and air.   The overa in the renal collecting systems. 2. Diffuse fatty infiltration of the liver. Please see above for further details.    Dictated by (CST): Jaimee Cee MD on 10/04/2020 at 5:19 PM     Finalized by (CST): Jaimee Cee MD on 10/04/2020 at 5:24 PM will need admission for further management of medical condition. Patient was stable in the emergency department and will be transferred to floor for further definitive care. Patient's questions were answered.   Admission disposition: 10/4/2020  6:33 PM

## 2020-10-05 ENCOUNTER — APPOINTMENT (OUTPATIENT)
Dept: CT IMAGING | Facility: HOSPITAL | Age: 55
DRG: 689 | End: 2020-10-05
Attending: PHYSICIAN ASSISTANT
Payer: COMMERCIAL

## 2020-10-05 PROCEDURE — 99152 MOD SED SAME PHYS/QHP 5/>YRS: CPT | Performed by: PHYSICIAN ASSISTANT

## 2020-10-05 PROCEDURE — 5A09357 ASSISTANCE WITH RESPIRATORY VENTILATION, LESS THAN 24 CONSECUTIVE HOURS, CONTINUOUS POSITIVE AIRWAY PRESSURE: ICD-10-PCS | Performed by: HOSPITALIST

## 2020-10-05 PROCEDURE — 99233 SBSQ HOSP IP/OBS HIGH 50: CPT | Performed by: HOSPITALIST

## 2020-10-05 PROCEDURE — 49405 IMAGE CATH FLUID COLXN VISC: CPT | Performed by: PHYSICIAN ASSISTANT

## 2020-10-05 PROCEDURE — 0T9330Z DRAINAGE OF RIGHT KIDNEY PELVIS WITH DRAINAGE DEVICE, PERCUTANEOUS APPROACH: ICD-10-PCS | Performed by: RADIOLOGY

## 2020-10-05 RX ORDER — NALOXONE HYDROCHLORIDE 0.4 MG/ML
80 INJECTION, SOLUTION INTRAMUSCULAR; INTRAVENOUS; SUBCUTANEOUS AS NEEDED
Status: DISCONTINUED | OUTPATIENT
Start: 2020-10-05 | End: 2020-10-05 | Stop reason: HOSPADM

## 2020-10-05 RX ORDER — MIDAZOLAM HYDROCHLORIDE 1 MG/ML
INJECTION INTRAMUSCULAR; INTRAVENOUS
Status: COMPLETED
Start: 2020-10-05 | End: 2020-10-05

## 2020-10-05 RX ORDER — FLUMAZENIL 0.1 MG/ML
0.2 INJECTION, SOLUTION INTRAVENOUS AS NEEDED
Status: DISCONTINUED | OUTPATIENT
Start: 2020-10-05 | End: 2020-10-05 | Stop reason: HOSPADM

## 2020-10-05 RX ORDER — MIDAZOLAM HYDROCHLORIDE 1 MG/ML
1 INJECTION INTRAMUSCULAR; INTRAVENOUS EVERY 5 MIN PRN
Status: DISCONTINUED | OUTPATIENT
Start: 2020-10-05 | End: 2020-10-05 | Stop reason: HOSPADM

## 2020-10-05 RX ORDER — POTASSIUM CHLORIDE 14.9 MG/ML
20 INJECTION INTRAVENOUS ONCE
Status: COMPLETED | OUTPATIENT
Start: 2020-10-05 | End: 2020-10-05

## 2020-10-05 RX ORDER — SODIUM CHLORIDE 9 MG/ML
INJECTION, SOLUTION INTRAVENOUS CONTINUOUS
Status: DISCONTINUED | OUTPATIENT
Start: 2020-10-05 | End: 2020-10-05 | Stop reason: HOSPADM

## 2020-10-05 NOTE — PROGRESS NOTES
[facility] Pharmacy Note:  Renal Adjustment for vancomycin (Hannah Rosebud)    Karl Valdes is a 47year old patient who has been prescribed vancomycin (VANCOCIN) 2000 mg x 1 in ER  CrCl is estimated creatinine clearance is 64.1 mL/min (based on SCr of 0.83 mg/dL).

## 2020-10-05 NOTE — PROCEDURES
[facility]  Procedure Note    [address] Patient Status:  Inpatient    1965 MRN [id number]   [facility] 3SW-A Attending Salome Villareal MD   Hosp Day # 1 PCP Jeannine Bingham MD     Procedure: CT guided drain placement     P

## 2020-10-05 NOTE — PROGRESS NOTES
[facility]    Progress Note    Tyra Busby Book Patient Status:  Inpatient    1965 MRN [id number]   [facility] 3SW-A Attending Melisa Johnson MD   Hosp Day # 1 PCP Flaco Cleveland MD         Subjective:   Ayanna Pennington is a(n (wev=[id number])    Result Date: 10/4/2020  CONCLUSION:   1. No CT evidence of acute pulmonary embolism or aortic dissection.   2. Scattered areas of atelectasis/consolidation are seen in the bilateral lower lobes and right middle lobe with developing pneumonia Urology  10/5/2020

## 2020-10-05 NOTE — CONSULTS
[facility]  Report of Consultation    [address] Patient Status:  Inpatient    1965 MRN [id number]   [facility] 3SW-A Attending Charlie Andre MD   Hosp Day # 0 PCP Yvan Askew MD     Reason for Consultation:  Right fl Esophageal reflux    • Exposure to radiation     hx thyroid cancer   • Extrinsic asthma, unspecified    • Fatigue not sure    This has been going on for a while   • Fever 9/2/2018    off and on   • Food intolerance approx 15 yrs    dairy does not agree wit to swallowing issue   • Unspecified sleep apnea PSG 7-3-14    PSG 7-3-14 AHI 15 RDI 15 REM AHI 20 SaO2 payal 91%   • Visual impairment     just glasses for reading   • Wears glasses 1980    need new ones, don't wear my old ones     Past Surgical History: (thyroid nodules) Mother    • Other (ALS) Father    • Other (Other) Father          from [facility] Maternal Grandmother    • Colon Cancer Maternal Grandmother          during tx after colonostomy.    • Uterine Cancer Maternal Grandmother    • H HIVES    Comment:TABS  Lyrica [Pregabalin]     SWELLING  Cat Hair Extract        ASTHMA, Coughing, Runny nose,                            SHORTNESS OF BREATH, WHEEZING  [place], [place]        ASTHMA, Runny nose, WHEEZING    Medications:    Current Intravenous, Q15 Min PRN **OR** glucose (DEX4) oral liquid 30 g, 30 g, Oral, Q15 Min PRN **OR** Glucose-Vitamin C (DEX-4) chewable tab 8 tablet, 8 tablet, Oral, Q15 Min PRN  •  Insulin Aspart Pen (NOVOLOG) 100 UNIT/ML flexpen 1-10 Units, 1-10 Units, Subcut Negative 10/04/2020       Imaging:  CT Scan:  Study Result    PROCEDURE:  CT ABDOMEN+PELVIS (CPT=74176)     COMPARISON:  [place], CT, CT UROGRAM(W+WO)(CPT=74178), 10/03/2020, 7:17 AM.     INDICATIONS:  Temp 102.4 today, Back pain, ROYA     TECHNIQUE: uncomplicated colonic diverticulosis. Scattered feces in the colon. No large or small bowel dilatation. No free air or free fluid.   ABDOMINAL WALL:  Nonspecific soft tissue edema  PELVIC ORGANS:  Urinary bladder contains some contrast.  Status posthyste pain in female     Bloating     H1N1 influenza     Other follow-up examination(V67.59)     DARSHANA on CPAP     Breast nodule     Nipple discharge     Discharge from left nipple     Abnormal ultrasound of breast     Dyspnea     Asthma with acute exacerbation

## 2020-10-05 NOTE — PROGRESS NOTES
[facility]  Progress Note     [address] Patient Status:  Inpatient    1965 MRN [id number]   [facility] 3SW-A Attending Ky Whiteside MD   Hosp Day # 1 PCP Avinash Pérez MD     Chief Complaint: subcapsular abscess 1.0  --   --    TP 7.8 6.5  --   --  8.0  --   --     < > = values in this interval not displayed. No results for input(s): PTP, INR in the last 168 hours.          COVID-19 Lab Results    COVID-19  Lab Results   Component Value Date    COVID19 Not Lorraine Alberto

## 2020-10-05 NOTE — H&P
[facility]  History and Physical     [facility] Book Patient Status:  Inpatient    1965 MRN [id number]   [facility] 3SW-A Attending Mary Ellen Montesinos MD   Hosp Day # 0 PCP Porfirio Townsend MD     Chief Complaint: fever    Hist Dyspnea 8/16/2016   • Endometriosis 12/1995    Hysterectomy 12/1995   • Enlarged lymph node 9/19/2018    lymph nodes in neck area   • Esophageal reflux    • Exposure to radiation     hx thyroid cancer   • Extrinsic asthma, unspecified    • Fatigue not sure mellitus with other specified complication ([facility]    • Uncomfortable fullness after meals 9/4/2018    Not sure if I get full quickly/ if due to swallowing issue   • Unspecified sleep apnea PSG 7-3-14    PSG 7-3-14 AHI 15 RDI 15 REM AHI 20 SaO2 payal 91%   • to 62 Jamaican        Social History:  reports that she has never smoked. She has never used smokeless tobacco. She reports previous alcohol use of about 0.8 - 1.7 standard drinks of alcohol per week. She reports that she does not use drugs.     Family Histor disease (kidney) / Papillary Carcinoma Stage 1   • Other (Other) Daughter         Papillary Carcinoma Stage 1 / Endometriosis   • Breast Cancer Maternal Cousin Female        Allergies:   Amoxicillin             HIVES    Comment:TABS  Lyrica [Pregabalin] Does not apply route every 7 days. , Disp: 30 each, Rfl: 0    •  Amitriptyline HCl 10 MG Oral Tab, TAKE 8 TABLETS BY MOUTH AT BEDTIME (Patient taking differently: TAKE 3 TABLETS BY MOUTH AT BEDTIME, states the order states 1-8 but has been taking 3 ), Disp: rebound, guarding or organomegaly. Neurologic: No focal neurological deficits. CNII-XII grossly intact. Musculoskeletal: Moves all extremities. Extremities: No edema or cyanosis. Integument: No rashes or lesions.    Psychiatric: Appropriate mood and aff

## 2020-10-05 NOTE — PROGRESS NOTES
Patient returned from IR. Mat drain placed to right flank, drsg clean and dry. Small amt of brown-red liquid noted to bulb, will monitor. Rating pain minimally. Repositioned for comfort. Eager to diet. Rn paged urology to see if able to resume diet.  Pt ana
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
## 2020-10-05 NOTE — PAYOR COMM NOTE
--------------  Appropriate for inpatient status per guidelines for fever and SOB due to renal abscess requiring drain placement. Hx recent renal hematoma and lithotripsy with renal drain.         ADMISSION REVIEW     Payor: [facility] CHOICE Calculus of kidney 25 yrs    had one last yr/ first one in 7 yrs   • Cancer [facility])    • Change in hair 6/2018    improved after starting k-pax   • Chronic cough 5/4/2018    dx: Neuropathic Sensory Cough   • Constipation years    still have at times, but not swallowing     food gets stuck   • Shortness of breath    • Skin blushing/flushing not sure    yes, sometimes due to food or drink, sick & times no clue   • Sleep disturbance 9/11/2018    probably due to steroids   • Stool incontinence 8/8/2018    normally surgery, L4-5 discectomy   • OTHER SURGICAL HISTORY      ablation of vaginal lesion   • SINUS SURGERY    03/28/2017   • THYROIDECTOMY  1/2008    complete   • TOTAL ABDOM HYSTERECTOMY     • UPPER GI ENDOSCOPY - REFERRAL  8/2014    otherwise unremarkable exa Soft nontender nondistended, no rebound no guarding  no hepatosplenomegaly bowel sounds are present , no pulsatile mass, right flank does show an area where the drain was it does seem still indurated.   Slightly tender to touch  Extremities: No clubbing cya ------                     CBC W/ DIFFERENTIAL[[id number]]          Abnormal            Final result                 Please view results for these tests on the individual orders.    RAINBOW DRAW BLUE   RAINBOW DRAW LAVENDER   RAINBOW DRAW LIGHT GREEN   RAINB ABDOMEN:    Fatty infiltration of the partially imaged liver. Redemonstration of perinephric collection that is incompletely visualized on the CT of the chest and better seen on the dedicated CT of the abdomen performed on the same day.   Please see that r ADRENALS:  Unremarkable. KIDNEYS:  There is persistent right perinephric stranding. There is a mixed attenuation collection surrounding the right kidney that appears to have a well-defined wall, areas of fluid attenuation, and air.   The overall findings a collecting systems. 2. Diffuse fatty infiltration of the liver. Please see above for further details.    Dictated by (CST): Nico Marvin MD on 10/04/2020 at 5:19 PM     Finalized by (CST): Nico Marvin MD on 10/04/2020 at 5:24 PM           Xr Chest type    Disposition:  Admit  10/4/2020  6:33 pm              H&P - H&P Note      Chief Complaint: fever    History of Present Illness: Paula Carroll is a 47year old female with a past medical history of NAFLD, hypothyroid, HTN, GERD and DM2.   She had k 18.2*  --  14.7* 10.5   HGB 15.3 13.8 13.8 13.4 12.0   MCV 89.0 89.0  --  91.6 89.5   .0 296.0  --  265.0 320.0       Recent Labs   Lab 09/29/20 2021 09/30/20  0543 09/30/20  2147 10/01/20  0545 10/04/20  1521 10/04/20  1734   GLU 87 129*  --  126* days  3. Asthma  1. stable  4. HTN  5. GERD  6. NAFLD  7. Hypothyroid  8. Obesity        Plan of care: as above. Repeat attempt to drain abscess today. Cont.  Pain control and ABX          10/5 URO    Right subcapsular hematoma with possible abscess  Right

## 2020-10-05 NOTE — IMAGING NOTE
Dr Oskar Dhillon explained procedure topatient. Patient signed consent, Time out performed. Patient tolerated procedurer well. Dr Oskar Dhillon placed a drainage catheter to right flank area attached to KIRAN drain. Noted 50 ml dark red thick initial output.   Tegaderm dressing c

## 2020-10-05 NOTE — RESPIRATORY THERAPY NOTE
DARSHANA Equipment Usage Summary :            Set Mode :AUTO CPAP W FLEX          Usage in Hours:6;37          90% Pressure (EPAP) : 5           90% Insp Pressure (IPAP);           AHI : 2.3          Supplemental Oxygen LPM :2          Auto cpap ( MAX PRESSURE )

## 2020-10-05 NOTE — CONSULTS
[facility] Dosing Service    Initial Pharmacokinetic Consult for Vancomycin Dosing     Bhakti Fulton is a 47year old patient who is being treated for  renal abscess .   Pharmacy has been asked to dose Vancomycin by Dr. Steven Cesar    Amoxicillin; Darcy [P

## 2020-10-06 PROCEDURE — 99232 SBSQ HOSP IP/OBS MODERATE 35: CPT | Performed by: HOSPITALIST

## 2020-10-06 RX ORDER — POTASSIUM CHLORIDE 20 MEQ/1
40 TABLET, EXTENDED RELEASE ORAL EVERY 4 HOURS
Status: COMPLETED | OUTPATIENT
Start: 2020-10-06 | End: 2020-10-06

## 2020-10-06 RX ORDER — ENOXAPARIN SODIUM 100 MG/ML
40 INJECTION SUBCUTANEOUS DAILY
Status: DISCONTINUED | OUTPATIENT
Start: 2020-10-06 | End: 2020-10-10

## 2020-10-06 NOTE — RESPIRATORY THERAPY NOTE
DARSHANA : EQUIPMENT USE: DAILY SUMMARY                                            SET MODE:AUTO CPAP WITH CFLEX                                          USAGE IN HOURS:7:07                                          90%

## 2020-10-06 NOTE — PROGRESS NOTES
[facility]  Progress Note      Jaylin Perkins Book Patient Status:  Inpatient    1965 MRN [id number]   [facility] 3SW-A Attending Sebastian Cordero MD   Hosp Day # 2 PCP Mia Haddad MD       Subjective:   Feels better today.   No c

## 2020-10-06 NOTE — PROGRESS NOTES
[facility]    Progress Note    Verdene Pace Book Patient Status:  Inpatient    1965 MRN [id number]   [facility] 3SW-A Attending Patrecia Cooks, MD   [facility] Day # 2 PCP Lizz Fontanez MD         Subjective:   Jennifer Soto is a(n (H) 03/10/2014       Ct Angiography, Chest (cpt=71275)    Result Date: 10/4/2020  CONCLUSION:   1. No CT evidence of acute pulmonary embolism or aortic dissection.   2. Scattered areas of atelectasis/consolidation are seen in the bilateral lower lobes and r 10/05/2020 at 3:15 PM       Xr Chest Ap Portable  (cpt=71045)    Result Date: 10/4/2020  CONCLUSION:  Atelectasis at the lung bases is relatively stable.     Dictated by (CST): Yana Gibson MD on 10/04/2020 at 3:48 PM     Finalized by (CST): Jeff

## 2020-10-06 NOTE — PROGRESS NOTES
[facility]  Progress Note     [address] Patient Status:  Inpatient    1965 MRN [id number]   [facility] 3SW-A Attending Roswell Sicard, MD   Hosp Day # 2 PCP Yvan Askew MD     Chief Complaint: subcapsular abscess 100  --   --  262*  --   --   --    AST 65* 17  --   --   --  27  --   --    ALT 58* 41  --   --   --  56  --   --    BILT 0.8 1.2  --   --  1.0  --   --   --    TP 7.8 6.5  --   --  8.0  --   --   --     < > = values in this interval not displayed. with patient or family at bedside.     Clarissa Marin MD

## 2020-10-07 PROCEDURE — 99232 SBSQ HOSP IP/OBS MODERATE 35: CPT | Performed by: INTERNAL MEDICINE

## 2020-10-07 RX ORDER — SODIUM CHLORIDE 9 MG/ML
INJECTION, SOLUTION INTRAVENOUS CONTINUOUS
Status: DISCONTINUED | OUTPATIENT
Start: 2020-10-07 | End: 2020-10-08

## 2020-10-07 NOTE — PROGRESS NOTES
[facility]    Progress Note    [place] Book Patient Status:  Inpatient    1965 MRN [id number]   [facility] 3SW-A Attending Araseli Juarez MD   [facility] Day # 3 PCP Lor Murphy MD         Subjective:   Yarelis Vasquez is a(n 03/27/2019    CK 93 04/09/2020    LAURI NEGATIVE 03/20/2013    B12 999 (H) 01/25/2017    POCGLU 166 (H) 03/10/2014       Ct Drain Abscess Renal Perirenal (cpt=49405)    Result Date: 10/5/2020  CONCLUSION:  Technically successful CT-guided drain placement wit

## 2020-10-07 NOTE — RESPIRATORY THERAPY NOTE
DARSHANA : EQUIPMENT USE: DAILY SUMMARY                                            SET MODE:AUTO CPAP WITH CFLEX                                          USAGE IN HOURS:9:44                                          90%

## 2020-10-07 NOTE — CONSULTS
INFECTIOUS DISEASE CONSULT NOTE    Lea Gramajo Book Patient Status:  Inpatient    1965 MRN [id number]   [facility] 3SW-A Attending Beth Curry MD   Hosp Day # 3 PCP Lor Spencer 2 holes each   • Calculus of kidney 25 yrs    had one last yr/ first one in 7 yrs   • Cancer [facility])    • Change in hair 6/2018    improved after starting k-pax   • Chronic cough 5/4/2018    dx: Neuropathic Sensory Cough   • Constipation years    still have • Problems with swallowing     food gets stuck   • Shortness of breath    • Skin blushing/flushing not sure    yes, sometimes due to food or drink, sick & times no clue   • Sleep disturbance 9/11/2018    probably due to steroids   • Stool incontinence 8/ back surgery, L4-5 discectomy   • OTHER SURGICAL HISTORY      ablation of vaginal lesion   • SINUS SURGERY    03/28/2017   • THYROIDECTOMY  1/2008    complete   • TOTAL ABDOM HYSTERECTOMY     • UPPER GI ENDOSCOPY - REFERRAL  8/2014    otherwise unremarkabl Paternal Auther Francisco   • Other (Other) Sister         Sjogren's Syndrome (rheumatoid) / Thin basement membrane disease (kidney) / Papillary Carcinoma Stage 1   • Other (Other) Daughter         Papillary Carcinoma Stage 1 / Endometriosis   • Percy (DILAUDID) 1 MG/ML injection 0.2 mg, 0.2 mg, Intravenous, Q2H PRN **OR** HYDROmorphone HCl (DILAUDID) 1 MG/ML injection 0.4 mg, 0.4 mg, Intravenous, Q2H PRN **OR** HYDROmorphone HCl (DILAUDID) 1 MG/ML injection 0.8 mg, 0.8 mg, Intravenous, Q2H PRN  Lorenza Harris 10/01/20  0545 10/04/20  1521 10/05/20  0547   RBC 4.62 4.18 3.76*   HGB 13.4 12.0 11.0*   HCT 42.3 37.4 33.9*   MCV 91.6 89.5 90.2   MCH 29.0 28.7 29.3   MCHC 31.7 32.1 32.4   RDW 13.4 13.6 13.9   NEPRELIM 12.44* 8.27* 7.38   WBC 14.7* 10.5 9.7   . (mg/dL)   Date Value   10/04/2020 Negative     BLOOD URINE (no units)   Date Value   03/02/2014 NEGATIVE     Blood Urine (no units)   Date Value   10/04/2020 Small (A)     PH Urine (no units)   Date Value   09/14/2020 7.0     pH Urine (no units)   Date Delilah treatment with IV abx for a few weeks  - follow temps and wbc  - follow output from drain  Case and plan d/w pt  Thank you for allowing me to participate in the care of this patient. Please do not hesitate to call if you have any questions.    I will contin

## 2020-10-07 NOTE — PROGRESS NOTES
[facility]  Progress Note      [facility]    1965 MRN [id number]   [facility] 3SW-A Attending Laine Corbin MD   Hosp Day # 3 PCP Cate Gann MD       47year-old female with left subcapsular

## 2020-10-07 NOTE — PROGRESS NOTES
[facility]  Progress Note     [address] Patient Status:  Inpatient    1965 MRN [id number]   [facility] 3SW-A Attending Ina Staton MD   Hosp Day # 3 PCP Hillary Hennessy MD     Chief Complaint: flank pain    S: Jamir Knutson ALKPHO  --  262*  --   --   --   --    AST  --   --  27  --   --   --    ALT  --   --  56  --   --   --    BILT  --  1.0  --   --   --   --    TP  --  8.0  --   --   --   --        Estimated Creatinine Clearance: 64.1 mL/min (based on SCr of 0.83 mg/dL).

## 2020-10-08 PROCEDURE — 99232 SBSQ HOSP IP/OBS MODERATE 35: CPT | Performed by: INTERNAL MEDICINE

## 2020-10-08 RX ORDER — POTASSIUM CHLORIDE 20 MEQ/1
40 TABLET, EXTENDED RELEASE ORAL EVERY 4 HOURS
Status: COMPLETED | OUTPATIENT
Start: 2020-10-08 | End: 2020-10-08

## 2020-10-08 RX ORDER — VANCOMYCIN HYDROCHLORIDE
1250 EVERY 8 HOURS
Status: DISCONTINUED | OUTPATIENT
Start: 2020-10-08 | End: 2020-10-09

## 2020-10-08 RX ORDER — TRAMADOL HYDROCHLORIDE 50 MG/1
50 TABLET ORAL EVERY 6 HOURS PRN
Status: DISCONTINUED | OUTPATIENT
Start: 2020-10-08 | End: 2020-10-10

## 2020-10-08 NOTE — PROGRESS NOTES
[facility]  Progress Note     [address] Patient Status:  Inpatient    1965 MRN [id number]   [facility] 3SW-A Attending Rufino Johnston MD   Hosp Day # 4 PCP Lor Murphy MD     Chief Complaint: back pain     S: FFTBWI --   --   --   --    ALT  --  56  --   --   --   --    BILT 1.0  --   --   --   --   --    TP 8.0  --   --   --   --   --     < > = values in this interval not displayed. Estimated Creatinine Clearance: 77.1 mL/min (based on SCr of 0.69 mg/dL).     R

## 2020-10-08 NOTE — CM/SW NOTE
10/08/20 1100   CM/SW Referral Data   Referral Source Social Work (self-referral)   Reason for Referral Discharge planning   Informant Patient   Patient Info   Patient's Mental Status Alert;Oriented   Patient's Home Environment House   Patient lives wit

## 2020-10-08 NOTE — PROGRESS NOTES
[facility] dosing service    Follow-up Pharmacokinetic Consult for Vancomycin Dosing     Odalys Sharpe is a 47year old patient who is being treated for renal abscess. Patient is on day 5 of Vancomycin and is currently receiving 1 gm IV Q 8 hours.   Go need Scr daily while on Vancomycin to assess renal function. 4.  Pharmacy will follow and adjust as necessary. We appreciate the opportunity to assist in the care of this patient.     Susu Jordan, PharmD  10/8/2020  12:24 AM  [address]

## 2020-10-08 NOTE — PROGRESS NOTES
[facility]  Progress Note      Fabricezenaida Haynes Book Patient Status:  Inpatient    1965 MRN [id number]   [facility] 3SW-A Attending Kiet Robles MD   Hosp Day # 4 PCP Olivia David MD     47year-old female with left subcapsular r

## 2020-10-08 NOTE — PROGRESS NOTES
[facility]    Progress Note    Tyra Mall Book Patient Status:  Inpatient    1965 MRN [id number]   [facility] 3SW-A Attending Melisa Johnson MD   [address] Day # 4 PCP Flaco Cleveland MD         Subjective:   Ayanna Pennington is a(n 10/05/2020    PT 13.2 02/23/2011    T4F 1.2 06/22/2020    TSH 0.349 (L) 06/22/2020    LIP 85 08/14/2020    DDIMER 3.67 (H) 10/04/2020    ESRML 7 12/28/2018    CRP 0.79 (H) 04/09/2020    BNP 22 03/07/2014    MG 2.8 (H) 09/21/2018    TROP <0.045 03/27/2019

## 2020-10-08 NOTE — CM/SW NOTE
[facility] able to accept referral for IV abx. Pt covered at 100%. [facility] out of network. Referral sent to [facility]. Await response. / to remain available for support and/or discharge planning.      Vaishali Gutiérrez

## 2020-10-08 NOTE — CM/SW NOTE
Noted ID indicating likely need for IV abx at discharge. Referrals sent to [facility] and [facility] to determine insurance coverage for home infusion. / to remain available for support and/or discharge planning.      Evangelina Tyson

## 2020-10-08 NOTE — PROGRESS NOTES
INFECTIOUS DISEASE PROGRESS NOTE    Charli Passaic Book Patient Status:  Inpatient    1965 MRN [id number]   [facility] ([place], [place]) 3SW-A Attending Shawn Swanson MD   Hosp Day # 4 PCP Denis Lee Exam:    General: No acute distress. Alert and oriented x 3. Vital signs: Blood pressure 131/65, pulse 71, temperature 98.1 °F (36.7 °C), temperature source Oral, resp.  rate 16, height 5' 3\" (1.6 m), weight 219 lb (99.3 kg), SpO2 95 %, not currently ila Value   03/20/2013 4.08 (H)     hsCRP (mg/L)   Date Value   08/18/2016 0.71      Vancomycin Trough (ug/mL)   Date Value   10/07/2020 10.7     COLOR URINE (no units)   Date Value   03/02/2014 YELLOW     Urine Color (no units)   Date Value   10/04/2020 Kalie Fernandes isolated N/A   3. AEROBIC BACTERIAL CULTURE     Status: Abnormal    Collection Time: 10/05/20  2:48 PM    Specimen: Kidney right;  Abscess   Result Value Ref Range    Aerobic Culture Result 3+ growth Staphylococcus aureus (A) N/A    Aerobic Smear 2+ WBCs se

## 2020-10-08 NOTE — RESPIRATORY THERAPY NOTE
DARSHANA - Equipment Use Daily Summary:  · Set Mode AFLEX  · Usage in hours: 9.2  · 90% Pressure (EPAP) level: 8  · 90% Insp Pressure (IPAP):   · AHI: 34  · Supplemental Oxygen:  · Comments:

## 2020-10-08 NOTE — HOME CARE LIAISON
TNL rec'd call from Nancy Salas to check ptnt insurance for [facility] on dc  [facility] is not contracted with ptnt insurance.   Porsha's aware    Thanks  Nabil Downing

## 2020-10-09 PROCEDURE — 99232 SBSQ HOSP IP/OBS MODERATE 35: CPT | Performed by: INTERNAL MEDICINE

## 2020-10-09 RX ORDER — FUROSEMIDE 10 MG/ML
40 INJECTION INTRAMUSCULAR; INTRAVENOUS ONCE
Status: COMPLETED | OUTPATIENT
Start: 2020-10-09 | End: 2020-10-09

## 2020-10-09 RX ORDER — CEFAZOLIN SODIUM/WATER 2 G/20 ML
2 SYRINGE (ML) INTRAVENOUS EVERY 8 HOURS
Qty: 600 ML | Refills: 0 | Status: SHIPPED | OUTPATIENT
Start: 2020-10-09 | End: 2020-11-06

## 2020-10-09 RX ORDER — CEFAZOLIN SODIUM/WATER 2 G/20 ML
2 SYRINGE (ML) INTRAVENOUS EVERY 8 HOURS
Status: DISCONTINUED | OUTPATIENT
Start: 2020-10-09 | End: 2020-10-10

## 2020-10-09 RX ORDER — SODIUM CHLORIDE 0.9 % (FLUSH) 0.9 %
10 SYRINGE (ML) INJECTION AS NEEDED
Qty: 1 SYRINGE | Refills: 0 | Status: SHIPPED | OUTPATIENT
Start: 2020-10-09 | End: 2020-12-03 | Stop reason: ALTCHOICE

## 2020-10-09 NOTE — VASCULAR ACCESS
Pt to have PICC placed tomorrow pending cultures. Spoke with nurse, Silver Tejeda and notified her that PICC nurse needs to be paged in am if PICC needs to be placed.

## 2020-10-09 NOTE — PROGRESS NOTES
INFECTIOUS DISEASE PROGRESS NOTE    Ace Nohelia Book Patient Status:  Inpatient    1965 MRN [id number]   [facility] 3SW-A Attending Rufino Johnston MD   Hosp Day # 5 PCP Deena Bell and negatives above    Physical Exam:    General: No acute distress. Alert and oriented x 3. Vital signs: Blood pressure 119/65, pulse 63, temperature 97.5 °F (36.4 °C), temperature source Oral, resp.  rate 13, height 5' 3\" (1.6 m), weight 219 lb (99.3 kg 0.79 (H)     HSCRP (mg/L)   Date Value   03/20/2013 4.08 (H)     hsCRP (mg/L)   Date Value   08/18/2016 0.71      Vancomycin Trough (ug/mL)   Date Value   10/07/2020 10.7     COLOR URINE (no units)   Date Value   03/02/2014 YELLOW     Urine Color (no units Anaerobic Culture No Anaerobes isolated N/A   3. AEROBIC BACTERIAL CULTURE     Status: Abnormal    Collection Time: 10/05/20  2:48 PM    Specimen: Kidney right;  Abscess   Result Value Ref Range    Aerobic Culture Result 3+ growth Staphylococcus aureus (A) imaging prior to d/cing drain.   D/w staff and with pt  Will follow     Priscilla Meehan MD

## 2020-10-09 NOTE — PROGRESS NOTES
[facility]  Progress Note     [address] Patient Status:  Inpatient    1965 MRN [id number]   [facility] [place] 3SW-A Attending Siobhan Her MD   Hosp Day # 5 PCP Diya Marx MD     Chief Complaint: mild back pain    S: Pa --  27  --   --   --   --    ALT  --  56  --   --   --   --    BILT 1.0  --   --   --   --   --    TP 8.0  --   --   --   --   --     < > = values in this interval not displayed.        Estimated Creatinine Clearance: 77.1 mL/min (based on SCr of 0.69 mg/dL

## 2020-10-09 NOTE — PROGRESS NOTES
[facility]  Progress Note      Quique Mouse Book Patient Status:  Inpatient    1965 MRN [id number]   [facility] 3SW-A Attending Tono Garcia MD   Hosp Day # 5 PCP Vicente Cohen MD       Subjective:   Continues to feel better

## 2020-10-09 NOTE — CM/SW NOTE
[facility] able to accept pt for Bonnie  services. Received call from [facility] with [address] Per ID note, plan to transition to ancef this morning. Will need IV abx orders and flush orders prior to discharge.   / to remain available f

## 2020-10-09 NOTE — RESPIRATORY THERAPY NOTE
DARSHANA : EQUIPMENT USE: DAILY SUMMARY                                            SET MODE:AUTO CPAP WITH CFLEX                                          USAGE IN HOURS:6:30                                          90%

## 2020-10-09 NOTE — CM/SW NOTE
Final abx orders from ID sent via [facility] to [address] [place] [address]. Call from Susan with [address] [place] [address], the script is covered 100%, all he will need is the saline flush order and I have asked RN to obtain this script. It will also need to be sent to [address] [place] [address].    Per RN,

## 2020-10-10 VITALS
HEART RATE: 69 BPM | WEIGHT: 219 LBS | DIASTOLIC BLOOD PRESSURE: 74 MMHG | BODY MASS INDEX: 38.8 KG/M2 | SYSTOLIC BLOOD PRESSURE: 136 MMHG | OXYGEN SATURATION: 93 % | TEMPERATURE: 98 F | HEIGHT: 63 IN | RESPIRATION RATE: 18 BRPM

## 2020-10-10 DIAGNOSIS — I10 ESSENTIAL HYPERTENSION: ICD-10-CM

## 2020-10-10 PROCEDURE — 02HV33Z INSERTION OF INFUSION DEVICE INTO SUPERIOR VENA CAVA, PERCUTANEOUS APPROACH: ICD-10-PCS | Performed by: INTERNAL MEDICINE

## 2020-10-10 PROCEDURE — 99239 HOSP IP/OBS DSCHRG MGMT >30: CPT | Performed by: INTERNAL MEDICINE

## 2020-10-10 PROCEDURE — B548ZZA ULTRASONOGRAPHY OF SUPERIOR VENA CAVA, GUIDANCE: ICD-10-PCS | Performed by: INTERNAL MEDICINE

## 2020-10-10 RX ORDER — FUROSEMIDE 20 MG/1
TABLET ORAL
Qty: 90 TABLET | Refills: 0 | OUTPATIENT
Start: 2020-10-10

## 2020-10-10 RX ORDER — OXYCODONE HYDROCHLORIDE 10 MG/1
10 TABLET ORAL EVERY 4 HOURS PRN
Qty: 12 TABLET | Refills: 0 | Status: SHIPPED | OUTPATIENT
Start: 2020-10-10 | End: 2021-03-11 | Stop reason: ALTCHOICE

## 2020-10-10 NOTE — PROGRESS NOTES
INFECTIOUS DISEASE PROGRESS NOTE    Kiarra Salamanca Book Patient Status:  Inpatient    1965 MRN [id number]   [facility] 3SW-A Attending Samantha Pisano MD   Hosp Day # 6 PCP Joseph Mej[employer] Systems:  Completed. See pertinent positives and negatives above    Physical Exam:    General: No acute distress. Alert and oriented x 3. Vital signs: Blood pressure 126/69, pulse 74, temperature 97.8 °F (36.6 °C), temperature source Oral, resp.  rate 20, Value   11/02/2012 0.39     C-Reactive Protein (mg/dL)   Date Value   04/09/2020 0.79 (H)     HSCRP (mg/L)   Date Value   03/20/2013 4.08 (H)     hsCRP (mg/L)   Date Value   08/18/2016 0.71      Vancomycin Trough (ug/mL)   Date Value   10/07/2020 10.7 10/05/20  2:48 PM    Specimen: Kidney right; Abscess   Result Value Ref Range    Anaerobic Culture No Anaerobes isolated N/A   3. AEROBIC BACTERIAL CULTURE     Status: Abnormal    Collection Time: 10/05/20  2:48 PM    Specimen: Kidney right;  Abscess   Resu

## 2020-10-10 NOTE — CM/SW NOTE
MSW spoke with Ishaan Clemente from [place] [address]. IV abx will be delivered tonight. RN getting approval from MD for patient to miss dose tonight. [address]  P: [phone]  F: [phone] will be out in the morning for start of care.   Noble Dougherty

## 2020-10-10 NOTE — PROGRESS NOTES
Rn called SW and they stated patients home abx cannot be given until tmrw am. Rn called Dr. Tarun Platt to notify of this, Md stated that Rn can give tonights dose early and then d/c her home after its done.  Rn updated patient of this, she does not want to stay

## 2020-10-10 NOTE — PROGRESS NOTES
UROLOGY NOTE    Chart reviewed. Pain controlled, no fevers. Blood cultures NG at 2 days. Okay to d'c from  perspective on abx per ID. Follow up with Dr. Heber Darnell on Monday.     Future Appointments   Date Time Provider Batsheva Hansen   10/12/2020  2:

## 2020-10-10 NOTE — CM/SW NOTE
MSW notified that MD is not ok with the patient missing a dose of IV abx. She will need to stay for 6pm dose. RN paged MSW stating that the patient does not want to stay.   MSW notified RN that she will need to stay or leave AMA as IV abx are not able to

## 2020-10-10 NOTE — PROGRESS NOTES
Rn paged Dr Mary Ann Pena to see if ok to place PICC line today, lab results discussed. [id number] [address] for PICC line today. Rn paged on call PICC Rn and she was notified. Patient informed.

## 2020-10-10 NOTE — RESPIRATORY THERAPY NOTE
DARSHANA Equipment Usage Summary :            Set Mode :CPAP           Usage in Hours:8;26          90% Pressure (EPAP) : 10           AHI : 9.7          Supplemental Oxygen LPM :2

## 2020-10-10 NOTE — DISCHARGE SUMMARY
[facility] HOSPITALIST  DISCHARGE SUMMARY     Karla Sepulveda Book Patient Status:  Inpatient    1965 MRN [id number]   [facility] 3SW-A Attending No att. providers found   [facility] Day # 6 PCP Jeannine Bingham MD     Date of Admission: 10/4/2020 for 28 days. Will need weekly labs with CBC, BMP, sed rate and CRP while on IV abx.    Stop taking on: November 6, 2020  Quantity: 600 mL  Refills: 0     Normal Saline Flush 0.9 % Soln  Notes to patient: FLUSH, USE AS DIRECTED      Inject 10 mL into the vei Inhale 1 puff into the lungs daily.    Quantity: 1 each  Refills: 5     Levothyroxine Sodium 150 MCG Tabs  Commonly known as: SYNTHROID      LEVOXYL BRAND 150 mcg daily - BRAND   Quantity: 90 tablet  Refills: 3     linaCLOtide 145 MCG Caps  Commonly known a [phone]  [address] [place] [address]    Phone: [phone]   · OxyCODONE HCl IR 10 MG Tabs     Please  your prescriptions at the location directed by your doctor or nurse    Bring a paper prescription for each of these medications  · ceF

## 2020-10-11 NOTE — CM/SW NOTE
10/11/20 0800   Discharge disposition   Expected discharge disposition Home-Health   Name of [address] services after discharge Skilled home care   HME provider   ([address]  for IV meds and supplies)   Discharge tr

## 2020-10-12 ENCOUNTER — PATIENT OUTREACH (OUTPATIENT)
Dept: CASE MANAGEMENT | Age: 55
End: 2020-10-12

## 2020-10-12 DIAGNOSIS — Z02.9 ENCOUNTERS FOR UNSPECIFIED ADMINISTRATIVE PURPOSE: ICD-10-CM

## 2020-10-12 DIAGNOSIS — L02.91 ABSCESS: ICD-10-CM

## 2020-10-12 PROCEDURE — 1111F DSCHRG MED/CURRENT MED MERGE: CPT

## 2020-10-13 NOTE — PROGRESS NOTES
Initial Post Discharge Follow Up   Discharge Date: 10/10/20  Contact Date: 10/13/2020    Consent Verification:  Assessment Completed With: Patient  HIPAA Verified? Yes    Discharge Dx:     1. Right Subcapsular abscess  1. IV ABX  2. PICC placed  3.  S/p hospital was your diagnoses explained to you? Yes  • Do you have any questions about your diagnoses?  No  • Are you able to perform normal daily activities of living as you have prior to your hospital stay (dressing, bathing, ambulating to the bathroom, etc SWALLOW 2 TABLETS BY MOUTH EVERY  tablet 0   • alendronate 70 MG Oral Tab Take 1 tablet (70 mg total) by mouth once a week.  12 tablet 3   • Potassium Citrate ER (UROCIT-K 15) 15 MEQ (1620 MG) Oral Tab CR Take one tablet  tablet 4   • Albutero  your discharge medications when you left the hospital? No- Antibiotics are being delivered. • May I go over your medications with you to make sure we are not missing anything? yes  • Are there any reasons that keep you from taking your medication 14, 2020  7:30 AM CDT Hospital Follow Up with Chidi Connolly MD [facility], [address] [address]        Oct 26, 2020  1:20 PM CDT Follow Up Visit with Indra العراقي MD Urology - [facility] Dr. Lyndol Seip Dr. Sony Bell office to provide an update that the pt missed two doses of IV antibiotics. ,one dose Sunday and one dose Monday. Pt has been administering the antibiotic twice a day not Q8 hours.  A detailed message was left stating pt was advised to administer

## 2020-10-14 ENCOUNTER — OFFICE VISIT (OUTPATIENT)
Dept: FAMILY MEDICINE CLINIC | Facility: CLINIC | Age: 55
End: 2020-10-14
Payer: COMMERCIAL

## 2020-10-14 VITALS
BODY MASS INDEX: 37.39 KG/M2 | WEIGHT: 211 LBS | SYSTOLIC BLOOD PRESSURE: 124 MMHG | HEIGHT: 63 IN | RESPIRATION RATE: 16 BRPM | DIASTOLIC BLOOD PRESSURE: 82 MMHG | HEART RATE: 82 BPM | TEMPERATURE: 97 F

## 2020-10-14 DIAGNOSIS — N15.1 KIDNEY, PERINEPHRIC ABSCESS: Primary | ICD-10-CM

## 2020-10-14 PROCEDURE — 3008F BODY MASS INDEX DOCD: CPT | Performed by: FAMILY MEDICINE

## 2020-10-14 PROCEDURE — 1111F DSCHRG MED/CURRENT MED MERGE: CPT | Performed by: FAMILY MEDICINE

## 2020-10-14 PROCEDURE — 3079F DIAST BP 80-89 MM HG: CPT | Performed by: FAMILY MEDICINE

## 2020-10-14 PROCEDURE — 3074F SYST BP LT 130 MM HG: CPT | Performed by: FAMILY MEDICINE

## 2020-10-14 PROCEDURE — 99214 OFFICE O/P EST MOD 30 MIN: CPT | Performed by: FAMILY MEDICINE

## 2020-10-14 NOTE — PATIENT INSTRUCTIONS
Thank you for choosing Jeannine Bingham MD at [place] [address]  To Do: [address]  1. Please take meds as directed. [facility] is located in [address]. Monday, Tuesday & Friday – 8 a.m. to 4 p.m. Wednesday, Thursday – 7 a.m. to 3 p. outweigh those potential risks and we strive to make you healthier and to improve your quality of life.     Referrals, and Radiology Information:    If your insurance requires a referral to a specialist, please allow 5 business days to process your referral afterward because they may be contaminated with bacteria after use. · Don't cut, squeeze, or pop the boil yourself. · Put antibiotic cream or ointment on the skin 3 to 4 times a day, unless something else was prescribed.  Some ointments include an antibio

## 2020-10-19 NOTE — PAYOR COMM NOTE
--------------  DISCHARGE REVIEW    Payor: [address] [place] [address] Road #:  [id number]  Authorization Number: [id number]     Admit date: 10/4/20  Admit time:  2011  Discharge Date: 10/10/2020  5:22 PM     Admitting Physician: Carlitos [facility] the hospital.    Consultants:  • Urology, ID    Discharge Medication List:     Discharge Medications      START taking these medications      Instructions Prescription details   ceFAZolin sodium 2 GM/20ML Sosy  Commonly known as: ANCEF/KEFZOL  Notes to pat 2 ML (0.5 MG TOTAL) BY NEBULIZATION 2 (TWO) TIMES DAILY. Quantity: 120 mL  Refills: 5     ergocalciferol 1.25 MG (50305 UT) Caps  Commonly known as: DRISDOL/VITAMIN D2      Take 1 capsule (50,000 Units total) by mouth once a week for 12 doses.    Stop brad Potassium Citrate ER 15 MEQ (1620 MG) Tbcr  Commonly known as: Urocit-K 15      Take one tablet BID   Quantity: 180 tablet  Refills: 4        STOP taking these medications    levofloxacin 500 MG Tabs  Commonly known as: LEVAQUIN              Where to Get No focal neurological deficits. Musculoskeletal: Moves all extremities. Extremities: No edema.   -----------------------------------------------------------------------------------------------  PATIENT DISCHARGE INSTRUCTIONS: See electronic chart    Jesica

## 2020-10-20 RX ORDER — ERGOCALCIFEROL 1.25 MG/1
50000 CAPSULE ORAL WEEKLY
Qty: 12 CAPSULE | Refills: 0 | OUTPATIENT
Start: 2020-10-20 | End: 2021-01-06

## 2020-10-20 RX ORDER — MONTELUKAST SODIUM 5 MG/1
TABLET, CHEWABLE ORAL
Qty: 180 TABLET | Refills: 1 | Status: SHIPPED | OUTPATIENT
Start: 2020-10-20 | End: 2020-12-07

## 2020-10-20 NOTE — TELEPHONE ENCOUNTER
Asthma & COPD Medication Protocol Fzivno73/17/2020 11:18 AM   Asthma Action Score greater than or equal to 20    AAP/ACT given in last 12 months    Appointment in past 6 or next 3 months      Refill protocol failed because the patient did not meet the prot

## 2020-10-20 NOTE — TELEPHONE ENCOUNTER
The patient was seen for a VV on 8/18/2020. End of TCM timeframe. [facility] closing encounter.

## 2020-10-27 ENCOUNTER — PATIENT MESSAGE (OUTPATIENT)
Dept: FAMILY MEDICINE CLINIC | Facility: CLINIC | Age: 55
End: 2020-10-27

## 2020-10-27 NOTE — TELEPHONE ENCOUNTER
See TE, should pt be receiving flu shot or waiting until drain and PICC are d/c'd, or getting advice from nephrology or infectious disease?

## 2020-10-27 NOTE — TELEPHONE ENCOUNTER
From: Steph Fire Book  To: Rachel Pollard MD  Sent: 10/27/2020 12:10 PM CDT  Subject: Non-Urgent Medical Question    Hi Dr Chris Castrejon! I didn't think I should get the flu shot with everything I have going on. But my mom made me question my thought.    What

## 2020-10-29 ENCOUNTER — PATIENT MESSAGE (OUTPATIENT)
Dept: FAMILY MEDICINE CLINIC | Facility: CLINIC | Age: 55
End: 2020-10-29

## 2020-10-29 NOTE — TELEPHONE ENCOUNTER
From: Mami Christianson Book  To: Cate Gann MD  Sent: 10/29/2020 3:16 PM CDT  Subject: Non-Urgent Medical Question    Dr. Jones Gilford, is it possible while on this IV anabiotic to still get a sinus infection? I feel like I have one started.

## 2020-11-03 DIAGNOSIS — R50.9 FEVER AND CHILLS: Primary | ICD-10-CM

## 2020-11-03 DIAGNOSIS — N15.1 RENAL ABSCESS: ICD-10-CM

## 2020-11-04 ENCOUNTER — APPOINTMENT (OUTPATIENT)
Dept: LAB | Age: 55
End: 2020-11-04
Attending: INTERNAL MEDICINE
Payer: COMMERCIAL

## 2020-11-04 DIAGNOSIS — R50.9 FEVER AND CHILLS: ICD-10-CM

## 2020-11-09 DIAGNOSIS — N15.1 RENAL ABSCESS: Primary | ICD-10-CM

## 2020-11-13 ENCOUNTER — HOSPITAL ENCOUNTER (OUTPATIENT)
Dept: CT IMAGING | Facility: HOSPITAL | Age: 55
Discharge: HOME | End: 2020-11-13
Attending: INTERNAL MEDICINE
Payer: COMMERCIAL

## 2020-11-13 DIAGNOSIS — N15.1 RENAL ABSCESS: ICD-10-CM

## 2020-11-13 PROCEDURE — 76080 X-RAY EXAM OF FISTULA: CPT | Performed by: INTERNAL MEDICINE

## 2020-11-13 PROCEDURE — 49424 ASSESS CYST CONTRAST INJECT: CPT | Performed by: INTERNAL MEDICINE

## 2020-11-13 NOTE — PROCEDURES
[facility]  Procedure Note    [address] Patient Status:  Outpatient    1965 MRN [id number]   [facility] CT Attending Angel Luis Blanco MD   Hosp Day # 0 PCP Bhakti Santiago MD     Procedure: CT abscessogram    Pre-Procedure D

## 2020-11-17 ENCOUNTER — TELEMEDICINE (OUTPATIENT)
Dept: FAMILY MEDICINE CLINIC | Facility: CLINIC | Age: 55
End: 2020-11-17
Payer: COMMERCIAL

## 2020-11-17 DIAGNOSIS — J32.9 SINUSITIS, UNSPECIFIED CHRONICITY, UNSPECIFIED LOCATION: Primary | ICD-10-CM

## 2020-11-17 PROCEDURE — 99213 OFFICE O/P EST LOW 20 MIN: CPT | Performed by: FAMILY MEDICINE

## 2020-11-17 RX ORDER — LEVOFLOXACIN 250 MG/1
250 TABLET ORAL DAILY
Qty: 7 TABLET | Refills: 0 | Status: SHIPPED | OUTPATIENT
Start: 2020-11-17 | End: 2020-11-24

## 2020-11-17 NOTE — PROGRESS NOTES
HPI:    Patient ID: Jennifer Rangel is a 54year old female. HPI  Ms. Reyes is a pleasant 41-year-old female who recently had pyelonephritis and had IV antibiotics for the past few weeks. PICC line was removed recently.   She continues to have fever and tablet  tablet 4   • Albuterol Sulfate HFA (PROAIR HFA) 108 (90 Base) MCG/ACT Inhalation Aero Soln Inhale 1 puff into the lungs every 6 (six) hours as needed for Wheezing.  1 Inhaler 11   • Insulin Pen Needle (PEN NEEDLES) 32G X 4 MM Does not apply M daily for the next 7 days.   Renal function was checked in October which was normal.  Follow-up sooner if symptoms worsen or persist.    Total face to face time was 5 min, more than 50% of the time was spent in counseling and/or coordination of care related

## 2020-12-01 ENCOUNTER — HOSPITAL ENCOUNTER (OUTPATIENT)
Dept: ULTRASOUND IMAGING | Age: 55
Discharge: HOME | End: 2020-12-01
Attending: INTERNAL MEDICINE
Payer: COMMERCIAL

## 2020-12-01 ENCOUNTER — LAB ENCOUNTER (OUTPATIENT)
Dept: LAB | Age: 55
End: 2020-12-01
Attending: INTERNAL MEDICINE
Payer: COMMERCIAL

## 2020-12-01 ENCOUNTER — APPOINTMENT (OUTPATIENT)
Dept: LAB | Age: 55
End: 2020-12-01
Attending: INTERNAL MEDICINE
Payer: COMMERCIAL

## 2020-12-01 DIAGNOSIS — E89.0 POST-SURGICAL HYPOTHYROIDISM: ICD-10-CM

## 2020-12-01 DIAGNOSIS — K75.81 NASH (NONALCOHOLIC STEATOHEPATITIS): ICD-10-CM

## 2020-12-01 DIAGNOSIS — C73 THYROID CANCER (HCC): ICD-10-CM

## 2020-12-01 DIAGNOSIS — R73.03 PREDIABETES: ICD-10-CM

## 2020-12-01 PROCEDURE — 84443 ASSAY THYROID STIM HORMONE: CPT

## 2020-12-01 PROCEDURE — 86800 THYROGLOBULIN ANTIBODY: CPT

## 2020-12-01 PROCEDURE — 76981 USE PARENCHYMA: CPT | Performed by: INTERNAL MEDICINE

## 2020-12-01 PROCEDURE — 84481 FREE ASSAY (FT-3): CPT

## 2020-12-01 PROCEDURE — 36415 COLL VENOUS BLD VENIPUNCTURE: CPT

## 2020-12-01 PROCEDURE — 76705 ECHO EXAM OF ABDOMEN: CPT | Performed by: INTERNAL MEDICINE

## 2020-12-01 PROCEDURE — 83036 HEMOGLOBIN GLYCOSYLATED A1C: CPT

## 2020-12-01 PROCEDURE — 84439 ASSAY OF FREE THYROXINE: CPT

## 2020-12-03 ENCOUNTER — OFFICE VISIT (OUTPATIENT)
Dept: SURGERY | Facility: CLINIC | Age: 55
End: 2020-12-03
Payer: COMMERCIAL

## 2020-12-03 VITALS
HEIGHT: 63 IN | SYSTOLIC BLOOD PRESSURE: 155 MMHG | WEIGHT: 213.38 LBS | DIASTOLIC BLOOD PRESSURE: 106 MMHG | RESPIRATION RATE: 16 BRPM | BODY MASS INDEX: 37.81 KG/M2 | HEART RATE: 87 BPM | OXYGEN SATURATION: 96 %

## 2020-12-03 NOTE — PROGRESS NOTES
[facility] at [facility]  [address], [address]  [address]  [phone] ([phone]) problem     hx of double discectomy   • Bloating 6/2018    not sure exactly   • Blood disorder 2003    MTHFR   • Blood in the stool 9/20/2018    Dark stools since 9/20/18   • Blurred vision not sure    hard to focus at times.    • Body piercing 1981    ears off and on.   • Obesity 8/31/11   • Osteopenia 07/01/2020   • Pain in joints varies    sometimes my knees bother me   • Painful swallowing 9/9/2018    At times it gets so difficult that it does hurt.    • Pneumonia, organism unspecified(486)    • Prediabete LITHOTRIPSY WITH CYSTOSCOPY, STENT PLACEMENT Right 8/7/2020    Performed by Axel Gaviria MD at [facility]   • OTHER  1/1/99    lithotomy, several kidney stones   • OTHER  2008    thyroidectomy   • OTHER SURGICAL HIST Daughter    • Alcohol and Other Disorders Associated Brother         alchoholic   • Diabetes Brother         Type 2   • Diabetes Paternal Aunt         Type 2   • Diabetes Maternal Uncle         Type 2   • Diabetes Paternal Uncle         Type 2   • Mental D Oral Tab, TAKE 8 TABLETS BY MOUTH AT BEDTIME (Patient taking differently: TAKE 3 TABLETS BY MOUTH AT BEDTIME, states the order states 1-8 but has been taking 3 ), Disp: 240 tablet, Rfl: 5  •  Fluticasone-Umeclidin-Vilant (Dipak Camacho) 100-62.5-25 MCG/I Has lost weight relative to a few years ago ( ~ 20-25 lb weight loss;~ 10%).  Maintaining this will be helpful but additional weight loss would further help her liver and delay any progression of liver disease  - immune to hepatitis A  - Follow up in 1 year

## 2020-12-07 ENCOUNTER — OFFICE VISIT (OUTPATIENT)
Dept: FAMILY MEDICINE CLINIC | Facility: CLINIC | Age: 55
End: 2020-12-07
Payer: COMMERCIAL

## 2020-12-07 VITALS
WEIGHT: 212 LBS | DIASTOLIC BLOOD PRESSURE: 90 MMHG | OXYGEN SATURATION: 96 % | BODY MASS INDEX: 37.56 KG/M2 | HEART RATE: 84 BPM | HEIGHT: 63 IN | TEMPERATURE: 98 F | SYSTOLIC BLOOD PRESSURE: 132 MMHG | RESPIRATION RATE: 16 BRPM

## 2020-12-07 DIAGNOSIS — E66.9 OBESITY (BMI 30-39.9): ICD-10-CM

## 2020-12-07 DIAGNOSIS — Z23 NEED FOR VACCINATION: ICD-10-CM

## 2020-12-07 DIAGNOSIS — J01.01 ACUTE RECURRENT MAXILLARY SINUSITIS: Primary | ICD-10-CM

## 2020-12-07 PROCEDURE — 90686 IIV4 VACC NO PRSV 0.5 ML IM: CPT | Performed by: FAMILY MEDICINE

## 2020-12-07 PROCEDURE — 99214 OFFICE O/P EST MOD 30 MIN: CPT | Performed by: FAMILY MEDICINE

## 2020-12-07 PROCEDURE — 90471 IMMUNIZATION ADMIN: CPT | Performed by: FAMILY MEDICINE

## 2020-12-07 PROCEDURE — 3008F BODY MASS INDEX DOCD: CPT | Performed by: FAMILY MEDICINE

## 2020-12-07 PROCEDURE — 3075F SYST BP GE 130 - 139MM HG: CPT | Performed by: FAMILY MEDICINE

## 2020-12-07 PROCEDURE — 3080F DIAST BP >= 90 MM HG: CPT | Performed by: FAMILY MEDICINE

## 2020-12-07 RX ORDER — CEPHALEXIN 500 MG/1
500 CAPSULE ORAL 3 TIMES DAILY
Qty: 30 CAPSULE | Refills: 0 | Status: SHIPPED | OUTPATIENT
Start: 2020-12-07 | End: 2020-12-17

## 2020-12-07 RX ORDER — MONTELUKAST SODIUM 10 MG/1
10 TABLET ORAL DAILY
COMMUNITY
Start: 2020-10-20 | End: 2021-03-29

## 2020-12-07 RX ORDER — LEVOFLOXACIN 250 MG/1
250 TABLET ORAL DAILY
Qty: 10 TABLET | Refills: 0 | Status: SHIPPED | OUTPATIENT
Start: 2020-12-07 | End: 2020-12-07

## 2020-12-07 NOTE — PATIENT INSTRUCTIONS
Thank you for choosing Yvan Askew MD at [place] [address]  To Do: [address]  1. Please take meds as directed. [facility] is located in [address]. Monday, Tuesday & Friday – 8 a.m. to 4 p.m. Wednesday, Thursday – 7 a.m. to 3 p. outweigh those potential risks and we strive to make you healthier and to improve your quality of life.     Referrals, and Radiology Information:    If your insurance requires a referral to a specialist, please allow 5 business days to process your referral

## 2020-12-07 NOTE — PROGRESS NOTES
HPI:    Patient ID: Larry Wan is a 54year old female. HPI  Ms. Reyes is a pleasant 42-year-old female with history of hypertension, hyperlipidemia, sleep apnea, asthma here for follow-up for weight management.   She has lost about 7 pounds since S (UROCIT-K 15) 15 MEQ (1620 MG) Oral Tab CR Take one tablet  tablet 4   • Albuterol Sulfate HFA (PROAIR HFA) 108 (90 Base) MCG/ACT Inhalation Aero Soln Inhale 1 puff into the lungs every 6 (six) hours as needed for Wheezing.  1 Inhaler 11   • Insulin maxillary sinus tenderness and frontal sinus tenderness. Mouth/Throat: Oropharynx is clear and moist. No oropharyngeal exudate. Eyes: Conjunctivae are normal. No scleral icterus. Neck: Neck supple.    Cardiovascular: Normal rate, regular rhythm and no

## 2020-12-08 ENCOUNTER — HOSPITAL ENCOUNTER (OUTPATIENT)
Dept: CT IMAGING | Age: 55
Discharge: HOME | End: 2020-12-08
Attending: INTERNAL MEDICINE
Payer: COMMERCIAL

## 2020-12-08 DIAGNOSIS — R93.89 ABNORMAL CT OF THE CHEST: ICD-10-CM

## 2020-12-08 PROCEDURE — 71250 CT THORAX DX C-: CPT | Performed by: INTERNAL MEDICINE

## 2020-12-08 RX ORDER — AMITRIPTYLINE HYDROCHLORIDE 10 MG/1
30 TABLET, FILM COATED ORAL NIGHTLY
Qty: 270 TABLET | Refills: 1 | Status: SHIPPED | OUTPATIENT
Start: 2020-12-08 | End: 2021-05-24

## 2020-12-08 NOTE — PROGRESS NOTES
Let her know lungs look good. There is mention of fatty liver which should be discussed with her pcp.  No signs of active inflammation in the lungs

## 2020-12-09 NOTE — PROGRESS NOTES
Per chart, left detailed message regarding CT chest results. Per Dr. Elliott Prado, lungs look good, no signs of active inflammation. Mention of fatty liver, please discuss with pcp.

## 2020-12-13 ENCOUNTER — HOSPITAL ENCOUNTER (OUTPATIENT)
Dept: MRI IMAGING | Facility: HOSPITAL | Age: 55
Discharge: HOME | End: 2020-12-13
Attending: PODIATRIST
Payer: COMMERCIAL

## 2020-12-13 DIAGNOSIS — S92.345A: ICD-10-CM

## 2020-12-13 PROCEDURE — 73718 MRI LOWER EXTREMITY W/O DYE: CPT | Performed by: PODIATRIST

## 2020-12-24 ENCOUNTER — APPOINTMENT (OUTPATIENT)
Dept: GENERAL RADIOLOGY | Age: 55
End: 2020-12-24
Attending: EMERGENCY MEDICINE
Payer: COMMERCIAL

## 2020-12-24 ENCOUNTER — HOSPITAL ENCOUNTER (EMERGENCY)
Age: 55
Discharge: HOME | End: 2020-12-24
Attending: EMERGENCY MEDICINE
Payer: COMMERCIAL

## 2020-12-24 VITALS
BODY MASS INDEX: 37.21 KG/M2 | RESPIRATION RATE: 18 BRPM | WEIGHT: 210 LBS | HEIGHT: 63 IN | HEART RATE: 79 BPM | DIASTOLIC BLOOD PRESSURE: 85 MMHG | TEMPERATURE: 97 F | SYSTOLIC BLOOD PRESSURE: 146 MMHG | OXYGEN SATURATION: 97 %

## 2020-12-24 DIAGNOSIS — S86.911A KNEE STRAIN, RIGHT, INITIAL ENCOUNTER: Primary | ICD-10-CM

## 2020-12-24 PROCEDURE — 99283 EMERGENCY DEPT VISIT LOW MDM: CPT

## 2020-12-24 PROCEDURE — 73562 X-RAY EXAM OF KNEE 3: CPT | Performed by: EMERGENCY MEDICINE

## 2020-12-24 NOTE — ED PROVIDER NOTES
Patient Seen in: [address] [facility] In [place]      History   Patient presents with:  Leg or Foot Injury    Stated Complaint: two days twisted right knee, is more painful    HPI    30-year-old female presents to the emergency department compl GENITO-URINARY DISEASE    • Headache disorder years    I have always gotten headaches   • Heartburn not sure    I think so.    • Heavy menses 12/1995    I did   • History of blood transfusion 9/16/2016    HGB 7.1 with 2 units to be given   • History of depr Date   • ANESTH, SECTION     • BACK SURGERY     • BRONCHOSCOPY N/A 2019    Performed by Melissa Rivera MD at [facility] ENDOSCOPY   • BRONCHOSCOPY N/A 2016    Performed by Melissa Rivera MD at [facility] ENDOSCOPY   •   192,8310,102 of Systems    Positive for stated complaint: two days twisted right knee, is more painful  Other systems are as noted in HPI. Constitutional and vital signs reviewed. All other systems reviewed and negative except as noted above.     Physical Exam went over results with patient. Test results and treatment plan were discussed prior to discharge. Ace wrap was applied. MDM      Right knee strain without evidence of bony injury.                          Disposition and Plan     Clinical Impressi

## 2021-01-01 NOTE — PROGRESS NOTES
[facility] dosing service    Follow-up Pharmacokinetic Consult for Vancomycin Dosing     Jennifer Soto is a 47year old patient who is being treated for renal abscess . Patient is on day 2 of Vancomycin and is currently receiving 1 gm IV Q 12 hours. VSS. No resp distress. Has been nursing vigorously and taking 30ml afterwards. Continues on antibiotics.

## 2021-01-06 ENCOUNTER — OFFICE VISIT (OUTPATIENT)
Dept: FAMILY MEDICINE CLINIC | Facility: CLINIC | Age: 56
End: 2021-01-06
Payer: COMMERCIAL

## 2021-01-06 VITALS
BODY MASS INDEX: 38.09 KG/M2 | SYSTOLIC BLOOD PRESSURE: 126 MMHG | WEIGHT: 215 LBS | HEART RATE: 74 BPM | HEIGHT: 63 IN | DIASTOLIC BLOOD PRESSURE: 80 MMHG | TEMPERATURE: 98 F | OXYGEN SATURATION: 99 % | RESPIRATION RATE: 16 BRPM

## 2021-01-06 DIAGNOSIS — Z23 NEED FOR SHINGLES VACCINE: ICD-10-CM

## 2021-01-06 DIAGNOSIS — M25.561 ACUTE PAIN OF RIGHT KNEE: Primary | ICD-10-CM

## 2021-01-06 PROCEDURE — 99213 OFFICE O/P EST LOW 20 MIN: CPT | Performed by: PHYSICIAN ASSISTANT

## 2021-01-06 PROCEDURE — 3074F SYST BP LT 130 MM HG: CPT | Performed by: PHYSICIAN ASSISTANT

## 2021-01-06 PROCEDURE — 90750 HZV VACC RECOMBINANT IM: CPT | Performed by: PHYSICIAN ASSISTANT

## 2021-01-06 PROCEDURE — 90471 IMMUNIZATION ADMIN: CPT | Performed by: PHYSICIAN ASSISTANT

## 2021-01-06 PROCEDURE — 3008F BODY MASS INDEX DOCD: CPT | Performed by: PHYSICIAN ASSISTANT

## 2021-01-06 PROCEDURE — 3079F DIAST BP 80-89 MM HG: CPT | Performed by: PHYSICIAN ASSISTANT

## 2021-01-06 NOTE — PROGRESS NOTES
[facility] Internal Medicine Progress Note    CC:  Patient presents with:  ER F/U: 12/24/2020 Sprained right knee- follow up. She states that it has been better.  She is keeping knee elevated and taking ibuprofen  Immunization/Injection: shingles times daily with meals. , Disp: , Rfl:     •  Montelukast Sodium 10 MG Oral Tab, Take 10 mg by mouth daily. , Disp: , Rfl:     •  Diclofenac Sodium 50 MG Oral Tab EC, Take 1 tablet by mouth daily as needed. , Disp: , Rfl:     •  OxyCODONE HCl IR 10 MG Oral Ta Disp: , Rfl:     •  omeprazole 20 MG Oral Capsule Delayed Release, Take 20 mg by mouth daily. , Disp: , Rfl:     No current facility-administered medications on file prior to visit.        Review of Systems :  Review of Systems   Constitutional: Negative for ibuprofen as needed  Referral for ortho    Need for shingles vaccine    RTC prn  Orders Placed This Encounter      Zoster Recombinant Adjuvanted [Shingrix -Shingles] (84269)      Meds & Refills for this Visit:  Requested Prescriptions      No prescriptions Exacerbation of asthma     Elevated liver enzymes     Hypokalemia     Ureteral stone with hydronephrosis     Right flank pain     Nephrolithiasis     Renal hematoma, right     Hematoma of right kidney, initial encounter     Urinary tract infection without

## 2021-01-06 NOTE — PATIENT INSTRUCTIONS
Thank you for choosing Carolina Hemphill PA-C at [place] [address]  To Do: [address]  1. Continue ibuprofen as needed  2. Referral for ortho if symptoms persist  3.  Follow-up 2-6 months for second shingles    • Please signup for MY CHART, which i insurance company approved your testing, please call Central Scheduling at [phone]  Please allow our office 5 business days to contact you regarding any testing results.     Refill policies:   Allow 3 business days for refills; controlled substances ma

## 2021-01-07 ENCOUNTER — TELEPHONE (OUTPATIENT)
Dept: FAMILY MEDICINE CLINIC | Facility: CLINIC | Age: 56
End: 2021-01-07

## 2021-01-07 NOTE — TELEPHONE ENCOUNTER
Patient states she had her first Shingles shot yesterday, her arm is swollen & painful, has a temp of 100.2, please advise.

## 2021-01-07 NOTE — TELEPHONE ENCOUNTER
Spoke to pt, states she received her first shingles vaccine yesterday and started with swelling and pain in her arm along with a low grade fever.   tmax 100.2, has not taken any Tylenol  Swelling and pain is right at the injection site, not spreading   +hea

## 2021-01-19 ENCOUNTER — PATIENT MESSAGE (OUTPATIENT)
Dept: FAMILY MEDICINE CLINIC | Facility: CLINIC | Age: 56
End: 2021-01-19

## 2021-01-20 ENCOUNTER — PATIENT MESSAGE (OUTPATIENT)
Dept: FAMILY MEDICINE CLINIC | Facility: CLINIC | Age: 56
End: 2021-01-20

## 2021-01-20 ENCOUNTER — LAB ENCOUNTER (OUTPATIENT)
Dept: LAB | Age: 56
End: 2021-01-20
Attending: INTERNAL MEDICINE
Payer: COMMERCIAL

## 2021-01-20 DIAGNOSIS — R73.03 PREDIABETES: ICD-10-CM

## 2021-01-20 DIAGNOSIS — E89.0 POST-SURGICAL HYPOTHYROIDISM: ICD-10-CM

## 2021-01-20 DIAGNOSIS — C73 THYROID CANCER (HCC): ICD-10-CM

## 2021-01-20 DIAGNOSIS — N20.0 KIDNEY STONES: ICD-10-CM

## 2021-01-20 LAB
ALBUMIN SERPL-MCNC: 3.8 G/DL (ref 3.4–5)
ALBUMIN/GLOB SERPL: 1.1 {RATIO} (ref 1–2)
ALP LIVER SERPL-CCNC: 100 U/L
ALT SERPL-CCNC: 98 U/L
ANION GAP SERPL CALC-SCNC: 9 MMOL/L (ref 0–18)
AST SERPL-CCNC: 47 U/L (ref 15–37)
BILIRUB SERPL-MCNC: 0.5 MG/DL (ref 0.1–2)
BUN BLD-MCNC: 14 MG/DL (ref 7–18)
BUN/CREAT SERPL: 21.2 (ref 10–20)
CALCIUM BLD-MCNC: 9.2 MG/DL (ref 8.5–10.1)
CHLORIDE SERPL-SCNC: 107 MMOL/L (ref 98–112)
CO2 SERPL-SCNC: 24 MMOL/L (ref 21–32)
CREAT BLD-MCNC: 0.66 MG/DL
GLOBULIN PLAS-MCNC: 3.4 G/DL (ref 2.8–4.4)
GLUCOSE BLD-MCNC: 108 MG/DL (ref 70–99)
M PROTEIN MFR SERPL ELPH: 7.2 G/DL (ref 6.4–8.2)
OSMOLALITY SERPL CALC.SUM OF ELEC: 291 MOSM/KG (ref 275–295)
PATIENT FASTING Y/N/NP: NO
POTASSIUM SERPL-SCNC: 3.4 MMOL/L (ref 3.5–5.1)
PTH-INTACT SERPL-MCNC: 92.6 PG/ML (ref 18.5–88)
SODIUM SERPL-SCNC: 140 MMOL/L (ref 136–145)
TSI SER-ACNC: 0.42 MIU/ML (ref 0.36–3.74)
VIT D+METAB SERPL-MCNC: 29.5 NG/ML (ref 30–100)

## 2021-01-20 PROCEDURE — 80053 COMPREHEN METABOLIC PANEL: CPT

## 2021-01-20 PROCEDURE — 84443 ASSAY THYROID STIM HORMONE: CPT

## 2021-01-20 PROCEDURE — 82306 VITAMIN D 25 HYDROXY: CPT

## 2021-01-20 PROCEDURE — 36415 COLL VENOUS BLD VENIPUNCTURE: CPT

## 2021-01-20 PROCEDURE — 83970 ASSAY OF PARATHORMONE: CPT

## 2021-01-20 RX ORDER — ORAL SEMAGLUTIDE 7 MG/1
1 TABLET ORAL DAILY
Qty: 30 TABLET | Refills: 2 | Status: SHIPPED | OUTPATIENT
Start: 2021-01-20 | End: 2021-02-19

## 2021-01-20 NOTE — TELEPHONE ENCOUNTER
From: Mami Christianson Book  To: Cate Gann MD  Sent: 1/19/2021 7:52 PM CST  Subject: Visit Follow-up Question    Hi! Just checking in to see if you have any Ozempic samples? Thank you!

## 2021-01-20 NOTE — TELEPHONE ENCOUNTER
We can send Rx to pharmacy and they will let us know if it's covered.  Which does do you want to start her on?

## 2021-01-21 NOTE — TELEPHONE ENCOUNTER
See TE, pt had blood work done that was ordered by Dr. Gilberto Arguelles. Please advise, pt to schedule OV?

## 2021-01-21 NOTE — TELEPHONE ENCOUNTER
Dr. Lea Briones prescribed pt the potassium citrate, should pt follow up with him regarding low potassium, wait for Dr. Kwadwo Arevalo to result on labs, or go ahead and send Rx?

## 2021-01-21 NOTE — TELEPHONE ENCOUNTER
From: Jaylin Perkins Book  To: Mia Haddad MD  Sent: 1/20/2021 6:52 PM CST  Subject: Other    Dr Rasheeda Lemus,  I was just looking at the test results. My potassium is below normal. I do take 2 potassium citrates daily.  For whatever reason (possibly the diarr

## 2021-01-22 LAB
THYROGLOB AB SERPL-ACNC: <1 IU/ML (ref 0–0.9)
THYROGLOB SERPL-MCNC: <0.1 NG/ML (ref 1.5–38.5)

## 2021-02-08 ENCOUNTER — PATIENT MESSAGE (OUTPATIENT)
Dept: FAMILY MEDICINE CLINIC | Facility: CLINIC | Age: 56
End: 2021-02-08

## 2021-02-08 ENCOUNTER — TELEMEDICINE (OUTPATIENT)
Dept: FAMILY MEDICINE CLINIC | Facility: CLINIC | Age: 56
End: 2021-02-08

## 2021-02-08 DIAGNOSIS — J01.01 ACUTE RECURRENT MAXILLARY SINUSITIS: Primary | ICD-10-CM

## 2021-02-08 PROCEDURE — 99213 OFFICE O/P EST LOW 20 MIN: CPT | Performed by: FAMILY MEDICINE

## 2021-02-08 NOTE — PROGRESS NOTES
HPI:    Patient ID: Debbie Ramos is a 54year old female. HPI  Ms. Reyes is a pleasant 77-year-old female with multiple medical conditions presenting for video visit today.   She has been experiencing sinus congestion and headaches which she localizes but missed dose. • alendronate 70 MG Oral Tab Take 1 tablet (70 mg total) by mouth once a week.  12 tablet 3   • Potassium Citrate ER (UROCIT-K 15) 15 MEQ (1620 MG) Oral Tab CR Take one tablet  tablet 4   • Albuterol Sulfate HFA (PROAIR HFA) 108 follow-up sooner if symptoms worsen or persist.    Total face to face time was 5 min, more than 50% of the time was spent in counseling and/or coordination of care related to sinusitis. No orders of the defined types were placed in this encounter.

## 2021-02-08 NOTE — PATIENT INSTRUCTIONS
Thank you for choosing Hilda Allen MD at [place] [address]  To Do: [address]  1. Please take meds as directed. [facility] is located in [address]. Monday, Tuesday & Friday – 8 a.m. to 4 p.m. Wednesday, Thursday – 7 a.m. to 3 p. outweigh those potential risks and we strive to make you healthier and to improve your quality of life.     Referrals, and Radiology Information:    If your insurance requires a referral to a specialist, please allow 5 business days to process your referral

## 2021-02-08 NOTE — TELEPHONE ENCOUNTER
From: Nino Zhong Book  To: Avinash Pérez MD  Sent: 2/8/2021 5:02 PM CST  Subject: Visit Follow-up Question    Are you all scheduling to receive the COVID vaccine? If not, where then?

## 2021-02-08 NOTE — TELEPHONE ENCOUNTER
From: Mami Christianson Book  To: Cate Gann MD  Sent: 2/8/2021 2:03 PM CST  Subject: Visit Follow-up Question    Dr. Jones Gilford,   I forgot to talk to you about the Covid vaccine. What are your thoughts? And should I get it? I'm very nervous about it.  I did

## 2021-02-11 ENCOUNTER — PATIENT MESSAGE (OUTPATIENT)
Dept: FAMILY MEDICINE CLINIC | Facility: CLINIC | Age: 56
End: 2021-02-11

## 2021-02-11 ENCOUNTER — TELEPHONE (OUTPATIENT)
Dept: FAMILY MEDICINE CLINIC | Facility: CLINIC | Age: 56
End: 2021-02-11

## 2021-02-11 RX ORDER — METHYLPREDNISOLONE 4 MG/1
TABLET ORAL
Qty: 1 KIT | Refills: 0 | Status: SHIPPED | OUTPATIENT
Start: 2021-02-11 | End: 2021-03-11 | Stop reason: ALTCHOICE

## 2021-02-11 RX ORDER — LEVOFLOXACIN 250 MG/1
250 TABLET ORAL DAILY
Qty: 7 TABLET | Refills: 0 | Status: SHIPPED | OUTPATIENT
Start: 2021-02-11 | End: 2021-02-18

## 2021-02-11 NOTE — TELEPHONE ENCOUNTER
· Not feeling any better.  Coughing and severe sinus migraine  · She has been taking the z maryanne  · LOV: VV 2/8/2021  · Please advise

## 2021-02-11 NOTE — TELEPHONE ENCOUNTER
From: Kiarra Salamanca Book  To: Mamadou Barnett MD  Sent: 2/11/2021 3:31 PM CST  Subject: Visit Follow-up Question    I believe I talked to Guerda Multani.  She was [facility]. My steffany is only showing that they received the steroid, not the antibiot

## 2021-02-16 ENCOUNTER — APPOINTMENT (OUTPATIENT)
Dept: GENERAL RADIOLOGY | Facility: HOSPITAL | Age: 56
End: 2021-02-16
Attending: EMERGENCY MEDICINE
Payer: COMMERCIAL

## 2021-02-16 ENCOUNTER — HOSPITAL ENCOUNTER (EMERGENCY)
Facility: HOSPITAL | Age: 56
Discharge: HOME | End: 2021-02-16
Attending: EMERGENCY MEDICINE
Payer: COMMERCIAL

## 2021-02-16 ENCOUNTER — PATIENT MESSAGE (OUTPATIENT)
Dept: FAMILY MEDICINE CLINIC | Facility: CLINIC | Age: 56
End: 2021-02-16

## 2021-02-16 VITALS
DIASTOLIC BLOOD PRESSURE: 75 MMHG | WEIGHT: 210 LBS | RESPIRATION RATE: 20 BRPM | HEIGHT: 63 IN | SYSTOLIC BLOOD PRESSURE: 155 MMHG | OXYGEN SATURATION: 97 % | TEMPERATURE: 98 F | BODY MASS INDEX: 37.21 KG/M2 | HEART RATE: 75 BPM

## 2021-02-16 DIAGNOSIS — R06.02 SHORTNESS OF BREATH: ICD-10-CM

## 2021-02-16 DIAGNOSIS — R05.9 COUGH: Primary | ICD-10-CM

## 2021-02-16 LAB
ALBUMIN SERPL-MCNC: 3.9 G/DL (ref 3.4–5)
ALBUMIN/GLOB SERPL: 1.1 {RATIO} (ref 1–2)
ALP LIVER SERPL-CCNC: 99 U/L
ALT SERPL-CCNC: 87 U/L
ANION GAP SERPL CALC-SCNC: 9 MMOL/L (ref 0–18)
AST SERPL-CCNC: 23 U/L (ref 15–37)
BASOPHILS # BLD AUTO: 0.18 X10(3) UL (ref 0–0.2)
BASOPHILS NFR BLD AUTO: 1.5 %
BILIRUB SERPL-MCNC: 0.4 MG/DL (ref 0.1–2)
BUN BLD-MCNC: 15 MG/DL (ref 7–18)
BUN/CREAT SERPL: 24.6 (ref 10–20)
CALCIUM BLD-MCNC: 9.3 MG/DL (ref 8.5–10.1)
CHLORIDE SERPL-SCNC: 106 MMOL/L (ref 98–112)
CO2 SERPL-SCNC: 27 MMOL/L (ref 21–32)
CREAT BLD-MCNC: 0.61 MG/DL
D-DIMER: 0.33 UG/ML FEU (ref ?–0.55)
DEPRECATED RDW RBC AUTO: 44.7 FL (ref 35.1–46.3)
EOSINOPHIL # BLD AUTO: 0.07 X10(3) UL (ref 0–0.7)
EOSINOPHIL NFR BLD AUTO: 0.6 %
ERYTHROCYTE [DISTWIDTH] IN BLOOD BY AUTOMATED COUNT: 14.1 % (ref 11–15)
GLOBULIN PLAS-MCNC: 3.6 G/DL (ref 2.8–4.4)
GLUCOSE BLD-MCNC: 110 MG/DL (ref 70–99)
HCT VFR BLD AUTO: 44.1 %
HGB BLD-MCNC: 14.8 G/DL
IMM GRANULOCYTES # BLD AUTO: 0.43 X10(3) UL (ref 0–1)
IMM GRANULOCYTES NFR BLD: 3.6 %
LYMPHOCYTES # BLD AUTO: 1.9 X10(3) UL (ref 1–4)
LYMPHOCYTES NFR BLD AUTO: 15.9 %
M PROTEIN MFR SERPL ELPH: 7.5 G/DL (ref 6.4–8.2)
MCH RBC QN AUTO: 29.1 PG (ref 26–34)
MCHC RBC AUTO-ENTMCNC: 33.6 G/DL (ref 31–37)
MCV RBC AUTO: 86.8 FL
MONOCYTES # BLD AUTO: 0.69 X10(3) UL (ref 0.1–1)
MONOCYTES NFR BLD AUTO: 5.8 %
NEUTROPHILS # BLD AUTO: 8.65 X10 (3) UL (ref 1.5–7.7)
NEUTROPHILS # BLD AUTO: 8.65 X10(3) UL (ref 1.5–7.7)
NEUTROPHILS NFR BLD AUTO: 72.6 %
NT-PROBNP SERPL-MCNC: 66 PG/ML (ref ?–125)
OSMOLALITY SERPL CALC.SUM OF ELEC: 295 MOSM/KG (ref 275–295)
PLATELET # BLD AUTO: 336 10(3)UL (ref 150–450)
POTASSIUM SERPL-SCNC: 3.5 MMOL/L (ref 3.5–5.1)
RBC # BLD AUTO: 5.08 X10(6)UL
SARS-COV-2 RNA RESP QL NAA+PROBE: NOT DETECTED
SODIUM SERPL-SCNC: 142 MMOL/L (ref 136–145)
TROPONIN I SERPL-MCNC: <0.045 NG/ML (ref ?–0.04)
WBC # BLD AUTO: 11.9 X10(3) UL (ref 4–11)

## 2021-02-16 PROCEDURE — 99284 EMERGENCY DEPT VISIT MOD MDM: CPT | Performed by: EMERGENCY MEDICINE

## 2021-02-16 PROCEDURE — 71045 X-RAY EXAM CHEST 1 VIEW: CPT | Performed by: EMERGENCY MEDICINE

## 2021-02-16 PROCEDURE — 83880 ASSAY OF NATRIURETIC PEPTIDE: CPT | Performed by: EMERGENCY MEDICINE

## 2021-02-16 PROCEDURE — 36415 COLL VENOUS BLD VENIPUNCTURE: CPT | Performed by: EMERGENCY MEDICINE

## 2021-02-16 PROCEDURE — 84484 ASSAY OF TROPONIN QUANT: CPT | Performed by: EMERGENCY MEDICINE

## 2021-02-16 PROCEDURE — 80053 COMPREHEN METABOLIC PANEL: CPT | Performed by: EMERGENCY MEDICINE

## 2021-02-16 PROCEDURE — 93005 ELECTROCARDIOGRAM TRACING: CPT

## 2021-02-16 PROCEDURE — 85379 FIBRIN DEGRADATION QUANT: CPT | Performed by: EMERGENCY MEDICINE

## 2021-02-16 PROCEDURE — 93010 ELECTROCARDIOGRAM REPORT: CPT | Performed by: EMERGENCY MEDICINE

## 2021-02-16 PROCEDURE — 85025 COMPLETE CBC W/AUTO DIFF WBC: CPT | Performed by: EMERGENCY MEDICINE

## 2021-02-16 RX ORDER — ALBUTEROL SULFATE 2.5 MG/3ML
2.5 SOLUTION RESPIRATORY (INHALATION) EVERY 4 HOURS PRN
Qty: 30 AMPULE | Refills: 0 | Status: SHIPPED | OUTPATIENT
Start: 2021-02-16 | End: 2021-03-18

## 2021-02-16 RX ORDER — BENZONATATE 100 MG/1
100 CAPSULE ORAL 3 TIMES DAILY PRN
Qty: 30 CAPSULE | Refills: 0 | Status: SHIPPED | OUTPATIENT
Start: 2021-02-16 | End: 2021-02-17

## 2021-02-16 RX ORDER — ALBUTEROL SULFATE 90 UG/1
2 AEROSOL, METERED RESPIRATORY (INHALATION) EVERY 4 HOURS PRN
Qty: 1 INHALER | Refills: 0 | Status: SHIPPED | OUTPATIENT
Start: 2021-02-16 | End: 2021-03-18

## 2021-02-16 NOTE — TELEPHONE ENCOUNTER
From: Adolfo Clarity Book  To: Mekhi Coon MD  Sent: 2/16/2021 2:26 PM CST  Subject: Visit Follow-up Question    Dr Gisell Franco,  I have been taking the Levoxyl since you prescribed last Friday, along with the steroid. Still having coughing fits.  Taking 4

## 2021-02-16 NOTE — ED INITIAL ASSESSMENT (HPI)
SOB, hx of asthma. Recently started on ABX for sinus infection, also given steroid dose pack with no improvement in SOB. Pt states doing home nebulizer treatments with no relief of SOB. Last treatment done at 1500.

## 2021-02-16 NOTE — TELEPHONE ENCOUNTER
Ashvin Cates MD  [facility] Staff 1 minute ago (2:35 PM)     Recommend going to ED    Message text

## 2021-02-17 ENCOUNTER — PATIENT MESSAGE (OUTPATIENT)
Dept: FAMILY MEDICINE CLINIC | Facility: CLINIC | Age: 56
End: 2021-02-17

## 2021-02-17 LAB
ATRIAL RATE: 87 BPM
P AXIS: 49 DEGREES
P-R INTERVAL: 158 MS
Q-T INTERVAL: 392 MS
QRS DURATION: 102 MS
QTC CALCULATION (BEZET): 471 MS
R AXIS: 20 DEGREES
SARS-COV-2 RNA RESP QL NAA+PROBE: NOT DETECTED
T AXIS: 32 DEGREES
VENTRICULAR RATE: 87 BPM

## 2021-02-17 RX ORDER — BENZONATATE 100 MG/1
100 CAPSULE ORAL 3 TIMES DAILY PRN
Qty: 30 CAPSULE | Refills: 0 | Status: SHIPPED | OUTPATIENT
Start: 2021-02-17 | End: 2021-03-19

## 2021-02-17 NOTE — TELEPHONE ENCOUNTER
From: MoSo Book  To: Yvan Askew MD  Sent: 2/17/2021 12:44 PM CST  Subject: Visit Follow-up Question    [id number] [address] Went to the ER yesterday. The one thing the ER doc was going to do was order Marce Pearls. The pharmacy never got that order.    Can Dr Narayan

## 2021-02-17 NOTE — ED PROVIDER NOTES
Patient Seen in: [facility] Emergency Department      History   Patient presents with:  Difficulty Breathing    Stated Complaint: Shortness of breath, hx of asthma ~ has been on antibiotics (98% in triage)     HPI/Subjective:   HPI    This is a 55-y 9/19/2018    lymph nodes in neck area   • Esophageal reflux    • Exposure to radiation     hx thyroid cancer   • Extrinsic asthma, unspecified    • Fatigue not sure    This has been going on for a while   • Fever 9/2/2018    off and on   • Food intolerance Not sure if I get full quickly/ if due to swallowing issue   • Unspecified sleep apnea PSG 7-3-14    PSG 7-3-14 AHI 15 RDI 15 REM AHI 20 SaO2 payal 91%   • Visual impairment     just glasses for reading   • Wears glasses 1980    need new ones, don't wea Smoking status: Never Smoker      Smokeless tobacco: Never Used      Tobacco comment: father was a smoker until I was 13years old    Alcohol use: Not Currently      Alcohol/week: 0.8 - 1.7 standard drinks      Types: 1 - 2 Standard drinks or equivalent BETA NATRIURETIC PEPTIDE - Normal   RAPID SARS-COV-2 BY PCR - Normal   CBC WITH DIFFERENTIAL WITH PLATELET    Narrative: The following orders were created for panel order CBC WITH DIFFERENTIAL WITH PLATELET.   Procedure                               Abn infiltrates EKG unremarkable, D-dimer negative BNP normal.  O2 saturation on room air is 98%. Symptoms seem to be bothering her at rest and with exertion, not seem to be exertional in nature.   Covid negative, PCR is pending though patient with no known CO

## 2021-03-11 ENCOUNTER — OFFICE VISIT (OUTPATIENT)
Dept: FAMILY MEDICINE CLINIC | Facility: CLINIC | Age: 56
End: 2021-03-11
Payer: COMMERCIAL

## 2021-03-11 VITALS
RESPIRATION RATE: 16 BRPM | WEIGHT: 225 LBS | OXYGEN SATURATION: 97 % | HEIGHT: 63 IN | SYSTOLIC BLOOD PRESSURE: 150 MMHG | DIASTOLIC BLOOD PRESSURE: 80 MMHG | TEMPERATURE: 98 F | HEART RATE: 88 BPM | BODY MASS INDEX: 39.87 KG/M2

## 2021-03-11 DIAGNOSIS — E66.9 OBESITY (BMI 30-39.9): ICD-10-CM

## 2021-03-11 DIAGNOSIS — R73.03 PREDIABETES: ICD-10-CM

## 2021-03-11 DIAGNOSIS — J01.90 ACUTE SINUSITIS, RECURRENCE NOT SPECIFIED, UNSPECIFIED LOCATION: Primary | ICD-10-CM

## 2021-03-11 PROCEDURE — 99214 OFFICE O/P EST MOD 30 MIN: CPT | Performed by: FAMILY MEDICINE

## 2021-03-11 PROCEDURE — 3008F BODY MASS INDEX DOCD: CPT | Performed by: FAMILY MEDICINE

## 2021-03-11 PROCEDURE — 3079F DIAST BP 80-89 MM HG: CPT | Performed by: FAMILY MEDICINE

## 2021-03-11 PROCEDURE — 3077F SYST BP >= 140 MM HG: CPT | Performed by: FAMILY MEDICINE

## 2021-03-11 RX ORDER — PHENTERMINE HYDROCHLORIDE 37.5 MG/1
37.5 TABLET ORAL
Qty: 30 TABLET | Refills: 2 | Status: SHIPPED | OUTPATIENT
Start: 2021-03-11 | End: 2021-04-12

## 2021-03-11 RX ORDER — BENZONATATE 100 MG/1
100 CAPSULE ORAL 3 TIMES DAILY PRN
Qty: 30 CAPSULE | Refills: 3 | Status: SHIPPED | OUTPATIENT
Start: 2021-03-11 | End: 2021-07-10

## 2021-03-11 RX ORDER — POTASSIUM BROMIDE 100 %
POWDER (GRAM) MISCELLANEOUS
Status: ON HOLD | COMMUNITY
End: 2021-10-29

## 2021-03-11 RX ORDER — LEVOFLOXACIN 500 MG/1
500 TABLET, FILM COATED ORAL DAILY
Qty: 10 TABLET | Refills: 0 | Status: SHIPPED | OUTPATIENT
Start: 2021-03-11 | End: 2021-03-21

## 2021-03-11 NOTE — PATIENT INSTRUCTIONS
Thank you for choosing Harrison Abbasi MD at [place] [address]  To Do: [address]  1. Please take meds as directed. [facility] is located in [address]. Monday, Tuesday & Friday – 8 a.m. to 4 p.m. Wednesday, Thursday – 7 a.m. to 3 p. outweigh those potential risks and we strive to make you healthier and to improve your quality of life.     Referrals, and Radiology Information:    If your insurance requires a referral to a specialist, please allow 5 business days to process your referral

## 2021-03-16 ENCOUNTER — PATIENT MESSAGE (OUTPATIENT)
Dept: FAMILY MEDICINE CLINIC | Facility: CLINIC | Age: 56
End: 2021-03-16

## 2021-03-17 DIAGNOSIS — Z23 NEED FOR VACCINATION: ICD-10-CM

## 2021-03-17 NOTE — TELEPHONE ENCOUNTER
From: Kayren Leventhal Book  To: Stefani Francis MD  Sent: 3/16/2021 8:41 PM CDT  Subject: Visit Follow-up Question    Dr Byron Flores,    My last visit \"post summary\" stated something about Type 2 diabetes. Are you saying that I do have type 2 diabetes?     Chr

## 2021-03-17 NOTE — TELEPHONE ENCOUNTER
How do we correct the AVS from showing type 2 diabetes, all of the diabetic care gaps are now showing on her chart as well.

## 2021-03-19 ENCOUNTER — IMMUNIZATION (OUTPATIENT)
Dept: LAB | Facility: HOSPITAL | Age: 56
End: 2021-03-19
Attending: HOSPITALIST
Payer: COMMERCIAL

## 2021-03-19 DIAGNOSIS — Z23 NEED FOR VACCINATION: Primary | ICD-10-CM

## 2021-03-19 PROCEDURE — 0011A SARSCOV2 VAC 100MCG/0.5ML IM: CPT

## 2021-03-29 RX ORDER — MONTELUKAST SODIUM 10 MG/1
TABLET ORAL
Qty: 90 TABLET | Refills: 1 | Status: SHIPPED | OUTPATIENT
Start: 2021-03-29 | End: 2021-09-08

## 2021-03-29 NOTE — TELEPHONE ENCOUNTER
Asthma & COPD Medication Protocol [id number]/27/2021 10:16 AM   Asthma Action Score greater than or equal to 20    Appointment in past 6 or next 3 months     AAP/ACT given in last 12 months     Refill protocol passed because the patient met the following pro

## 2021-04-12 ENCOUNTER — OFFICE VISIT (OUTPATIENT)
Dept: FAMILY MEDICINE CLINIC | Facility: CLINIC | Age: 56
End: 2021-04-12
Payer: COMMERCIAL

## 2021-04-12 VITALS
OXYGEN SATURATION: 98 % | RESPIRATION RATE: 16 BRPM | DIASTOLIC BLOOD PRESSURE: 80 MMHG | TEMPERATURE: 97 F | HEART RATE: 84 BPM | WEIGHT: 223 LBS | HEIGHT: 63 IN | BODY MASS INDEX: 39.51 KG/M2 | SYSTOLIC BLOOD PRESSURE: 120 MMHG

## 2021-04-12 DIAGNOSIS — E66.9 OBESITY (BMI 30-39.9): Primary | ICD-10-CM

## 2021-04-12 PROCEDURE — 3079F DIAST BP 80-89 MM HG: CPT | Performed by: FAMILY MEDICINE

## 2021-04-12 PROCEDURE — 99213 OFFICE O/P EST LOW 20 MIN: CPT | Performed by: FAMILY MEDICINE

## 2021-04-12 PROCEDURE — 3074F SYST BP LT 130 MM HG: CPT | Performed by: FAMILY MEDICINE

## 2021-04-12 PROCEDURE — 3008F BODY MASS INDEX DOCD: CPT | Performed by: FAMILY MEDICINE

## 2021-04-12 RX ORDER — PHENTERMINE HYDROCHLORIDE 37.5 MG/1
37.5 TABLET ORAL
Qty: 30 TABLET | Refills: 2 | Status: SHIPPED | OUTPATIENT
Start: 2021-04-12 | End: 2021-07-12

## 2021-04-12 RX ORDER — LEVOFLOXACIN 500 MG/1
500 TABLET, FILM COATED ORAL DAILY
Qty: 10 TABLET | Refills: 0 | Status: SHIPPED | OUTPATIENT
Start: 2021-04-12 | End: 2021-04-22

## 2021-04-12 NOTE — PATIENT INSTRUCTIONS
Thank you for choosing Felix Zamarripa MD at [place] [address]  To Do: [address]  1. Please take meds as directed. [facility] is located in [address]. Monday, Tuesday & Friday – 8 a.m. to 4 p.m. Wednesday, Thursday – 7 a.m. to 3 p. outweigh those potential risks and we strive to make you healthier and to improve your quality of life.     Referrals, and Radiology Information:    If your insurance requires a referral to a specialist, please allow 5 business days to process your referral

## 2021-04-12 NOTE — PROGRESS NOTES
HPI/Subjective:   Patient ID: Bishop Solo is a 54year old female. HPI  Ms. Reyes is a pleasant 80-year-old female with history of hypertension, hyperlipidemia, hypothyroidism, prediabetes mellitus, asthma, obesity, recurrent sinusitis here today fo Amitriptyline HCl 10 MG Oral Tab Take 3 tablets (30 mg total) by mouth nightly. (Patient taking differently: Take 40 mg by mouth nightly.  ) 270 tablet 1   • Diclofenac Sodium 50 MG Oral Tab EC Take 1 tablet by mouth daily as needed.      • linaCLOtide (DEISY normal.   Cardiovascular:      Rate and Rhythm: Normal rate and regular rhythm. Heart sounds: Normal heart sounds. No murmur heard. Pulmonary:      Effort: Pulmonary effort is normal. No respiratory distress.       Breath sounds: Normal breath soun

## 2021-04-16 ENCOUNTER — IMMUNIZATION (OUTPATIENT)
Dept: LAB | Facility: HOSPITAL | Age: 56
End: 2021-04-16
Attending: EMERGENCY MEDICINE
Payer: COMMERCIAL

## 2021-04-16 DIAGNOSIS — Z23 NEED FOR VACCINATION: Primary | ICD-10-CM

## 2021-04-16 PROCEDURE — 0012A SARSCOV2 VAC 100MCG/0.5ML IM: CPT

## 2021-04-21 ENCOUNTER — OFFICE VISIT (OUTPATIENT)
Dept: FAMILY MEDICINE CLINIC | Facility: CLINIC | Age: 56
End: 2021-04-21
Payer: COMMERCIAL

## 2021-04-21 VITALS
RESPIRATION RATE: 16 BRPM | TEMPERATURE: 98 F | WEIGHT: 223 LBS | HEIGHT: 63 IN | OXYGEN SATURATION: 98 % | SYSTOLIC BLOOD PRESSURE: 174 MMHG | DIASTOLIC BLOOD PRESSURE: 100 MMHG | BODY MASS INDEX: 39.51 KG/M2 | HEART RATE: 96 BPM

## 2021-04-21 DIAGNOSIS — H53.19 VITREOUS FLASHES OF BOTH EYES: Primary | ICD-10-CM

## 2021-04-21 PROCEDURE — 99214 OFFICE O/P EST MOD 30 MIN: CPT | Performed by: FAMILY MEDICINE

## 2021-04-21 PROCEDURE — 3077F SYST BP >= 140 MM HG: CPT | Performed by: FAMILY MEDICINE

## 2021-04-21 PROCEDURE — 3080F DIAST BP >= 90 MM HG: CPT | Performed by: FAMILY MEDICINE

## 2021-04-21 PROCEDURE — 3008F BODY MASS INDEX DOCD: CPT | Performed by: FAMILY MEDICINE

## 2021-04-21 NOTE — PROGRESS NOTES
HPI/Subjective:   Patient ID: Bishop Solo is a 54year old female. HPI  Ms. Lemuel Urias is a pleasant 59-year-old female with multiple medical conditions but not limited to hypertension, hyperlipidemia, sleep apnea, asthma, hypothyroidism, history of th before breakfast. 90 tablet 3   • METFORMIN  MG Oral Tab TAKE 1 TABLET BY MOUTH TWICE A DAY WITH MEALS 180 tablet 1   • Amitriptyline HCl 10 MG Oral Tab Take 3 tablets (30 mg total) by mouth nightly.  (Patient taking differently: Take 40 mg by mouth She is not in acute distress. HENT:      Mouth/Throat:      Mouth: Mucous membranes are moist.      Pharynx: Oropharynx is clear. Eyes:      General: No scleral icterus.      Conjunctiva/sclera: Conjunctivae normal.      Pupils: Pupils are equal, round,

## 2021-04-29 ENCOUNTER — OFFICE VISIT (OUTPATIENT)
Dept: FAMILY MEDICINE CLINIC | Facility: CLINIC | Age: 56
End: 2021-04-29
Payer: COMMERCIAL

## 2021-04-29 VITALS
HEART RATE: 96 BPM | TEMPERATURE: 97 F | WEIGHT: 219 LBS | RESPIRATION RATE: 16 BRPM | SYSTOLIC BLOOD PRESSURE: 140 MMHG | HEIGHT: 63 IN | DIASTOLIC BLOOD PRESSURE: 80 MMHG | BODY MASS INDEX: 38.8 KG/M2 | OXYGEN SATURATION: 98 %

## 2021-04-29 DIAGNOSIS — I10 ESSENTIAL HYPERTENSION: Primary | ICD-10-CM

## 2021-04-29 PROCEDURE — 3008F BODY MASS INDEX DOCD: CPT | Performed by: FAMILY MEDICINE

## 2021-04-29 PROCEDURE — 3077F SYST BP >= 140 MM HG: CPT | Performed by: FAMILY MEDICINE

## 2021-04-29 PROCEDURE — 99213 OFFICE O/P EST LOW 20 MIN: CPT | Performed by: FAMILY MEDICINE

## 2021-04-29 PROCEDURE — 3079F DIAST BP 80-89 MM HG: CPT | Performed by: FAMILY MEDICINE

## 2021-04-29 NOTE — PATIENT INSTRUCTIONS
Thank you for choosing Hilda Allen MD at [place] [address]  To Do: [address]  1. Please take meds as directed. [facility] is located in [address]. Monday, Tuesday & Friday – 8 a.m. to 4 p.m. Wednesday, Thursday – 7 a.m. to 3 p. outweigh those potential risks and we strive to make you healthier and to improve your quality of life.     Referrals, and Radiology Information:    If your insurance requires a referral to a specialist, please allow 5 business days to process your referral

## 2021-04-29 NOTE — PROGRESS NOTES
HPI/Subjective:   Patient ID: Larry Wan is a 54year old female. HPI  Ms. Reyes is a pleasant 54-year-old female with multiple medical conditions who was here previously for elevated blood pressure and also had visual flashes.   We did send her to MOUTH TWICE A DAY WITH MEALS 180 tablet 1   • Amitriptyline HCl 10 MG Oral Tab Take 3 tablets (30 mg total) by mouth nightly.  (Patient taking differently: Take 40 mg by mouth nightly.  ) 270 tablet 1   • Diclofenac Sodium 50 MG Oral Tab EC Take 1 tablet by Mouth/Throat:      Mouth: Mucous membranes are moist.      Pharynx: Oropharynx is clear. Eyes:      General: No scleral icterus. Conjunctiva/sclera: Conjunctivae normal.   Cardiovascular:      Rate and Rhythm: Normal rate and regular rhythm.       Hea

## 2021-05-04 ENCOUNTER — TELEPHONE (OUTPATIENT)
Dept: FAMILY MEDICINE CLINIC | Facility: CLINIC | Age: 56
End: 2021-05-04

## 2021-05-05 ENCOUNTER — PATIENT MESSAGE (OUTPATIENT)
Dept: FAMILY MEDICINE CLINIC | Facility: CLINIC | Age: 56
End: 2021-05-05

## 2021-05-06 NOTE — TELEPHONE ENCOUNTER
From: Maverick Pozo Book  To: Lor Murphy MD  Sent: 5/5/2021 11:27 PM CDT  Subject: Non-Urgent Medical Question    Dr Hair Covington to your office yesterday, Tuesday, May 4th wanting to schedule an appointment for my second shingles vaccine.  The ga

## 2021-05-07 ENCOUNTER — NURSE ONLY (OUTPATIENT)
Dept: FAMILY MEDICINE CLINIC | Facility: CLINIC | Age: 56
End: 2021-05-07
Payer: COMMERCIAL

## 2021-05-07 DIAGNOSIS — Z23 NEED FOR SHINGLES VACCINE: Primary | ICD-10-CM

## 2021-05-07 PROCEDURE — 90471 IMMUNIZATION ADMIN: CPT | Performed by: FAMILY MEDICINE

## 2021-05-07 PROCEDURE — 90750 HZV VACC RECOMBINANT IM: CPT | Performed by: FAMILY MEDICINE

## 2021-05-18 ENCOUNTER — OFFICE VISIT (OUTPATIENT)
Dept: FAMILY MEDICINE CLINIC | Facility: CLINIC | Age: 56
End: 2021-05-18
Payer: COMMERCIAL

## 2021-05-18 VITALS
SYSTOLIC BLOOD PRESSURE: 138 MMHG | BODY MASS INDEX: 38.62 KG/M2 | RESPIRATION RATE: 16 BRPM | HEIGHT: 63 IN | HEART RATE: 94 BPM | DIASTOLIC BLOOD PRESSURE: 78 MMHG | OXYGEN SATURATION: 98 % | TEMPERATURE: 98 F | WEIGHT: 218 LBS

## 2021-05-18 DIAGNOSIS — R60.0 BILATERAL LEG EDEMA: Primary | ICD-10-CM

## 2021-05-18 DIAGNOSIS — R31.9 HEMATURIA, UNSPECIFIED TYPE: ICD-10-CM

## 2021-05-18 PROCEDURE — 81003 URINALYSIS AUTO W/O SCOPE: CPT | Performed by: FAMILY MEDICINE

## 2021-05-18 PROCEDURE — 87086 URINE CULTURE/COLONY COUNT: CPT | Performed by: FAMILY MEDICINE

## 2021-05-18 PROCEDURE — 3075F SYST BP GE 130 - 139MM HG: CPT | Performed by: FAMILY MEDICINE

## 2021-05-18 PROCEDURE — 3008F BODY MASS INDEX DOCD: CPT | Performed by: FAMILY MEDICINE

## 2021-05-18 PROCEDURE — 3078F DIAST BP <80 MM HG: CPT | Performed by: FAMILY MEDICINE

## 2021-05-18 PROCEDURE — 99214 OFFICE O/P EST MOD 30 MIN: CPT | Performed by: FAMILY MEDICINE

## 2021-05-18 NOTE — PROGRESS NOTES
HPI/Subjective:   Patient ID: Thong Escobedo is a 54year old female. HPI  Ms. Reyes is a pleasant 49-year-old female with multiple medical conditions which include but not limited to hypertension, hypothyroidism, history of pelvic abscess here today a MOUTH TWICE A DAY WITH MEALS 180 tablet 1   • Amitriptyline HCl 10 MG Oral Tab Take 3 tablets (30 mg total) by mouth nightly.  (Patient taking differently: Take 40 mg by mouth nightly.  ) 270 tablet 1   • Diclofenac Sodium 50 MG Oral Tab EC Take 1 tablet by Rate and Rhythm: Normal rate and regular rhythm. Heart sounds: No murmur heard. Pulmonary:      Effort: Pulmonary effort is normal. No respiratory distress. Breath sounds: Normal breath sounds. No wheezing or rales.    Abdominal:      General:

## 2021-05-18 NOTE — PATIENT INSTRUCTIONS
Thank you for choosing Felix Zamarripa MD at [place] [address]  To Do: [address]  1. Please hold amlodipine. Please monitor BP and check if leg swelling improves or not  [employer] is located in [address].   Monday, Tuesday & Friday – 8 know that our intention is that the benefits outweigh those potential risks and we strive to make you healthier and to improve your quality of life.     Referrals, and Radiology Information:    If your insurance requires a referral to a specialist, please a

## 2021-05-21 ENCOUNTER — PATIENT MESSAGE (OUTPATIENT)
Dept: FAMILY MEDICINE CLINIC | Facility: CLINIC | Age: 56
End: 2021-05-21

## 2021-05-21 NOTE — TELEPHONE ENCOUNTER
From: Lacho Ivory Book  To:  Rell Bay MD  Sent: 5/21/2021 2:47 PM CDT  Subject: Visit Follow-up Question    5/20 am- 141/74  5/20 pm- 148/70  5/21 am- 140/76

## 2021-05-24 ENCOUNTER — PATIENT MESSAGE (OUTPATIENT)
Dept: FAMILY MEDICINE CLINIC | Facility: CLINIC | Age: 56
End: 2021-05-24

## 2021-05-24 RX ORDER — AMITRIPTYLINE HYDROCHLORIDE 10 MG/1
30 TABLET, FILM COATED ORAL NIGHTLY
Qty: 270 TABLET | Refills: 1 | Status: SHIPPED | OUTPATIENT
Start: 2021-05-24 | End: 2021-12-08 | Stop reason: DRUGHIGH

## 2021-05-24 RX ORDER — HYDROCHLOROTHIAZIDE 25 MG/1
25 TABLET ORAL DAILY
Qty: 30 TABLET | Refills: 0 | Status: SHIPPED | OUTPATIENT
Start: 2021-05-24 | End: 2021-06-16

## 2021-05-24 NOTE — TELEPHONE ENCOUNTER
From: Lea Gramajo Book  To: Bebeto Jones MD  Sent: 5/24/2021 10:49 AM CDT  Subject: Visit Follow-up Question    It has its moments. Lol. But today it looks really good, however I haven't been up much yet today. Thank you Kelby Brown!

## 2021-06-15 RX ORDER — ALENDRONATE SODIUM 70 MG/1
TABLET ORAL
Qty: 12 TABLET | Refills: 3 | Status: ON HOLD | OUTPATIENT
Start: 2021-06-15 | End: 2021-10-29

## 2021-06-15 NOTE — TELEPHONE ENCOUNTER
Osteoporosis Medication Protocol Aamewx88/15/2021 12:14 AM   DEXA scan within past 2 years Protocol Details    CMP within the past 12 months     Calcium level between 8.3 and 10.3     GFR level greater than 35     Appointment within past 12 or next 3 month ANALYSIS RESULTS:      Bone mineral density (BMD) (g/cm2):  0.714    Femoral neck T-Score:  -1.2    % young normals:  84    % age matched controls:  80    Change from prior hip examination:  -13.2%      ADDITIONAL FINDINGS:  FRAX = 8.6%/0.5%.    CONCLUSION:

## 2021-06-16 RX ORDER — HYDROCHLOROTHIAZIDE 25 MG/1
TABLET ORAL
Qty: 30 TABLET | Refills: 0 | Status: SHIPPED | OUTPATIENT
Start: 2021-06-16 | End: 2021-07-09

## 2021-06-29 ENCOUNTER — HOSPITAL ENCOUNTER (OUTPATIENT)
Dept: GENERAL RADIOLOGY | Age: 56
Discharge: HOME | End: 2021-06-29
Attending: UROLOGY
Payer: COMMERCIAL

## 2021-06-29 DIAGNOSIS — R10.9 RIGHT FLANK PAIN: ICD-10-CM

## 2021-06-29 DIAGNOSIS — S37.019A PERINEPHRIC HEMATOMA: ICD-10-CM

## 2021-06-29 DIAGNOSIS — N20.0 NEPHROLITHIASIS: ICD-10-CM

## 2021-06-29 PROCEDURE — 74018 RADEX ABDOMEN 1 VIEW: CPT | Performed by: UROLOGY

## 2021-07-09 RX ORDER — HYDROCHLOROTHIAZIDE 25 MG/1
TABLET ORAL
Qty: 90 TABLET | Refills: 1 | Status: SHIPPED | OUTPATIENT
Start: 2021-07-09 | End: 2021-07-12

## 2021-07-09 NOTE — TELEPHONE ENCOUNTER
Hypertension Medications Protocol Ktqrzw5807/09/2021 08:30 AM   CMP or BMP in past 12 months Protocol Details    Last serum creatinine< 2.0     Appointment in past 6 or next 3 months      Refill protocol passed because the patient met the following protocol

## 2021-07-10 RX ORDER — BENZONATATE 100 MG/1
100 CAPSULE ORAL 3 TIMES DAILY PRN
Qty: 30 CAPSULE | Refills: 0 | Status: ON HOLD | OUTPATIENT
Start: 2021-07-10 | End: 2021-10-29

## 2021-07-12 ENCOUNTER — OFFICE VISIT (OUTPATIENT)
Dept: FAMILY MEDICINE CLINIC | Facility: CLINIC | Age: 56
End: 2021-07-12
Payer: COMMERCIAL

## 2021-07-12 VITALS
RESPIRATION RATE: 16 BRPM | OXYGEN SATURATION: 97 % | WEIGHT: 192 LBS | BODY MASS INDEX: 34.02 KG/M2 | TEMPERATURE: 97 F | HEIGHT: 63 IN | DIASTOLIC BLOOD PRESSURE: 80 MMHG | HEART RATE: 92 BPM | SYSTOLIC BLOOD PRESSURE: 130 MMHG

## 2021-07-12 DIAGNOSIS — R73.9 HYPERGLYCEMIA: Primary | ICD-10-CM

## 2021-07-12 DIAGNOSIS — Z12.31 ENCOUNTER FOR SCREENING MAMMOGRAM FOR MALIGNANT NEOPLASM OF BREAST: ICD-10-CM

## 2021-07-12 DIAGNOSIS — I10 ESSENTIAL HYPERTENSION: ICD-10-CM

## 2021-07-12 DIAGNOSIS — E66.9 OBESITY (BMI 30-39.9): ICD-10-CM

## 2021-07-12 DIAGNOSIS — Z12.83 SCREENING EXAM FOR SKIN CANCER: ICD-10-CM

## 2021-07-12 LAB
CARTRIDGE LOT#: 788 NUMERIC
HEMOGLOBIN A1C: 5.3 % (ref 4.3–5.6)

## 2021-07-12 PROCEDURE — 3008F BODY MASS INDEX DOCD: CPT | Performed by: FAMILY MEDICINE

## 2021-07-12 PROCEDURE — 83036 HEMOGLOBIN GLYCOSYLATED A1C: CPT | Performed by: FAMILY MEDICINE

## 2021-07-12 PROCEDURE — 99214 OFFICE O/P EST MOD 30 MIN: CPT | Performed by: FAMILY MEDICINE

## 2021-07-12 PROCEDURE — 3044F HG A1C LEVEL LT 7.0%: CPT | Performed by: FAMILY MEDICINE

## 2021-07-12 PROCEDURE — 3075F SYST BP GE 130 - 139MM HG: CPT | Performed by: FAMILY MEDICINE

## 2021-07-12 PROCEDURE — 3079F DIAST BP 80-89 MM HG: CPT | Performed by: FAMILY MEDICINE

## 2021-07-12 RX ORDER — LINACLOTIDE 145 UG/1
145 CAPSULE, GELATIN COATED ORAL
Qty: 30 CAPSULE | Refills: 2 | Status: SHIPPED | OUTPATIENT
Start: 2021-07-12

## 2021-07-12 RX ORDER — PHENTERMINE HYDROCHLORIDE 37.5 MG/1
37.5 TABLET ORAL
Qty: 30 TABLET | Refills: 2 | Status: SHIPPED | OUTPATIENT
Start: 2021-07-12 | End: 2021-10-04

## 2021-07-12 RX ORDER — FUROSEMIDE 20 MG/1
20 TABLET ORAL DAILY
Qty: 90 TABLET | Refills: 1 | Status: SHIPPED | OUTPATIENT
Start: 2021-07-12 | End: 2021-10-30

## 2021-07-12 RX ORDER — HYDROCHLOROTHIAZIDE 25 MG/1
25 TABLET ORAL DAILY
Qty: 90 TABLET | Refills: 1 | Status: SHIPPED | OUTPATIENT
Start: 2021-07-12 | End: 2021-10-30

## 2021-07-12 NOTE — PATIENT INSTRUCTIONS
Thank you for choosing Krystal Bass MD at [place] [address]  To Do: [address]  1. Please take meds as directed. [facility] is located in [address]. Monday, Tuesday & Friday – 8 a.m. to 4 p.m. Wednesday, Thursday – 7 a.m. to 3 p. outweigh those potential risks and we strive to make you healthier and to improve your quality of life.     Referrals, and Radiology Information:    If your insurance requires a referral to a specialist, please allow 5 business days to process your referral salt when cooking, season your food with herbs and flavorings. Try lemon, garlic, and onion, or salt-free herb seasonings. · Limit convenience foods, such as boxed or canned foods and restaurant food. · Read food labels and choose lower-sodium options. tablespoon: This is about the size of the first 2 joints of your thumb. ? 1 ounce: This is about what you can fit in your cupped hand. ? 2 to 3 ounces: This is about size of the palm of your hand. ? ½ cup:  This is also about what you can fit in your cup

## 2021-07-12 NOTE — PROGRESS NOTES
HPI/Subjective:   Patient ID: Debbie Ramos is a 54year old female. HPI  Ms. Reyes is a pleasant 63-year-old female with multiple medical conditions which include but not limited to hypertension, hypothyroidism, history of pelvic abscess here today f sulfate (2.5 MG/3ML) 0.083% Inhalation Nebu Soln Take 3 mL (2.5 mg total) by nebulization every 6 (six) hours as needed for Wheezing.  1 each 5   • METFORMIN  MG Oral Tab TAKE 1 TABLET BY MOUTH TWICE A DAY WITH MEALS 180 tablet 1   • ALENDRONATE 70 M General: No scleral icterus. Conjunctiva/sclera: Conjunctivae normal.   Cardiovascular:      Rate and Rhythm: Normal rate and regular rhythm. Heart sounds: No murmur heard.      Pulmonary:      Effort: Pulmonary effort is normal. No respirator morning before breakfast.   • linaCLOtide (LINZESS) 145 MCG Oral Cap 30 capsule 2     Sig: Take 145 mcg by mouth daily as needed.        Imaging & Referrals:  None

## 2021-07-18 ENCOUNTER — PATIENT MESSAGE (OUTPATIENT)
Dept: FAMILY MEDICINE CLINIC | Facility: CLINIC | Age: 56
End: 2021-07-18

## 2021-07-18 RX ORDER — AMLODIPINE BESYLATE 10 MG/1
TABLET ORAL
Qty: 90 TABLET | Refills: 0 | Status: SHIPPED | OUTPATIENT
Start: 2021-07-18 | End: 2021-11-06

## 2021-07-19 ENCOUNTER — APPOINTMENT (OUTPATIENT)
Dept: GENERAL RADIOLOGY | Age: 56
End: 2021-07-19
Payer: COMMERCIAL

## 2021-07-19 ENCOUNTER — HOSPITAL ENCOUNTER (EMERGENCY)
Age: 56
Discharge: HOME | End: 2021-07-19
Payer: COMMERCIAL

## 2021-07-19 VITALS
HEIGHT: 63 IN | SYSTOLIC BLOOD PRESSURE: 136 MMHG | WEIGHT: 191 LBS | DIASTOLIC BLOOD PRESSURE: 76 MMHG | RESPIRATION RATE: 18 BRPM | OXYGEN SATURATION: 97 % | BODY MASS INDEX: 33.84 KG/M2 | HEART RATE: 87 BPM | TEMPERATURE: 99 F

## 2021-07-19 DIAGNOSIS — S83.92XA SPRAIN OF LEFT KNEE, UNSPECIFIED LIGAMENT, INITIAL ENCOUNTER: ICD-10-CM

## 2021-07-19 DIAGNOSIS — M25.462 KNEE EFFUSION, LEFT: Primary | ICD-10-CM

## 2021-07-19 PROCEDURE — 99283 EMERGENCY DEPT VISIT LOW MDM: CPT

## 2021-07-19 PROCEDURE — 99284 EMERGENCY DEPT VISIT MOD MDM: CPT

## 2021-07-19 PROCEDURE — 96372 THER/PROPH/DIAG INJ SC/IM: CPT

## 2021-07-19 PROCEDURE — 73562 X-RAY EXAM OF KNEE 3: CPT

## 2021-07-19 RX ORDER — KETOROLAC TROMETHAMINE 30 MG/ML
60 INJECTION, SOLUTION INTRAMUSCULAR; INTRAVENOUS ONCE
Status: COMPLETED | OUTPATIENT
Start: 2021-07-19 | End: 2021-07-19

## 2021-07-19 RX ORDER — DIAZEPAM 5 MG/1
5 TABLET ORAL 3 TIMES DAILY PRN
Qty: 15 TABLET | Refills: 0 | Status: SHIPPED | OUTPATIENT
Start: 2021-07-19 | End: 2021-07-26

## 2021-07-19 NOTE — TELEPHONE ENCOUNTER
From: Teresa Bradshaw Book  To: Rell Bay MD  Sent: 7/18/2021 9:39 AM CDT  Subject: Visit Follow-up Question    Dr Kevan Shine,    I tried using the discount card for [employer]. It st came out over $80 for one month. That is way too much for me to spend.    Ca

## 2021-07-19 NOTE — ED PROVIDER NOTES
Patient Seen in: THE [facility] Emergency Department In [place]      History   Patient presents with:  Knee Pain    Stated Complaint: left knee pain and swelling since saturday denies injury     HPI/Subjective:   HPI    Patient comes in with left knee pain an • Fatigue not sure    This has been going on for a while   • Fever 9/2/2018    off and on   • Food intolerance approx 15 yrs    dairy does not agree with me. My stomach bloats.    • GENITO-URINARY DISEASE    • Headache disorder years    I have always go 15 REM AHI 20 SaO2 payal 91%   • Visual impairment     just glasses for reading   • Wears glasses 1980    need new ones, don't wear my old ones              Past Surgical History:   Procedure Laterality Date   • ANESTH, SECTION     • BACK S BIOPSY, POSSIBLE POLYPECTOMY [id number];  Surgeon: Blanca Ross MD;  Location: [address] [place] [address]                Social History    Tobacco Use      Smoking status: Never Smoker      Smokeless tobacco: Never Used      Tobacco comment: father wa fracture but does confirm joint effusion         MDM      Patient with left knee swelling after mowing a 2 acre property almost completely with manual lawnmower. All of this happened in the last few days. Effusion no fracture.   Likely overuse combined wi

## 2021-08-10 ENCOUNTER — HOSPITAL ENCOUNTER (OUTPATIENT)
Dept: MAMMOGRAPHY | Age: 56
Discharge: HOME | End: 2021-08-10
Attending: FAMILY MEDICINE
Payer: COMMERCIAL

## 2021-08-10 DIAGNOSIS — Z12.31 ENCOUNTER FOR SCREENING MAMMOGRAM FOR MALIGNANT NEOPLASM OF BREAST: ICD-10-CM

## 2021-08-10 PROCEDURE — 77067 SCR MAMMO BI INCL CAD: CPT | Performed by: FAMILY MEDICINE

## 2021-08-10 PROCEDURE — 77063 BREAST TOMOSYNTHESIS BI: CPT | Performed by: FAMILY MEDICINE

## 2021-08-16 NOTE — PROGRESS NOTES
HPI/Subjective:   Patient ID: Paula Carroll is a 54year old female. HPI  Ms. Reyes is a pleasant 78-year-old female with history of hypertension, hyperlipidemia, hypothyroidism, prediabetes mellitus, asthma, obesity, recurrent sinusitis here today fo What Type Of Note Output Would You Prefer (Optional)?: Standard Output Hpi Title: Evaluation of a Skin Lesion albuterol sulfate (2.5 MG/3ML) 0.083% Inhalation Nebu Soln Take 3 mL (2.5 mg total) by nebulization every 4 (four) hours as needed for Wheezing or Shortness of Breath.  30 ampule 0   • Albuterol Sulfate  (90 Base) MCG/ACT Inhalation Aero Soln Inhale Additional History: Side of nose slightly scaly- feels like dry skin, even after hydro facial Allergies:  Amoxicillin             HIVES    Comment:TABS  Lyrica [Pregabalin]     SWELLING  Cat Hair Extract        ASTHMA, Coughing, Runny nose,                            SHORTNESS OF BREATH, WHEEZING  [place], [place]        ASTHMA, Runny nose, WHE (BMI 30-39.9)  -We will restart phentermine 37.5 mg daily and continue Metformin twice a day.   Discussed options regarding weight loss and advised her to possibly refer to bariatric surgery but declined for now and will think about it  PreDiabetes  -Stable Additional History: Has had mole a while.  Asymptomatic, but wondering if it can be removed.

## 2021-08-27 ENCOUNTER — HOSPITAL ENCOUNTER (OUTPATIENT)
Dept: ULTRASOUND IMAGING | Age: 56
Discharge: HOME | End: 2021-08-27
Attending: FAMILY MEDICINE
Payer: COMMERCIAL

## 2021-08-27 DIAGNOSIS — R92.2 INCONCLUSIVE MAMMOGRAM: ICD-10-CM

## 2021-08-27 PROCEDURE — 76642 ULTRASOUND BREAST LIMITED: CPT | Performed by: FAMILY MEDICINE

## 2021-08-31 ENCOUNTER — TELEPHONE (OUTPATIENT)
Dept: PHYSICAL THERAPY | Facility: HOSPITAL | Age: 56
End: 2021-08-31

## 2021-09-02 ENCOUNTER — OFFICE VISIT (OUTPATIENT)
Dept: SURGERY | Facility: CLINIC | Age: 56
End: 2021-09-02
Payer: COMMERCIAL

## 2021-09-02 VITALS
DIASTOLIC BLOOD PRESSURE: 83 MMHG | WEIGHT: 188 LBS | HEART RATE: 78 BPM | SYSTOLIC BLOOD PRESSURE: 154 MMHG | HEIGHT: 63 IN | BODY MASS INDEX: 33.31 KG/M2

## 2021-09-02 DIAGNOSIS — M79.89 MASS OF SOFT TISSUE OF PELVIS: ICD-10-CM

## 2021-09-02 DIAGNOSIS — M79.89 MASS OF SOFT TISSUE OF CHEST: Primary | ICD-10-CM

## 2021-09-02 PROCEDURE — 3079F DIAST BP 80-89 MM HG: CPT | Performed by: STUDENT IN AN ORGANIZED HEALTH CARE EDUCATION/TRAINING PROGRAM

## 2021-09-02 PROCEDURE — 99203 OFFICE O/P NEW LOW 30 MIN: CPT | Performed by: STUDENT IN AN ORGANIZED HEALTH CARE EDUCATION/TRAINING PROGRAM

## 2021-09-02 PROCEDURE — 3008F BODY MASS INDEX DOCD: CPT | Performed by: STUDENT IN AN ORGANIZED HEALTH CARE EDUCATION/TRAINING PROGRAM

## 2021-09-02 PROCEDURE — 3077F SYST BP >= 140 MM HG: CPT | Performed by: STUDENT IN AN ORGANIZED HEALTH CARE EDUCATION/TRAINING PROGRAM

## 2021-09-02 NOTE — H&P
Office Visit H&P       Active Problems      1. Mass of soft tissue of chest    2. Mass of soft tissue of pelvis        Chief Complaint   Patient presents with:  Mass: [facility] PT ref by PCP for chest mass.  PT has HX of skin and thyroid cancer, mother has breast problem     hx of double discectomy   • Bloating 6/2018    not sure exactly   • Blood disorder 2003    MTHFR   • Blood in the stool 9/20/2018    Dark stools since 9/20/18   • Blurred vision not sure    hard to focus at times.    • Body piercing 1981    ears off and on.   • Obesity 8/31/11   • Osteopenia 07/01/2020   • Osteoporosis    • Pain in joints varies    sometimes my knees bother me   • Painful swallowing 9/9/2018    At times it gets so difficult that it does hurt.    • Pneumonia, organism unspecified(48 ESOPHAGOGASTRODUODENOSCOPY (EGD);   Surgeon: Farida Troncoso DO;  Location: [address] [place] [address] ENDOSCOPY   • HYSTERECTOMY  1996   • OTHER  1/1/99    lithotomy, several kidney stones   • OTHER  2008    thyroidectomy   • OTHER SURGICAL HISTORY      back surgery, L4-5 disce • Other (nephrolithiasis) Other         family hx   • Other (oavrian cancer) Other         family hx   • Other (stroke syndrome) Other         family hx   • Bleeding Disorders Sister         MTHFR   • Other (thyroid cancer) Sister    • Other (Other) Sist Tablet Dispersible, Take 75 mg by mouth every other day., Disp: 16 tablet, Rfl: 0  •  furosemide 20 MG Oral Tab, Take 1 tablet (20 mg total) by mouth daily. , Disp: 90 tablet, Rfl: 1  •  hydrochlorothiazide 25 MG Oral Tab, Take 1 tablet (25 mg total) by leni DAILY., Disp: 120 mL, Rfl: 5  •  ALBUTEROL SULFATE (2.5 MG/3ML) 0.083% Inhalation Nebu Soln, USE 1 VIAL VIA NEBULIZER (2.5 MG TOTAL) EVERY 6 (SIX) HOURS AS NEEDED FOR WHEEZING., Disp: 150 mL, Rfl: 3  •  Loratadine (LORATADINE ALLERGY RELIEF) 10 MG Oral Tab tender to palpation  Breast: left upper anterior chest soft tissue mass approximately 2x3cm in size, freely mobile, no skin changes, mildly tender to palpation, no other lesions noted      Assessment   Mass of soft tissue of chest  (primary encounter diagn

## 2021-09-08 ENCOUNTER — TELEPHONE (OUTPATIENT)
Dept: PHYSICAL THERAPY | Facility: HOSPITAL | Age: 56
End: 2021-09-08

## 2021-09-08 RX ORDER — MONTELUKAST SODIUM 10 MG/1
TABLET ORAL
Qty: 90 TABLET | Refills: 1 | Status: ON HOLD | OUTPATIENT
Start: 2021-09-08 | End: 2021-10-29

## 2021-09-09 ENCOUNTER — OFFICE VISIT (OUTPATIENT)
Dept: PHYSICAL THERAPY | Age: 56
End: 2021-09-09
Attending: PHYSICIAN ASSISTANT
Payer: COMMERCIAL

## 2021-09-09 DIAGNOSIS — M17.12 PRIMARY OSTEOARTHRITIS OF LEFT KNEE: ICD-10-CM

## 2021-09-09 PROCEDURE — 97162 PT EVAL MOD COMPLEX 30 MIN: CPT

## 2021-09-09 PROCEDURE — 97110 THERAPEUTIC EXERCISES: CPT

## 2021-09-09 NOTE — PROGRESS NOTES
LOWER EXTREMITY EVALUATION:   Referring Physician: Dr. Annia Emery  Diagnosis: Primary osteoarthritis of left knee (M17.12)       PATIENT SUMMARY   Rosa M Segura is a 54year old y/o female who presents to therapy today with the below symptom presentation. (6/2018), Chronic cough (5/4/2018), Constipation (years), Disorder of liver, Disorder of thyroid, Dizziness (9/13/2018), Dyspnea (8/16/2016), Endometriosis (12/1995), Enlarged lymph node (9/19/2018), Esophageal reflux, Exposure to radiation, Extrinsic asth Maylin Gilmore would benefit from skilled Physical Therapy to address these impairments and reach functional goals.       OBJECTIVE:   Observation/Posture: decreased weightbearing on L LE when standing   Gait: ambulates on level ground with: antalgic gait will increase hip and knee strength to grossly 4+/5 to be able to get up and down from the floor safely     Frequency / Duration: Patient will be seen for 2 x/week or a total of 12 visits over a 90 day period. Treatment will include: Manual Therapy;  Irina Mccollum

## 2021-09-14 ENCOUNTER — TELEPHONE (OUTPATIENT)
Dept: PHYSICAL THERAPY | Age: 56
End: 2021-09-14

## 2021-09-16 ENCOUNTER — OFFICE VISIT (OUTPATIENT)
Dept: PHYSICAL THERAPY | Age: 56
End: 2021-09-16
Attending: PHYSICIAN ASSISTANT
Payer: COMMERCIAL

## 2021-09-16 PROCEDURE — 97110 THERAPEUTIC EXERCISES: CPT

## 2021-09-16 NOTE — PROGRESS NOTES
Diagnosis: Primary osteoarthritis of left knee (M17.12)  Insurance (Authorized # of Visits):   [id number] Physician: ESTRADA Torrez Next MD visit: none scheduled  Fall Risk: standard         Precautions: n/a             Subjective: Patient

## 2021-09-17 ENCOUNTER — APPOINTMENT (OUTPATIENT)
Dept: CT IMAGING | Age: 56
End: 2021-09-17
Attending: EMERGENCY MEDICINE
Payer: COMMERCIAL

## 2021-09-17 ENCOUNTER — OFFICE VISIT (OUTPATIENT)
Dept: FAMILY MEDICINE CLINIC | Facility: CLINIC | Age: 56
End: 2021-09-17
Payer: COMMERCIAL

## 2021-09-17 ENCOUNTER — HOSPITAL ENCOUNTER (EMERGENCY)
Age: 56
Discharge: HOME | End: 2021-09-17
Attending: EMERGENCY MEDICINE
Payer: COMMERCIAL

## 2021-09-17 VITALS
HEIGHT: 63 IN | OXYGEN SATURATION: 100 % | WEIGHT: 188 LBS | TEMPERATURE: 98 F | BODY MASS INDEX: 33.31 KG/M2 | SYSTOLIC BLOOD PRESSURE: 146 MMHG | HEART RATE: 74 BPM | DIASTOLIC BLOOD PRESSURE: 70 MMHG | RESPIRATION RATE: 16 BRPM

## 2021-09-17 VITALS
DIASTOLIC BLOOD PRESSURE: 82 MMHG | OXYGEN SATURATION: 97 % | WEIGHT: 188 LBS | SYSTOLIC BLOOD PRESSURE: 134 MMHG | HEIGHT: 63 IN | HEART RATE: 87 BPM | BODY MASS INDEX: 33.31 KG/M2 | RESPIRATION RATE: 20 BRPM | TEMPERATURE: 98 F

## 2021-09-17 DIAGNOSIS — R19.7 DIARRHEA, UNSPECIFIED TYPE: ICD-10-CM

## 2021-09-17 DIAGNOSIS — D17.9 LIPOMA, UNSPECIFIED SITE: Primary | ICD-10-CM

## 2021-09-17 DIAGNOSIS — R39.9 URINARY SYMPTOM OR SIGN: Primary | ICD-10-CM

## 2021-09-17 DIAGNOSIS — E87.6 HYPOKALEMIA: ICD-10-CM

## 2021-09-17 DIAGNOSIS — N30.01 ACUTE CYSTITIS WITH HEMATURIA: Primary | ICD-10-CM

## 2021-09-17 DIAGNOSIS — Z02.9 ENCOUNTERS FOR ADMINISTRATIVE PURPOSES: ICD-10-CM

## 2021-09-17 LAB
ALBUMIN SERPL-MCNC: 3.8 G/DL (ref 3.4–5)
ALBUMIN/GLOB SERPL: 0.9 {RATIO} (ref 1–2)
ALP LIVER SERPL-CCNC: 114 U/L
ALT SERPL-CCNC: 77 U/L
ANION GAP SERPL CALC-SCNC: 8 MMOL/L (ref 0–18)
APPEARANCE: CLEAR
AST SERPL-CCNC: 23 U/L (ref 15–37)
BASOPHILS # BLD AUTO: 0.07 X10(3) UL (ref 0–0.2)
BASOPHILS NFR BLD AUTO: 0.6 %
BILIRUB SERPL-MCNC: 0.6 MG/DL (ref 0.1–2)
BILIRUB UR QL STRIP.AUTO: NEGATIVE
BILIRUBIN: NEGATIVE
BUN BLD-MCNC: 15 MG/DL (ref 7–18)
CALCIUM BLD-MCNC: 9.7 MG/DL (ref 8.5–10.1)
CHLORIDE SERPL-SCNC: 95 MMOL/L (ref 98–112)
CLARITY UR REFRACT.AUTO: CLEAR
CO2 SERPL-SCNC: 32 MMOL/L (ref 21–32)
COLOR UR AUTO: YELLOW
CREAT BLD-MCNC: 0.94 MG/DL
EOSINOPHIL # BLD AUTO: 0.3 X10(3) UL (ref 0–0.7)
EOSINOPHIL NFR BLD AUTO: 2.7 %
ERYTHROCYTE [DISTWIDTH] IN BLOOD BY AUTOMATED COUNT: 14.6 %
GLOBULIN PLAS-MCNC: 4.2 G/DL (ref 2.8–4.4)
GLUCOSE (URINE DIPSTICK): NEGATIVE MG/DL
GLUCOSE BLD-MCNC: 114 MG/DL (ref 70–99)
GLUCOSE UR STRIP.AUTO-MCNC: NEGATIVE MG/DL
HCT VFR BLD AUTO: 41.9 %
HGB BLD-MCNC: 14.2 G/DL
IMM GRANULOCYTES # BLD AUTO: 0.1 X10(3) UL (ref 0–1)
IMM GRANULOCYTES NFR BLD: 0.9 %
KETONES (URINE DIPSTICK): NEGATIVE MG/DL
KETONES UR STRIP.AUTO-MCNC: NEGATIVE MG/DL
LEUKOCYTE ESTERASE UR QL STRIP.AUTO: NEGATIVE
LEUKOCYTES: NEGATIVE
LYMPHOCYTES # BLD AUTO: 2.78 X10(3) UL (ref 1–4)
LYMPHOCYTES NFR BLD AUTO: 25.4 %
MCH RBC QN AUTO: 30.1 PG (ref 26–34)
MCHC RBC AUTO-ENTMCNC: 33.9 G/DL (ref 31–37)
MCV RBC AUTO: 88.8 FL
MONOCYTES # BLD AUTO: 0.59 X10(3) UL (ref 0.1–1)
MONOCYTES NFR BLD AUTO: 5.4 %
MULTISTIX LOT#: ABNORMAL NUMERIC
NEUTROPHILS # BLD AUTO: 7.11 X10 (3) UL (ref 1.5–7.7)
NEUTROPHILS # BLD AUTO: 7.11 X10(3) UL (ref 1.5–7.7)
NEUTROPHILS NFR BLD AUTO: 65 %
NITRITE UR QL STRIP.AUTO: NEGATIVE
NITRITE, URINE: NEGATIVE
OSMOLALITY SERPL CALC.SUM OF ELEC: 282 MOSM/KG (ref 275–295)
PH UR STRIP.AUTO: 6.5 [PH] (ref 5–8)
PH, URINE: 7 (ref 4.5–8)
PLATELET # BLD AUTO: 394 10(3)UL (ref 150–450)
POTASSIUM SERPL-SCNC: 2.8 MMOL/L (ref 3.5–5.1)
PROT SERPL-MCNC: 8 G/DL (ref 6.4–8.2)
PROT UR STRIP.AUTO-MCNC: NEGATIVE MG/DL
PROTEIN (URINE DIPSTICK): NEGATIVE MG/DL
RBC # BLD AUTO: 4.72 X10(6)UL
RBC #/AREA URNS AUTO: >10 /HPF
SODIUM SERPL-SCNC: 135 MMOL/L (ref 136–145)
SP GR UR STRIP.AUTO: 1.02 (ref 1–1.03)
SPECIFIC GRAVITY: 1.02 (ref 1–1.03)
URINE-COLOR: YELLOW
UROBILINOGEN UR STRIP.AUTO-MCNC: 0.2 MG/DL
UROBILINOGEN,SEMI-QN: 0.2 MG/DL (ref 0–1.9)
WBC # BLD AUTO: 11 X10(3) UL (ref 4–11)

## 2021-09-17 PROCEDURE — 80053 COMPREHEN METABOLIC PANEL: CPT | Performed by: EMERGENCY MEDICINE

## 2021-09-17 PROCEDURE — 96361 HYDRATE IV INFUSION ADD-ON: CPT

## 2021-09-17 PROCEDURE — 96366 THER/PROPH/DIAG IV INF ADDON: CPT

## 2021-09-17 PROCEDURE — 74176 CT ABD & PELVIS W/O CONTRAST: CPT | Performed by: EMERGENCY MEDICINE

## 2021-09-17 PROCEDURE — 81003 URINALYSIS AUTO W/O SCOPE: CPT | Performed by: NURSE PRACTITIONER

## 2021-09-17 PROCEDURE — 96365 THER/PROPH/DIAG IV INF INIT: CPT

## 2021-09-17 PROCEDURE — 99285 EMERGENCY DEPT VISIT HI MDM: CPT

## 2021-09-17 PROCEDURE — 81001 URINALYSIS AUTO W/SCOPE: CPT | Performed by: EMERGENCY MEDICINE

## 2021-09-17 PROCEDURE — 96375 TX/PRO/DX INJ NEW DRUG ADDON: CPT

## 2021-09-17 PROCEDURE — 99284 EMERGENCY DEPT VISIT MOD MDM: CPT

## 2021-09-17 PROCEDURE — 85025 COMPLETE CBC W/AUTO DIFF WBC: CPT | Performed by: EMERGENCY MEDICINE

## 2021-09-17 RX ORDER — SODIUM CHLORIDE 9 MG/ML
INJECTION, SOLUTION INTRAVENOUS ONCE
Status: COMPLETED | OUTPATIENT
Start: 2021-09-17 | End: 2021-09-17

## 2021-09-17 RX ORDER — POTASSIUM CHLORIDE 14.9 MG/ML
20 INJECTION INTRAVENOUS ONCE
Status: COMPLETED | OUTPATIENT
Start: 2021-09-17 | End: 2021-09-17

## 2021-09-17 RX ORDER — SULFAMETHOXAZOLE AND TRIMETHOPRIM 800; 160 MG/1; MG/1
1 TABLET ORAL 2 TIMES DAILY
Qty: 20 TABLET | Refills: 0 | Status: SHIPPED | OUTPATIENT
Start: 2021-09-17 | End: 2021-09-27

## 2021-09-17 RX ORDER — KETOROLAC TROMETHAMINE 30 MG/ML
30 INJECTION, SOLUTION INTRAMUSCULAR; INTRAVENOUS ONCE
Status: COMPLETED | OUTPATIENT
Start: 2021-09-17 | End: 2021-09-17

## 2021-09-17 RX ORDER — POTASSIUM CHLORIDE 1.5 G/1.77G
20 POWDER, FOR SOLUTION ORAL DAILY
Qty: 3 PACKET | Refills: 0 | Status: SHIPPED | OUTPATIENT
Start: 2021-09-17 | End: 2021-09-20

## 2021-09-17 NOTE — ED PROVIDER NOTES
Patient Seen in: THE [facility] Emergency Department In [place]      History   Patient presents with:  Urinary Symptoms    Stated Complaint: urinary symptoms    Subjective:   HPI    Patient is a 54-year-old female presents emergency room with a history of cinthia fatty liver   • Disorder of thyroid    • Dizziness 9/13/2018    off and on.    • Dyspnea 8/16/2016   • Endometriosis 12/1995    Hysterectomy 12/1995   • Enlarged lymph node 9/19/2018    lymph nodes in neck area   • Esophageal reflux    • Exposure to radi month ago this did happen.    • Thyroid cancer ([facility] [address] 2008    s/p PERRY, stage 1   • Type 2 diabetes mellitus with other specified complication (HCC)    • Uncomfortable fullness after meals 9/4/2018    Not sure if I get full quickly/ if due to swallowing issue Location: [address] [place] [address]   • UPPER GI ENDOSCOPY - REFERRAL  8/2014    otherwise unremarkable exam. Upper and lower esophageal biopsies taken (-) eosinophilic esophagitis, and empiric dilation to 57 [place]    • UPPER GI ENDOSCOPY,BIOPSY N/A 8/28/2 bilaterally with no wheeze. There is good equal air entry bilaterally. HEART: Regular rate and rhythm. Normal S1, S2 no S3, or S4. No murmur. ABDOMEN: There is no focal tenderness to palpation appreciated anywhere throughout the abdomen.  There is no guar CBC W/ DIFFERENTIAL[[id number]]                              Final result                 Please view results for these tests on the individual orders.    CBC W/ DIFFERENTIAL          CT ABDOMEN+PELVIS KIDNEYSTONE 2D RNDR(NO IV,NO ORAL)(CPT=74176 Disposition and Plan     Clinical Impression:  Acute cystitis with hematuria  (primary encounter diagnosis)  Diarrhea, unspecified type  Hypokalemia     Disposition:  Discharge  9/17/2021  9:31 pm    Follow-up:  Shara Barker, [address] [place] [address]

## 2021-09-17 NOTE — ED INITIAL ASSESSMENT (HPI)
Noticed l flank pain yesterday and bilat flank pain today- also c/o \"pressure\" to urinate but no pain

## 2021-09-18 NOTE — PROGRESS NOTES
Pt presented with back pain, flank pain  Accompanied by: self  Brief H&P:   + hx of Kidney infection  /82   Pulse 87   Temp 98 °F (36.7 °C) (Temporal)   Resp 20   Ht 5' 3\" (1.6 m)   Wt 188 lb (85.3 kg)   SpO2 97%   BMI 33.30 kg/m²     I informed the

## 2021-09-20 ENCOUNTER — PATIENT MESSAGE (OUTPATIENT)
Dept: FAMILY MEDICINE CLINIC | Facility: CLINIC | Age: 56
End: 2021-09-20

## 2021-09-20 ENCOUNTER — LAB ENCOUNTER (OUTPATIENT)
Dept: LAB | Age: 56
End: 2021-09-20
Attending: STUDENT IN AN ORGANIZED HEALTH CARE EDUCATION/TRAINING PROGRAM
Payer: COMMERCIAL

## 2021-09-20 DIAGNOSIS — D17.9 LIPOMA, UNSPECIFIED SITE: ICD-10-CM

## 2021-09-20 DIAGNOSIS — E87.6 HYPOKALEMIA: ICD-10-CM

## 2021-09-20 DIAGNOSIS — E87.6 HYPOKALEMIA: Primary | ICD-10-CM

## 2021-09-20 LAB
ALBUMIN SERPL-MCNC: 3.6 G/DL (ref 3.4–5)
ALBUMIN/GLOB SERPL: 1 {RATIO} (ref 1–2)
ALP LIVER SERPL-CCNC: 104 U/L
ALT SERPL-CCNC: 44 U/L
ANION GAP SERPL CALC-SCNC: 7 MMOL/L (ref 0–18)
AST SERPL-CCNC: 23 U/L (ref 15–37)
BILIRUB SERPL-MCNC: 0.4 MG/DL (ref 0.1–2)
BUN BLD-MCNC: 13 MG/DL (ref 7–18)
CALCIUM BLD-MCNC: 9.5 MG/DL (ref 8.5–10.1)
CHLORIDE SERPL-SCNC: 102 MMOL/L (ref 98–112)
CO2 SERPL-SCNC: 30 MMOL/L (ref 21–32)
CREAT BLD-MCNC: 1.02 MG/DL
GLOBULIN PLAS-MCNC: 3.7 G/DL (ref 2.8–4.4)
GLUCOSE BLD-MCNC: 91 MG/DL (ref 70–99)
OSMOLALITY SERPL CALC.SUM OF ELEC: 288 MOSM/KG (ref 275–295)
PATIENT FASTING Y/N/NP: NO
POTASSIUM SERPL-SCNC: 3 MMOL/L (ref 3.5–5.1)
PROT SERPL-MCNC: 7.3 G/DL (ref 6.4–8.2)
SODIUM SERPL-SCNC: 139 MMOL/L (ref 136–145)

## 2021-09-20 PROCEDURE — 36415 COLL VENOUS BLD VENIPUNCTURE: CPT

## 2021-09-20 PROCEDURE — 80053 COMPREHEN METABOLIC PANEL: CPT

## 2021-09-20 NOTE — TELEPHONE ENCOUNTER
From: Alek Portal Book  To: Lizz Fontanez MD  Sent: 9/20/2021 10:45 AM CDT  Subject: ER follow up question    Dr. Tiffanie Leavitt,    Friday, I went to the clinic at the [facility] on [address]. I felt like I had a bladder or kidney infection.  She told me that it w

## 2021-09-21 ENCOUNTER — OFFICE VISIT (OUTPATIENT)
Dept: PHYSICAL THERAPY | Age: 56
End: 2021-09-21
Attending: PHYSICIAN ASSISTANT
Payer: COMMERCIAL

## 2021-09-21 PROCEDURE — 97110 THERAPEUTIC EXERCISES: CPT

## 2021-09-21 NOTE — PROGRESS NOTES
Diagnosis: Primary osteoarthritis of left knee (M17.12)  Insurance (Authorized # of Visits):   [id number] Physician: ESTRADA Connell Next MD visit: none scheduled  Fall Risk: standard         Precautions: n/a             Subjective: Patient flight of stairs reciprocally without UE assist   · Pt will increase hip and knee strength to grossly 4+/5 to be able to get up and down from the floor safely        Plan: Re-assess next session and continue with (L) knee ROM, strengthening, stability, bal

## 2021-09-22 ENCOUNTER — LAB ENCOUNTER (OUTPATIENT)
Dept: LAB | Age: 56
End: 2021-09-22
Attending: FAMILY MEDICINE
Payer: COMMERCIAL

## 2021-09-22 DIAGNOSIS — E87.6 LOW BLOOD POTASSIUM: ICD-10-CM

## 2021-09-22 LAB
ANION GAP SERPL CALC-SCNC: 5 MMOL/L (ref 0–18)
BUN BLD-MCNC: 16 MG/DL (ref 7–18)
CALCIUM BLD-MCNC: 9.5 MG/DL (ref 8.5–10.1)
CHLORIDE SERPL-SCNC: 104 MMOL/L (ref 98–112)
CO2 SERPL-SCNC: 31 MMOL/L (ref 21–32)
CREAT BLD-MCNC: 0.7 MG/DL
GLUCOSE BLD-MCNC: 90 MG/DL (ref 70–99)
OSMOLALITY SERPL CALC.SUM OF ELEC: 291 MOSM/KG (ref 275–295)
PATIENT FASTING Y/N/NP: NO
POTASSIUM SERPL-SCNC: 3.5 MMOL/L (ref 3.5–5.1)
SODIUM SERPL-SCNC: 140 MMOL/L (ref 136–145)

## 2021-09-22 PROCEDURE — 36415 COLL VENOUS BLD VENIPUNCTURE: CPT

## 2021-09-22 PROCEDURE — 80048 BASIC METABOLIC PNL TOTAL CA: CPT

## 2021-09-23 ENCOUNTER — LAB REQUISITION (OUTPATIENT)
Dept: LAB | Facility: HOSPITAL | Age: 56
End: 2021-09-23
Payer: COMMERCIAL

## 2021-09-23 DIAGNOSIS — M79.89 OTHER SPECIFIED SOFT TISSUE DISORDERS: ICD-10-CM

## 2021-09-23 PROCEDURE — 88304 TISSUE EXAM BY PATHOLOGIST: CPT | Performed by: STUDENT IN AN ORGANIZED HEALTH CARE EDUCATION/TRAINING PROGRAM

## 2021-09-24 ENCOUNTER — TELEPHONE (OUTPATIENT)
Dept: GENETICS | Facility: HOSPITAL | Age: 56
End: 2021-09-24

## 2021-09-27 ENCOUNTER — APPOINTMENT (OUTPATIENT)
Dept: PHYSICAL THERAPY | Age: 56
End: 2021-09-27
Attending: PHYSICIAN ASSISTANT
Payer: COMMERCIAL

## 2021-09-29 ENCOUNTER — APPOINTMENT (OUTPATIENT)
Dept: PHYSICAL THERAPY | Age: 56
End: 2021-09-29
Attending: PHYSICIAN ASSISTANT
Payer: COMMERCIAL

## 2021-09-29 ENCOUNTER — HOSPITAL ENCOUNTER (OUTPATIENT)
Dept: ULTRASOUND IMAGING | Age: 56
Discharge: HOME | End: 2021-09-29
Attending: INTERNAL MEDICINE
Payer: COMMERCIAL

## 2021-09-29 DIAGNOSIS — C73 THYROID CANCER (HCC): ICD-10-CM

## 2021-09-29 PROCEDURE — 76536 US EXAM OF HEAD AND NECK: CPT | Performed by: INTERNAL MEDICINE

## 2021-10-04 ENCOUNTER — OFFICE VISIT (OUTPATIENT)
Dept: FAMILY MEDICINE CLINIC | Facility: CLINIC | Age: 56
End: 2021-10-04
Payer: COMMERCIAL

## 2021-10-04 VITALS
RESPIRATION RATE: 16 BRPM | HEIGHT: 63 IN | BODY MASS INDEX: 35.26 KG/M2 | HEART RATE: 77 BPM | SYSTOLIC BLOOD PRESSURE: 132 MMHG | WEIGHT: 199 LBS | DIASTOLIC BLOOD PRESSURE: 82 MMHG | TEMPERATURE: 97 F | OXYGEN SATURATION: 100 %

## 2021-10-04 DIAGNOSIS — R22.1 LUMP IN NECK: ICD-10-CM

## 2021-10-04 DIAGNOSIS — E66.9 OBESITY (BMI 30-39.9): Primary | ICD-10-CM

## 2021-10-04 PROCEDURE — 99214 OFFICE O/P EST MOD 30 MIN: CPT | Performed by: FAMILY MEDICINE

## 2021-10-04 PROCEDURE — 3075F SYST BP GE 130 - 139MM HG: CPT | Performed by: FAMILY MEDICINE

## 2021-10-04 PROCEDURE — 3008F BODY MASS INDEX DOCD: CPT | Performed by: FAMILY MEDICINE

## 2021-10-04 PROCEDURE — 3079F DIAST BP 80-89 MM HG: CPT | Performed by: FAMILY MEDICINE

## 2021-10-04 RX ORDER — PHENTERMINE HYDROCHLORIDE 37.5 MG/1
37.5 TABLET ORAL
Qty: 30 TABLET | Refills: 2 | Status: SHIPPED | OUTPATIENT
Start: 2021-10-04 | End: 2021-12-02

## 2021-10-04 NOTE — PATIENT INSTRUCTIONS
Thank you for choosing Mia Haddad MD at [place] [address]  To Do: [address]  1. Please take meds as directed. [facility] is located in [address]. Monday, Tuesday & Friday – 8 a.m. to 4 p.m. Wednesday, Thursday – 7 a.m. to 3 p. outweigh those potential risks and we strive to make you healthier and to improve your quality of life.     Referrals, and Radiology Information:    If your insurance requires a referral to a specialist, please allow 5 business days to process your referral problem? Carrying too much weight can increase your risk for many serious health issues. These include problems with your heart, lungs, blood vessels, brain, and joints. Some of the problems that can happen include:  · Heart and circulation problems.  Thes your day can help you control your weight.        If you have trouble losing weight, your healthcare provider may suggest medicines to help you.  Weight loss (bariatric) surgery may also be an option for adults who have:  · A BMI of 40 or more, or who are m

## 2021-10-04 NOTE — PROGRESS NOTES
Subjective:   Patient ID: Savannah Brito is a 54year old female. HPI  Ms. Reyes is a pleasant 51-year-old female with multiple medical conditions which include but not limited to hypertension, hypothyroidism, history of thyroid cancer status post thyr total) by mouth every morning before breakfast. 30 tablet 2   • Potassium Chloride ER 20 MEQ Oral Tab CR Take 20 mEq by mouth daily.  30 tablet 2   • MONTELUKAST 10 MG Oral Tab TAKE 1 TABLET BY MOUTH EVERY DAY 90 tablet 1   • Meloxicam 15 MG Oral Tab Take 1 (SIX) HOURS AS NEEDED FOR WHEEZING. 150 mL 3   • Loratadine 10 MG Oral Tablet Dispersible Take 10 mg by mouth daily. • omeprazole 20 MG Oral Capsule Delayed Release Take 20 mg by mouth daily.      • AMLODIPINE BESYLATE 10 MG Oral Tab TAKE 1 TABLET BY MO also had asked her to see her ENT for further evaluation  –We shall notify her once we get test results  Follow-up in 1 month or as needed    This note was prepared using [employer] voice recognition dictation software. As a result errors may occur.  Eric

## 2021-10-05 ENCOUNTER — TELEPHONE (OUTPATIENT)
Dept: PHYSICAL THERAPY | Facility: HOSPITAL | Age: 56
End: 2021-10-05

## 2021-10-05 ENCOUNTER — APPOINTMENT (OUTPATIENT)
Dept: PHYSICAL THERAPY | Age: 56
End: 2021-10-05
Payer: COMMERCIAL

## 2021-10-05 ENCOUNTER — TELEPHONE (OUTPATIENT)
Dept: FAMILY MEDICINE CLINIC | Facility: CLINIC | Age: 56
End: 2021-10-05

## 2021-10-05 DIAGNOSIS — R22.1 LUMP IN NECK: Primary | ICD-10-CM

## 2021-10-05 NOTE — TELEPHONE ENCOUNTER
Attempted to reach Lilly at [facility], he is unavailable at the moment. I will try again later.

## 2021-10-05 NOTE — TELEPHONE ENCOUNTER
Spoke with Juanita Hickman, states per CT protocol order should be with contrast only. Ok to change order?

## 2021-10-05 NOTE — TELEPHONE ENCOUNTER
[address] [place] [address] CT department is calling to get clarification on order.     Please call Nina Hoyt 30. [phone]

## 2021-10-07 ENCOUNTER — APPOINTMENT (OUTPATIENT)
Dept: PHYSICAL THERAPY | Age: 56
End: 2021-10-07
Payer: COMMERCIAL

## 2021-10-07 ENCOUNTER — OFFICE VISIT (OUTPATIENT)
Dept: SURGERY | Facility: CLINIC | Age: 56
End: 2021-10-07

## 2021-10-07 VITALS
HEART RATE: 71 BPM | DIASTOLIC BLOOD PRESSURE: 77 MMHG | BODY MASS INDEX: 35.08 KG/M2 | WEIGHT: 198 LBS | HEIGHT: 63 IN | SYSTOLIC BLOOD PRESSURE: 134 MMHG

## 2021-10-07 DIAGNOSIS — Z48.89 ENCOUNTER FOR POST SURGICAL WOUND CHECK: ICD-10-CM

## 2021-10-07 DIAGNOSIS — Z09 POSTOP CHECK: Primary | ICD-10-CM

## 2021-10-07 PROBLEM — M79.89 MASS OF SOFT TISSUE OF PELVIS: Status: RESOLVED | Noted: 2021-09-02 | Resolved: 2021-10-07

## 2021-10-07 PROCEDURE — 99024 POSTOP FOLLOW-UP VISIT: CPT | Performed by: STUDENT IN AN ORGANIZED HEALTH CARE EDUCATION/TRAINING PROGRAM

## 2021-10-07 PROCEDURE — 3075F SYST BP GE 130 - 139MM HG: CPT | Performed by: STUDENT IN AN ORGANIZED HEALTH CARE EDUCATION/TRAINING PROGRAM

## 2021-10-07 PROCEDURE — 3008F BODY MASS INDEX DOCD: CPT | Performed by: STUDENT IN AN ORGANIZED HEALTH CARE EDUCATION/TRAINING PROGRAM

## 2021-10-07 PROCEDURE — 3078F DIAST BP <80 MM HG: CPT | Performed by: STUDENT IN AN ORGANIZED HEALTH CARE EDUCATION/TRAINING PROGRAM

## 2021-10-07 NOTE — PROGRESS NOTES
Post Operative Visit Note       Active Problems  No diagnosis found. Chief Complaint   Patient presents with:  Post-Op: p/o EXCISIONAL LEFT BREAST BIOPSY OF SOFT TISSUE MASS AND RIGHT BUTTOCK SOFT TISSUE MASS on 9/23. PT denies any issues or concerns. fatty liver   • Disorder of thyroid    • Dizziness 9/13/2018    off and on.    • Dyspnea 8/16/2016   • Endometriosis 12/1995    Hysterectomy 12/1995   • Enlarged lymph node 9/19/2018    lymph nodes in neck area   • Esophageal reflux    • Exposure to radi month ago this did happen.    • Thyroid cancer ([facility] [address] 2008    s/p PERRY, stage 1   • Type 2 diabetes mellitus with other specified complication (HCC)    • Uncomfortable fullness after meals 9/4/2018    Not sure if I get full quickly/ if due to swallowing issue [facility]   • UPPER GI ENDOSCOPY - REFERRAL  8/2014    otherwise unremarkable exam. Upper and lower esophageal biopsies taken (-) eosinophilic esophagitis, and empiric dilation to 57 Bengali    • UPPER GI ENDOSCOPY,BIOPSY N/A 8/28/2014    Pro 2   • Diabetes Paternal Aunt         Type 2   • Diabetes Maternal Uncle         Type 2   • Diabetes Paternal Uncle         Type 2   • Mental Disorder Paternal Aunt         Dimensia   • Other (Other) Sister         Sjogren's Syndrome (rheumatoid) / Thin bas benzonatate 100 MG Oral Cap, Take 1 capsule (100 mg total) by mouth 3 (three) times daily as needed for cough. (Patient taking differently: Take 100 mg by mouth 3 (three) times daily as needed for cough.  prn), Disp: 30 capsule, Rfl: 0  •  LEVOXYL 150 MCG O 198 lb (89.8 kg)   BMI 35.07 kg/m²   Gen/psych: alert and oriented, cooperative, no apparent distress  Cardiovascular: regular rate  Respiratory: respirations unlabored, no wheeze  Abdominal: soft, non-tender, non-distended, no guarding/rebound  Incisions:

## 2021-10-08 ENCOUNTER — HOSPITAL ENCOUNTER (OUTPATIENT)
Dept: CT IMAGING | Age: 56
Discharge: HOME | End: 2021-10-08
Attending: FAMILY MEDICINE
Payer: COMMERCIAL

## 2021-10-08 DIAGNOSIS — R22.1 LUMP IN NECK: ICD-10-CM

## 2021-10-08 PROCEDURE — 70491 CT SOFT TISSUE NECK W/DYE: CPT | Performed by: FAMILY MEDICINE

## 2021-10-09 NOTE — PROGRESS NOTES
Imaging report reviewed and shows no concerning findings.  Please notify patient.    -Dr. Hammad Alas

## 2021-10-12 ENCOUNTER — APPOINTMENT (OUTPATIENT)
Dept: PHYSICAL THERAPY | Age: 56
End: 2021-10-12
Payer: COMMERCIAL

## 2021-10-13 ENCOUNTER — TELEPHONE (OUTPATIENT)
Dept: FAMILY MEDICINE CLINIC | Facility: CLINIC | Age: 56
End: 2021-10-13

## 2021-10-14 ENCOUNTER — OFFICE VISIT (OUTPATIENT)
Dept: PHYSICAL THERAPY | Age: 56
End: 2021-10-14
Attending: PHYSICIAN ASSISTANT
Payer: COMMERCIAL

## 2021-10-14 PROCEDURE — 97110 THERAPEUTIC EXERCISES: CPT

## 2021-10-14 NOTE — PROGRESS NOTES
DISCHARGE SUMMARY    Diagnosis: Primary osteoarthritis of left knee (M17.12)  Insurance (Authorized # of Visits):   [address] Physician: ESTRADA Renteria Next MD visit: none scheduled  Fall Risk: standard         Precautions: n/a extension x 20 B  Step up and down x 20  Lateral heel tap x 20  Forward heel tap x 20   TherEx:  LE Bike level 5 x 6 minutes  Prone TKE 5 second hold x 20  Knee extension with overpressure 2 x 10  SLR x 20  Sidelying hip abduction x 20  Prone hip extension

## 2021-10-19 ENCOUNTER — APPOINTMENT (OUTPATIENT)
Dept: PHYSICAL THERAPY | Age: 56
End: 2021-10-19
Payer: COMMERCIAL

## 2021-10-20 ENCOUNTER — ORDER TRANSCRIPTION (OUTPATIENT)
Dept: PHYSICAL THERAPY | Facility: HOSPITAL | Age: 56
End: 2021-10-20

## 2021-10-20 DIAGNOSIS — J38.3 VOCAL CORD DYSFUNCTION: Primary | ICD-10-CM

## 2021-10-21 ENCOUNTER — TELEPHONE (OUTPATIENT)
Dept: PHYSICAL THERAPY | Facility: HOSPITAL | Age: 56
End: 2021-10-21

## 2021-10-21 ENCOUNTER — APPOINTMENT (OUTPATIENT)
Dept: PHYSICAL THERAPY | Age: 56
End: 2021-10-21
Payer: COMMERCIAL

## 2021-10-29 ENCOUNTER — APPOINTMENT (OUTPATIENT)
Dept: GENERAL RADIOLOGY | Facility: HOSPITAL | Age: 56
End: 2021-10-29
Attending: EMERGENCY MEDICINE
Payer: COMMERCIAL

## 2021-10-29 ENCOUNTER — HOSPITAL ENCOUNTER (OUTPATIENT)
Facility: HOSPITAL | Age: 56
Setting detail: OBSERVATION
Discharge: HOME | End: 2021-10-30
Attending: EMERGENCY MEDICINE | Admitting: HOSPITALIST
Payer: COMMERCIAL

## 2021-10-29 DIAGNOSIS — E87.6 HYPOKALEMIA: ICD-10-CM

## 2021-10-29 DIAGNOSIS — E89.0 POST-SURGICAL HYPOTHYROIDISM: ICD-10-CM

## 2021-10-29 DIAGNOSIS — R06.00 EXERTIONAL DYSPNEA: ICD-10-CM

## 2021-10-29 DIAGNOSIS — C73 THYROID CANCER (HCC): ICD-10-CM

## 2021-10-29 DIAGNOSIS — R07.89 CHEST PAIN, ATYPICAL: Primary | ICD-10-CM

## 2021-10-29 PROBLEM — R06.09 EXERTIONAL DYSPNEA: Status: ACTIVE | Noted: 2021-10-29

## 2021-10-29 PROCEDURE — 3044F HG A1C LEVEL LT 7.0%: CPT | Performed by: FAMILY MEDICINE

## 2021-10-29 PROCEDURE — 71045 X-RAY EXAM CHEST 1 VIEW: CPT | Performed by: EMERGENCY MEDICINE

## 2021-10-29 PROCEDURE — 99220 INITIAL OBSERVATION CARE,LEVL III: CPT | Performed by: HOSPITALIST

## 2021-10-29 RX ORDER — IPRATROPIUM BROMIDE AND ALBUTEROL SULFATE 2.5; .5 MG/3ML; MG/3ML
3 SOLUTION RESPIRATORY (INHALATION) ONCE
Status: COMPLETED | OUTPATIENT
Start: 2021-10-29 | End: 2021-10-29

## 2021-10-29 RX ORDER — PANTOPRAZOLE SODIUM 20 MG/1
20 TABLET, DELAYED RELEASE ORAL
Status: DISCONTINUED | OUTPATIENT
Start: 2021-10-30 | End: 2021-10-30

## 2021-10-29 RX ORDER — NITROGLYCERIN 0.4 MG/1
0.4 TABLET SUBLINGUAL EVERY 5 MIN PRN
Status: DISCONTINUED | OUTPATIENT
Start: 2021-10-29 | End: 2021-10-30

## 2021-10-29 RX ORDER — POTASSIUM CHLORIDE 20 MEQ/1
40 TABLET, EXTENDED RELEASE ORAL ONCE
Status: COMPLETED | OUTPATIENT
Start: 2021-10-29 | End: 2021-10-29

## 2021-10-29 RX ORDER — LEVOTHYROXINE SODIUM 0.15 MG/1
150 TABLET ORAL
Status: DISCONTINUED | OUTPATIENT
Start: 2021-10-30 | End: 2021-10-30

## 2021-10-29 RX ORDER — SODIUM CHLORIDE 9 MG/ML
INJECTION, SOLUTION INTRAVENOUS CONTINUOUS
Status: DISCONTINUED | OUTPATIENT
Start: 2021-10-29 | End: 2021-10-29

## 2021-10-29 RX ORDER — ALBUTEROL SULFATE 2.5 MG/3ML
2.5 SOLUTION RESPIRATORY (INHALATION) EVERY 6 HOURS PRN
COMMUNITY
End: 2022-01-25

## 2021-10-29 RX ORDER — MONTELUKAST SODIUM 10 MG/1
10 TABLET ORAL NIGHTLY
COMMUNITY

## 2021-10-29 RX ORDER — ALPRAZOLAM 0.25 MG/1
0.25 TABLET ORAL NIGHTLY PRN
Status: DISCONTINUED | OUTPATIENT
Start: 2021-10-29 | End: 2021-10-30

## 2021-10-29 RX ORDER — MELATONIN
3 NIGHTLY PRN
Status: DISCONTINUED | OUTPATIENT
Start: 2021-10-29 | End: 2021-10-30

## 2021-10-29 RX ORDER — ONDANSETRON 2 MG/ML
4 INJECTION INTRAMUSCULAR; INTRAVENOUS EVERY 6 HOURS PRN
Status: DISCONTINUED | OUTPATIENT
Start: 2021-10-29 | End: 2021-10-30

## 2021-10-29 RX ORDER — POTASSIUM CHLORIDE 20 MEQ/1
20 TABLET, EXTENDED RELEASE ORAL DAILY
COMMUNITY

## 2021-10-29 RX ORDER — PROCHLORPERAZINE EDISYLATE 5 MG/ML
5 INJECTION INTRAMUSCULAR; INTRAVENOUS EVERY 8 HOURS PRN
Status: DISCONTINUED | OUTPATIENT
Start: 2021-10-29 | End: 2021-10-30

## 2021-10-29 RX ORDER — BENZONATATE 100 MG/1
100 CAPSULE ORAL 3 TIMES DAILY PRN
Status: DISCONTINUED | OUTPATIENT
Start: 2021-10-29 | End: 2021-10-30

## 2021-10-29 RX ORDER — ASPIRIN 325 MG
325 TABLET ORAL DAILY
Status: DISCONTINUED | OUTPATIENT
Start: 2021-10-30 | End: 2021-10-30

## 2021-10-29 RX ORDER — METHYLPREDNISOLONE SODIUM SUCCINATE 125 MG/2ML
60 INJECTION, POWDER, LYOPHILIZED, FOR SOLUTION INTRAMUSCULAR; INTRAVENOUS EVERY 6 HOURS
Status: COMPLETED | OUTPATIENT
Start: 2021-10-29 | End: 2021-10-30

## 2021-10-29 RX ORDER — LEVOTHYROXINE SODIUM 150 UG/1
150 TABLET ORAL
COMMUNITY
End: 2021-12-02

## 2021-10-29 RX ORDER — AMITRIPTYLINE HYDROCHLORIDE 10 MG/1
40 TABLET, FILM COATED ORAL NIGHTLY
Status: DISCONTINUED | OUTPATIENT
Start: 2021-10-29 | End: 2021-10-30

## 2021-10-29 RX ORDER — DEXTROSE MONOHYDRATE 25 G/50ML
50 INJECTION, SOLUTION INTRAVENOUS
Status: DISCONTINUED | OUTPATIENT
Start: 2021-10-29 | End: 2021-10-30

## 2021-10-29 RX ORDER — ALBUTEROL SULFATE 2.5 MG/3ML
2.5 SOLUTION RESPIRATORY (INHALATION) EVERY 4 HOURS PRN
Status: DISCONTINUED | OUTPATIENT
Start: 2021-10-29 | End: 2021-10-30

## 2021-10-29 RX ORDER — HYDROCHLOROTHIAZIDE 25 MG/1
12.5 TABLET ORAL DAILY
Status: DISCONTINUED | OUTPATIENT
Start: 2021-10-30 | End: 2021-10-30

## 2021-10-29 RX ORDER — BUDESONIDE 0.5 MG/2ML
0.5 INHALANT ORAL 2 TIMES DAILY
COMMUNITY

## 2021-10-29 RX ORDER — ACETAMINOPHEN 325 MG/1
650 TABLET ORAL EVERY 6 HOURS PRN
Status: DISCONTINUED | OUTPATIENT
Start: 2021-10-29 | End: 2021-10-30

## 2021-10-29 RX ORDER — ASPIRIN 81 MG/1
324 TABLET, CHEWABLE ORAL ONCE
Status: COMPLETED | OUTPATIENT
Start: 2021-10-29 | End: 2021-10-29

## 2021-10-29 RX ORDER — ALENDRONATE SODIUM 70 MG/1
70 TABLET ORAL
COMMUNITY

## 2021-10-29 RX ORDER — BUDESONIDE 0.5 MG/2ML
0.5 INHALANT ORAL
Status: DISCONTINUED | OUTPATIENT
Start: 2021-10-29 | End: 2021-10-30

## 2021-10-29 RX ORDER — ALBUTEROL SULFATE 90 UG/1
1 AEROSOL, METERED RESPIRATORY (INHALATION) EVERY 6 HOURS PRN
Status: DISCONTINUED | OUTPATIENT
Start: 2021-10-29 | End: 2021-10-30

## 2021-10-29 RX ORDER — MONTELUKAST SODIUM 10 MG/1
10 TABLET ORAL DAILY
Status: DISCONTINUED | OUTPATIENT
Start: 2021-10-29 | End: 2021-10-30

## 2021-10-29 NOTE — ED INITIAL ASSESSMENT (HPI)
PT PRESENTS TO ED WITH INCREASED SHORTNESS OF BREATH, COUGH, PT STATES SHE FEELS LIKE SOMETHING IS SITTING ON HER CHEST, HX OF PNEUMONIA AND LUNG DISEASE

## 2021-10-29 NOTE — ED PROVIDER NOTES
Patient Seen in: [facility] Emergency Department      History   Patient presents with:  Difficulty Breathing    Stated Complaint: increasing sob since last ngiht with cough    Subjective:   HPI    Patient is a 54-year-old female presents emergency r lymph node 9/19/2018    lymph nodes in neck area   • Esophageal reflux    • Exposure to radiation     hx thyroid cancer   • Extrinsic asthma, unspecified    • Fatigue not sure    This has been going on for a while   • Fever 9/2/2018    off and on   • Food fullness after meals 9/4/2018    Not sure if I get full quickly/ if due to swallowing issue   • Unspecified sleep apnea PSG 7-3-14    PSG 7-3-14 AHI 15 RDI 15 REM AHI 20 SaO2 payal 91%   • Visual impairment     just glasses for reading   • Wears glasses 19 esophagitis, and empiric dilation to 57 [place]    • UPPER GI ENDOSCOPY,BIOPSY N/A 8/28/2014    Procedure: ESOPHAGOGASTRODUODENOSCOPY, COLONOSCOPY, POSSIBLE BIOPSY, POSSIBLE POLYPECTOMY [id number];  Surgeon: Tono Her MD;  Location: [facility] anywhere throughout the abdomen. There is no guarding, no rebound, no mass, and no organomegaly appreciated. There is normoactive bowel sounds. There is no hernia. EXTREMITIES: There is no cyanosis, clubbing, or edema appreciated.  Pulses are 2+ and equal Title    Criteria Score   Age: 41-57 Age Score: 1   History:  Mod Suspicious Hx Score: 1   EKG: Non-Spec Changes EKG Score: 1   HTN: Yes   Hypercholesterolemia: Yes   Atherosclerosis/PVD: No   DM: No   BMI>30kg/m2: Yes   Smoking: No   Family History: John Goyal dyspnea  Hypokalemia     Disposition:  Admit  10/29/2021  3:35 pm    Follow-up:  No follow-up provider specified.         Medications Prescribed:  Current Discharge Medication List                          Hospital Problems             Present on Admission

## 2021-10-29 NOTE — ED QUICK NOTES
Orders for admission, patient is aware of plan and ready to go upstairs. Any questions, please call ED RN Noni Cao  at extension [phone]. Vaccinated?  Yes  Type of COVID test sent: Rapid - negative result  COVID Suspicion level: Low      Titratable drug(s)

## 2021-10-29 NOTE — H&P
[facility] HOSPITALIST  History and Physical     Jemima Rosales Book Patient Status:  Emergency    1965 MRN [id number]   Location [facility] Attending Radha Sorensen MD   Hosp Day # 0 PCP Jeannine Bingham MD     Chief Complai radiation     hx thyroid cancer   • Extrinsic asthma, unspecified    • Fatigue not sure    This has been going on for a while   • Fever 9/2/2018    off and on   • Food intolerance approx 15 yrs    dairy does not agree with me. My stomach bloats.    • GENIT issue   • Unspecified sleep apnea PSG 7-3-14    PSG 7-3-14 AHI 15 RDI 15 REM AHI 20 SaO2 payal 91%   • Visual impairment     just glasses for reading   • Wears glasses 1980    need new ones, don't wear my old ones        Past Surgical History:   Past Surgi ENDOSCOPY,BIOPSY N/A 8/28/2014    Procedure: ESOPHAGOGASTRODUODENOSCOPY, COLONOSCOPY, POSSIBLE BIOPSY, POSSIBLE POLYPECTOMY [id number];  Surgeon: Melissa Armstrong MD;  Location: [address] [place] [address]       Social History:  reports that she has never Diabetes Maternal Uncle         Type 2   • Diabetes Paternal Uncle         Type 2   • Mental Disorder Paternal Aunt         Dimensia   • Other (Other) Sister         Sjogren's Syndrome (rheumatoid) / Thin basement membrane disease (kidney) / Papillary Carc breakfast., Disp: 90 tablet, Rfl: 0  METFORMIN  MG Oral Tab, TAKE 1 TABLET BY MOUTH TWICE A DAY WITH MEALS, Disp: 180 tablet, Rfl: 1  ALENDRONATE 70 MG Oral Tab, TAKE 1 TABLET BY MOUTH ONE TIME PER WEEK, Disp: 12 tablet, Rfl: 3  Amitriptyline HCl 10 No tenderness or deformity. Abdomen: Soft, nontender, nondistended. Positive bowel sounds. No rebound, guarding or organomegaly. Neurologic: No focal neurological deficits. CNII-XII grossly intact. Musculoskeletal: Moves all extremities.   Extremities:

## 2021-10-30 ENCOUNTER — APPOINTMENT (OUTPATIENT)
Dept: CV DIAGNOSTICS | Facility: HOSPITAL | Age: 56
End: 2021-10-30
Attending: HOSPITALIST
Payer: COMMERCIAL

## 2021-10-30 VITALS
HEIGHT: 63 IN | DIASTOLIC BLOOD PRESSURE: 69 MMHG | WEIGHT: 202.81 LBS | OXYGEN SATURATION: 93 % | TEMPERATURE: 98 F | BODY MASS INDEX: 35.93 KG/M2 | SYSTOLIC BLOOD PRESSURE: 130 MMHG | HEART RATE: 91 BPM | RESPIRATION RATE: 18 BRPM

## 2021-10-30 PROCEDURE — 93306 TTE W/DOPPLER COMPLETE: CPT | Performed by: HOSPITALIST

## 2021-10-30 PROCEDURE — 99217 OBSERVATION CARE DISCHARGE: CPT | Performed by: HOSPITALIST

## 2021-10-30 RX ORDER — LEVOTHYROXINE SODIUM 150 UG/1
150 TABLET ORAL
Qty: 90 TABLET | Refills: 0 | Status: SHIPPED | OUTPATIENT
Start: 2021-10-30

## 2021-10-30 RX ORDER — GABAPENTIN 100 MG/1
200 CAPSULE ORAL NIGHTLY
Qty: 60 CAPSULE | Refills: 0 | Status: SHIPPED | OUTPATIENT
Start: 2021-10-30 | End: 2021-12-08 | Stop reason: DRUGHIGH

## 2021-10-30 RX ORDER — BENZONATATE 100 MG/1
100 CAPSULE ORAL 3 TIMES DAILY PRN
Qty: 30 CAPSULE | Refills: 0 | Status: SHIPPED | OUTPATIENT
Start: 2021-10-30

## 2021-10-30 RX ORDER — HYDROCHLOROTHIAZIDE 12.5 MG/1
12.5 TABLET ORAL DAILY
Qty: 30 TABLET | Refills: 0 | Status: SHIPPED | OUTPATIENT
Start: 2021-10-30

## 2021-10-30 NOTE — PLAN OF CARE
Pt A&Ox4. Pain to left chest. Tylenol given. Echo in process. Lungs clear on RA. Pt has nonproductive cough. NSR on tele. Pt independent. Call light within reach. Probable discharge this afternoon. Will continue to monitor.       Problem: Patient/[facility] therapy as ordered  Outcome: Progressing     Problem: RESPIRATORY - ADULT  Goal: Achieves optimal ventilation and oxygenation  Description: INTERVENTIONS:  - Assess for changes in respiratory status  - Assess for changes in mentation and behavior  - Positi meals as needed  - Monitor I&O, WT and lab values  - Obtain nutritional consult as needed  - Optimize oral hygiene and moisture  - Encourage food from home; allow for food preferences  - Enhance eating environment  Outcome: Progressing  Goal: Achieves appr (other measures as available)  - Encourage oral intake as appropriate  - Instruct patient on fluid and nutrition restrictions as appropriate  Outcome: Progressing

## 2021-10-30 NOTE — CONSULTS
[facility] JO-ANN  Report of Consultation    Rip Grebe Book Patient Status:  Observation    1965 MRN [id number]   [facility] 8NE-A Attending Savita Lee MD   Hosp Day # 0 PCP Lynda Tyson MD     Reason for [facility] Normal LV diastolic function. Right ventricle function normal.  No significant valvular abnormalities and no significant pulmonary hypertension. Overall unremarkable echo Doppler study, minimally elevated at 34 mmHg.     History:  Past Medical History: of eating disorder 1983    anorexia in high school   • Hoarseness, chronic 9/5/2018    yes   • Hypertension     not currently being treated   • IBS (irritable bowel syndrome)    • Indigestion 9/12/2018    I was having.    • Kidney stones    • Leg swelling y Surgeon: Sukumar Calvo DO;  Location: [facility] ENDOSCOPY   • COLONOSCOPY,BIOPSY  8/28/2014    Procedure: ;  Surgeon: Cliff Parikh MD;  Location: [address] [place] [address]  8/28/2014    Procedure: ;  Surgeon: Mehdi De Luna Cancer Maternal Grandmother    • Heart Disorder Paternal Grandfather    • Diabetes Paternal Grandfather         Type 1   • Diabetes Paternal Grandmother         Type 2   • Stroke Paternal Grandmother    • Other (Other) Paternal Grandmother         Kidney C ASTHMA, Runny nose, WHEEZING    Medications:    Current Facility-Administered Medications:   •  0.9% NaCl infusion, , Intravenous, Continuous  •  albuterol 108 (90 Base) MCG/ACT inhaler 1 puff, 1 puff, Inhalation, Q6H PRN  •  amitriptyline (ELAVIL) tab 40 Patient denies significant visual changes. Ears, Nose, Mouth, Throat:  Negative for any new hearing loss. No sore throat. No nasal congestion. Cardiovascular: see HPI -no cardiac history.   Respiratory: Cough, shortness of breath, respiratory distress, hi joint effusion. Integumentary: Warm and dry skin. No rashes. Psychiatric: no depression. Normal affect.      Laboratories and Data:    Imaging:  CT SOFT TISSUE OF NECK(CONTRAST ONLY) (CPT=70491)    Result Date: 10/8/2021  PROCEDURE:  CT SOFT TISSUE OF NE Foramina are symmetric without bony erosion. VASCULATURE:  Limited views are unremarkable. BONES:  No significant osseous lesions. OTHER:  No additional imaging findings.              CONCLUSION:  No suspicious lesion is seen to correspond to the area ma TP 8.6 10/29/2021    AST 26 10/29/2021    ALT 51 10/29/2021    DDIMER 0.46 10/29/2021    TROP <0.045 10/29/2021     Impression:  1. Acute respiratory distress with cough. Could be acute bronchitis. Bronchial asthma and history of PGP pneumonias.   CBC un pneumonia with a history of PGP pneumonias  -I do not feel strong about ischemic work-up with echo Doppler unremarkable since normal stress test will not cure her cough and reactive airways.   I found gated CT of coronary arteries which was completely frank

## 2021-10-30 NOTE — PROGRESS NOTES
Progress Note  Yajaira Triciavictorino Book Patient Status:  Observation    1965 MRN [id number]   [facility] 8NE-A Attending Mo Thomas MD   Hosp Day # 0 PCP Vasyl Faulkner MD     Subjective:  No chest pain or SOb    Objective:  /63 Insulin Aspart Pen  1-10 Units Subcutaneous TID AC and HS             Assessment:  • Shortness of breath, cough - troponin negative x 2, negative CTA coronary arteries 5/19.  Ddimer negative  • Hx of PGP pneumonia  • Asthma  • DARSHANA, on CPAP  • Obesity, BMI 3

## 2021-10-30 NOTE — PLAN OF CARE
Tele monitoring. I/o. Echo doppler in am. Cardiology consult. Solumedrol given 60 mg times one and repeat in 6 hours. Duo neb times one given per cardiology orders. Gluco scan qid.  A1c 5.7.  pulmonology consult message left with service no new orders recei are met  Description: Collaborate with interdisciplinary team and initiate plans and interventions as needed.    Outcome: Progressing     Problem: CARDIOVASCULAR - ADULT  Goal: Maintains optimal cardiac output and hemodynamic stability  Description: INTERVE ADULT  Goal: Minimal or absence of nausea and vomiting  Description: INTERVENTIONS:  - Maintain adequate hydration with IV or PO as ordered and tolerated  - Nasogastric tube to low intermittent suction as ordered  - Evaluate effectiveness of ordered antiem hypoglycemia  - Administer ordered medications to maintain glucose within target range  - Assess barriers to adequate nutritional intake and initiate nutrition consult as needed  - Instruct patient on self management of diabetes  Outcome: Progressing  Goal

## 2021-10-30 NOTE — PROGRESS NOTES
Pt seen and examined. Feels well. Was given steroids overnight. No change in her symptoms. Apparently had coronary CTA in 2019 which was unremarkable. No need for ST. Echo today. Has chronic neurogenic cough. On elavil. Add chepe at night.   OK to DC

## 2021-10-30 NOTE — PLAN OF CARE
NURSING DISCHARGE NOTE    Discharged Home via Wheelchair. Accompanied by Support staff  Belongings Taken by patient/family. Discharge instructions reviewed with patient. Follow up appointments reviewed. New medications reviewed.  All questions answe balance, assess for edema, trend weights  10/30/2021 1401 by Sneha Johnson RN  Outcome: Adequate for Discharge  10/30/2021 1046 by Sneha Johnson RN  Outcome: Progressing  Goal: Absence of cardiac arrhythmias or at baseline  Description: INTERVENTIONS:

## 2021-11-01 ENCOUNTER — PATIENT OUTREACH (OUTPATIENT)
Dept: CASE MANAGEMENT | Age: 56
End: 2021-11-01

## 2021-11-01 ENCOUNTER — OFFICE VISIT (OUTPATIENT)
Dept: FAMILY MEDICINE CLINIC | Facility: CLINIC | Age: 56
End: 2021-11-01
Payer: COMMERCIAL

## 2021-11-01 VITALS
HEART RATE: 97 BPM | TEMPERATURE: 98 F | RESPIRATION RATE: 16 BRPM | SYSTOLIC BLOOD PRESSURE: 130 MMHG | OXYGEN SATURATION: 97 % | DIASTOLIC BLOOD PRESSURE: 80 MMHG | HEIGHT: 63 IN | BODY MASS INDEX: 35.44 KG/M2 | WEIGHT: 200 LBS

## 2021-11-01 DIAGNOSIS — E66.9 OBESITY (BMI 30-39.9): ICD-10-CM

## 2021-11-01 DIAGNOSIS — J01.01 ACUTE RECURRENT MAXILLARY SINUSITIS: ICD-10-CM

## 2021-11-01 DIAGNOSIS — R05.9 COUGH: Primary | ICD-10-CM

## 2021-11-01 DIAGNOSIS — Z02.9 ENCOUNTERS FOR ADMINISTRATIVE PURPOSE: ICD-10-CM

## 2021-11-01 PROCEDURE — 3079F DIAST BP 80-89 MM HG: CPT | Performed by: FAMILY MEDICINE

## 2021-11-01 PROCEDURE — 3075F SYST BP GE 130 - 139MM HG: CPT | Performed by: FAMILY MEDICINE

## 2021-11-01 PROCEDURE — 99215 OFFICE O/P EST HI 40 MIN: CPT | Performed by: FAMILY MEDICINE

## 2021-11-01 PROCEDURE — 3008F BODY MASS INDEX DOCD: CPT | Performed by: FAMILY MEDICINE

## 2021-11-01 RX ORDER — METHYLPREDNISOLONE 4 MG/1
TABLET ORAL
Qty: 1 EACH | Refills: 0 | Status: SHIPPED | OUTPATIENT
Start: 2021-11-01 | End: 2021-12-02 | Stop reason: ALTCHOICE

## 2021-11-01 RX ORDER — POTASSIUM CHLORIDE 1500 MG/1
TABLET, FILM COATED, EXTENDED RELEASE ORAL
COMMUNITY
Start: 2021-10-30 | End: 2021-12-02

## 2021-11-01 RX ORDER — LEVOFLOXACIN 250 MG/1
250 TABLET ORAL DAILY
Qty: 7 TABLET | Refills: 0 | Status: SHIPPED | OUTPATIENT
Start: 2021-11-01 | End: 2021-11-08

## 2021-11-01 RX ORDER — MELOXICAM 15 MG/1
15 TABLET ORAL DAILY
Qty: 30 TABLET | Refills: 1 | Status: SHIPPED | OUTPATIENT
Start: 2021-11-01 | End: 2021-12-27

## 2021-11-01 NOTE — PATIENT INSTRUCTIONS
Thank you for choosing Lizz Fontanez MD at [place] [address]  To Do: [address]  1. Please take meds as directed. [facility] is located in [address]. Monday, Tuesday & Friday – 8 a.m. to 4 p.m. Wednesday, Thursday – 7 a.m. to 3 p. outweigh those potential risks and we strive to make you healthier and to improve your quality of life.     Referrals, and Radiology Information:    If your insurance requires a referral to a specialist, please allow 5 business days to process your referral can cause a runny nose, stuffed-up nose, sneezing, coughing, and headache. You may also have mild body aches and low fever. A cold gets better on its own in a few days to a week. · The flu (influenza). This is a respiratory illness caused by a virus.  The help  Antibiotics can be used to treat:                                                     · Strep throat. This is a throat infection caused by a certain type of bacteria.  Symptoms of strep throat include a sore throat, white patches on the tonsils, red s cough:   · Use a humidifier or cool mist vaporizer. · Breathe in steam from a hot shower. · Suck on cough lozenges.   To sooth a sore throat:   · Suck on ice chips, frozen ice pops, or lozenges. · Use a sore throat spray.   · Use a humidifier or cool mis

## 2021-11-01 NOTE — DISCHARGE SUMMARY
[facility]  DISCHARGE SUMMARY     Ginny Reyes Patient Status:  Observation    1965 MRN [id number]   [facility] 8NE-A Attending No att. providers found   Hosp Day # 0 PCP Olivia David MD     Date of Admission: 10/29/202 Caps  Commonly known as: NEURONTIN      Take 2 capsules (200 mg total) by mouth nightly.    Quantity: 60 capsule  Refills: 0        CHANGE how you take these medications      Instructions Prescription details   benzonatate 100 MG Caps  Commonly known as: TE needed. Quantity: 30 capsule  Refills: 2     Loratadine 10 MG Tbdp      Take 10 mg by mouth daily. Refills: 0     Meloxicam 15 MG Tabs      Take 1 tablet (15 mg total) by mouth daily.    Quantity: 30 tablet  Refills: 1     metFORMIN 850 MG Tabs  Commonl approximately 1/2 mile [address] [place] [address] of [address] on [address] at the corner of [address] and [employer]. Please park in the [address], enter through [address] and follow the posted signs for guidance.                11/4/2021  9:30 AM  GENETIC CONSULT [868 have put the following visitor restrictions in place during the Coronavirus outbreak.   -Visitors 18 years and under are not allowed at the [address] [place], [place] [address]Suite [address] visitors and patients will be screened for a temperature greater than 100 degrees before entering

## 2021-11-01 NOTE — PROGRESS NOTES
Subjective:   Patient ID: Zaria Chandra is a 54year old female. HPI  Ms. Reyes is a pleasant 51-year-old female with multiple medical conditions which include but not limited to hypertension, hypothyroidism, history of thyroid cancer status post thyr each 0   • gabapentin 100 MG Oral Cap Take 2 capsules (200 mg total) by mouth nightly. 60 capsule 0   • hydroCHLOROthiazide 12.5 MG Oral Tab Take 1 tablet (12.5 mg total) by mouth daily.  30 tablet 0   • LEVOXYL 150 MCG Oral Tab Take 1 tablet (150 mcg total SHORTNESS OF BREATH, WHEEZING  [place], [place]        ASTHMA, Runny nose, WHEEZING    Objective:   Physical Exam  Vitals reviewed. Constitutional:       General: She is not in acute distress.   HENT:      Right Ear: Tympanic me supplement with potassium    This note was prepared using [employer] voice recognition dictation software. As a result errors may occur. When identified these errors have been corrected.  While every attempt is made to correct errors during dictation di

## 2021-11-02 NOTE — PAYOR COMM NOTE
Discharge Notification    Patient Name: [facility]: [address] #: [id number]  Authorization Number: N/A  Admit Date/Time: 10/29/2021 1:28 PM  Discharge Date/Time: 10/30/2021 2:40 PM

## 2021-11-04 ENCOUNTER — NURSE ONLY (OUTPATIENT)
Dept: HEMATOLOGY/ONCOLOGY | Facility: HOSPITAL | Age: 56
End: 2021-11-04
Attending: GENETIC COUNSELOR, MS
Payer: COMMERCIAL

## 2021-11-04 ENCOUNTER — GENETICS ENCOUNTER (OUTPATIENT)
Dept: GENETICS | Facility: HOSPITAL | Age: 56
End: 2021-11-04
Attending: GENETIC COUNSELOR, MS
Payer: COMMERCIAL

## 2021-11-04 PROCEDURE — 96040 HC GENETIC COUNSELING EA 30 MIN: CPT | Performed by: GENETIC COUNSELOR, MS

## 2021-11-04 PROCEDURE — 36415 COLL VENOUS BLD VENIPUNCTURE: CPT

## 2021-11-04 NOTE — PROGRESS NOTES
Referring Provider:  Ananya Harry MD    Additional Provider(s):  MD Nadia Saldaña MD    Reason for Referral:  Ricky Chiang was referred for genetic counseling because of a personal and family history of cancer.   Ms. Aaliyah Nichols is a 54 ye Eric’s maternal uncle, Chip Primrose, had an unknown cancer at an unspecified age. The daughter of Ms. Reyes’s maternal aunt had invasive lobular breast cancer in her 46s. The son of Ms. Reyes’s second maternal aunt had breast cancer in his late teens.   MsTanya Stan weber include some rare cancers, common cancers occurring at unusually young ages, multiple primary cancers in the same individual, or the same type of cancer or related cancers (e.g., breast and ovarian, colorectal and endometrial) in three or more individuals genetic testing were discussed including the potential implications of test results on clinical management. If a pathogenic variant is not identified (negative result), it is still possible that Ms.  Book has a pathogenic variant in one of these genes t Act:  The legal protections of the Genetic Information Nondiscrimination Act (PATRICE) for health insurance and employment were discussed. PATRICE does not provide protection for life insurance, disability or long-term care insurance.     Summary and Plan:  Ms.

## 2021-11-05 ENCOUNTER — TELEPHONE (OUTPATIENT)
Dept: FAMILY MEDICINE CLINIC | Facility: CLINIC | Age: 56
End: 2021-11-05

## 2021-11-05 NOTE — TELEPHONE ENCOUNTER
Pt states she is taking the levaquin, medrol dose pack, and tessalon pearls but is not having much improvement in her cough.  Pt states she has \"spasms that are so hard she almost pees her pants\" and pt states she does not have problems controlling her ur

## 2021-11-05 NOTE — TELEPHONE ENCOUNTER
benzonatate 100 MG Oral Cap    · Pt would like to speak to nurse  · The medication above is not helping her with her cough  · She states she has been coughing so hard she almost wet pants  · Pt is requesting a stronger medication  · Please advise

## 2021-11-06 RX ORDER — AMLODIPINE BESYLATE 10 MG/1
TABLET ORAL
Qty: 90 TABLET | Refills: 0 | Status: SHIPPED | OUTPATIENT
Start: 2021-11-06

## 2021-11-06 RX ORDER — PROMETHAZINE HYDROCHLORIDE AND CODEINE PHOSPHATE 6.25; 1 MG/5ML; MG/5ML
2.5 SYRUP ORAL EVERY 4 HOURS PRN
Qty: 118 ML | Refills: 0 | Status: SHIPPED | OUTPATIENT
Start: 2021-11-06 | End: 2021-12-02

## 2021-11-19 ENCOUNTER — GENETICS ENCOUNTER (OUTPATIENT)
Dept: HEMATOLOGY/ONCOLOGY | Facility: HOSPITAL | Age: 56
End: 2021-11-19

## 2021-11-19 NOTE — PROGRESS NOTES
Referring Provider:                    Rell Bay MD     Additional Provider(s):              MD Darleen Almaguer MD     Reason for Referral:  Yash Riley had genetic testing performe only), HRAS, KIT,MAX,  MEN1, MET, MITF (sequencing only), MLH1, MSH2 (including EPCAM rearrangement testing), MSH6, MUTYH, NBN, NF1, NF2, PALB2, PDGRFA, PHOX2B, PMS2, POLD1, POLE, POT1, FRMJW0C, PTCH1, PTEN, RAD50, RAD51C, RAD51D, RB1, RECQL4, RET, RUNX1,

## 2021-12-02 ENCOUNTER — OFFICE VISIT (OUTPATIENT)
Dept: FAMILY MEDICINE CLINIC | Facility: CLINIC | Age: 56
End: 2021-12-02
Payer: COMMERCIAL

## 2021-12-02 VITALS
HEART RATE: 84 BPM | TEMPERATURE: 98 F | BODY MASS INDEX: 36.86 KG/M2 | DIASTOLIC BLOOD PRESSURE: 90 MMHG | OXYGEN SATURATION: 98 % | HEIGHT: 63 IN | WEIGHT: 208 LBS | SYSTOLIC BLOOD PRESSURE: 130 MMHG | RESPIRATION RATE: 16 BRPM

## 2021-12-02 DIAGNOSIS — R05.3 CHRONIC COUGH: ICD-10-CM

## 2021-12-02 DIAGNOSIS — E66.9 OBESITY (BMI 30-39.9): Primary | ICD-10-CM

## 2021-12-02 PROBLEM — E87.6 HYPOKALEMIA: Status: RESOLVED | Noted: 2020-06-21 | Resolved: 2021-12-02

## 2021-12-02 PROBLEM — E87.1 HYPONATREMIA: Status: RESOLVED | Noted: 2020-10-04 | Resolved: 2021-12-02

## 2021-12-02 PROCEDURE — 3080F DIAST BP >= 90 MM HG: CPT | Performed by: FAMILY MEDICINE

## 2021-12-02 PROCEDURE — 3075F SYST BP GE 130 - 139MM HG: CPT | Performed by: FAMILY MEDICINE

## 2021-12-02 PROCEDURE — 99214 OFFICE O/P EST MOD 30 MIN: CPT | Performed by: FAMILY MEDICINE

## 2021-12-02 PROCEDURE — 3008F BODY MASS INDEX DOCD: CPT | Performed by: FAMILY MEDICINE

## 2021-12-02 RX ORDER — PROMETHAZINE HYDROCHLORIDE AND CODEINE PHOSPHATE 6.25; 1 MG/5ML; MG/5ML
2.5 SYRUP ORAL EVERY 4 HOURS PRN
Qty: 118 ML | Refills: 0 | Status: SHIPPED | OUTPATIENT
Start: 2021-12-02

## 2021-12-02 RX ORDER — PHENTERMINE HYDROCHLORIDE 37.5 MG/1
37.5 TABLET ORAL
Qty: 30 TABLET | Refills: 2 | Status: SHIPPED | OUTPATIENT
Start: 2021-12-02 | End: 2022-01-19

## 2021-12-02 NOTE — PATIENT INSTRUCTIONS
Thank you for choosing Jennifer Valentine MD at [place] [address]  To Do: [address]  1. Please take meds as directed. [facility] [place] is located in [address]. Monday, Tuesday & Friday – 8 a.m. to 4 p.m. Wednesday, Thursday – 7 a.m. to 3 p. outweigh those potential risks and we strive to make you healthier and to improve your quality of life.     Referrals, and Radiology Information:    If your insurance requires a referral to a specialist, please allow 5 business days to process your referral Now\"    •To schedule Imaging or tests at [facility] Scheduling [phone], Go to [facility] A ER Building (For example: CT scans, X rays, Ultrasound, MRI)  •Cardiac Testing in ER building Building A second floor Cardiac Testing [phone] (For exam in 30 day increments and must be refilled at an office visit only. If your prescription is due for a refill, you may be due for a follow-up appointment. We cannot refill your maintenance medications at a preventative wellness visit.   To best provide you some natural sugar. · Cut the sugar in recipes by 1/3 to 1/2. Boost the flavor with extracts like almond, vanilla, or orange. Or add spices such as cinnamon or nutmeg. Step 4. Eat more fiber  · Eat fresh fruits and vegetables every day.   · Boost your d

## 2021-12-02 NOTE — PROGRESS NOTES
Subjective:   Patient ID: Zaria Chandra is a 64year old female. HPI  Ms. Reyes is a pleasant 51-year-old female with multiple medical conditions which include but not limited to hypertension, hypothyroidism, history of thyroid cancer status post thyr (150 mcg total) by mouth before breakfast. 90 tablet 0   • benzonatate 100 MG Oral Cap Take 1 capsule (100 mg total) by mouth 3 (three) times daily as needed for cough.  30 capsule 0   • albuterol (2.5 MG/3ML) 0.083% Inhalation Nebu Soln Take 2.5 mg by nebu rhythm. Heart sounds: Normal heart sounds. No murmur heard. Pulmonary:      Effort: Pulmonary effort is normal. No respiratory distress. Breath sounds: Normal breath sounds. No wheezing or rales.    Abdominal:      General: Bowel sounds are n

## 2021-12-05 NOTE — PROGRESS NOTES
[facility]  [address] [facility] [address], [address]  [address]  [phone] ([phone]) not sure exactly   • Anemia 2016    blood transfusion 2 yrs ago   • Anxiety    • Asthma    • Atypical mole 1987    have had moles removed.   some malignant and some precancer   • Back pain varies    soreness in lower back where I had surgery   • Back pro 8/5/2011   • Loss of appetite 9/2/2018    off and on   • Menses painful 12/1995    I did   • Migraines     trigger:weather,bright lights   • MTHFR (methylene THF reductase) deficiency and homocystinuria (HCC)    • Muscle weakness    • Nausea 9/7/2018    of COLONOSCPY, FLEXIBLE, PROXIMAL TO SPLENIC FLEXURE; W/DIRECTED SUBMUCOSA INJECTION(S), ANY SUBSTANCE  8/28/2014    Procedure: ;  Surgeon: Lala Ma MD;  Location: [address] [place] [address]   • EGD N/A 9/19/2016    Procedure: [facility] Asthma Other         family hx   • Other (colon cancer) Other         family hx   • Other (CHF) Other         family hx   • Diabetes Other         family hx   • Other (nephrolithiasis) Other         family hx   • Other (oavrian cancer) Other         family Disp: 90 tablet, Rfl: 0  •  Meloxicam 15 MG Oral Tab, Take 1 tablet (15 mg total) by mouth daily. , Disp: 30 tablet, Rfl: 1  •  gabapentin 100 MG Oral Cap, Take 2 capsules (200 mg total) by mouth nightly., Disp: 60 capsule, Rfl: 0  •  hydroCHLOROthiazide 12 Patient Position: Sitting, Cuff Size: adult)   Pulse 85   Resp 16   Wt 94.3 kg (208 lb)   SpO2 94%   BMI 36.85 kg/m²   Gen: NAD  HEENT: Anicteric  Chest: no angiomata  CV: RRR, no murmur, no edema  Lungs: Clear to auscultation (B)  Abd: non-distended, non-

## 2021-12-06 ENCOUNTER — OFFICE VISIT (OUTPATIENT)
Dept: SURGERY | Facility: CLINIC | Age: 56
End: 2021-12-06
Payer: COMMERCIAL

## 2021-12-06 VITALS
DIASTOLIC BLOOD PRESSURE: 87 MMHG | BODY MASS INDEX: 37 KG/M2 | HEART RATE: 85 BPM | OXYGEN SATURATION: 94 % | WEIGHT: 208 LBS | SYSTOLIC BLOOD PRESSURE: 147 MMHG | RESPIRATION RATE: 16 BRPM

## 2021-12-06 DIAGNOSIS — K75.81 NASH (NONALCOHOLIC STEATOHEPATITIS): Primary | ICD-10-CM

## 2021-12-16 ENCOUNTER — TELEMEDICINE (OUTPATIENT)
Dept: FAMILY MEDICINE CLINIC | Facility: CLINIC | Age: 56
End: 2021-12-16

## 2021-12-16 DIAGNOSIS — J32.9 RECURRENT SINUS INFECTIONS: Primary | ICD-10-CM

## 2021-12-16 PROCEDURE — 99213 OFFICE O/P EST LOW 20 MIN: CPT | Performed by: FAMILY MEDICINE

## 2021-12-16 RX ORDER — LEVOFLOXACIN 250 MG/1
250 TABLET ORAL DAILY
Qty: 14 TABLET | Refills: 0 | Status: SHIPPED | OUTPATIENT
Start: 2021-12-16 | End: 2021-12-30

## 2021-12-16 NOTE — PROGRESS NOTES
Subjective:   Patient ID: Yariel Sy is a 64year old female. HPI  Ms. Reyes is a pleasant 61-year-old female with multiple medical conditions presenting for video visit today. She has had recurrent sinusitis.   She has been explained sinus congest by mouth 3 (three) times daily as needed for cough. 30 capsule 0   • albuterol (2.5 MG/3ML) 0.083% Inhalation Nebu Soln Take 2.5 mg by nebulization every 6 (six) hours as needed for Wheezing.      • alendronate 70 MG Oral Tab Take 70 mg by mouth every 7 day during dictation discrepancies may still exist        Total face to face time was 5 min, more than 50% of the time was spent in counseling and/or coordination of care related to sinusitis. No orders of the defined types were placed in this encounter.

## 2021-12-16 NOTE — PATIENT INSTRUCTIONS
Thank you for choosing Jennifer Valentine MD at [place] [address]  To Do: [address]  1. Please take meds as directed. [facility] is located in [address]. Monday, Tuesday & Friday – 8 a.m. to 4 p.m. Wednesday, Thursday – 7 a.m. to 3 p. outweigh those potential risks and we strive to make you healthier and to improve your quality of life.     Referrals, and Radiology Information:    If your insurance requires a referral to a specialist, please allow 5 business days to process your referral

## 2021-12-19 ENCOUNTER — HOSPITAL ENCOUNTER (EMERGENCY)
Age: 56
Discharge: HOME | End: 2021-12-19
Attending: EMERGENCY MEDICINE
Payer: COMMERCIAL

## 2021-12-19 VITALS
RESPIRATION RATE: 20 BRPM | DIASTOLIC BLOOD PRESSURE: 76 MMHG | BODY MASS INDEX: 36.32 KG/M2 | SYSTOLIC BLOOD PRESSURE: 132 MMHG | WEIGHT: 205 LBS | TEMPERATURE: 98 F | HEART RATE: 101 BPM | HEIGHT: 63 IN | OXYGEN SATURATION: 96 %

## 2021-12-19 DIAGNOSIS — T78.2XXA ANAPHYLAXIS, INITIAL ENCOUNTER: Primary | ICD-10-CM

## 2021-12-19 PROCEDURE — 99284 EMERGENCY DEPT VISIT MOD MDM: CPT

## 2021-12-19 PROCEDURE — 96375 TX/PRO/DX INJ NEW DRUG ADDON: CPT

## 2021-12-19 PROCEDURE — 96374 THER/PROPH/DIAG INJ IV PUSH: CPT

## 2021-12-19 PROCEDURE — 96361 HYDRATE IV INFUSION ADD-ON: CPT

## 2021-12-19 PROCEDURE — S0028 INJECTION, FAMOTIDINE, 20 MG: HCPCS

## 2021-12-19 PROCEDURE — 96372 THER/PROPH/DIAG INJ SC/IM: CPT

## 2021-12-19 RX ORDER — DIPHENHYDRAMINE HYDROCHLORIDE 50 MG/ML
50 INJECTION INTRAMUSCULAR; INTRAVENOUS ONCE
Status: COMPLETED | OUTPATIENT
Start: 2021-12-19 | End: 2021-12-19

## 2021-12-19 RX ORDER — FAMOTIDINE 10 MG/ML
INJECTION, SOLUTION INTRAVENOUS
Status: COMPLETED
Start: 2021-12-19 | End: 2021-12-19

## 2021-12-19 RX ORDER — EPINEPHRINE 0.3 MG/.3ML
0.3 INJECTION SUBCUTANEOUS
Qty: 1 EACH | Refills: 0 | Status: SHIPPED | OUTPATIENT
Start: 2021-12-19 | End: 2022-01-18

## 2021-12-19 RX ORDER — METHYLPREDNISOLONE SODIUM SUCCINATE 125 MG/2ML
125 INJECTION, POWDER, LYOPHILIZED, FOR SOLUTION INTRAMUSCULAR; INTRAVENOUS ONCE
Status: COMPLETED | OUTPATIENT
Start: 2021-12-19 | End: 2021-12-19

## 2021-12-19 RX ORDER — DIPHENHYDRAMINE HYDROCHLORIDE 50 MG/ML
INJECTION INTRAMUSCULAR; INTRAVENOUS
Status: COMPLETED
Start: 2021-12-19 | End: 2021-12-19

## 2021-12-19 RX ORDER — FAMOTIDINE 10 MG/ML
20 INJECTION, SOLUTION INTRAVENOUS ONCE
Status: COMPLETED | OUTPATIENT
Start: 2021-12-19 | End: 2021-12-19

## 2021-12-19 RX ORDER — METHYLPREDNISOLONE SODIUM SUCCINATE 125 MG/2ML
INJECTION, POWDER, LYOPHILIZED, FOR SOLUTION INTRAMUSCULAR; INTRAVENOUS
Status: COMPLETED
Start: 2021-12-19 | End: 2021-12-19

## 2021-12-19 RX ORDER — PREDNISONE 20 MG/1
40 TABLET ORAL DAILY
Qty: 10 TABLET | Refills: 0 | Status: SHIPPED | OUTPATIENT
Start: 2021-12-19 | End: 2021-12-24

## 2021-12-19 NOTE — ED PROVIDER NOTES
Patient Seen in: [facility] [place]      History   Patient presents with:   Allergic Rxn Allergies    Stated Complaint: throat tightness, rash/swelling  after taking levaquin this am.    Subjective:   HPI    40-year-old female prese 12/1995   • Enlarged lymph node 9/19/2018    lymph nodes in neck area   • Esophageal reflux    • Exposure to radiation     hx thyroid cancer   • Extrinsic asthma, unspecified    • Fatigue not sure    This has been going on for a while   • Fever 9/2/2018 • Uncomfortable fullness after meals 9/4/2018    Not sure if I get full quickly/ if due to swallowing issue   • Unspecified sleep apnea PSG 7-3-14    PSG 7-3-14 AHI 15 RDI 15 REM AHI 20 SaO2 payal 91%   • Visual impairment     just glasses for reading taken (-) eosinophilic esophagitis, and empiric dilation to 57 Japanese    • UPPER GI ENDOSCOPY,BIOPSY N/A 8/28/2014    Procedure: ESOPHAGOGASTRODUODENOSCOPY, COLONOSCOPY, POSSIBLE BIOPSY, POSSIBLE POLYPECTOMY [id number];  Surgeon: Tex Mina MD;  L peripheral edema  SKIN: Warm, dry, intact, diffuse erythematous rash to the face torso and upper extremities. Negative Nikolsky sign.   NEUROLOGIC EXAM: Tongue midline, no facial drooping, no ptosis      ED Course   Labs Reviewed - No data to display needed., Normal, Disp-1 each, R-0    predniSONE 20 MG Oral Tab  Take 2 tablets (40 mg total) by mouth daily for 5 days. , Normal, Disp-10 tablet, R-0

## 2021-12-20 ENCOUNTER — TELEMEDICINE (OUTPATIENT)
Dept: FAMILY MEDICINE CLINIC | Facility: CLINIC | Age: 56
End: 2021-12-20

## 2021-12-20 DIAGNOSIS — T78.2XXD ANAPHYLAXIS, SUBSEQUENT ENCOUNTER: Primary | ICD-10-CM

## 2021-12-20 PROCEDURE — 99214 OFFICE O/P EST MOD 30 MIN: CPT | Performed by: FAMILY MEDICINE

## 2021-12-20 RX ORDER — DOXYCYCLINE HYCLATE 100 MG
100 TABLET ORAL 2 TIMES DAILY
Qty: 20 TABLET | Refills: 0 | Status: SHIPPED | OUTPATIENT
Start: 2021-12-20 | End: 2021-12-30

## 2021-12-20 NOTE — PROGRESS NOTES
Subjective:   Patient ID: Lamberto Hernandez is a 64year old female.     HPI  Ms. Luana Griffiths is a pleasant 49-year-old female presenting for video visit follow-up after she was seen at the emergency room yesterday when she presented with throat swelling and itchi mouth every morning before breakfast. 30 tablet 2   • promethazine-codeine 6.25-10 MG/5ML Oral Syrup Take 2.5 mL by mouth every 4 (four) hours as needed for cough.  118 mL 0   • AMLODIPINE 10 MG Oral Tab TAKE 1 TABLET BY MOUTH EVERY DAY 90 tablet 0   • Jiang Exam  Constitutional:       General: She is not in acute distress. Neurological:      Mental Status: She is alert.    Psychiatric:         Mood and Affect: Mood normal.         Assessment & Plan:   Anaphylaxis, subsequent encounter  (primary encounter diag

## 2021-12-20 NOTE — PATIENT INSTRUCTIONS
Thank you for choosing Jennifer Valentine MD at [place] [address]  To Do: [address]  1. Please take meds as directed. [facility] is located in [address]. Monday, Tuesday & Friday – 8 a.m. to 4 p.m. Wednesday, Thursday – 7 a.m. to 3 p. outweigh those potential risks and we strive to make you healthier and to improve your quality of life.     Referrals, and Radiology Information:    If your insurance requires a referral to a specialist, please allow 5 business days to process your referral

## 2021-12-27 RX ORDER — MELOXICAM 15 MG/1
TABLET ORAL
Qty: 30 TABLET | Refills: 1 | Status: SHIPPED | OUTPATIENT
Start: 2021-12-27

## 2021-12-27 RX ORDER — POTASSIUM CHLORIDE 1500 MG/1
TABLET, FILM COATED, EXTENDED RELEASE ORAL
Qty: 30 TABLET | Refills: 2 | Status: SHIPPED | OUTPATIENT
Start: 2021-12-27

## 2022-01-10 ENCOUNTER — HOSPITAL ENCOUNTER (OUTPATIENT)
Dept: ULTRASOUND IMAGING | Age: 57
Discharge: HOME | End: 2022-01-10
Attending: INTERNAL MEDICINE
Payer: COMMERCIAL

## 2022-01-10 DIAGNOSIS — K75.81 NASH (NONALCOHOLIC STEATOHEPATITIS): ICD-10-CM

## 2022-01-10 PROCEDURE — 76705 ECHO EXAM OF ABDOMEN: CPT | Performed by: INTERNAL MEDICINE

## 2022-01-10 PROCEDURE — 76981 USE PARENCHYMA: CPT | Performed by: INTERNAL MEDICINE

## 2022-01-19 ENCOUNTER — OFFICE VISIT (OUTPATIENT)
Dept: FAMILY MEDICINE CLINIC | Facility: CLINIC | Age: 57
End: 2022-01-19
Payer: COMMERCIAL

## 2022-01-19 VITALS
WEIGHT: 213 LBS | HEIGHT: 63 IN | TEMPERATURE: 98 F | RESPIRATION RATE: 18 BRPM | SYSTOLIC BLOOD PRESSURE: 120 MMHG | OXYGEN SATURATION: 99 % | DIASTOLIC BLOOD PRESSURE: 74 MMHG | BODY MASS INDEX: 37.74 KG/M2 | HEART RATE: 84 BPM

## 2022-01-19 DIAGNOSIS — M25.522 LEFT ELBOW PAIN: ICD-10-CM

## 2022-01-19 DIAGNOSIS — M54.50 ACUTE RIGHT-SIDED LOW BACK PAIN WITHOUT SCIATICA: ICD-10-CM

## 2022-01-19 DIAGNOSIS — E66.9 OBESITY (BMI 30-39.9): ICD-10-CM

## 2022-01-19 DIAGNOSIS — J32.9 SINUSITIS, UNSPECIFIED CHRONICITY, UNSPECIFIED LOCATION: Primary | ICD-10-CM

## 2022-01-19 PROCEDURE — 99215 OFFICE O/P EST HI 40 MIN: CPT | Performed by: FAMILY MEDICINE

## 2022-01-19 PROCEDURE — 3008F BODY MASS INDEX DOCD: CPT | Performed by: FAMILY MEDICINE

## 2022-01-19 PROCEDURE — 3078F DIAST BP <80 MM HG: CPT | Performed by: FAMILY MEDICINE

## 2022-01-19 PROCEDURE — 3074F SYST BP LT 130 MM HG: CPT | Performed by: FAMILY MEDICINE

## 2022-01-19 RX ORDER — PHENTERMINE HYDROCHLORIDE 37.5 MG/1
37.5 TABLET ORAL
Qty: 30 TABLET | Refills: 2 | Status: SHIPPED | OUTPATIENT
Start: 2022-01-19

## 2022-01-19 RX ORDER — METHYLPREDNISOLONE 4 MG/1
TABLET ORAL
Qty: 1 EACH | Refills: 0 | Status: SHIPPED | OUTPATIENT
Start: 2022-01-19

## 2022-01-19 RX ORDER — DOXYCYCLINE HYCLATE 100 MG
100 TABLET ORAL 2 TIMES DAILY
Qty: 20 TABLET | Refills: 0 | Status: SHIPPED | OUTPATIENT
Start: 2022-01-19 | End: 2022-01-29

## 2022-01-19 NOTE — PROGRESS NOTES
Subjective:   Patient ID: Devin Summers is a 64year old female. HPI  Ms. Reyes is a pleasant 24-year-old female with multiple medical conditions which include but not limited to hypertension, hypothyroidism, history of pelvic abscess, asthma, recurre MOUTH EVERY DAY 30 tablet 2   • MELOXICAM 15 MG Oral Tab TAKE 1 TABLET BY MOUTH EVERY DAY 30 tablet 1   • gabapentin 300 MG Oral Cap Take 300 mg by mouth 2 (two) times a day. • amitriptyline 10 MG Oral Tab Take 80 mg by mouth nightly.      • METFORMIN 8 Hair Extract        ASTHMA, Coughing, Runny nose,                            SHORTNESS OF BREATH, WHEEZING  [place]        ASTHMA, Runny nose, WHEEZING    Objective:   Physical Exam  Vitals reviewed.    Constitutional:       General: She is not in management. Obesity (BMI 30-39.9)  -Trying to eat healthier and cut back on portions; will continue phentermine which was refilled.   If with no improvement will refer to weight loss clinic  Acute right-sided low back pain without sciatica  -Muscular in na

## 2022-01-19 NOTE — PATIENT INSTRUCTIONS
Thank you for choosing Marky Pino MD at [place] [address]  To Do: [address]  1. Please take meds as directed. [facility] is located in [address]. Monday, Tuesday & Friday – 8 a.m. to 4 p.m. Wednesday, Thursday – 7 a.m. to 3 p. outweigh those potential risks and we strive to make you healthier and to improve your quality of life.     Referrals, and Radiology Information:    If your insurance requires a referral to a specialist, please allow 5 business days to process your referral can cause a runny nose, stuffed-up nose, sneezing, coughing, and headache. You may also have mild body aches and low fever. A cold gets better on its own in a few days to a week. · The flu (influenza). This is a respiratory illness caused by a virus.  The help  Antibiotics can be used to treat:                                                     · Strep throat. This is a throat infection caused by a certain type of bacteria.  Symptoms of strep throat include a sore throat, white patches on the tonsils, red s cough:   · Use a humidifier or cool mist vaporizer. · Breathe in steam from a hot shower. · Suck on cough lozenges.   To sooth a sore throat:   · Suck on ice chips, frozen ice pops, or lozenges. · Use a sore throat spray.   · Use a humidifier or cool mis

## 2022-02-16 ENCOUNTER — OFFICE VISIT (OUTPATIENT)
Dept: FAMILY MEDICINE CLINIC | Facility: CLINIC | Age: 57
End: 2022-02-16
Payer: COMMERCIAL

## 2022-02-16 ENCOUNTER — TELEPHONE (OUTPATIENT)
Dept: FAMILY MEDICINE CLINIC | Facility: CLINIC | Age: 57
End: 2022-02-16

## 2022-02-16 VITALS
BODY MASS INDEX: 38.27 KG/M2 | RESPIRATION RATE: 18 BRPM | HEIGHT: 63 IN | OXYGEN SATURATION: 99 % | DIASTOLIC BLOOD PRESSURE: 80 MMHG | TEMPERATURE: 97 F | WEIGHT: 216 LBS | SYSTOLIC BLOOD PRESSURE: 120 MMHG | HEART RATE: 86 BPM

## 2022-02-16 DIAGNOSIS — E66.9 OBESITY (BMI 30-39.9): ICD-10-CM

## 2022-02-16 DIAGNOSIS — J32.9 RECURRENT SINUS INFECTIONS: Primary | ICD-10-CM

## 2022-02-16 PROCEDURE — 3079F DIAST BP 80-89 MM HG: CPT | Performed by: FAMILY MEDICINE

## 2022-02-16 PROCEDURE — 3074F SYST BP LT 130 MM HG: CPT | Performed by: FAMILY MEDICINE

## 2022-02-16 PROCEDURE — 3008F BODY MASS INDEX DOCD: CPT | Performed by: FAMILY MEDICINE

## 2022-02-16 PROCEDURE — 99214 OFFICE O/P EST MOD 30 MIN: CPT | Performed by: FAMILY MEDICINE

## 2022-02-16 RX ORDER — HYDROCHLOROTHIAZIDE 25 MG/1
25 TABLET ORAL DAILY
COMMUNITY
End: 2022-02-16

## 2022-02-16 RX ORDER — DOXYCYCLINE HYCLATE 100 MG
100 TABLET ORAL 2 TIMES DAILY
Qty: 20 TABLET | Refills: 0 | Status: SHIPPED | OUTPATIENT
Start: 2022-02-16 | End: 2022-02-26

## 2022-02-16 RX ORDER — HYDROCHLOROTHIAZIDE 25 MG/1
25 TABLET ORAL DAILY
Qty: 90 TABLET | Refills: 1 | Status: ON HOLD | OUTPATIENT
Start: 2022-02-16

## 2022-02-16 NOTE — TELEPHONE ENCOUNTER
Patient states she thought she booked correctly, appt cancelled for tomorrow, should have been a new patient 40 min. Patient wants a call back to see if they will do a video visit, otherwise has to wait a month for the next opening, please advise.

## 2022-02-20 ENCOUNTER — PATIENT MESSAGE (OUTPATIENT)
Dept: FAMILY MEDICINE CLINIC | Facility: CLINIC | Age: 57
End: 2022-02-20

## 2022-02-20 RX ORDER — FUROSEMIDE 20 MG/1
20 TABLET ORAL DAILY
Qty: 90 TABLET | Refills: 1 | Status: ON HOLD | OUTPATIENT
Start: 2022-02-20

## 2022-02-20 RX ORDER — MELOXICAM 15 MG/1
15 TABLET ORAL DAILY
Qty: 90 TABLET | Refills: 1 | Status: SHIPPED | OUTPATIENT
Start: 2022-02-20 | End: 2022-03-24 | Stop reason: ALTCHOICE

## 2022-02-20 RX ORDER — MELOXICAM 15 MG/1
TABLET ORAL
Qty: 30 TABLET | Refills: 1 | Status: SHIPPED | OUTPATIENT
Start: 2022-02-20 | End: 2022-02-20

## 2022-02-20 RX ORDER — POTASSIUM CHLORIDE 1500 MG/1
1 TABLET, FILM COATED, EXTENDED RELEASE ORAL DAILY
Qty: 90 TABLET | Refills: 1 | Status: ON HOLD | OUTPATIENT
Start: 2022-02-20

## 2022-02-20 NOTE — TELEPHONE ENCOUNTER
From: Nehemiah Rosales Book  To: Lidia Contreras MD  Sent: 2/20/2022 5:19 PM CST  Subject: Rx refills    I forgot to ask for refills. Can you put in for 3 month supply:    Meloxicam  Potassium CL ER  Furosemide     Thank you!

## 2022-02-28 ENCOUNTER — HOSPITAL ENCOUNTER (OUTPATIENT)
Dept: MRI IMAGING | Age: 57
Discharge: HOME | End: 2022-02-28
Attending: PHYSICIAN ASSISTANT
Payer: COMMERCIAL

## 2022-02-28 DIAGNOSIS — M25.462 EFFUSION OF LEFT KNEE: ICD-10-CM

## 2022-02-28 PROCEDURE — 73721 MRI JNT OF LWR EXTRE W/O DYE: CPT | Performed by: PHYSICIAN ASSISTANT

## 2022-03-01 ENCOUNTER — TELEMEDICINE (OUTPATIENT)
Dept: INTERNAL MEDICINE CLINIC | Facility: CLINIC | Age: 57
End: 2022-03-01

## 2022-03-01 DIAGNOSIS — Z51.81 ENCOUNTER FOR THERAPEUTIC DRUG MONITORING: Primary | ICD-10-CM

## 2022-03-01 DIAGNOSIS — E78.5 HYPERLIPIDEMIA, UNSPECIFIED HYPERLIPIDEMIA TYPE: ICD-10-CM

## 2022-03-01 DIAGNOSIS — E55.9 VITAMIN D DEFICIENCY: ICD-10-CM

## 2022-03-01 DIAGNOSIS — R73.03 PREDIABETES: ICD-10-CM

## 2022-03-01 DIAGNOSIS — N20.0 NEPHROLITHIASIS: ICD-10-CM

## 2022-03-01 DIAGNOSIS — E66.01 CLASS 2 SEVERE OBESITY WITH SERIOUS COMORBIDITY IN ADULT, UNSPECIFIED BMI, UNSPECIFIED OBESITY TYPE (HCC): ICD-10-CM

## 2022-03-01 PROCEDURE — 99215 OFFICE O/P EST HI 40 MIN: CPT | Performed by: PHYSICIAN ASSISTANT

## 2022-03-01 RX ORDER — DEXTROAMPHETAMINE SACCHARATE, AMPHETAMINE ASPARTATE MONOHYDRATE, DEXTROAMPHETAMINE SULFATE AND AMPHETAMINE SULFATE 5; 5; 5; 5 MG/1; MG/1; MG/1; MG/1
20 CAPSULE, EXTENDED RELEASE ORAL EVERY MORNING
Qty: 30 CAPSULE | Refills: 0 | Status: SHIPPED | OUTPATIENT
Start: 2022-03-01 | End: 2022-03-30

## 2022-03-09 RX ORDER — MONTELUKAST SODIUM 10 MG/1
TABLET ORAL
Qty: 90 TABLET | Refills: 1 | Status: ON HOLD | OUTPATIENT
Start: 2022-03-09

## 2022-03-23 ENCOUNTER — PATIENT MESSAGE (OUTPATIENT)
Dept: FAMILY MEDICINE CLINIC | Facility: CLINIC | Age: 57
End: 2022-03-23

## 2022-03-24 ENCOUNTER — HOSPITAL ENCOUNTER (EMERGENCY)
Facility: HOSPITAL | Age: 57
Discharge: HOME | End: 2022-03-25
Attending: EMERGENCY MEDICINE
Payer: COMMERCIAL

## 2022-03-24 ENCOUNTER — APPOINTMENT (OUTPATIENT)
Dept: GENERAL RADIOLOGY | Facility: HOSPITAL | Age: 57
End: 2022-03-24
Attending: EMERGENCY MEDICINE
Payer: COMMERCIAL

## 2022-03-24 DIAGNOSIS — J45.41 MODERATE PERSISTENT ASTHMA WITH EXACERBATION: Primary | ICD-10-CM

## 2022-03-24 LAB
ALBUMIN SERPL-MCNC: 4.4 G/DL (ref 3.4–5)
ALBUMIN/GLOB SERPL: 1.1 {RATIO} (ref 1–2)
ALP LIVER SERPL-CCNC: 88 U/L
ALT SERPL-CCNC: 117 U/L
ANION GAP SERPL CALC-SCNC: 5 MMOL/L (ref 0–18)
AST SERPL-CCNC: 65 U/L (ref 15–37)
BASOPHILS # BLD AUTO: 0.12 X10(3) UL (ref 0–0.2)
BASOPHILS NFR BLD AUTO: 1.1 %
BILIRUB SERPL-MCNC: 0.5 MG/DL (ref 0.1–2)
BUN BLD-MCNC: 12 MG/DL (ref 7–18)
CALCIUM BLD-MCNC: 9.8 MG/DL (ref 8.5–10.1)
CHLORIDE SERPL-SCNC: 100 MMOL/L (ref 98–112)
CO2 SERPL-SCNC: 33 MMOL/L (ref 21–32)
CREAT BLD-MCNC: 0.81 MG/DL
EOSINOPHIL # BLD AUTO: 0.21 X10(3) UL (ref 0–0.7)
EOSINOPHIL NFR BLD AUTO: 2 %
ERYTHROCYTE [DISTWIDTH] IN BLOOD BY AUTOMATED COUNT: 13.6 %
GLOBULIN PLAS-MCNC: 4.1 G/DL (ref 2.8–4.4)
GLUCOSE BLD-MCNC: 95 MG/DL (ref 70–99)
HCT VFR BLD AUTO: 45.3 %
HGB BLD-MCNC: 14.9 G/DL
IMM GRANULOCYTES # BLD AUTO: 0.1 X10(3) UL (ref 0–1)
IMM GRANULOCYTES NFR BLD: 1 %
LYMPHOCYTES # BLD AUTO: 1.94 X10(3) UL (ref 1–4)
LYMPHOCYTES NFR BLD AUTO: 18.5 %
MCH RBC QN AUTO: 29.4 PG (ref 26–34)
MCV RBC AUTO: 89.5 FL
MONOCYTES # BLD AUTO: 0.7 X10(3) UL (ref 0.1–1)
MONOCYTES NFR BLD AUTO: 6.7 %
NEUTROPHILS # BLD AUTO: 7.44 X10 (3) UL (ref 1.5–7.7)
NEUTROPHILS # BLD AUTO: 7.44 X10(3) UL (ref 1.5–7.7)
NEUTROPHILS NFR BLD AUTO: 70.7 %
OSMOLALITY SERPL CALC.SUM OF ELEC: 286 MOSM/KG (ref 275–295)
PLATELET # BLD AUTO: 330 10(3)UL (ref 150–450)
POTASSIUM SERPL-SCNC: 3.2 MMOL/L (ref 3.5–5.1)
PROCALCITONIN SERPL-MCNC: <0.05 NG/ML (ref ?–0.16)
PROT SERPL-MCNC: 8.5 G/DL (ref 6.4–8.2)
RBC # BLD AUTO: 5.06 X10(6)UL
SARS-COV-2 RNA RESP QL NAA+PROBE: NOT DETECTED
SODIUM SERPL-SCNC: 138 MMOL/L (ref 136–145)
WBC # BLD AUTO: 10.5 X10(3) UL (ref 4–11)

## 2022-03-24 PROCEDURE — 96375 TX/PRO/DX INJ NEW DRUG ADDON: CPT

## 2022-03-24 PROCEDURE — 84145 PROCALCITONIN (PCT): CPT | Performed by: EMERGENCY MEDICINE

## 2022-03-24 PROCEDURE — 99285 EMERGENCY DEPT VISIT HI MDM: CPT

## 2022-03-24 PROCEDURE — 96372 THER/PROPH/DIAG INJ SC/IM: CPT

## 2022-03-24 PROCEDURE — 96365 THER/PROPH/DIAG IV INF INIT: CPT

## 2022-03-24 PROCEDURE — 71045 X-RAY EXAM CHEST 1 VIEW: CPT | Performed by: EMERGENCY MEDICINE

## 2022-03-24 PROCEDURE — 94640 AIRWAY INHALATION TREATMENT: CPT

## 2022-03-24 PROCEDURE — 85025 COMPLETE CBC W/AUTO DIFF WBC: CPT | Performed by: EMERGENCY MEDICINE

## 2022-03-24 PROCEDURE — 94644 CONT INHLJ TX 1ST HOUR: CPT

## 2022-03-24 PROCEDURE — 80053 COMPREHEN METABOLIC PANEL: CPT | Performed by: EMERGENCY MEDICINE

## 2022-03-24 RX ORDER — METHYLPREDNISOLONE SODIUM SUCCINATE 125 MG/2ML
125 INJECTION, POWDER, LYOPHILIZED, FOR SOLUTION INTRAMUSCULAR; INTRAVENOUS ONCE
Status: COMPLETED | OUTPATIENT
Start: 2022-03-24 | End: 2022-03-24

## 2022-03-24 RX ORDER — POTASSIUM CHLORIDE 20 MEQ/1
40 TABLET, EXTENDED RELEASE ORAL ONCE
Status: COMPLETED | OUTPATIENT
Start: 2022-03-24 | End: 2022-03-24

## 2022-03-24 RX ORDER — ALBUTEROL SULFATE 90 UG/1
2 AEROSOL, METERED RESPIRATORY (INHALATION) EVERY 4 HOURS PRN
Qty: 6.7 G | Refills: 0 | Status: ON HOLD | OUTPATIENT
Start: 2022-03-24 | End: 2022-04-23

## 2022-03-24 RX ORDER — METHYLPREDNISOLONE 4 MG/1
TABLET ORAL
Qty: 21 EACH | Refills: 0 | Status: ON HOLD | OUTPATIENT
Start: 2022-03-24 | End: 2022-03-31

## 2022-03-24 RX ORDER — IPRATROPIUM BROMIDE AND ALBUTEROL SULFATE 2.5; .5 MG/3ML; MG/3ML
3 SOLUTION RESPIRATORY (INHALATION) ONCE
Status: DISCONTINUED | OUTPATIENT
Start: 2022-03-24 | End: 2022-03-24

## 2022-03-24 RX ORDER — TERBUTALINE SULFATE 1 MG/ML
0.25 INJECTION, SOLUTION SUBCUTANEOUS ONCE
Status: COMPLETED | OUTPATIENT
Start: 2022-03-24 | End: 2022-03-24

## 2022-03-24 RX ORDER — MAGNESIUM SULFATE HEPTAHYDRATE 40 MG/ML
2 INJECTION, SOLUTION INTRAVENOUS ONCE
Status: COMPLETED | OUTPATIENT
Start: 2022-03-24 | End: 2022-03-25

## 2022-03-24 NOTE — ED INITIAL ASSESSMENT (HPI)
Asthmatic pt here for SOB  Per pt, \"I've been having a hard time breathing since yesterday with a phlegmy cough since Tuesday night. \"  Low grade fevers.     Pt presents alert, orientedx4, +SOB, clear speech, oxygen 94% on RA

## 2022-03-24 NOTE — TELEPHONE ENCOUNTER
From: Gladys Lei Book  To: Jackelyn Camarillo MD  Sent: 3/23/2022 10:00 PM CDT  Subject: Cough    Dr Roly Marquez,  I tried scheduling an appointment either in person or video chat. Last night my cough started. It is a really hard mucus type cough. Very low grade temp. Chest is hurting a little, and breathing is becoming slightly labored.

## 2022-03-25 ENCOUNTER — PATIENT OUTREACH (OUTPATIENT)
Dept: CASE MANAGEMENT | Age: 57
End: 2022-03-25

## 2022-03-25 ENCOUNTER — TELEPHONE (OUTPATIENT)
Dept: FAMILY MEDICINE CLINIC | Facility: CLINIC | Age: 57
End: 2022-03-25

## 2022-03-25 VITALS
HEART RATE: 89 BPM | WEIGHT: 218.25 LBS | SYSTOLIC BLOOD PRESSURE: 147 MMHG | DIASTOLIC BLOOD PRESSURE: 94 MMHG | OXYGEN SATURATION: 99 % | BODY MASS INDEX: 39 KG/M2 | RESPIRATION RATE: 22 BRPM | TEMPERATURE: 98 F

## 2022-03-25 PROCEDURE — 94640 AIRWAY INHALATION TREATMENT: CPT

## 2022-03-25 PROCEDURE — 94645 CONT INHLJ TX EACH ADDL HOUR: CPT

## 2022-03-25 NOTE — TELEPHONE ENCOUNTER
Trina Beard MD  You 19 minutes ago (1:49 PM)         Yes. Please find an easy 30 minute slot where she can be seen. Thanks!     Message text

## 2022-03-25 NOTE — TELEPHONE ENCOUNTER
See TE, can pt be double booked next week on day you think you will be back in the office, please advise.

## 2022-03-25 NOTE — TELEPHONE ENCOUNTER
Future Appointments   Date Time Provider Batsheva Jade   3/29/2022  8:30 AM Juana Marr MD [facility] [address] [facility] [address]

## 2022-03-29 ENCOUNTER — OFFICE VISIT (OUTPATIENT)
Dept: FAMILY MEDICINE CLINIC | Facility: CLINIC | Age: 57
End: 2022-03-29
Payer: COMMERCIAL

## 2022-03-29 VITALS
TEMPERATURE: 98 F | DIASTOLIC BLOOD PRESSURE: 88 MMHG | HEART RATE: 86 BPM | HEIGHT: 63 IN | SYSTOLIC BLOOD PRESSURE: 148 MMHG | OXYGEN SATURATION: 95 % | RESPIRATION RATE: 18 BRPM | BODY MASS INDEX: 38.74 KG/M2 | WEIGHT: 218.63 LBS

## 2022-03-29 DIAGNOSIS — J45.41 MODERATE PERSISTENT ASTHMA WITH ACUTE EXACERBATION: Primary | ICD-10-CM

## 2022-03-29 PROCEDURE — 3008F BODY MASS INDEX DOCD: CPT | Performed by: FAMILY MEDICINE

## 2022-03-29 PROCEDURE — 3077F SYST BP >= 140 MM HG: CPT | Performed by: FAMILY MEDICINE

## 2022-03-29 PROCEDURE — 99214 OFFICE O/P EST MOD 30 MIN: CPT | Performed by: FAMILY MEDICINE

## 2022-03-29 PROCEDURE — 3079F DIAST BP 80-89 MM HG: CPT | Performed by: FAMILY MEDICINE

## 2022-03-29 RX ORDER — DEXTROMETHORPHAN HYDROBROMIDE AND PROMETHAZINE HYDROCHLORIDE 15; 6.25 MG/5ML; MG/5ML
5 SYRUP ORAL 4 TIMES DAILY PRN
Qty: 118 ML | Refills: 0 | Status: ON HOLD | OUTPATIENT
Start: 2022-03-29 | End: 2022-03-31

## 2022-03-29 RX ORDER — PREDNISONE 20 MG/1
TABLET ORAL
Qty: 30 TABLET | Refills: 0 | Status: ON HOLD | OUTPATIENT
Start: 2022-03-29 | End: 2022-04-13

## 2022-03-30 ENCOUNTER — PATIENT MESSAGE (OUTPATIENT)
Dept: INTERNAL MEDICINE CLINIC | Facility: CLINIC | Age: 57
End: 2022-03-30

## 2022-03-30 RX ORDER — DEXTROAMPHETAMINE SACCHARATE, AMPHETAMINE ASPARTATE MONOHYDRATE, DEXTROAMPHETAMINE SULFATE AND AMPHETAMINE SULFATE 5; 5; 5; 5 MG/1; MG/1; MG/1; MG/1
20 CAPSULE, EXTENDED RELEASE ORAL EVERY MORNING
Qty: 30 CAPSULE | Refills: 0 | Status: ON HOLD | OUTPATIENT
Start: 2022-03-30 | End: 2022-04-29

## 2022-03-30 NOTE — TELEPHONE ENCOUNTER
Requesting Adderall  LOV: 3/1/22  RTC: not noted  Last Relevant Labs: na  Filled: 3/1/22 #30 with 0 refills last [place] 3[place]2/22 [place]    Future Appointments   Date Time Provider Batsheva Hansen   4/6/2022  7:00 AM Ashwini Loaiza MD [facility] [address] [facility] [address]   4/14/2022  9:40 AM Rashid Howell PA-C [address] [facility] [address]

## 2022-03-31 ENCOUNTER — APPOINTMENT (OUTPATIENT)
Dept: GENERAL RADIOLOGY | Facility: HOSPITAL | Age: 57
End: 2022-03-31
Attending: EMERGENCY MEDICINE
Payer: COMMERCIAL

## 2022-03-31 ENCOUNTER — TELEPHONE (OUTPATIENT)
Dept: INTERNAL MEDICINE CLINIC | Facility: CLINIC | Age: 57
End: 2022-03-31

## 2022-03-31 ENCOUNTER — HOSPITAL ENCOUNTER (INPATIENT)
Facility: HOSPITAL | Age: 57
LOS: 5 days | Discharge: HOME | End: 2022-04-05
Attending: EMERGENCY MEDICINE | Admitting: HOSPITALIST
Payer: COMMERCIAL

## 2022-03-31 DIAGNOSIS — J45.41 MODERATE PERSISTENT ASTHMA WITH EXACERBATION: Primary | ICD-10-CM

## 2022-03-31 LAB
ADENOVIRUS PCR:: NOT DETECTED
ALBUMIN SERPL-MCNC: 4.2 G/DL (ref 3.4–5)
ALBUMIN/GLOB SERPL: 1.1 {RATIO} (ref 1–2)
ALP LIVER SERPL-CCNC: 97 U/L
ALT SERPL-CCNC: 121 U/L
ANION GAP SERPL CALC-SCNC: 10 MMOL/L (ref 0–18)
AST SERPL-CCNC: 41 U/L (ref 15–37)
B PARAPERT DNA SPEC QL NAA+PROBE: NOT DETECTED
B PERT DNA SPEC QL NAA+PROBE: NOT DETECTED
BASE EXCESS BLDV CALC-SCNC: 6.4 MMOL/L
BASOPHILS # BLD: 0 X10(3) UL (ref 0–0.2)
BASOPHILS NFR BLD: 0 %
BILIRUB SERPL-MCNC: 0.5 MG/DL (ref 0.1–2)
BUN BLD-MCNC: 18 MG/DL (ref 7–18)
C PNEUM DNA SPEC QL NAA+PROBE: NOT DETECTED
CALCIUM BLD-MCNC: 9.7 MG/DL (ref 8.5–10.1)
CHLORIDE SERPL-SCNC: 100 MMOL/L (ref 98–112)
CO2 SERPL-SCNC: 29 MMOL/L (ref 21–32)
CORONAVIRUS 229E PCR:: NOT DETECTED
CORONAVIRUS HKU1 PCR:: NOT DETECTED
CORONAVIRUS NL63 PCR:: NOT DETECTED
CORONAVIRUS OC43 PCR:: NOT DETECTED
CREAT BLD-MCNC: 0.97 MG/DL
D DIMER PPP FEU-MCNC: <0.27 UG/ML FEU (ref ?–0.56)
EOSINOPHIL # BLD: 0 X10(3) UL (ref 0–0.7)
EOSINOPHIL NFR BLD: 0 %
ERYTHROCYTE [DISTWIDTH] IN BLOOD BY AUTOMATED COUNT: 13.9 %
EST. AVERAGE GLUCOSE BLD GHB EST-MCNC: 134 MG/DL (ref 68–126)
FLUAV + FLUBV RNA SPEC NAA+PROBE: NEGATIVE
FLUAV + FLUBV RNA SPEC NAA+PROBE: NEGATIVE
FLUAV RNA SPEC QL NAA+PROBE: NOT DETECTED
FLUBV RNA SPEC QL NAA+PROBE: NOT DETECTED
GLOBULIN PLAS-MCNC: 3.9 G/DL (ref 2.8–4.4)
GLUCOSE BLD-MCNC: 156 MG/DL (ref 70–99)
GLUCOSE BLD-MCNC: 157 MG/DL (ref 70–99)
GLUCOSE BLD-MCNC: 160 MG/DL (ref 70–99)
HBA1C MFR BLD: 6.3 % (ref ?–5.7)
HCO3 BLDV-SCNC: 29.8 MEQ/L (ref 22–26)
HCT VFR BLD AUTO: 44.7 %
HGB BLD-MCNC: 15 G/DL
LACTATE BLD-SCNC: 4.2 MMOL/L (ref 0.5–2)
LACTATE SERPL-SCNC: 3.6 MMOL/L (ref 0.4–2)
LACTATE SERPL-SCNC: 4.1 MMOL/L (ref 0.4–2)
LYMPHOCYTES NFR BLD: 16 %
LYMPHOCYTES NFR BLD: 2.18 X10(3) UL (ref 1–4)
MCH RBC QN AUTO: 29.7 PG (ref 26–34)
MCHC RBC AUTO-ENTMCNC: 33.6 G/DL (ref 31–37)
MCV RBC AUTO: 88.5 FL
METAPNEUMOVIRUS PCR:: DETECTED
MONOCYTES # BLD: 0.68 X10(3) UL (ref 0.1–1)
MONOCYTES NFR BLD: 5 %
MORPHOLOGY: NORMAL
MYCOPLASMA PNEUMONIA PCR:: NOT DETECTED
NEUTROPHILS # BLD AUTO: 10.69 X10 (3) UL (ref 1.5–7.7)
NEUTROPHILS NFR BLD: 79 %
NEUTS HYPERSEG # BLD: 10.74 X10(3) UL (ref 1.5–7.7)
NT-PROBNP SERPL-MCNC: 42 PG/ML (ref ?–125)
OSMOLALITY SERPL CALC.SUM OF ELEC: 293 MOSM/KG (ref 275–295)
OXYHGB MFR BLDV: 93 % (ref 72–78)
PARAINFLUENZA 1 PCR:: NOT DETECTED
PARAINFLUENZA 2 PCR:: NOT DETECTED
PARAINFLUENZA 3 PCR:: NOT DETECTED
PARAINFLUENZA 4 PCR:: NOT DETECTED
PCO2 BLDV: 36 MM HG (ref 38–50)
PH BLDV: 7.52 [PH] (ref 7.33–7.43)
PLATELET # BLD AUTO: 404 10(3)UL (ref 150–450)
PLATELET MORPHOLOGY: NORMAL
PO2 BLDV: 66 MM HG (ref 30–50)
POTASSIUM SERPL-SCNC: 3.2 MMOL/L (ref 3.5–5.1)
PROT SERPL-MCNC: 8.1 G/DL (ref 6.4–8.2)
RBC # BLD AUTO: 5.05 X10(6)UL
RHINOVIRUS/ENTERO PCR:: NOT DETECTED
RSV RNA SPEC NAA+PROBE: NEGATIVE
RSV RNA SPEC QL NAA+PROBE: NOT DETECTED
SARS-COV-2 RNA NPH QL NAA+NON-PROBE: NOT DETECTED
SARS-COV-2 RNA RESP QL NAA+PROBE: NOT DETECTED
SARS-COV-2 RNA RESP QL NAA+PROBE: NOT DETECTED
SODIUM SERPL-SCNC: 139 MMOL/L (ref 136–145)
TOTAL CELLS COUNTED BLD: 100
TROPONIN I HIGH SENSITIVITY: 5 NG/L
WBC # BLD AUTO: 13.6 X10(3) UL (ref 4–11)

## 2022-03-31 PROCEDURE — 94640 AIRWAY INHALATION TREATMENT: CPT

## 2022-03-31 PROCEDURE — 99291 CRITICAL CARE FIRST HOUR: CPT

## 2022-03-31 PROCEDURE — 87999 UNLISTED MICROBIOLOGY PX: CPT

## 2022-03-31 PROCEDURE — 80053 COMPREHEN METABOLIC PANEL: CPT | Performed by: EMERGENCY MEDICINE

## 2022-03-31 PROCEDURE — 82803 BLOOD GASES ANY COMBINATION: CPT | Performed by: EMERGENCY MEDICINE

## 2022-03-31 PROCEDURE — 85379 FIBRIN DEGRADATION QUANT: CPT | Performed by: EMERGENCY MEDICINE

## 2022-03-31 PROCEDURE — 96375 TX/PRO/DX INJ NEW DRUG ADDON: CPT

## 2022-03-31 PROCEDURE — 93005 ELECTROCARDIOGRAM TRACING: CPT

## 2022-03-31 PROCEDURE — 94644 CONT INHLJ TX 1ST HOUR: CPT

## 2022-03-31 PROCEDURE — 0202U NFCT DS 22 TRGT SARS-COV-2: CPT | Performed by: INTERNAL MEDICINE

## 2022-03-31 PROCEDURE — 83880 ASSAY OF NATRIURETIC PEPTIDE: CPT | Performed by: EMERGENCY MEDICINE

## 2022-03-31 PROCEDURE — 0241U SARS-COV-2/FLU A AND B/RSV BY PCR (GENEXPERT): CPT | Performed by: EMERGENCY MEDICINE

## 2022-03-31 PROCEDURE — 85027 COMPLETE CBC AUTOMATED: CPT | Performed by: EMERGENCY MEDICINE

## 2022-03-31 PROCEDURE — 82962 GLUCOSE BLOOD TEST: CPT

## 2022-03-31 PROCEDURE — 3044F HG A1C LEVEL LT 7.0%: CPT | Performed by: NURSE PRACTITIONER

## 2022-03-31 PROCEDURE — 71045 X-RAY EXAM CHEST 1 VIEW: CPT | Performed by: EMERGENCY MEDICINE

## 2022-03-31 PROCEDURE — 84484 ASSAY OF TROPONIN QUANT: CPT | Performed by: EMERGENCY MEDICINE

## 2022-03-31 PROCEDURE — 94660 CPAP INITIATION&MGMT: CPT

## 2022-03-31 PROCEDURE — 83605 ASSAY OF LACTIC ACID: CPT | Performed by: INTERNAL MEDICINE

## 2022-03-31 PROCEDURE — 96374 THER/PROPH/DIAG INJ IV PUSH: CPT

## 2022-03-31 PROCEDURE — 5A09357 ASSISTANCE WITH RESPIRATORY VENTILATION, LESS THAN 24 CONSECUTIVE HOURS, CONTINUOUS POSITIVE AIRWAY PRESSURE: ICD-10-PCS | Performed by: HOSPITALIST

## 2022-03-31 PROCEDURE — 85025 COMPLETE CBC W/AUTO DIFF WBC: CPT | Performed by: EMERGENCY MEDICINE

## 2022-03-31 PROCEDURE — 83036 HEMOGLOBIN GLYCOSYLATED A1C: CPT | Performed by: INTERNAL MEDICINE

## 2022-03-31 PROCEDURE — 85007 BL SMEAR W/DIFF WBC COUNT: CPT | Performed by: EMERGENCY MEDICINE

## 2022-03-31 PROCEDURE — 93010 ELECTROCARDIOGRAM REPORT: CPT

## 2022-03-31 RX ORDER — HYDROCHLOROTHIAZIDE 25 MG/1
25 TABLET ORAL DAILY
Status: DISCONTINUED | OUTPATIENT
Start: 2022-04-01 | End: 2022-04-05

## 2022-03-31 RX ORDER — CETIRIZINE HYDROCHLORIDE 5 MG/1
10 TABLET ORAL EVERY EVENING
COMMUNITY

## 2022-03-31 RX ORDER — ENOXAPARIN SODIUM 100 MG/ML
40 INJECTION SUBCUTANEOUS NIGHTLY
Status: DISCONTINUED | OUTPATIENT
Start: 2022-03-31 | End: 2022-04-05

## 2022-03-31 RX ORDER — NICOTINE POLACRILEX 4 MG
15 LOZENGE BUCCAL
Status: DISCONTINUED | OUTPATIENT
Start: 2022-03-31 | End: 2022-04-05

## 2022-03-31 RX ORDER — ARFORMOTEROL TARTRATE 15 UG/2ML
15 SOLUTION RESPIRATORY (INHALATION)
Status: DISCONTINUED | OUTPATIENT
Start: 2022-03-31 | End: 2022-04-05

## 2022-03-31 RX ORDER — METHYLPREDNISOLONE SODIUM SUCCINATE 125 MG/2ML
60 INJECTION, POWDER, LYOPHILIZED, FOR SOLUTION INTRAMUSCULAR; INTRAVENOUS EVERY 8 HOURS
Status: DISCONTINUED | OUTPATIENT
Start: 2022-03-31 | End: 2022-04-04

## 2022-03-31 RX ORDER — SODIUM PHOSPHATE, DIBASIC AND SODIUM PHOSPHATE, MONOBASIC 7; 19 G/133ML; G/133ML
1 ENEMA RECTAL ONCE AS NEEDED
Status: DISCONTINUED | OUTPATIENT
Start: 2022-03-31 | End: 2022-04-05

## 2022-03-31 RX ORDER — DEXTROSE MONOHYDRATE 25 G/50ML
50 INJECTION, SOLUTION INTRAVENOUS
Status: DISCONTINUED | OUTPATIENT
Start: 2022-03-31 | End: 2022-04-05

## 2022-03-31 RX ORDER — GABAPENTIN 300 MG/1
300 CAPSULE ORAL 3 TIMES DAILY
Status: DISCONTINUED | OUTPATIENT
Start: 2022-03-31 | End: 2022-04-05

## 2022-03-31 RX ORDER — ALPRAZOLAM 0.25 MG/1
0.25 TABLET ORAL NIGHTLY PRN
Status: DISCONTINUED | OUTPATIENT
Start: 2022-03-31 | End: 2022-04-05

## 2022-03-31 RX ORDER — LEVOTHYROXINE SODIUM 0.15 MG/1
150 TABLET ORAL
Status: DISCONTINUED | OUTPATIENT
Start: 2022-04-01 | End: 2022-04-05

## 2022-03-31 RX ORDER — IPRATROPIUM BROMIDE AND ALBUTEROL SULFATE 2.5; .5 MG/3ML; MG/3ML
3 SOLUTION RESPIRATORY (INHALATION)
Status: DISCONTINUED | OUTPATIENT
Start: 2022-03-31 | End: 2022-04-01

## 2022-03-31 RX ORDER — IPRATROPIUM BROMIDE AND ALBUTEROL SULFATE 2.5; .5 MG/3ML; MG/3ML
3 SOLUTION RESPIRATORY (INHALATION)
Status: DISCONTINUED | OUTPATIENT
Start: 2022-03-31 | End: 2022-03-31

## 2022-03-31 RX ORDER — ASPIRIN 81 MG/1
324 TABLET, CHEWABLE ORAL ONCE
Status: COMPLETED | OUTPATIENT
Start: 2022-03-31 | End: 2022-03-31

## 2022-03-31 RX ORDER — DEXTROMETHORPHAN HYDROBROMIDE AND PROMETHAZINE HYDROCHLORIDE 15; 6.25 MG/5ML; MG/5ML
5 SYRUP ORAL 4 TIMES DAILY PRN
Status: DISCONTINUED | OUTPATIENT
Start: 2022-03-31 | End: 2022-03-31

## 2022-03-31 RX ORDER — PROCHLORPERAZINE EDISYLATE 5 MG/ML
5 INJECTION INTRAMUSCULAR; INTRAVENOUS EVERY 8 HOURS PRN
Status: DISCONTINUED | OUTPATIENT
Start: 2022-03-31 | End: 2022-04-05

## 2022-03-31 RX ORDER — BENZONATATE 100 MG/1
100 CAPSULE ORAL 3 TIMES DAILY PRN
Status: DISCONTINUED | OUTPATIENT
Start: 2022-03-31 | End: 2022-03-31

## 2022-03-31 RX ORDER — PANTOPRAZOLE SODIUM 20 MG/1
20 TABLET, DELAYED RELEASE ORAL
Status: DISCONTINUED | OUTPATIENT
Start: 2022-04-01 | End: 2022-04-05

## 2022-03-31 RX ORDER — ONDANSETRON 2 MG/ML
4 INJECTION INTRAMUSCULAR; INTRAVENOUS EVERY 6 HOURS PRN
Status: DISCONTINUED | OUTPATIENT
Start: 2022-03-31 | End: 2022-04-05

## 2022-03-31 RX ORDER — ACETAMINOPHEN 325 MG/1
650 TABLET ORAL EVERY 6 HOURS PRN
Status: DISCONTINUED | OUTPATIENT
Start: 2022-03-31 | End: 2022-04-05

## 2022-03-31 RX ORDER — IPRATROPIUM BROMIDE AND ALBUTEROL SULFATE 2.5; .5 MG/3ML; MG/3ML
3 SOLUTION RESPIRATORY (INHALATION) EVERY 6 HOURS PRN
Status: DISCONTINUED | OUTPATIENT
Start: 2022-03-31 | End: 2022-04-05

## 2022-03-31 RX ORDER — LORAZEPAM 2 MG/ML
0.5 INJECTION INTRAMUSCULAR ONCE
Status: COMPLETED | OUTPATIENT
Start: 2022-03-31 | End: 2022-03-31

## 2022-03-31 RX ORDER — ALBUTEROL SULFATE 2.5 MG/3ML
10 SOLUTION RESPIRATORY (INHALATION) CONTINUOUS
Status: DISCONTINUED | OUTPATIENT
Start: 2022-03-31 | End: 2022-04-05

## 2022-03-31 RX ORDER — MONTELUKAST SODIUM 10 MG/1
10 TABLET ORAL DAILY
Status: DISCONTINUED | OUTPATIENT
Start: 2022-03-31 | End: 2022-04-05

## 2022-03-31 RX ORDER — BISACODYL 10 MG
10 SUPPOSITORY, RECTAL RECTAL
Status: DISCONTINUED | OUTPATIENT
Start: 2022-03-31 | End: 2022-04-05

## 2022-03-31 RX ORDER — SENNOSIDES 8.6 MG
17.2 TABLET ORAL NIGHTLY PRN
Status: DISCONTINUED | OUTPATIENT
Start: 2022-03-31 | End: 2022-04-05

## 2022-03-31 RX ORDER — MELATONIN
3 NIGHTLY PRN
Status: DISCONTINUED | OUTPATIENT
Start: 2022-03-31 | End: 2022-04-05

## 2022-03-31 RX ORDER — BUDESONIDE 0.5 MG/2ML
0.5 INHALANT ORAL 2 TIMES DAILY
Status: DISCONTINUED | OUTPATIENT
Start: 2022-03-31 | End: 2022-04-05

## 2022-03-31 RX ORDER — ALBUTEROL SULFATE 90 UG/1
8 AEROSOL, METERED RESPIRATORY (INHALATION) 4 TIMES DAILY
Status: DISCONTINUED | OUTPATIENT
Start: 2022-03-31 | End: 2022-03-31

## 2022-03-31 RX ORDER — METHYLPREDNISOLONE SODIUM SUCCINATE 125 MG/2ML
125 INJECTION, POWDER, LYOPHILIZED, FOR SOLUTION INTRAMUSCULAR; INTRAVENOUS ONCE
Status: COMPLETED | OUTPATIENT
Start: 2022-03-31 | End: 2022-03-31

## 2022-03-31 RX ORDER — POLYETHYLENE GLYCOL 3350 17 G/17G
17 POWDER, FOR SOLUTION ORAL DAILY PRN
Status: DISCONTINUED | OUTPATIENT
Start: 2022-03-31 | End: 2022-04-05

## 2022-03-31 RX ORDER — NICOTINE POLACRILEX 4 MG
30 LOZENGE BUCCAL
Status: DISCONTINUED | OUTPATIENT
Start: 2022-03-31 | End: 2022-04-05

## 2022-03-31 RX ORDER — METHYLPREDNISOLONE SODIUM SUCCINATE 40 MG/ML
40 INJECTION, POWDER, LYOPHILIZED, FOR SOLUTION INTRAMUSCULAR; INTRAVENOUS EVERY 8 HOURS
Status: DISCONTINUED | OUTPATIENT
Start: 2022-03-31 | End: 2022-03-31

## 2022-03-31 RX ORDER — DEXTROSE AND SODIUM CHLORIDE 5; .45 G/100ML; G/100ML
INJECTION, SOLUTION INTRAVENOUS CONTINUOUS
Status: DISCONTINUED | OUTPATIENT
Start: 2022-03-31 | End: 2022-03-31

## 2022-03-31 NOTE — ED INITIAL ASSESSMENT (HPI)
PT PRESENTS TO ED WITH SHORTNESS OF BREATH, STATES SHE WAS SEEN LAST WEEK FOR ASTHMA EXACERBATION, STATES HER PRIMARY MD SENT HER TO ER, SYMPTOMS ARE NOT IMPROVING

## 2022-03-31 NOTE — PLAN OF CARE
A/ox4. Dyspneic with minimal movement/activity. Received on continuous neb, now weaned down to 3L nc. Diminshed with expiratory wheezes Pt reports no home o2 during the daytime but uses a cpap at night. Vital signs stable.  at bedside. Pt updated on plan of care.

## 2022-03-31 NOTE — TELEPHONE ENCOUNTER
Amphetamine-Dextroamphet ER (ADDERALL XR) 20 MG Oral Capsule SR 24       Is on back order. Patient requesting a new prescription.

## 2022-03-31 NOTE — PROGRESS NOTES
03/31/22 1748   Clinical Encounter Type   Visited With Patient; Health care provider   Routine Visit Introduction   Continue Visiting No   Patient's Supportive Strategies/Resources Dennie Bow talked lovingly of her family and stated that she prays to God. Patient Spiritual Encounters   Spiritual Assessment Completed Yes   Taxonomy   Intended Effects Helping someone feel comforted   Methods Encourage self reflection;Explore presence of God;Offer support   Interventions Acknowledge current situation; Active listening; Ask guided questions about cinda; Explain  role;Prayer for healing;Assist with identifying strengths   Spiritual care remains available via consult or at pager [phone].

## 2022-04-01 LAB
ANION GAP SERPL CALC-SCNC: 6 MMOL/L (ref 0–18)
ATRIAL RATE: 96 BPM
BASOPHILS # BLD: 0 X10(3) UL (ref 0–0.2)
BASOPHILS NFR BLD: 0 %
BUN BLD-MCNC: 19 MG/DL (ref 7–18)
CALCIUM BLD-MCNC: 8.4 MG/DL (ref 8.5–10.1)
CHLORIDE SERPL-SCNC: 102 MMOL/L (ref 98–112)
CO2 SERPL-SCNC: 31 MMOL/L (ref 21–32)
CREAT BLD-MCNC: 0.8 MG/DL
EOSINOPHIL # BLD: 0 X10(3) UL (ref 0–0.7)
EOSINOPHIL NFR BLD: 0 %
ERYTHROCYTE [DISTWIDTH] IN BLOOD BY AUTOMATED COUNT: 13.9 %
GLUCOSE BLD-MCNC: 139 MG/DL (ref 70–99)
GLUCOSE BLD-MCNC: 146 MG/DL (ref 70–99)
GLUCOSE BLD-MCNC: 151 MG/DL (ref 70–99)
GLUCOSE BLD-MCNC: 164 MG/DL (ref 70–99)
GLUCOSE BLD-MCNC: 176 MG/DL (ref 70–99)
HCT VFR BLD AUTO: 40.6 %
HGB BLD-MCNC: 12.7 G/DL
LACTATE SERPL-SCNC: 4.2 MMOL/L (ref 0.4–2)
LACTATE SERPL-SCNC: 4.8 MMOL/L (ref 0.4–2)
LYMPHOCYTES NFR BLD: 2.46 X10(3) UL (ref 1–4)
LYMPHOCYTES NFR BLD: 20 %
MCH RBC QN AUTO: 28.9 PG (ref 26–34)
MCHC RBC AUTO-ENTMCNC: 31.3 G/DL (ref 31–37)
MCV RBC AUTO: 92.3 FL
METAMYELOCYTES # BLD: 0.25 X10(3) UL
METAMYELOCYTES NFR BLD: 2 %
MONOCYTES # BLD: 0.49 X10(3) UL (ref 0.1–1)
MONOCYTES NFR BLD: 4 %
MORPHOLOGY: NORMAL
NEUTROPHILS # BLD AUTO: 9.3 X10 (3) UL (ref 1.5–7.7)
NEUTROPHILS NFR BLD: 74 %
NEUTS HYPERSEG # BLD: 9.1 X10(3) UL (ref 1.5–7.7)
OSMOLALITY SERPL CALC.SUM OF ELEC: 293 MOSM/KG (ref 275–295)
P AXIS: 66 DEGREES
P-R INTERVAL: 152 MS
PLATELET # BLD AUTO: 345 10(3)UL (ref 150–450)
PLATELET MORPHOLOGY: NORMAL
POTASSIUM SERPL-SCNC: 3 MMOL/L (ref 3.5–5.1)
POTASSIUM SERPL-SCNC: 3.6 MMOL/L (ref 3.5–5.1)
Q-T INTERVAL: 378 MS
QRS DURATION: 106 MS
QTC CALCULATION (BEZET): 477 MS
R AXIS: 26 DEGREES
RBC # BLD AUTO: 4.4 X10(6)UL
SODIUM SERPL-SCNC: 139 MMOL/L (ref 136–145)
T AXIS: 18 DEGREES
TOTAL CELLS COUNTED BLD: 100
VENTRICULAR RATE: 96 BPM
WBC # BLD AUTO: 12.3 X10(3) UL (ref 4–11)

## 2022-04-01 PROCEDURE — 94640 AIRWAY INHALATION TREATMENT: CPT

## 2022-04-01 PROCEDURE — 85027 COMPLETE CBC AUTOMATED: CPT | Performed by: INTERNAL MEDICINE

## 2022-04-01 PROCEDURE — 85025 COMPLETE CBC W/AUTO DIFF WBC: CPT | Performed by: INTERNAL MEDICINE

## 2022-04-01 PROCEDURE — 80048 BASIC METABOLIC PNL TOTAL CA: CPT | Performed by: INTERNAL MEDICINE

## 2022-04-01 PROCEDURE — 84132 ASSAY OF SERUM POTASSIUM: CPT | Performed by: NURSE PRACTITIONER

## 2022-04-01 PROCEDURE — 85007 BL SMEAR W/DIFF WBC COUNT: CPT | Performed by: INTERNAL MEDICINE

## 2022-04-01 PROCEDURE — 83605 ASSAY OF LACTIC ACID: CPT | Performed by: INTERNAL MEDICINE

## 2022-04-01 PROCEDURE — 82962 GLUCOSE BLOOD TEST: CPT

## 2022-04-01 RX ORDER — BENZONATATE 200 MG/1
200 CAPSULE ORAL 3 TIMES DAILY PRN
Status: DISCONTINUED | OUTPATIENT
Start: 2022-04-01 | End: 2022-04-02

## 2022-04-01 RX ORDER — CODEINE PHOSPHATE AND GUAIFENESIN 10; 100 MG/5ML; MG/5ML
10 SOLUTION ORAL EVERY 4 HOURS PRN
Status: DISCONTINUED | OUTPATIENT
Start: 2022-04-01 | End: 2022-04-05

## 2022-04-01 RX ORDER — IPRATROPIUM BROMIDE AND ALBUTEROL SULFATE 2.5; .5 MG/3ML; MG/3ML
3 SOLUTION RESPIRATORY (INHALATION)
Status: DISCONTINUED | OUTPATIENT
Start: 2022-04-01 | End: 2022-04-02

## 2022-04-01 RX ORDER — POTASSIUM CHLORIDE 20 MEQ/1
40 TABLET, EXTENDED RELEASE ORAL EVERY 4 HOURS
Status: COMPLETED | OUTPATIENT
Start: 2022-04-01 | End: 2022-04-01

## 2022-04-01 NOTE — PLAN OF CARE
Received patient following report on 3L NC. Switched to CPAP for sleeping. O2 saturations remain above 90%. Dim w/ ex wheezes. Labored. SOB w exertion. HR 80-100s. Afebrile. Flu and covid panel done, see results. Lactics continue to be elevated. 1L bolus given. No new orders. Q2 hour nebs done per RT. Up to bedside commode to void. K replaced. See flowsheets and MAR.

## 2022-04-01 NOTE — PLAN OF CARE
Assumed care of patient at change of shift. Pt aox4. Denies pain. Afebrile, SR on tele, weaned to 1 L nasal cannula (cpap with sleep), hemodynamically stable. oob to chair and bathroom, independent after set up. PIV saline locked, flushed. Nebs Q 3 per pulm. Tolerating diet, good appetite. Potassium replaced per protocol. accuchecks QID. * transfer orders canceled, pt remains on Q 3 nebs.

## 2022-04-01 NOTE — CDS QUERY
Barb Hare     Dear Dr. Palafox Form,   Clinical information (provided below) indicates a diagnosis of ACUTE RESPIRATORY FAILURE. For accurate ICD-10-CM code assignment to reflect severity of illness and risk of mortality,   BASED ON YOUR CLINICAL JUDGMENT, PLEASE CLARIFY  THE DOCUMENTED DIAGNOSIS OF: ACUTE RESPIRATORY FAILURE    [  x ] Acute respiratory failure is ruled in (please document any additional clinical indicators supportive of your diagnosis) _________________________________________________________________      [   ] Acute respiratory failure is ruled out after study    [   ] Other (please specify): _________________________    [  ] Unable to determine    ________________________________________________________________________________     Documentation from the Medical Record:   3/31 Presented to Ed with SOB. Hx asthma. RR- 16-23 in ED . SpO2 94% on RA in triage, 90% RA in room. A&O. nontoxic appearing. Inspiratory /expiratory wheeze with prolonged expiratory phase. VBG upon arrival pH 7.52/PCO2 36/PO2 66/bicarb 29/lactic acid 4.2   Placed on 3L NC in ED. Dx asthma exacerbation. Pulm consulted-Respiratory: + SOB, dyspnea on exertion, wheezing. RR -19 SpO2 96% 3l NC assessment /plan acute respiratory failure : 2/2 status asthmaticus  Per NN- no home o2 during day. Does use CPAP at night. O2 saturations remain above 90%. Labored, SOB w exertion. O2 needs 1-3L NC. (exception CPAP at night per patients norm). SpO2 90-98% RR 16-23. For questions regarding this query, please contact Clinical Documentation :  Socrates Ochoa RN, BSN Ext [phone] . [email or web address] Thank you.                                                            THIS FORM IS A PERMANENT PART OF THE MEDICAL RECORD

## 2022-04-01 NOTE — CM/SW NOTE
Per RN, patient has a home nebulizer and CPAP. Had recently had issues with maintenance inhalers, likely a formulary issue. Patient will follow up with prescribing MD for rx to in formulary medication.

## 2022-04-02 LAB
ANION GAP SERPL CALC-SCNC: 6 MMOL/L (ref 0–18)
BASOPHILS # BLD: 0 X10(3) UL (ref 0–0.2)
BASOPHILS NFR BLD: 0 %
BUN BLD-MCNC: 23 MG/DL (ref 7–18)
CALCIUM BLD-MCNC: 9.2 MG/DL (ref 8.5–10.1)
CHLORIDE SERPL-SCNC: 101 MMOL/L (ref 98–112)
CO2 SERPL-SCNC: 31 MMOL/L (ref 21–32)
CREAT BLD-MCNC: 0.8 MG/DL
EOSINOPHIL # BLD: 0 X10(3) UL (ref 0–0.7)
EOSINOPHIL NFR BLD: 0 %
ERYTHROCYTE [DISTWIDTH] IN BLOOD BY AUTOMATED COUNT: 14.3 %
GLUCOSE BLD-MCNC: 121 MG/DL (ref 70–99)
GLUCOSE BLD-MCNC: 127 MG/DL (ref 70–99)
GLUCOSE BLD-MCNC: 149 MG/DL (ref 70–99)
GLUCOSE BLD-MCNC: 157 MG/DL (ref 70–99)
GLUCOSE BLD-MCNC: 166 MG/DL (ref 70–99)
HCT VFR BLD AUTO: 41.4 %
HGB BLD-MCNC: 13.5 G/DL
LYMPHOCYTES NFR BLD: 2.44 X10(3) UL (ref 1–4)
LYMPHOCYTES NFR BLD: 20 %
MAGNESIUM SERPL-MCNC: 2.5 MG/DL (ref 1.6–2.6)
MCH RBC QN AUTO: 30.3 PG (ref 26–34)
MCHC RBC AUTO-ENTMCNC: 32.6 G/DL (ref 31–37)
MCV RBC AUTO: 92.8 FL
METAMYELOCYTES # BLD: 0.24 X10(3) UL
METAMYELOCYTES NFR BLD: 2 %
MONOCYTES # BLD: 0.49 X10(3) UL (ref 0.1–1)
MONOCYTES NFR BLD: 4 %
MORPHOLOGY: NORMAL
NEUTROPHILS # BLD AUTO: 8.53 X10 (3) UL (ref 1.5–7.7)
NEUTROPHILS NFR BLD: 73 %
NEUTS BAND NFR BLD: 1 %
NEUTS HYPERSEG # BLD: 9.03 X10(3) UL (ref 1.5–7.7)
OSMOLALITY SERPL CALC.SUM OF ELEC: 293 MOSM/KG (ref 275–295)
PHOSPHATE SERPL-MCNC: 3.3 MG/DL (ref 2.5–4.9)
PLATELET # BLD AUTO: 332 10(3)UL (ref 150–450)
PLATELET MORPHOLOGY: NORMAL
POTASSIUM SERPL-SCNC: 3.9 MMOL/L (ref 3.5–5.1)
RBC # BLD AUTO: 4.46 X10(6)UL
SODIUM SERPL-SCNC: 138 MMOL/L (ref 136–145)
TOTAL CELLS COUNTED BLD: 100
WBC # BLD AUTO: 12.2 X10(3) UL (ref 4–11)

## 2022-04-02 PROCEDURE — 83735 ASSAY OF MAGNESIUM: CPT | Performed by: NURSE PRACTITIONER

## 2022-04-02 PROCEDURE — 85027 COMPLETE CBC AUTOMATED: CPT | Performed by: INTERNAL MEDICINE

## 2022-04-02 PROCEDURE — 94640 AIRWAY INHALATION TREATMENT: CPT

## 2022-04-02 PROCEDURE — 85007 BL SMEAR W/DIFF WBC COUNT: CPT | Performed by: INTERNAL MEDICINE

## 2022-04-02 PROCEDURE — 84100 ASSAY OF PHOSPHORUS: CPT | Performed by: NURSE PRACTITIONER

## 2022-04-02 PROCEDURE — 85025 COMPLETE CBC W/AUTO DIFF WBC: CPT | Performed by: INTERNAL MEDICINE

## 2022-04-02 PROCEDURE — 80048 BASIC METABOLIC PNL TOTAL CA: CPT | Performed by: INTERNAL MEDICINE

## 2022-04-02 PROCEDURE — 82962 GLUCOSE BLOOD TEST: CPT

## 2022-04-02 RX ORDER — CELECOXIB 200 MG/1
200 CAPSULE ORAL DAILY
Status: DISCONTINUED | OUTPATIENT
Start: 2022-04-02 | End: 2022-04-05

## 2022-04-02 RX ORDER — CETIRIZINE HYDROCHLORIDE 10 MG/1
10 TABLET ORAL DAILY
Status: DISCONTINUED | OUTPATIENT
Start: 2022-04-02 | End: 2022-04-05

## 2022-04-02 RX ORDER — BENZONATATE 200 MG/1
200 CAPSULE ORAL EVERY 4 HOURS PRN
Status: DISCONTINUED | OUTPATIENT
Start: 2022-04-02 | End: 2022-04-05

## 2022-04-02 RX ORDER — IPRATROPIUM BROMIDE AND ALBUTEROL SULFATE 2.5; .5 MG/3ML; MG/3ML
3 SOLUTION RESPIRATORY (INHALATION)
Status: DISCONTINUED | OUTPATIENT
Start: 2022-04-02 | End: 2022-04-05

## 2022-04-02 NOTE — PLAN OF CARE
Problem: Diabetes/Glucose Control  Goal: Glucose maintained within prescribed range  Description: INTERVENTIONS:  - Monitor Blood Glucose as ordered  - Assess for signs and symptoms of hyperglycemia and hypoglycemia  - Administer ordered medications to maintain glucose within target range  - Assess barriers to adequate nutritional intake and initiate nutrition consult as needed  - Instruct patient on self management of diabetes  Outcome: Progressing     Problem: Patient/Family Goals  Goal: Patient/Family Short Term Goal  Description: Patient's Short Term Goal: no shortness of breath    Interventions:   - nebs  -   - See additional Care Plan goals for specific interventions  Outcome: Progressing     Problem: RESPIRATORY - ADULT  Goal: Achieves optimal ventilation and oxygenation  Description: INTERVENTIONS:  - Assess for changes in respiratory status  - Assess for changes in mentation and behavior  - Position to facilitate oxygenation and minimize respiratory effort  - Oxygen supplementation based on oxygen saturation or ABGs  - Provide Smoking Cessation handout, if applicable  - Encourage broncho-pulmonary hygiene including cough, deep breathe, Incentive Spirometry  - Assess the need for suctioning and perform as needed  - Assess and instruct to report SOB or any respiratory difficulty  - Respiratory Therapy support as indicated  - Manage/alleviate anxiety  - Monitor for signs/symptoms of CO2 retention  Outcome: Progressing

## 2022-04-02 NOTE — PLAN OF CARE
Aox4. Denies pain. SR on tele, hemodynamically stable. Weaned to room air, 2L with ambulation in hallway. RT/pulm decreasing frequency of nebs as tolerated. To auscultation pt has improved aeration throughout. Expresses ongoing sob with exertion. Independent to brp/adls. Good appetite. Edema noted to ankles. 1 piv saline locked. Ambulated in hallway x3 with rn today. Droplet isolation maintained.

## 2022-04-02 NOTE — PLAN OF CARE
Pt A&Ox4 denies pain. Pupils 3; equal and reactive. On oxygen 1Lpm via NC SpO2 >90%. Bilateral expiratory wheezing in all fields. SBP >90 HR 70-80s NSR with occasional PACs no complaints of chest pain. Nebulizer treatments Q3H. CPAP nightly tolerated well. Bilateral below the knee SCDs in place and operational. VTE prophylaxis with Lovenox. Redness and flushing to face no fever. Bruising to Right Hand denies pain skin otherwise C/D/I. Pt is independent in room and with ADLs.

## 2022-04-03 LAB
ALBUMIN SERPL-MCNC: 3.6 G/DL (ref 3.4–5)
ALBUMIN/GLOB SERPL: 1.1 {RATIO} (ref 1–2)
ALP LIVER SERPL-CCNC: 65 U/L
ALT SERPL-CCNC: 78 U/L
ANION GAP SERPL CALC-SCNC: 6 MMOL/L (ref 0–18)
AST SERPL-CCNC: 18 U/L (ref 15–37)
BASOPHILS # BLD: 0 X10(3) UL (ref 0–0.2)
BASOPHILS NFR BLD: 0 %
BILIRUB SERPL-MCNC: 0.6 MG/DL (ref 0.1–2)
BUN BLD-MCNC: 23 MG/DL (ref 7–18)
CALCIUM BLD-MCNC: 9.1 MG/DL (ref 8.5–10.1)
CHLORIDE SERPL-SCNC: 99 MMOL/L (ref 98–112)
CO2 SERPL-SCNC: 31 MMOL/L (ref 21–32)
CREAT BLD-MCNC: 0.82 MG/DL
EOSINOPHIL # BLD: 0 X10(3) UL (ref 0–0.7)
EOSINOPHIL NFR BLD: 0 %
ERYTHROCYTE [DISTWIDTH] IN BLOOD BY AUTOMATED COUNT: 14 %
GLOBULIN PLAS-MCNC: 3.4 G/DL (ref 2.8–4.4)
GLUCOSE BLD-MCNC: 131 MG/DL (ref 70–99)
GLUCOSE BLD-MCNC: 145 MG/DL (ref 70–99)
GLUCOSE BLD-MCNC: 170 MG/DL (ref 70–99)
GLUCOSE BLD-MCNC: 176 MG/DL (ref 70–99)
GLUCOSE BLD-MCNC: 179 MG/DL (ref 70–99)
HCT VFR BLD AUTO: 41.5 %
HGB BLD-MCNC: 13.4 G/DL
LYMPHOCYTES NFR BLD: 1.26 X10(3) UL (ref 1–4)
LYMPHOCYTES NFR BLD: 10 %
MCH RBC QN AUTO: 29.4 PG (ref 26–34)
MCHC RBC AUTO-ENTMCNC: 32.3 G/DL (ref 31–37)
MCV RBC AUTO: 91 FL
METAMYELOCYTES # BLD: 0.25 X10(3) UL
METAMYELOCYTES NFR BLD: 2 %
MONOCYTES # BLD: 0.88 X10(3) UL (ref 0.1–1)
MONOCYTES NFR BLD: 7 %
MORPHOLOGY: NORMAL
NEUTROPHILS # BLD AUTO: 9.22 X10 (3) UL (ref 1.5–7.7)
NEUTROPHILS NFR BLD: 81 %
NEUTS HYPERSEG # BLD: 10.21 X10(3) UL (ref 1.5–7.7)
OSMOLALITY SERPL CALC.SUM OF ELEC: 290 MOSM/KG (ref 275–295)
PLATELET # BLD AUTO: 341 10(3)UL (ref 150–450)
PLATELET MORPHOLOGY: NORMAL
POTASSIUM SERPL-SCNC: 3.6 MMOL/L (ref 3.5–5.1)
PROT SERPL-MCNC: 7 G/DL (ref 6.4–8.2)
RBC # BLD AUTO: 4.56 X10(6)UL
SODIUM SERPL-SCNC: 136 MMOL/L (ref 136–145)
TOTAL CELLS COUNTED BLD: 100
WBC # BLD AUTO: 12.6 X10(3) UL (ref 4–11)

## 2022-04-03 PROCEDURE — 94640 AIRWAY INHALATION TREATMENT: CPT

## 2022-04-03 PROCEDURE — 85027 COMPLETE CBC AUTOMATED: CPT | Performed by: NURSE PRACTITIONER

## 2022-04-03 PROCEDURE — 82962 GLUCOSE BLOOD TEST: CPT

## 2022-04-03 PROCEDURE — 85025 COMPLETE CBC W/AUTO DIFF WBC: CPT | Performed by: NURSE PRACTITIONER

## 2022-04-03 PROCEDURE — 85007 BL SMEAR W/DIFF WBC COUNT: CPT | Performed by: NURSE PRACTITIONER

## 2022-04-03 PROCEDURE — 80053 COMPREHEN METABOLIC PANEL: CPT | Performed by: NURSE PRACTITIONER

## 2022-04-03 RX ORDER — ECHINACEA PURPUREA EXTRACT 125 MG
1 TABLET ORAL
Status: DISCONTINUED | OUTPATIENT
Start: 2022-04-03 | End: 2022-04-05

## 2022-04-03 RX ORDER — FUROSEMIDE 20 MG/1
20 TABLET ORAL DAILY
Status: DISCONTINUED | OUTPATIENT
Start: 2022-04-03 | End: 2022-04-05

## 2022-04-03 NOTE — PROGRESS NOTES
Received patient from ICU at 1600. Pt is A&Ox4. Room air. 2 L with ambulation. Diminished lung sounds bilaterally. Non-productive cough. Tele NSR. Afebrile. Lovenox. Trace edema in bilateral feet. No c/o pain. IV steroids. Tolerating diet, QID accuchecks. Up self/sba. Voids. Patient updated on plan of care.

## 2022-04-03 NOTE — PROGRESS NOTES
Late entry: Received after rn report at [address]. Alert and oriented x4. On1l NC and placed on cpap for sleep. Continues to have exp wheezing and dyspnea on exertion. Nebs q3. SR on monitor. Normotensive. Up to bathroom to void-tolerating activity well. Hss denied pain. Prn cough medications given.

## 2022-04-03 NOTE — PLAN OF CARE
Aox4. Afebrile, SR on tele occasional pacs, hemodynamically stable, room air. 2 L while ambulating. Prn cough medicine, strong dry non productive cough. Auscultated Crackles to posterior bases, lasix ordered by hospitalist. Mis [facility] to express VALDEZ. Denies pain. Trace edema to LE. Up in chair most of the day, ambulating in hallway with rn, up to bathroom ad pelon. 1 piv saline locked. No bm since 4/1. Good appetite, tolerating diet. Monitoring blood glucose levels QID. Will transfer to 522.

## 2022-04-03 NOTE — PLAN OF CARE
Alert and oriented x4. On room air while awake and wearing cpap at night. Lungs diminished with fine crackles. Continues to have dyspnea on exertion. Denies any pain. Medicated with prn cough medications with improvement in cough. Tolerating activity up to the bathroom.

## 2022-04-04 LAB
GLUCOSE BLD-MCNC: 140 MG/DL (ref 70–99)
GLUCOSE BLD-MCNC: 148 MG/DL (ref 70–99)
GLUCOSE BLD-MCNC: 165 MG/DL (ref 70–99)
GLUCOSE BLD-MCNC: 221 MG/DL (ref 70–99)

## 2022-04-04 PROCEDURE — 94640 AIRWAY INHALATION TREATMENT: CPT

## 2022-04-04 PROCEDURE — 92610 EVALUATE SWALLOWING FUNCTION: CPT

## 2022-04-04 PROCEDURE — 82962 GLUCOSE BLOOD TEST: CPT

## 2022-04-04 RX ORDER — PREDNISONE 20 MG/1
60 TABLET ORAL DAILY
Status: DISCONTINUED | OUTPATIENT
Start: 2022-04-04 | End: 2022-04-05

## 2022-04-04 NOTE — PROGRESS NOTES
04/04/22 1621   Mobility   O2 walk?  Yes   SPO2% on Room Air at Rest 92   SPO2% Ambulation on Room Air 95

## 2022-04-04 NOTE — PLAN OF CARE
Patient is A&Ox4. Facial flushing noted baseline for her. NSR on tele. No c/o pain at this time. Endorses SOB at rest and with activity, currently maintaining O2 sats >92% on RA. +metapneumovirus. Having nonproductive cough. Receiving nebs. Just transitioned to PO steroids. She reports difficulty with swallowing, sometimes cough d/t hx vocal cord dysfunction. SLP consulted. High fall risk. Fall precautions in place. Call light within reach. QID accuchecks and insulin for blood glucose management. Had some questions regarding workup for autoimmune, questions relayed to the hospitalist.  Patient updated on plan of care, verbalized understanding. Will continue to monitor.     Problem: Diabetes/Glucose Control  Goal: Glucose maintained within prescribed range  Description: INTERVENTIONS:  - Monitor Blood Glucose as ordered  - Assess for signs and symptoms of hyperglycemia and hypoglycemia  - Administer ordered medications to maintain glucose within target range  - Assess barriers to adequate nutritional intake and initiate nutrition consult as needed  - Instruct patient on self management of diabetes  Outcome: Progressing     Problem: Patient/Family Goals  Goal: Patient/Family Long Term Goal  Description: Patient's Long Term Goal: go home    Interventions:  - wean o2  - no shortness of breath  -   - See additional Care Plan goals for specific interventions  Outcome: Progressing  Goal: Patient/Family Short Term Goal  Description: Patient's Short Term Goal: no shortness of breath  4/4 AM: have a bm    Interventions:   - nebs  - meds per [facility]  -ambulate  - See additional Care Plan goals for specific interventions  Outcome: Progressing     Problem: RESPIRATORY - ADULT  Goal: Achieves optimal ventilation and oxygenation  Description: INTERVENTIONS:  - Assess for changes in respiratory status  - Assess for changes in mentation and behavior  - Position to facilitate oxygenation and minimize respiratory effort  - Oxygen supplementation based on oxygen saturation or ABGs  - Provide Smoking Cessation handout, if applicable  - Encourage broncho-pulmonary hygiene including cough, deep breathe, Incentive Spirometry  - Assess the need for suctioning and perform as needed  - Assess and instruct to report SOB or any respiratory difficulty  - Respiratory Therapy support as indicated  - Manage/alleviate anxiety  - Monitor for signs/symptoms of CO2 retention  Outcome: Progressing

## 2022-04-04 NOTE — PLAN OF CARE
Alert and oriented. Room air during the day, CPAP at night. Scheduled nebs. IV steroids. Tele monitored- NSR. Accuchecks QID. Voids. Up ad pelon. Denies any c/o pain. No n/v/d. Updated on POC, answered all questions, verbalized understanding. Call light within reach.      Problem: Diabetes/Glucose Control  Goal: Glucose maintained within prescribed range  Description: INTERVENTIONS:  - Monitor Blood Glucose as ordered  - Assess for signs and symptoms of hyperglycemia and hypoglycemia  - Administer ordered medications to maintain glucose within target range  - Assess barriers to adequate nutritional intake and initiate nutrition consult as needed  - Instruct patient on self management of diabetes  Outcome: Progressing     Problem: Patient/Family Goals  Goal: Patient/Family Long Term Goal  Description: Patient's Long Term Goal: go home    Interventions:  - wean o2  - no shortness of breath  -   - See additional Care Plan goals for specific interventions  Outcome: Progressing  Goal: Patient/Family Short Term Goal  Description: Patient's Short Term Goal: no shortness of breath    Interventions:   - nebs  -   - See additional Care Plan goals for specific interventions  Outcome: Progressing     Problem: RESPIRATORY - ADULT  Goal: Achieves optimal ventilation and oxygenation  Description: INTERVENTIONS:  - Assess for changes in respiratory status  - Assess for changes in mentation and behavior  - Position to facilitate oxygenation and minimize respiratory effort  - Oxygen supplementation based on oxygen saturation or ABGs  - Provide Smoking Cessation handout, if applicable  - Encourage broncho-pulmonary hygiene including cough, deep breathe, Incentive Spirometry  - Assess the need for suctioning and perform as needed  - Assess and instruct to report SOB or any respiratory difficulty  - Respiratory Therapy support as indicated  - Manage/alleviate anxiety  - Monitor for signs/symptoms of CO2 retention  Outcome: Progressing

## 2022-04-05 VITALS
HEIGHT: 63 IN | RESPIRATION RATE: 27 BRPM | BODY MASS INDEX: 37.77 KG/M2 | TEMPERATURE: 98 F | WEIGHT: 213.19 LBS | OXYGEN SATURATION: 95 % | HEART RATE: 79 BPM | SYSTOLIC BLOOD PRESSURE: 117 MMHG | DIASTOLIC BLOOD PRESSURE: 71 MMHG

## 2022-04-05 LAB
GLUCOSE BLD-MCNC: 138 MG/DL (ref 70–99)
GLUCOSE BLD-MCNC: 99 MG/DL (ref 70–99)

## 2022-04-05 PROCEDURE — 94640 AIRWAY INHALATION TREATMENT: CPT

## 2022-04-05 PROCEDURE — 82962 GLUCOSE BLOOD TEST: CPT

## 2022-04-05 RX ORDER — FORMOTEROL FUMARATE 20 UG/2ML
20 SOLUTION RESPIRATORY (INHALATION) EVERY 12 HOURS
Qty: 1 EACH | Refills: 0 | Status: SHIPPED | OUTPATIENT
Start: 2022-04-05 | End: 2022-04-14

## 2022-04-05 RX ORDER — IPRATROPIUM BROMIDE AND ALBUTEROL SULFATE 2.5; .5 MG/3ML; MG/3ML
3 SOLUTION RESPIRATORY (INHALATION) EVERY 6 HOURS PRN
Qty: 90 EACH | Refills: 0 | Status: SHIPPED | OUTPATIENT
Start: 2022-04-05 | End: 2022-05-05

## 2022-04-05 RX ORDER — ARFORMOTEROL TARTRATE 15 UG/2ML
15 SOLUTION RESPIRATORY (INHALATION) 2 TIMES DAILY
Qty: 120 ML | Refills: 0 | Status: SHIPPED | OUTPATIENT
Start: 2022-04-05 | End: 2022-04-08 | Stop reason: CLARIF

## 2022-04-05 RX ORDER — METHYLPREDNISOLONE 4 MG/1
TABLET ORAL
Qty: 21 TABLET | Refills: 0 | Status: SHIPPED | OUTPATIENT
Start: 2022-04-05 | End: 2022-04-05

## 2022-04-05 RX ORDER — PREDNISONE 10 MG/1
TABLET ORAL
Qty: 42 TABLET | Refills: 0 | Status: SHIPPED | OUTPATIENT
Start: 2022-04-05

## 2022-04-05 RX ORDER — BUDESONIDE 0.5 MG/2ML
0.5 INHALANT ORAL 2 TIMES DAILY
Qty: 120 ML | Refills: 0 | Status: SHIPPED | OUTPATIENT
Start: 2022-04-05 | End: 2022-08-03

## 2022-04-05 NOTE — PLAN OF CARE
PT A/O, 95% ON RA, SOB AT TIMES, LUNGS DIMINISHED, SCHEDULED NEBS, NPC, TAKING COUGH MEDS, ORAL STEROIDS, FACIAL FLUSHING, AFEBRILE, TOOK XANAX AT BEDTIME, ISOLATION    Problem: RESPIRATORY - ADULT  Goal: Achieves optimal ventilation and oxygenation  Description: INTERVENTIONS:  - Assess for changes in respiratory status  - Assess for changes in mentation and behavior  - Position to facilitate oxygenation and minimize respiratory effort  - Oxygen supplementation based on oxygen saturation or ABGs  - Provide Smoking Cessation handout, if applicable  - Encourage broncho-pulmonary hygiene including cough, deep breathe, Incentive Spirometry  - Assess the need for suctioning and perform as needed  - Assess and instruct to report SOB or any respiratory difficulty  - Respiratory Therapy support as indicated  - Manage/alleviate anxiety  - Monitor for signs/symptoms of CO2 retention  Outcome: Progressing

## 2022-04-05 NOTE — DISCHARGE PLANNING
NURSING DISCHARGE NOTE    Discharged Home via Wheelchair. Accompanied by Support staff  Belongings Taken by patient/family. PIV removed  Tele box removed & placed in drawer  Discharge navigator complete  Discharge instructions reviewed with patient at bedside. All questions & concerns addressed at this time. Per patient, OP pharmacy and Meds to Bed, Luana nebs $175  Paged Pulm for alternatives - see orders. Formoterol nebs $105. Call placed to patient to inform her of medication alternatives.  [facility] pharmacy tech also informed this writer that they will place call to patient as well

## 2022-04-05 NOTE — PROGRESS NOTES
Patient is A&Ox4. Wears glasses. Facial flushing noted, patient baseline. Room air. Passed 02 walk on room air at 92%. DARSHANA/CPAP on nocs, pt compliant. IS encouraged. Lungs diminished bilaterally. Non-productive cough. Tele NSR. Lovenox. Tolerating diet, QID accucheck. Voids. Up ad pelon. No c/o pain. Patient updated on plan of care. Will continue to monitor.

## 2022-04-05 NOTE — PLAN OF CARE
Problem: Diabetes/Glucose Control  Goal: Glucose maintained within prescribed range  Description: INTERVENTIONS:  - Monitor Blood Glucose as ordered  - Assess for signs and symptoms of hyperglycemia and hypoglycemia  - Administer ordered medications to maintain glucose within target range  - Assess barriers to adequate nutritional intake and initiate nutrition consult as needed  - Instruct patient on self management of diabetes  Outcome: Progressing     Problem: Patient/Family Goals  Goal: Patient/Family Long Term Goal  Description: Patient's Long Term Goal: go home    Interventions:  - wean o2  - no shortness of breath  -   - See additional Care Plan goals for specific interventions  Outcome: Progressing  Goal: Patient/Family Short Term Goal  Description: Patient's Short Term Goal: no shortness of breath  4/4 AM: have a bm  4/5 AM: Feel better, less SOB.     Interventions:   - nebs  - meds per [facility]  -ambulate  - See additional Care Plan goals for specific interventions  Outcome: Progressing     Problem: RESPIRATORY - ADULT  Goal: Achieves optimal ventilation and oxygenation  Description: INTERVENTIONS:  - Assess for changes in respiratory status  - Assess for changes in mentation and behavior  - Position to facilitate oxygenation and minimize respiratory effort  - Oxygen supplementation based on oxygen saturation or ABGs  - Provide Smoking Cessation handout, if applicable  - Encourage broncho-pulmonary hygiene including cough, deep breathe, Incentive Spirometry  - Assess the need for suctioning and perform as needed  - Assess and instruct to report SOB or any respiratory difficulty  - Respiratory Therapy support as indicated  - Manage/alleviate anxiety  - Monitor for signs/symptoms of CO2 retention  Outcome: Progressing

## 2022-04-06 ENCOUNTER — PATIENT OUTREACH (OUTPATIENT)
Dept: CASE MANAGEMENT | Age: 57
End: 2022-04-06

## 2022-04-06 NOTE — PAYOR COMM NOTE
--------------  DISCHARGE REVIEW    Payor: [facility] [employer]  Subscriber #:  [id number]  Authorization Number: [id number]    Admit date: 3/31/22  Admit time:   4:20 PM  Discharge Date: 4/5/2022  4:37 PM     Admitting Physician: Donald Ridley MD  Attending Physician:  No att. providers found  Primary Care Physician: Trina Beard MD       Discharge Summary Notes    No notes of this type exist for this encounter.          REVIEWER COMMENTS

## 2022-04-07 NOTE — PROGRESS NOTES
NCM s/w patient who stated that this was not a good time and to call back later. NCM will try again another time.

## 2022-04-08 ENCOUNTER — OFFICE VISIT (OUTPATIENT)
Dept: INTERNAL MEDICINE CLINIC | Facility: CLINIC | Age: 57
End: 2022-04-08
Payer: COMMERCIAL

## 2022-04-08 VITALS
RESPIRATION RATE: 22 BRPM | HEART RATE: 102 BPM | WEIGHT: 215 LBS | HEIGHT: 63 IN | BODY MASS INDEX: 38.09 KG/M2 | OXYGEN SATURATION: 96 % | TEMPERATURE: 97 F | SYSTOLIC BLOOD PRESSURE: 148 MMHG | DIASTOLIC BLOOD PRESSURE: 86 MMHG

## 2022-04-08 DIAGNOSIS — E11.9 TYPE 2 DIABETES MELLITUS WITHOUT COMPLICATION, WITHOUT LONG-TERM CURRENT USE OF INSULIN (HCC): ICD-10-CM

## 2022-04-08 DIAGNOSIS — B37.0 THRUSH, ORAL: ICD-10-CM

## 2022-04-08 DIAGNOSIS — I10 ESSENTIAL HYPERTENSION: ICD-10-CM

## 2022-04-08 DIAGNOSIS — J45.41 MODERATE PERSISTENT ASTHMA WITH ACUTE EXACERBATION: Primary | ICD-10-CM

## 2022-04-08 PROCEDURE — 99495 TRANSJ CARE MGMT MOD F2F 14D: CPT | Performed by: NURSE PRACTITIONER

## 2022-04-08 PROCEDURE — 3077F SYST BP >= 140 MM HG: CPT | Performed by: NURSE PRACTITIONER

## 2022-04-08 PROCEDURE — 3079F DIAST BP 80-89 MM HG: CPT | Performed by: NURSE PRACTITIONER

## 2022-04-08 PROCEDURE — 3008F BODY MASS INDEX DOCD: CPT | Performed by: NURSE PRACTITIONER

## 2022-04-08 RX ORDER — MULTIVITAMIN WITH IRON
250 TABLET ORAL DAILY
COMMUNITY

## 2022-04-08 RX ORDER — BENZONATATE 200 MG/1
200 CAPSULE ORAL 3 TIMES DAILY PRN
Qty: 30 CAPSULE | Refills: 0 | Status: SHIPPED | OUTPATIENT
Start: 2022-04-08

## 2022-04-08 RX ORDER — BENZONATATE 100 MG/1
100 CAPSULE ORAL 3 TIMES DAILY PRN
COMMUNITY
End: 2022-04-08 | Stop reason: DRUGHIGH

## 2022-04-08 RX ORDER — MELATONIN
1000 DAILY
COMMUNITY

## 2022-04-08 RX ORDER — GARLIC EXTRACT 500 MG
1 CAPSULE ORAL DAILY
COMMUNITY

## 2022-04-08 RX ORDER — FLUTICASONE FUROATE AND VILANTEROL TRIFENATATE 100; 25 UG/1; UG/1
1 POWDER RESPIRATORY (INHALATION) DAILY
COMMUNITY

## 2022-04-08 NOTE — PROGRESS NOTES
Multiple attempts made to reach the patient for hospital follow up, with no return call. Patient completed HFU on 4-8-22 in [facility]. [facility] closing encounter.

## 2022-04-11 NOTE — TELEPHONE ENCOUNTER
Requesting Adderall XR 20 mg  LOV: 3/1/22  RTC: not noted  Last Relevant Labs: na  Filled: 3/30/22 #30 with 0 refills  Last [place]    Future Appointments   Date Time Provider Batsheva Hansen   4/14/2022  9:40 AM Tiffany Zazueta PA-C [address] [facility] [address]     Pt could not get it at her normal pharmacy. She called [facility] and can get it there.   She is asking if you will do a 3 month RX

## 2022-04-11 NOTE — TELEPHONE ENCOUNTER
Can we please follow-up with pt and see if she was ever able to get medication, I know she was just in the hospital

## 2022-04-12 RX ORDER — DEXTROAMPHETAMINE SACCHARATE, AMPHETAMINE ASPARTATE MONOHYDRATE, DEXTROAMPHETAMINE SULFATE AND AMPHETAMINE SULFATE 5; 5; 5; 5 MG/1; MG/1; MG/1; MG/1
20 CAPSULE, EXTENDED RELEASE ORAL DAILY
Qty: 30 CAPSULE | Refills: 0 | Status: SHIPPED | OUTPATIENT
Start: 2022-04-12 | End: 2022-07-19 | Stop reason: CLARIF

## 2022-04-12 RX ORDER — DEXTROAMPHETAMINE SACCHARATE, AMPHETAMINE ASPARTATE MONOHYDRATE, DEXTROAMPHETAMINE SULFATE AND AMPHETAMINE SULFATE 5; 5; 5; 5 MG/1; MG/1; MG/1; MG/1
20 CAPSULE, EXTENDED RELEASE ORAL DAILY
Qty: 30 CAPSULE | Refills: 0 | Status: SHIPPED | OUTPATIENT
Start: 2022-05-12 | End: 2022-07-19 | Stop reason: CLARIF

## 2022-04-12 RX ORDER — DEXTROAMPHETAMINE SACCHARATE, AMPHETAMINE ASPARTATE MONOHYDRATE, DEXTROAMPHETAMINE SULFATE AND AMPHETAMINE SULFATE 5; 5; 5; 5 MG/1; MG/1; MG/1; MG/1
20 CAPSULE, EXTENDED RELEASE ORAL DAILY
Qty: 30 CAPSULE | Refills: 0 | Status: SHIPPED | OUTPATIENT
Start: 2022-06-12 | End: 2022-07-19 | Stop reason: CLARIF

## 2022-04-14 ENCOUNTER — TELEPHONE (OUTPATIENT)
Dept: FAMILY MEDICINE CLINIC | Facility: CLINIC | Age: 57
End: 2022-04-14

## 2022-04-14 ENCOUNTER — OFFICE VISIT (OUTPATIENT)
Dept: INTERNAL MEDICINE CLINIC | Facility: CLINIC | Age: 57
End: 2022-04-14
Payer: COMMERCIAL

## 2022-04-14 VITALS
HEART RATE: 87 BPM | HEIGHT: 62.5 IN | OXYGEN SATURATION: 95 % | BODY MASS INDEX: 40.37 KG/M2 | WEIGHT: 225 LBS | RESPIRATION RATE: 24 BRPM | SYSTOLIC BLOOD PRESSURE: 160 MMHG | DIASTOLIC BLOOD PRESSURE: 70 MMHG

## 2022-04-14 DIAGNOSIS — E78.5 HYPERLIPIDEMIA, UNSPECIFIED HYPERLIPIDEMIA TYPE: ICD-10-CM

## 2022-04-14 DIAGNOSIS — E11.9 TYPE 2 DIABETES MELLITUS WITHOUT COMPLICATION, WITHOUT LONG-TERM CURRENT USE OF INSULIN (HCC): ICD-10-CM

## 2022-04-14 DIAGNOSIS — Z51.81 ENCOUNTER FOR THERAPEUTIC DRUG MONITORING: Primary | ICD-10-CM

## 2022-04-14 DIAGNOSIS — E66.01 CLASS 2 SEVERE OBESITY WITH SERIOUS COMORBIDITY IN ADULT, UNSPECIFIED BMI, UNSPECIFIED OBESITY TYPE (HCC): ICD-10-CM

## 2022-04-14 DIAGNOSIS — Z99.89 OSA ON CPAP: ICD-10-CM

## 2022-04-14 DIAGNOSIS — G47.33 OSA ON CPAP: ICD-10-CM

## 2022-04-14 DIAGNOSIS — I10 ESSENTIAL HYPERTENSION: ICD-10-CM

## 2022-04-14 PROCEDURE — 99214 OFFICE O/P EST MOD 30 MIN: CPT | Performed by: PHYSICIAN ASSISTANT

## 2022-04-14 PROCEDURE — 3078F DIAST BP <80 MM HG: CPT | Performed by: PHYSICIAN ASSISTANT

## 2022-04-14 PROCEDURE — 3077F SYST BP >= 140 MM HG: CPT | Performed by: PHYSICIAN ASSISTANT

## 2022-04-14 PROCEDURE — 3008F BODY MASS INDEX DOCD: CPT | Performed by: PHYSICIAN ASSISTANT

## 2022-04-14 RX ORDER — ALPRAZOLAM 0.25 MG/1
0.25 TABLET ORAL NIGHTLY PRN
Qty: 30 TABLET | Refills: 1 | Status: SHIPPED | OUTPATIENT
Start: 2022-04-14

## 2022-04-14 NOTE — TELEPHONE ENCOUNTER
- Pt has rescheduled Hospital follow up. Please advise if ok to wait until scheduled for follow up?     Future Appointments   Date Time Provider Batsheva Hansen   4/26/2022  7:30 AM Dez Alan MD [facility] [address] [facility]   12/5/2022 10:00 AM Alana Gonzalez MD [facility] Surg/Onc

## 2022-04-26 ENCOUNTER — LAB ENCOUNTER (OUTPATIENT)
Dept: LAB | Age: 57
End: 2022-04-26
Attending: FAMILY MEDICINE
Payer: COMMERCIAL

## 2022-04-26 ENCOUNTER — OFFICE VISIT (OUTPATIENT)
Dept: FAMILY MEDICINE CLINIC | Facility: CLINIC | Age: 57
End: 2022-04-26
Payer: COMMERCIAL

## 2022-04-26 VITALS
SYSTOLIC BLOOD PRESSURE: 142 MMHG | TEMPERATURE: 98 F | HEART RATE: 84 BPM | RESPIRATION RATE: 22 BRPM | HEIGHT: 62.5 IN | OXYGEN SATURATION: 96 % | BODY MASS INDEX: 38.9 KG/M2 | WEIGHT: 216.81 LBS | DIASTOLIC BLOOD PRESSURE: 86 MMHG

## 2022-04-26 DIAGNOSIS — J45.41 MODERATE PERSISTENT ASTHMA WITH ACUTE EXACERBATION: Primary | ICD-10-CM

## 2022-04-26 DIAGNOSIS — K05.30 PERIODONTITIS: ICD-10-CM

## 2022-04-26 DIAGNOSIS — I10 ESSENTIAL HYPERTENSION: ICD-10-CM

## 2022-04-26 DIAGNOSIS — E87.6 HYPOKALEMIA: ICD-10-CM

## 2022-04-26 LAB
ANION GAP SERPL CALC-SCNC: 7 MMOL/L (ref 0–18)
BUN BLD-MCNC: 19 MG/DL (ref 7–18)
CALCIUM BLD-MCNC: 10 MG/DL (ref 8.5–10.1)
CHLORIDE SERPL-SCNC: 98 MMOL/L (ref 98–112)
CO2 SERPL-SCNC: 36 MMOL/L (ref 21–32)
CREAT BLD-MCNC: 0.78 MG/DL
FASTING STATUS PATIENT QL REPORTED: YES
GLUCOSE BLD-MCNC: 105 MG/DL (ref 70–99)
OSMOLALITY SERPL CALC.SUM OF ELEC: 295 MOSM/KG (ref 275–295)
POTASSIUM SERPL-SCNC: 3.5 MMOL/L (ref 3.5–5.1)
SODIUM SERPL-SCNC: 141 MMOL/L (ref 136–145)

## 2022-04-26 PROCEDURE — 80048 BASIC METABOLIC PNL TOTAL CA: CPT

## 2022-04-26 PROCEDURE — 99215 OFFICE O/P EST HI 40 MIN: CPT | Performed by: FAMILY MEDICINE

## 2022-04-26 PROCEDURE — 3079F DIAST BP 80-89 MM HG: CPT | Performed by: FAMILY MEDICINE

## 2022-04-26 PROCEDURE — 3008F BODY MASS INDEX DOCD: CPT | Performed by: FAMILY MEDICINE

## 2022-04-26 PROCEDURE — 3077F SYST BP >= 140 MM HG: CPT | Performed by: FAMILY MEDICINE

## 2022-04-26 PROCEDURE — 36415 COLL VENOUS BLD VENIPUNCTURE: CPT

## 2022-04-26 RX ORDER — CLINDAMYCIN HYDROCHLORIDE 300 MG/1
300 CAPSULE ORAL 3 TIMES DAILY
Qty: 21 CAPSULE | Refills: 0 | Status: SHIPPED | OUTPATIENT
Start: 2022-04-26 | End: 2022-05-03

## 2022-04-26 NOTE — PROGRESS NOTES
Normal potassium level. Labs with no concerning values.  Please notify patient.    -Dr. Phong Shultz

## 2022-05-09 ENCOUNTER — TELEMEDICINE (OUTPATIENT)
Dept: INTERNAL MEDICINE CLINIC | Facility: CLINIC | Age: 57
End: 2022-05-09

## 2022-05-09 DIAGNOSIS — Z99.89 OSA ON CPAP: ICD-10-CM

## 2022-05-09 DIAGNOSIS — K21.9 GASTROESOPHAGEAL REFLUX DISEASE, UNSPECIFIED WHETHER ESOPHAGITIS PRESENT: ICD-10-CM

## 2022-05-09 DIAGNOSIS — G47.33 OSA ON CPAP: ICD-10-CM

## 2022-05-09 DIAGNOSIS — I10 ESSENTIAL HYPERTENSION: ICD-10-CM

## 2022-05-09 DIAGNOSIS — E11.9 TYPE 2 DIABETES MELLITUS WITHOUT COMPLICATION, WITHOUT LONG-TERM CURRENT USE OF INSULIN (HCC): ICD-10-CM

## 2022-05-09 DIAGNOSIS — E78.5 HYPERLIPIDEMIA, UNSPECIFIED HYPERLIPIDEMIA TYPE: ICD-10-CM

## 2022-05-09 DIAGNOSIS — Z51.81 ENCOUNTER FOR THERAPEUTIC DRUG MONITORING: ICD-10-CM

## 2022-05-09 DIAGNOSIS — E55.9 VITAMIN D DEFICIENCY: ICD-10-CM

## 2022-05-09 DIAGNOSIS — E66.01 CLASS 2 SEVERE OBESITY WITH SERIOUS COMORBIDITY IN ADULT, UNSPECIFIED BMI, UNSPECIFIED OBESITY TYPE (HCC): ICD-10-CM

## 2022-05-09 DIAGNOSIS — R73.9 HYPERGLYCEMIA: ICD-10-CM

## 2022-05-09 NOTE — PATIENT INSTRUCTIONS
Goals: 1. Keep a food record,  My Net Diary. (select macros dashboard). 2.  Consume 4-6 meals/snacks per day. Include protein and produce. Aim for 42-52 grams per day of protein. 3.  Continue to consume at least 64 oz of water per day. 4.  Do 5 minutes per day of walking. 5.  Do 10 minutes of strength training 3 days per week. 6.  Practice eating strategies.

## 2022-05-11 NOTE — PATIENT INSTRUCTIONS
Patient Declined AVS    Verbal Instructions given      1. Clarithromycin  2. Medrol  3. Continue allergy medication  4.  Follow up with PCP or ENT .

## 2022-05-21 RX ORDER — ALENDRONATE SODIUM 70 MG/1
TABLET ORAL
Qty: 12 TABLET | Refills: 3 | Status: SHIPPED | OUTPATIENT
Start: 2022-05-21

## 2022-05-26 ENCOUNTER — OFFICE VISIT (OUTPATIENT)
Dept: FAMILY MEDICINE CLINIC | Facility: CLINIC | Age: 57
End: 2022-05-26
Payer: COMMERCIAL

## 2022-05-26 VITALS
TEMPERATURE: 98 F | HEART RATE: 74 BPM | SYSTOLIC BLOOD PRESSURE: 136 MMHG | BODY MASS INDEX: 38.4 KG/M2 | WEIGHT: 214 LBS | HEIGHT: 62.5 IN | DIASTOLIC BLOOD PRESSURE: 80 MMHG | RESPIRATION RATE: 16 BRPM | OXYGEN SATURATION: 98 %

## 2022-05-26 DIAGNOSIS — J45.41 MODERATE PERSISTENT ASTHMA WITH ACUTE EXACERBATION: Primary | ICD-10-CM

## 2022-05-26 DIAGNOSIS — J30.9 ALLERGIC RHINITIS, UNSPECIFIED SEASONALITY, UNSPECIFIED TRIGGER: ICD-10-CM

## 2022-05-26 PROCEDURE — 3079F DIAST BP 80-89 MM HG: CPT | Performed by: FAMILY MEDICINE

## 2022-05-26 PROCEDURE — 99214 OFFICE O/P EST MOD 30 MIN: CPT | Performed by: FAMILY MEDICINE

## 2022-05-26 PROCEDURE — 3008F BODY MASS INDEX DOCD: CPT | Performed by: FAMILY MEDICINE

## 2022-05-26 PROCEDURE — 3075F SYST BP GE 130 - 139MM HG: CPT | Performed by: FAMILY MEDICINE

## 2022-05-26 RX ORDER — FLUTICASONE FUROATE, UMECLIDINIUM BROMIDE AND VILANTEROL TRIFENATATE 100; 62.5; 25 UG/1; UG/1; UG/1
1 POWDER RESPIRATORY (INHALATION) DAILY
COMMUNITY
Start: 2022-04-20

## 2022-05-26 RX ORDER — IPRATROPIUM BROMIDE AND ALBUTEROL SULFATE 2.5; .5 MG/3ML; MG/3ML
1 SOLUTION RESPIRATORY (INHALATION) EVERY 6 HOURS PRN
COMMUNITY
Start: 2022-05-12

## 2022-05-26 RX ORDER — CLINDAMYCIN HYDROCHLORIDE 300 MG/1
300 CAPSULE ORAL 3 TIMES DAILY
Qty: 21 CAPSULE | Refills: 0 | Status: SHIPPED | OUTPATIENT
Start: 2022-05-26 | End: 2022-06-02

## 2022-06-02 ENCOUNTER — OFFICE VISIT (OUTPATIENT)
Dept: INTERNAL MEDICINE CLINIC | Facility: CLINIC | Age: 57
End: 2022-06-02
Payer: COMMERCIAL

## 2022-06-02 VITALS
WEIGHT: 214 LBS | BODY MASS INDEX: 38.4 KG/M2 | HEIGHT: 62.5 IN | DIASTOLIC BLOOD PRESSURE: 82 MMHG | HEART RATE: 88 BPM | RESPIRATION RATE: 16 BRPM | SYSTOLIC BLOOD PRESSURE: 126 MMHG

## 2022-06-02 DIAGNOSIS — E66.01 CLASS 2 SEVERE OBESITY WITH SERIOUS COMORBIDITY IN ADULT, UNSPECIFIED BMI, UNSPECIFIED OBESITY TYPE (HCC): ICD-10-CM

## 2022-06-02 DIAGNOSIS — E78.5 HYPERLIPIDEMIA, UNSPECIFIED HYPERLIPIDEMIA TYPE: ICD-10-CM

## 2022-06-02 DIAGNOSIS — E11.9 TYPE 2 DIABETES MELLITUS WITHOUT COMPLICATION, WITHOUT LONG-TERM CURRENT USE OF INSULIN (HCC): ICD-10-CM

## 2022-06-02 DIAGNOSIS — Z99.89 OSA ON CPAP: ICD-10-CM

## 2022-06-02 DIAGNOSIS — G47.33 OSA ON CPAP: ICD-10-CM

## 2022-06-02 DIAGNOSIS — I10 ESSENTIAL HYPERTENSION: ICD-10-CM

## 2022-06-02 DIAGNOSIS — Z51.81 ENCOUNTER FOR THERAPEUTIC DRUG MONITORING: Primary | ICD-10-CM

## 2022-06-02 PROCEDURE — 3008F BODY MASS INDEX DOCD: CPT | Performed by: PHYSICIAN ASSISTANT

## 2022-06-02 PROCEDURE — 3074F SYST BP LT 130 MM HG: CPT | Performed by: PHYSICIAN ASSISTANT

## 2022-06-02 PROCEDURE — 99214 OFFICE O/P EST MOD 30 MIN: CPT | Performed by: PHYSICIAN ASSISTANT

## 2022-06-02 PROCEDURE — 3079F DIAST BP 80-89 MM HG: CPT | Performed by: PHYSICIAN ASSISTANT

## 2022-06-17 ENCOUNTER — OFFICE VISIT (OUTPATIENT)
Dept: INTERNAL MEDICINE CLINIC | Facility: CLINIC | Age: 57
End: 2022-06-17
Payer: COMMERCIAL

## 2022-06-17 VITALS — HEIGHT: 62.5 IN | BODY MASS INDEX: 38.43 KG/M2 | WEIGHT: 214.19 LBS

## 2022-06-17 DIAGNOSIS — K21.9 GASTROESOPHAGEAL REFLUX DISEASE, UNSPECIFIED WHETHER ESOPHAGITIS PRESENT: ICD-10-CM

## 2022-06-17 DIAGNOSIS — E66.9 OBESITY (BMI 30-39.9): ICD-10-CM

## 2022-06-17 DIAGNOSIS — E11.9 TYPE 2 DIABETES MELLITUS WITHOUT COMPLICATION, WITHOUT LONG-TERM CURRENT USE OF INSULIN (HCC): ICD-10-CM

## 2022-06-17 DIAGNOSIS — R73.9 HYPERGLYCEMIA: ICD-10-CM

## 2022-06-17 NOTE — PATIENT INSTRUCTIONS
Recommendations/goals:    1. Continue to keep a food record, MFP or My Net Diary. 2.  Continue to eat 4-6 times per day. Continue to aim for 42-52 grams per day. Increase fruit and vegetable consumption goal 2-3 servings per day. 3.  Continue to drink 64 oz per day of water. 4.  Continue the walking aim for 10 minutes per day. 5.  Add strength training 10 minutes three days per week. (Sit and Be Fit on Youtube). 6.  Practice eating separately from drinking. 7. Try Glucerna, Boost of Orgain or Ensure High Protein protein shakes. 8.  Buy Bariatric Choice chewable multivitamin.

## 2022-06-21 ENCOUNTER — OFFICE VISIT (OUTPATIENT)
Dept: SURGERY | Facility: CLINIC | Age: 57
End: 2022-06-21
Payer: COMMERCIAL

## 2022-06-21 DIAGNOSIS — K21.9 GASTROESOPHAGEAL REFLUX DISEASE, UNSPECIFIED WHETHER ESOPHAGITIS PRESENT: ICD-10-CM

## 2022-06-21 DIAGNOSIS — Z99.89 OSA ON CPAP: ICD-10-CM

## 2022-06-21 DIAGNOSIS — G47.33 OSA ON CPAP: ICD-10-CM

## 2022-06-21 DIAGNOSIS — E66.9 OBESITY (BMI 30-39.9): ICD-10-CM

## 2022-06-21 DIAGNOSIS — I10 ESSENTIAL HYPERTENSION: ICD-10-CM

## 2022-06-21 DIAGNOSIS — E11.9 TYPE 2 DIABETES MELLITUS WITHOUT COMPLICATION, WITHOUT LONG-TERM CURRENT USE OF INSULIN (HCC): ICD-10-CM

## 2022-06-21 PROCEDURE — 97803 MED NUTRITION INDIV SUBSEQ: CPT

## 2022-06-21 NOTE — PATIENT INSTRUCTIONS
Recommendations/goals:    1. Continue to keep a food record, MFP or My Net Diary. 2.  Continue to eat 4-6 times per day. Continue to aim for 42-52 grams per day. Increase fruit and vegetable consumption goal 2-3 servings per day. 3.  Continue to drink 64 oz per day of water. 4.  Continue the walking aim for 10 minutes per day. 5.  Add strength training 10 minutes three days per week. (Sit and Be Fit on Youtube). Focus on left arm and leg. 6.  Practice eating separately from drinking. 7. Try Glucerna, Boost of Orgain or Ensure High Protein protein shakes.

## 2022-06-28 ENCOUNTER — DOCUMENTATION ONLY (OUTPATIENT)
Dept: SURGERY | Facility: CLINIC | Age: 57
End: 2022-06-28

## 2022-06-28 ENCOUNTER — OFFICE VISIT (OUTPATIENT)
Dept: SURGERY | Facility: CLINIC | Age: 57
End: 2022-06-28
Payer: COMMERCIAL

## 2022-06-28 ENCOUNTER — LAB ENCOUNTER (OUTPATIENT)
Dept: LAB | Age: 57
End: 2022-06-28
Attending: SURGERY
Payer: COMMERCIAL

## 2022-06-28 VITALS
HEIGHT: 62.5 IN | SYSTOLIC BLOOD PRESSURE: 128 MMHG | DIASTOLIC BLOOD PRESSURE: 80 MMHG | OXYGEN SATURATION: 100 % | BODY MASS INDEX: 37.32 KG/M2 | HEART RATE: 92 BPM | WEIGHT: 208 LBS

## 2022-06-28 DIAGNOSIS — K21.9 GASTROESOPHAGEAL REFLUX DISEASE, UNSPECIFIED WHETHER ESOPHAGITIS PRESENT: ICD-10-CM

## 2022-06-28 DIAGNOSIS — E66.01 CLASS 2 SEVERE OBESITY DUE TO EXCESS CALORIES WITH SERIOUS COMORBIDITY AND BODY MASS INDEX (BMI) OF 37.0 TO 37.9 IN ADULT (HCC): ICD-10-CM

## 2022-06-28 DIAGNOSIS — E66.01 CLASS 2 SEVERE OBESITY DUE TO EXCESS CALORIES WITH SERIOUS COMORBIDITY AND BODY MASS INDEX (BMI) OF 37.0 TO 37.9 IN ADULT (HCC): Primary | ICD-10-CM

## 2022-06-28 NOTE — TELEPHONE ENCOUNTER
Requesting ozempic 1 mg  LOV: 6/2/22  RTC: not noted  Last Relevant Labs: no A1c on file  Filled: 5/19/22 #1.5ml with 0 refills  Patient was reduced to lower dose d/t GI upset    8/8/2022 10:20 AM Maya Maradiaga PA-C [facility] [address]

## 2022-06-28 NOTE — PROGRESS NOTES
Oriented pt to the bariatric program; provided/reviewed bariatric packet of info, time line, referrals, etc; pt agreed and verbalized understanding; attended seminar on 5/16/22.

## 2022-06-29 LAB — SARS-COV-2 RNA RESP QL NAA+PROBE: NOT DETECTED

## 2022-06-30 ENCOUNTER — PATIENT MESSAGE (OUTPATIENT)
Dept: INTERNAL MEDICINE CLINIC | Facility: CLINIC | Age: 57
End: 2022-06-30

## 2022-07-01 ENCOUNTER — HOSPITAL ENCOUNTER (OUTPATIENT)
Dept: GENERAL RADIOLOGY | Age: 57
Discharge: HOME | End: 2022-07-01
Attending: SURGERY
Payer: COMMERCIAL

## 2022-07-01 DIAGNOSIS — E66.01 CLASS 2 SEVERE OBESITY DUE TO EXCESS CALORIES WITH SERIOUS COMORBIDITY AND BODY MASS INDEX (BMI) OF 37.0 TO 37.9 IN ADULT (HCC): ICD-10-CM

## 2022-07-01 DIAGNOSIS — K21.9 GASTROESOPHAGEAL REFLUX DISEASE, UNSPECIFIED WHETHER ESOPHAGITIS PRESENT: ICD-10-CM

## 2022-07-01 PROCEDURE — 74240 X-RAY XM UPR GI TRC 1CNTRST: CPT | Performed by: SURGERY

## 2022-07-12 ENCOUNTER — TELEPHONE (OUTPATIENT)
Dept: SURGERY | Facility: CLINIC | Age: 57
End: 2022-07-12

## 2022-07-12 ENCOUNTER — DOCUMENTATION ONLY (OUTPATIENT)
Dept: SURGERY | Facility: CLINIC | Age: 57
End: 2022-07-12

## 2022-07-12 RX ORDER — SEMAGLUTIDE 1.34 MG/ML
INJECTION, SOLUTION SUBCUTANEOUS
Refills: 0 | OUTPATIENT
Start: 2022-07-12

## 2022-07-12 NOTE — PROGRESS NOTES
Please review the information regarding my recent evaluation of our mutual patient, Axel Johnston, 11/16/1965.       Surgical Clearance from [facility]  Evaluation Completed on: 07/12/22  Clearance information for: Bariatric  Referring Provider: Zina Vickers MD  Clearance for Bariatric Surgery: Approved  Evaluation Concerns: No concerns  Recommendations: Patient to continue to meet with dietician to refine diet and monitor progress      Thank you for allowing me to partake in the care of this patient,     Sincerely,     Tima Bassett PsyD  7/12/2022 @ 12:17 PM

## 2022-07-12 NOTE — TELEPHONE ENCOUNTER
Patient placed on support group list. Information sent to [facility].

## 2022-07-13 ENCOUNTER — LAB ENCOUNTER (OUTPATIENT)
Dept: LAB | Age: 57
End: 2022-07-13
Attending: FAMILY MEDICINE
Payer: COMMERCIAL

## 2022-07-13 ENCOUNTER — TELEMEDICINE (OUTPATIENT)
Dept: FAMILY MEDICINE CLINIC | Facility: CLINIC | Age: 57
End: 2022-07-13

## 2022-07-13 DIAGNOSIS — R05.9 COUGH: ICD-10-CM

## 2022-07-13 DIAGNOSIS — R50.9 FEVER, UNSPECIFIED FEVER CAUSE: Primary | ICD-10-CM

## 2022-07-13 DIAGNOSIS — R50.9 FEVER, UNSPECIFIED FEVER CAUSE: ICD-10-CM

## 2022-07-13 PROCEDURE — 99214 OFFICE O/P EST MOD 30 MIN: CPT | Performed by: FAMILY MEDICINE

## 2022-07-13 RX ORDER — CLINDAMYCIN HYDROCHLORIDE 300 MG/1
300 CAPSULE ORAL 3 TIMES DAILY
Qty: 21 CAPSULE | Refills: 0 | Status: SHIPPED | OUTPATIENT
Start: 2022-07-13 | End: 2022-07-20

## 2022-07-13 RX ORDER — PREDNISONE 10 MG/1
TABLET ORAL
Qty: 42 TABLET | Refills: 0 | Status: SHIPPED | OUTPATIENT
Start: 2022-07-13

## 2022-07-14 LAB — SARS-COV-2 RNA RESP QL NAA+PROBE: NOT DETECTED

## 2022-07-19 ENCOUNTER — APPOINTMENT (OUTPATIENT)
Dept: GENERAL RADIOLOGY | Facility: HOSPITAL | Age: 57
End: 2022-07-19
Attending: EMERGENCY MEDICINE
Payer: COMMERCIAL

## 2022-07-19 ENCOUNTER — HOSPITAL ENCOUNTER (INPATIENT)
Facility: HOSPITAL | Age: 57
LOS: 4 days | Discharge: HOME | End: 2022-07-23
Attending: EMERGENCY MEDICINE | Admitting: HOSPITALIST
Payer: COMMERCIAL

## 2022-07-19 DIAGNOSIS — J45.41 MODERATE PERSISTENT ASTHMA WITH EXACERBATION: Primary | ICD-10-CM

## 2022-07-19 PROBLEM — J45.902 MODERATE ASTHMA WITH STATUS ASTHMATICUS: Status: ACTIVE | Noted: 2018-10-10

## 2022-07-19 PROBLEM — J45.902 MODERATE ASTHMA WITH STATUS ASTHMATICUS (HCC): Status: ACTIVE | Noted: 2018-10-10

## 2022-07-19 LAB
ADENOVIRUS PCR:: NOT DETECTED
ALBUMIN SERPL-MCNC: 3.4 G/DL (ref 3.4–5)
ALBUMIN/GLOB SERPL: 1.1 {RATIO} (ref 1–2)
ALP LIVER SERPL-CCNC: 73 U/L
ALT SERPL-CCNC: 67 U/L
ANION GAP SERPL CALC-SCNC: 5 MMOL/L (ref 0–18)
ARTERIAL PATENCY WRIST A: POSITIVE
AST SERPL-CCNC: 25 U/L (ref 15–37)
ATRIAL RATE: 73 BPM
B PARAPERT DNA SPEC QL NAA+PROBE: NOT DETECTED
B PERT DNA SPEC QL NAA+PROBE: NOT DETECTED
BASE EXCESS BLDA CALC-SCNC: 1 MMOL/L (ref ?–2)
BASOPHILS # BLD AUTO: 0.12 X10(3) UL (ref 0–0.2)
BASOPHILS NFR BLD AUTO: 1.4 %
BILIRUB SERPL-MCNC: 0.5 MG/DL (ref 0.1–2)
BILIRUB UR QL STRIP.AUTO: NEGATIVE
BODY TEMPERATURE: 98.6 F
BUN BLD-MCNC: 23 MG/DL (ref 7–18)
C PNEUM DNA SPEC QL NAA+PROBE: NOT DETECTED
CALCIUM BLD-MCNC: 8.6 MG/DL (ref 8.5–10.1)
CHLORIDE SERPL-SCNC: 108 MMOL/L (ref 98–112)
CLARITY UR REFRACT.AUTO: CLEAR
CO2 SERPL-SCNC: 28 MMOL/L (ref 21–32)
COHGB MFR BLD: 1.2 % SAT (ref 0–3)
COLOR UR AUTO: YELLOW
CORONAVIRUS 229E PCR:: NOT DETECTED
CORONAVIRUS HKU1 PCR:: NOT DETECTED
CORONAVIRUS NL63 PCR:: NOT DETECTED
CORONAVIRUS OC43 PCR:: NOT DETECTED
CREAT BLD-MCNC: 0.72 MG/DL
D DIMER PPP FEU-MCNC: <0.27 UG/ML FEU (ref ?–0.56)
EOSINOPHIL # BLD AUTO: 0.12 X10(3) UL (ref 0–0.7)
EOSINOPHIL NFR BLD AUTO: 1.4 %
ERYTHROCYTE [DISTWIDTH] IN BLOOD BY AUTOMATED COUNT: 13.8 %
EST. AVERAGE GLUCOSE BLD GHB EST-MCNC: 126 MG/DL (ref 68–126)
FLUAV RNA SPEC QL NAA+PROBE: NOT DETECTED
FLUBV RNA SPEC QL NAA+PROBE: NOT DETECTED
GLOBULIN PLAS-MCNC: 3 G/DL (ref 2.8–4.4)
GLUCOSE BLD-MCNC: 231 MG/DL (ref 70–99)
GLUCOSE BLD-MCNC: 94 MG/DL (ref 70–99)
GLUCOSE UR STRIP.AUTO-MCNC: NEGATIVE MG/DL
HBA1C MFR BLD: 6 % (ref ?–5.7)
HCO3 BLDA-SCNC: 25.7 MEQ/L (ref 21–27)
HCT VFR BLD AUTO: 41.9 %
HGB BLD-MCNC: 13.5 G/DL
HGB BLD-MCNC: 13.7 G/DL
IMM GRANULOCYTES # BLD AUTO: 0.22 X10(3) UL (ref 0–1)
IMM GRANULOCYTES NFR BLD: 2.5 %
KETONES UR STRIP.AUTO-MCNC: NEGATIVE MG/DL
LACTATE BLD-SCNC: 5.4 MMOL/L (ref 0.5–2)
LACTATE SERPL-SCNC: 2.5 MMOL/L (ref 0.4–2)
LACTATE SERPL-SCNC: 3.2 MMOL/L (ref 0.4–2)
LACTATE SERPL-SCNC: 6.7 MMOL/L (ref 0.4–2)
LEUKOCYTE ESTERASE UR QL STRIP.AUTO: NEGATIVE
LYMPHOCYTES # BLD AUTO: 3.17 X10(3) UL (ref 1–4)
LYMPHOCYTES NFR BLD AUTO: 36.4 %
MCH RBC QN AUTO: 29.5 PG (ref 26–34)
MCHC RBC AUTO-ENTMCNC: 32.7 G/DL (ref 31–37)
MCV RBC AUTO: 90.1 FL
METAPNEUMOVIRUS PCR:: NOT DETECTED
METHGB MFR BLD: 0.9 % SAT (ref 0.4–1.5)
MONOCYTES # BLD AUTO: 0.43 X10(3) UL (ref 0.1–1)
MONOCYTES NFR BLD AUTO: 4.9 %
MYCOPLASMA PNEUMONIA PCR:: NOT DETECTED
NEUTROPHILS # BLD AUTO: 4.65 X10 (3) UL (ref 1.5–7.7)
NEUTROPHILS # BLD AUTO: 4.65 X10(3) UL (ref 1.5–7.7)
NEUTROPHILS NFR BLD AUTO: 53.4 %
NITRITE UR QL STRIP.AUTO: NEGATIVE
OSMOLALITY SERPL CALC.SUM OF ELEC: 295 MOSM/KG (ref 275–295)
OXYHGB MFR BLDA: 96.3 % (ref 92–100)
P AXIS: 58 DEGREES
P-R INTERVAL: 160 MS
PARAINFLUENZA 1 PCR:: NOT DETECTED
PARAINFLUENZA 2 PCR:: NOT DETECTED
PARAINFLUENZA 3 PCR:: DETECTED
PARAINFLUENZA 4 PCR:: NOT DETECTED
PCO2 BLDA: 29 MM HG (ref 35–45)
PH BLDA: 7.51 [PH] (ref 7.35–7.45)
PH UR STRIP.AUTO: 7 [PH] (ref 5–8)
PLATELET # BLD AUTO: 329 10(3)UL (ref 150–450)
PO2 BLDA: 89 MM HG (ref 80–100)
POTASSIUM SERPL-SCNC: 3 MMOL/L (ref 3.5–5.1)
PROCALCITONIN SERPL-MCNC: <0.05 NG/ML (ref ?–0.16)
PROT SERPL-MCNC: 6.4 G/DL (ref 6.4–8.2)
PROT UR STRIP.AUTO-MCNC: NEGATIVE MG/DL
Q-T INTERVAL: 416 MS
QRS DURATION: 108 MS
QTC CALCULATION (BEZET): 458 MS
R AXIS: 24 DEGREES
RBC # BLD AUTO: 4.65 X10(6)UL
RHINOVIRUS/ENTERO PCR:: NOT DETECTED
RSV RNA SPEC QL NAA+PROBE: NOT DETECTED
SARS-COV-2 RNA NPH QL NAA+NON-PROBE: NOT DETECTED
SARS-COV-2 RNA RESP QL NAA+PROBE: NOT DETECTED
SODIUM SERPL-SCNC: 141 MMOL/L (ref 136–145)
SP GR UR STRIP.AUTO: 1.01 (ref 1–1.03)
T AXIS: 7 DEGREES
TROPONIN I HIGH SENSITIVITY: 5 NG/L
UROBILINOGEN UR STRIP.AUTO-MCNC: 0.2 MG/DL
VENTRICULAR RATE: 73 BPM
WBC # BLD AUTO: 8.7 X10(3) UL (ref 4–11)

## 2022-07-19 PROCEDURE — 99223 1ST HOSP IP/OBS HIGH 75: CPT | Performed by: STUDENT IN AN ORGANIZED HEALTH CARE EDUCATION/TRAINING PROGRAM

## 2022-07-19 PROCEDURE — 99291 CRITICAL CARE FIRST HOUR: CPT | Performed by: NURSE PRACTITIONER

## 2022-07-19 PROCEDURE — 71045 X-RAY EXAM CHEST 1 VIEW: CPT | Performed by: EMERGENCY MEDICINE

## 2022-07-19 RX ORDER — FLUTICASONE FUROATE AND VILANTEROL 100; 25 UG/1; UG/1
1 POWDER RESPIRATORY (INHALATION) DAILY
Status: DISCONTINUED | OUTPATIENT
Start: 2022-07-19 | End: 2022-07-23

## 2022-07-19 RX ORDER — CODEINE PHOSPHATE AND GUAIFENESIN 10; 100 MG/5ML; MG/5ML
5 SOLUTION ORAL EVERY 4 HOURS PRN
Status: DISCONTINUED | OUTPATIENT
Start: 2022-07-19 | End: 2022-07-23

## 2022-07-19 RX ORDER — BUDESONIDE 0.5 MG/2ML
0.5 INHALANT ORAL 2 TIMES DAILY
Status: CANCELLED | OUTPATIENT
Start: 2022-07-19

## 2022-07-19 RX ORDER — HYDROCHLOROTHIAZIDE 25 MG/1
25 TABLET ORAL DAILY
Status: DISCONTINUED | OUTPATIENT
Start: 2022-07-20 | End: 2022-07-23

## 2022-07-19 RX ORDER — POLYETHYLENE GLYCOL 3350 17 G/17G
17 POWDER, FOR SOLUTION ORAL DAILY PRN
Status: DISCONTINUED | OUTPATIENT
Start: 2022-07-19 | End: 2022-07-23

## 2022-07-19 RX ORDER — GABAPENTIN 100 MG/1
100 CAPSULE ORAL 3 TIMES DAILY
Status: DISCONTINUED | OUTPATIENT
Start: 2022-07-19 | End: 2022-07-19 | Stop reason: SDUPTHER

## 2022-07-19 RX ORDER — PANTOPRAZOLE SODIUM 20 MG/1
20 TABLET, DELAYED RELEASE ORAL NIGHTLY
Status: DISCONTINUED | OUTPATIENT
Start: 2022-07-19 | End: 2022-07-23

## 2022-07-19 RX ORDER — SODIUM PHOSPHATE, DIBASIC AND SODIUM PHOSPHATE, MONOBASIC 7; 19 G/133ML; G/133ML
1 ENEMA RECTAL ONCE AS NEEDED
Status: DISCONTINUED | OUTPATIENT
Start: 2022-07-19 | End: 2022-07-23

## 2022-07-19 RX ORDER — ONDANSETRON 2 MG/ML
4 INJECTION INTRAMUSCULAR; INTRAVENOUS EVERY 6 HOURS PRN
Status: DISCONTINUED | OUTPATIENT
Start: 2022-07-19 | End: 2022-07-23

## 2022-07-19 RX ORDER — ALBUTEROL SULFATE 2.5 MG/3ML
2.5 SOLUTION RESPIRATORY (INHALATION)
Status: DISCONTINUED | OUTPATIENT
Start: 2022-07-20 | End: 2022-07-20

## 2022-07-19 RX ORDER — MONTELUKAST SODIUM 10 MG/1
10 TABLET ORAL NIGHTLY
Status: DISCONTINUED | OUTPATIENT
Start: 2022-07-19 | End: 2022-07-23

## 2022-07-19 RX ORDER — BENZONATATE 200 MG/1
200 CAPSULE ORAL 3 TIMES DAILY PRN
Status: DISCONTINUED | OUTPATIENT
Start: 2022-07-19 | End: 2022-07-23

## 2022-07-19 RX ORDER — ALENDRONATE SODIUM 70 MG/1
70 TABLET ORAL
COMMUNITY

## 2022-07-19 RX ORDER — ECHINACEA PURPUREA EXTRACT 125 MG
1 TABLET ORAL
Status: DISCONTINUED | OUTPATIENT
Start: 2022-07-19 | End: 2022-07-23

## 2022-07-19 RX ORDER — ALBUTEROL SULFATE 2.5 MG/3ML
2.5 SOLUTION RESPIRATORY (INHALATION) EVERY 6 HOURS PRN
COMMUNITY

## 2022-07-19 RX ORDER — ALBUTEROL SULFATE 2.5 MG/3ML
SOLUTION RESPIRATORY (INHALATION)
Status: DISPENSED
Start: 2022-07-19 | End: 2022-07-20

## 2022-07-19 RX ORDER — SENNOSIDES 8.6 MG
17.2 TABLET ORAL NIGHTLY PRN
Status: DISCONTINUED | OUTPATIENT
Start: 2022-07-19 | End: 2022-07-23

## 2022-07-19 RX ORDER — ALBUTEROL SULFATE 2.5 MG/3ML
2.5 SOLUTION RESPIRATORY (INHALATION)
Status: DISCONTINUED | OUTPATIENT
Start: 2022-07-19 | End: 2022-07-19

## 2022-07-19 RX ORDER — BISACODYL 10 MG
10 SUPPOSITORY, RECTAL RECTAL
Status: DISCONTINUED | OUTPATIENT
Start: 2022-07-19 | End: 2022-07-23

## 2022-07-19 RX ORDER — GABAPENTIN 100 MG/1
100 CAPSULE ORAL 2 TIMES DAILY
Status: DISCONTINUED | OUTPATIENT
Start: 2022-07-20 | End: 2022-07-23

## 2022-07-19 RX ORDER — PREDNISONE 10 MG/1
20 TABLET ORAL DAILY
COMMUNITY
Start: 2022-07-21 | End: 2022-07-23

## 2022-07-19 RX ORDER — POTASSIUM CHLORIDE 20 MEQ/1
40 TABLET, EXTENDED RELEASE ORAL ONCE
Status: COMPLETED | OUTPATIENT
Start: 2022-07-19 | End: 2022-07-19

## 2022-07-19 RX ORDER — LEVOTHYROXINE SODIUM 0.15 MG/1
150 TABLET ORAL
Status: DISCONTINUED | OUTPATIENT
Start: 2022-07-20 | End: 2022-07-23

## 2022-07-19 RX ORDER — ENOXAPARIN SODIUM 100 MG/ML
40 INJECTION SUBCUTANEOUS NIGHTLY
Status: DISCONTINUED | OUTPATIENT
Start: 2022-07-19 | End: 2022-07-23

## 2022-07-19 RX ORDER — NICOTINE POLACRILEX 4 MG
15 LOZENGE BUCCAL
Status: DISCONTINUED | OUTPATIENT
Start: 2022-07-19 | End: 2022-07-23

## 2022-07-19 RX ORDER — DEXTROAMPHETAMINE SACCHARATE, AMPHETAMINE ASPARTATE MONOHYDRATE, DEXTROAMPHETAMINE SULFATE AND AMPHETAMINE SULFATE 5; 5; 5; 5 MG/1; MG/1; MG/1; MG/1
20 CAPSULE, EXTENDED RELEASE ORAL DAILY
COMMUNITY
Start: 2022-06-12 | End: 2022-07-26

## 2022-07-19 RX ORDER — CETIRIZINE HYDROCHLORIDE 10 MG/1
10 TABLET ORAL EVERY EVENING
Status: DISCONTINUED | OUTPATIENT
Start: 2022-07-19 | End: 2022-07-23

## 2022-07-19 RX ORDER — ACETAMINOPHEN 500 MG
500 TABLET ORAL EVERY 4 HOURS PRN
Status: DISCONTINUED | OUTPATIENT
Start: 2022-07-19 | End: 2022-07-23

## 2022-07-19 RX ORDER — CELECOXIB 200 MG/1
200 CAPSULE ORAL DAILY
COMMUNITY
Start: 2022-06-20

## 2022-07-19 RX ORDER — IPRATROPIUM BROMIDE AND ALBUTEROL SULFATE 2.5; .5 MG/3ML; MG/3ML
3 SOLUTION RESPIRATORY (INHALATION)
Status: DISCONTINUED | OUTPATIENT
Start: 2022-07-19 | End: 2022-07-19

## 2022-07-19 RX ORDER — PREDNISONE 10 MG/1
40 TABLET ORAL DAILY
COMMUNITY
Start: 2022-07-19 | End: 2022-07-23

## 2022-07-19 RX ORDER — METHYLPREDNISOLONE SODIUM SUCCINATE 125 MG/2ML
80 INJECTION, POWDER, LYOPHILIZED, FOR SOLUTION INTRAMUSCULAR; INTRAVENOUS EVERY 6 HOURS
Status: DISCONTINUED | OUTPATIENT
Start: 2022-07-19 | End: 2022-07-20

## 2022-07-19 RX ORDER — LEVOTHYROXINE SODIUM 0.15 MG/1
150 TABLET ORAL DAILY
Status: DISCONTINUED | OUTPATIENT
Start: 2022-07-19 | End: 2022-07-19

## 2022-07-19 RX ORDER — CELECOXIB 200 MG/1
200 CAPSULE ORAL DAILY
Status: DISCONTINUED | OUTPATIENT
Start: 2022-07-20 | End: 2022-07-23

## 2022-07-19 RX ORDER — ALPRAZOLAM 0.25 MG/1
0.25 TABLET ORAL NIGHTLY PRN
Status: DISCONTINUED | OUTPATIENT
Start: 2022-07-19 | End: 2022-07-21

## 2022-07-19 RX ORDER — METHYLPREDNISOLONE SODIUM SUCCINATE 125 MG/2ML
125 INJECTION, POWDER, LYOPHILIZED, FOR SOLUTION INTRAMUSCULAR; INTRAVENOUS ONCE
Status: COMPLETED | OUTPATIENT
Start: 2022-07-19 | End: 2022-07-19

## 2022-07-19 RX ORDER — DEXTROAMPHETAMINE SACCHARATE, AMPHETAMINE ASPARTATE, DEXTROAMPHETAMINE SULFATE AND AMPHETAMINE SULFATE 2.5; 2.5; 2.5; 2.5 MG/1; MG/1; MG/1; MG/1
10 TABLET ORAL
Status: DISCONTINUED | OUTPATIENT
Start: 2022-07-20 | End: 2022-07-23

## 2022-07-19 RX ORDER — PREDNISONE 10 MG/1
10 TABLET ORAL DAILY
COMMUNITY
Start: 2022-07-23 | End: 2022-07-23

## 2022-07-19 RX ORDER — MELATONIN
3 NIGHTLY PRN
Status: DISCONTINUED | OUTPATIENT
Start: 2022-07-19 | End: 2022-07-23

## 2022-07-19 RX ORDER — PROCHLORPERAZINE EDISYLATE 5 MG/ML
5 INJECTION INTRAMUSCULAR; INTRAVENOUS EVERY 8 HOURS PRN
Status: DISCONTINUED | OUTPATIENT
Start: 2022-07-19 | End: 2022-07-23

## 2022-07-19 RX ORDER — GABAPENTIN 100 MG/1
200 CAPSULE ORAL NIGHTLY
Status: DISCONTINUED | OUTPATIENT
Start: 2022-07-19 | End: 2022-07-23

## 2022-07-19 RX ORDER — DEXTROSE MONOHYDRATE 25 G/50ML
50 INJECTION, SOLUTION INTRAVENOUS
Status: DISCONTINUED | OUTPATIENT
Start: 2022-07-19 | End: 2022-07-23

## 2022-07-19 RX ORDER — MONTELUKAST SODIUM 10 MG/1
10 TABLET ORAL DAILY
COMMUNITY

## 2022-07-19 RX ORDER — ALBUTEROL SULFATE 2.5 MG/3ML
2.5 SOLUTION RESPIRATORY (INHALATION) EVERY 2 HOUR PRN
Status: DISCONTINUED | OUTPATIENT
Start: 2022-07-19 | End: 2022-07-23

## 2022-07-19 RX ORDER — LEVOTHYROXINE SODIUM 0.15 MG/1
150 TABLET ORAL DAILY
COMMUNITY
Start: 2022-06-28

## 2022-07-19 RX ORDER — NICOTINE POLACRILEX 4 MG
30 LOZENGE BUCCAL
Status: DISCONTINUED | OUTPATIENT
Start: 2022-07-19 | End: 2022-07-23

## 2022-07-19 RX ORDER — SODIUM CHLORIDE 9 MG/ML
INJECTION, SOLUTION INTRAVENOUS CONTINUOUS
Status: DISCONTINUED | OUTPATIENT
Start: 2022-07-19 | End: 2022-07-20

## 2022-07-19 RX ORDER — FUROSEMIDE 20 MG/1
20 TABLET ORAL DAILY
Status: DISCONTINUED | OUTPATIENT
Start: 2022-07-20 | End: 2022-07-23

## 2022-07-19 NOTE — ED QUICK NOTES
Orders for admission, patient is aware of plan and ready to go upstairs.  Any questions, please call ED RN Johnny Salgado at extension [phone]    Patient Covid vaccination status: Fully vaccinated     COVID Test Ordered in ED: Respiratory Flu Expanded Panel + COVID-19    COVID Suspicion at Admission: N/A    Running Infusions:  None    Mental Status/LOC at time of transport: alert and oriented x4, oxygen applied when patient fell asleep due to desating, not used at home     Other pertinent information:   CIWA score: N/A   NIH score:  N/A

## 2022-07-19 NOTE — ED INITIAL ASSESSMENT (HPI)
Patient to the ER c/o difficulty breathing and sob. Patient states the sob began one week ago. No known exposure. No n/v Patient has diarrhea and fever intermittently  No chest pain. Patient has productive cough for one week.

## 2022-07-19 NOTE — PROGRESS NOTES
NURSING ADMISSION NOTE      Patient admitted via Cart  Oriented to room. Safety precautions initiated. Bed in low position. Call light in reach. Pt is aox4, VSS. Afebrile. SOB at rest. On RA. Tele NSR. Resting in bed with call light in place. Admission navigator complete.  at bedside. Will continue to monitor.

## 2022-07-20 ENCOUNTER — TELEPHONE (OUTPATIENT)
Dept: SURGERY | Facility: CLINIC | Age: 57
End: 2022-07-20

## 2022-07-20 LAB
ANION GAP SERPL CALC-SCNC: 5 MMOL/L (ref 0–18)
BASOPHILS # BLD AUTO: 0.04 X10(3) UL (ref 0–0.2)
BASOPHILS NFR BLD AUTO: 0.3 %
BUN BLD-MCNC: 18 MG/DL (ref 7–18)
CALCIUM BLD-MCNC: 8.6 MG/DL (ref 8.5–10.1)
CHLORIDE SERPL-SCNC: 110 MMOL/L (ref 98–112)
CO2 SERPL-SCNC: 27 MMOL/L (ref 21–32)
CREAT BLD-MCNC: 0.7 MG/DL
EOSINOPHIL # BLD AUTO: 0 X10(3) UL (ref 0–0.7)
EOSINOPHIL NFR BLD AUTO: 0 %
ERYTHROCYTE [DISTWIDTH] IN BLOOD BY AUTOMATED COUNT: 13.5 %
GLUCOSE BLD-MCNC: 166 MG/DL (ref 70–99)
GLUCOSE BLD-MCNC: 178 MG/DL (ref 70–99)
GLUCOSE BLD-MCNC: 179 MG/DL (ref 70–99)
GLUCOSE BLD-MCNC: 189 MG/DL (ref 70–99)
GLUCOSE BLD-MCNC: 199 MG/DL (ref 70–99)
HCT VFR BLD AUTO: 41 %
HGB BLD-MCNC: 13.2 G/DL
IMM GRANULOCYTES # BLD AUTO: 0.2 X10(3) UL (ref 0–1)
IMM GRANULOCYTES NFR BLD: 1.5 %
LACTATE SERPL-SCNC: 2.6 MMOL/L (ref 0.4–2)
LYMPHOCYTES # BLD AUTO: 1 X10(3) UL (ref 1–4)
LYMPHOCYTES NFR BLD AUTO: 7.6 %
MCH RBC QN AUTO: 29.2 PG (ref 26–34)
MCHC RBC AUTO-ENTMCNC: 32.2 G/DL (ref 31–37)
MCV RBC AUTO: 90.7 FL
MONOCYTES # BLD AUTO: 0.15 X10(3) UL (ref 0.1–1)
MONOCYTES NFR BLD AUTO: 1.1 %
NEUTROPHILS # BLD AUTO: 11.76 X10 (3) UL (ref 1.5–7.7)
NEUTROPHILS # BLD AUTO: 11.76 X10(3) UL (ref 1.5–7.7)
NEUTROPHILS NFR BLD AUTO: 89.5 %
OSMOLALITY SERPL CALC.SUM OF ELEC: 300 MOSM/KG (ref 275–295)
PLATELET # BLD AUTO: 330 10(3)UL (ref 150–450)
POTASSIUM SERPL-SCNC: 4.2 MMOL/L (ref 3.5–5.1)
RBC # BLD AUTO: 4.52 X10(6)UL
SODIUM SERPL-SCNC: 142 MMOL/L (ref 136–145)
WBC # BLD AUTO: 13.2 X10(3) UL (ref 4–11)

## 2022-07-20 PROCEDURE — 99233 SBSQ HOSP IP/OBS HIGH 50: CPT | Performed by: HOSPITALIST

## 2022-07-20 RX ORDER — ALBUTEROL SULFATE 2.5 MG/3ML
2.5 SOLUTION RESPIRATORY (INHALATION)
Status: DISCONTINUED | OUTPATIENT
Start: 2022-07-20 | End: 2022-07-20

## 2022-07-20 RX ORDER — ALBUTEROL SULFATE 2.5 MG/3ML
2.5 SOLUTION RESPIRATORY (INHALATION)
Status: DISCONTINUED | OUTPATIENT
Start: 2022-07-20 | End: 2022-07-21

## 2022-07-20 RX ORDER — METHYLPREDNISOLONE SODIUM SUCCINATE 125 MG/2ML
60 INJECTION, POWDER, LYOPHILIZED, FOR SOLUTION INTRAMUSCULAR; INTRAVENOUS EVERY 8 HOURS
Status: DISCONTINUED | OUTPATIENT
Start: 2022-07-20 | End: 2022-07-22

## 2022-07-20 NOTE — RESPIRATORY THERAPY NOTE
Pt received this AM on Q2 nebulizer treatment schedule. Pt breath sounds this AM were mostly clear so Pt was changed to Q3. As the day progressed it seemed the Pt was needing the treatments more often so RT changed order back to Q2 for the time being. Pt is not in distress, only complains of chest tightness with relief from nebulizers. Will continue to monitor.

## 2022-07-20 NOTE — PLAN OF CARE
Received patient as transfer from [facility]. A&Ox4. Maintaining saturations on RA, placed on 4L NC for saturation <90%. C/o of dyspnea. Expiratory wheezes noted, labored breathing. APN at bedside. Per order, RT to bedside for NEB tx. POC discussed and questions answered. Call light within reach. Will continue to monitor.

## 2022-07-20 NOTE — PLAN OF CARE
Received at shift change. A&Ox4, moves all extremities, ambulates to bathroom with standby assist. Lungs diminished on RA. Vitals stable.  Refer to MAR/Flow sheets

## 2022-07-20 NOTE — PROGRESS NOTES
Resumed pt care 2200. Received Pt AOx4. Glasses. VSS,afebrile. Flushed skin tone. Maintaining O2 sats >90% on RA. Pt wheezing, SOB at rest/ using accessory muscle use. Pt states she is feeling ok. IV solumedrol given per MAR. NEBS Q4. CPAP. Lactic elevated- MD awarem ABGs done, 1L bolus infusing. IVF. Tele, NSR.; Voids, c/o diarrhea at home. R/O cdiff. No c/o pain, N/V. Up SBA with walker. QID accucheck. Cardiac diet. Pt updated on POC. Call light within reach, safety precautions in place. No further pt needs at this time. Per MD- orders to transfer. Report given to RN.

## 2022-07-21 LAB
ANION GAP SERPL CALC-SCNC: 7 MMOL/L (ref 0–18)
BUN BLD-MCNC: 24 MG/DL (ref 7–18)
CALCIUM BLD-MCNC: 8.8 MG/DL (ref 8.5–10.1)
CHLORIDE SERPL-SCNC: 106 MMOL/L (ref 98–112)
CO2 SERPL-SCNC: 28 MMOL/L (ref 21–32)
CREAT BLD-MCNC: 0.74 MG/DL
ERYTHROCYTE [DISTWIDTH] IN BLOOD BY AUTOMATED COUNT: 13.9 %
GLUCOSE BLD-MCNC: 110 MG/DL (ref 70–99)
GLUCOSE BLD-MCNC: 143 MG/DL (ref 70–99)
GLUCOSE BLD-MCNC: 155 MG/DL (ref 70–99)
GLUCOSE BLD-MCNC: 165 MG/DL (ref 70–99)
GLUCOSE BLD-MCNC: 167 MG/DL (ref 70–99)
HCT VFR BLD AUTO: 38.6 %
HGB BLD-MCNC: 12.4 G/DL
MCH RBC QN AUTO: 29.2 PG (ref 26–34)
MCHC RBC AUTO-ENTMCNC: 32.1 G/DL (ref 31–37)
MCV RBC AUTO: 91 FL
OSMOLALITY SERPL CALC.SUM OF ELEC: 300 MOSM/KG (ref 275–295)
PLATELET # BLD AUTO: 339 10(3)UL (ref 150–450)
POTASSIUM SERPL-SCNC: 3.5 MMOL/L (ref 3.5–5.1)
RBC # BLD AUTO: 4.24 X10(6)UL
SODIUM SERPL-SCNC: 141 MMOL/L (ref 136–145)
WBC # BLD AUTO: 16.6 X10(3) UL (ref 4–11)

## 2022-07-21 PROCEDURE — 99232 SBSQ HOSP IP/OBS MODERATE 35: CPT | Performed by: HOSPITALIST

## 2022-07-21 PROCEDURE — 5A09357 ASSISTANCE WITH RESPIRATORY VENTILATION, LESS THAN 24 CONSECUTIVE HOURS, CONTINUOUS POSITIVE AIRWAY PRESSURE: ICD-10-PCS | Performed by: HOSPITALIST

## 2022-07-21 RX ORDER — ALPRAZOLAM 0.25 MG/1
0.25 TABLET ORAL 3 TIMES DAILY PRN
Status: DISCONTINUED | OUTPATIENT
Start: 2022-07-21 | End: 2022-07-23

## 2022-07-21 RX ORDER — ALBUTEROL SULFATE 2.5 MG/3ML
2.5 SOLUTION RESPIRATORY (INHALATION)
Status: DISCONTINUED | OUTPATIENT
Start: 2022-07-21 | End: 2022-07-22

## 2022-07-21 NOTE — PLAN OF CARE
Received pt AOX4, on room air during the day and CPAP during the night when sleeping. Pt able to ambulate to BR with standby assist only. Pt denied having pain or discomfort during the shift and appears to be comfortable. Will continue to monitor and assess.       Problem: Patient/Family Goals  Goal: Patient/Family Long Term Goal  Description: Patient's Long Term Goal: Go home    Interventions:  - NEB tx  - Consults    - See additional Care Plan goals for specific interventions  Outcome: Not Progressing  Goal: Patient/Family Short Term Goal  Description: Patient's Short Term Goal: breath better    Interventions:   - NEB tx  - steroids  - consults    - See additional Care Plan goals for specific interventions  Outcome: Not Progressing     Problem: RESPIRATORY - ADULT  Goal: Achieves optimal ventilation and oxygenation  Description: INTERVENTIONS:  - Assess for changes in respiratory status  - Assess for changes in mentation and behavior  - Position to facilitate oxygenation and minimize respiratory effort  - Oxygen supplementation based on oxygen saturation or ABGs  - Provide Smoking Cessation handout, if applicable  - Encourage broncho-pulmonary hygiene including cough, deep breathe, Incentive Spirometry  - Assess the need for suctioning and perform as needed  - Assess and instruct to report SOB or any respiratory difficulty  - Respiratory Therapy support as indicated  - Manage/alleviate anxiety  - Monitor for signs/symptoms of CO2 retention  Outcome: Not Progressing

## 2022-07-21 NOTE — PLAN OF CARE
Assumed care of patient at change of shift. Pt aox4. Delirium negative. Room air, c/o slight VALDEZ to bathroom, non productive cough. Nebs Q3. Improved air movement. SR on tele, sbp stable, afebrile. Tolerating diet, good appetite. Denies pain. PIV occluded, new site placed by charge rn using US guidance. Independent to bathroom and chair. accuchecks QID with insulin coverage as ordered. Spiritual care consult placed for support/grief counseling. Pt received difficult news about her granddaughter being ill.

## 2022-07-22 LAB
ANION GAP SERPL CALC-SCNC: 6 MMOL/L (ref 0–18)
BASOPHILS # BLD: 0 X10(3) UL (ref 0–0.2)
BASOPHILS NFR BLD: 0 %
BUN BLD-MCNC: 23 MG/DL (ref 7–18)
CALCIUM BLD-MCNC: 8.8 MG/DL (ref 8.5–10.1)
CHLORIDE SERPL-SCNC: 103 MMOL/L (ref 98–112)
CO2 SERPL-SCNC: 30 MMOL/L (ref 21–32)
CREAT BLD-MCNC: 0.66 MG/DL
EOSINOPHIL # BLD: 0 X10(3) UL (ref 0–0.7)
EOSINOPHIL NFR BLD: 0 %
ERYTHROCYTE [DISTWIDTH] IN BLOOD BY AUTOMATED COUNT: 13.7 %
GLUCOSE BLD-MCNC: 124 MG/DL (ref 70–99)
GLUCOSE BLD-MCNC: 143 MG/DL (ref 70–99)
GLUCOSE BLD-MCNC: 158 MG/DL (ref 70–99)
GLUCOSE BLD-MCNC: 169 MG/DL (ref 70–99)
GLUCOSE BLD-MCNC: 195 MG/DL (ref 70–99)
HCT VFR BLD AUTO: 39.7 %
HGB BLD-MCNC: 13 G/DL
LYMPHOCYTES NFR BLD: 0.88 X10(3) UL (ref 1–4)
LYMPHOCYTES NFR BLD: 6 %
MCH RBC QN AUTO: 29.8 PG (ref 26–34)
MCHC RBC AUTO-ENTMCNC: 32.7 G/DL (ref 31–37)
MCV RBC AUTO: 91.1 FL
METAMYELOCYTES # BLD: 0.15 X10(3) UL
METAMYELOCYTES NFR BLD: 1 %
MONOCYTES # BLD: 0.44 X10(3) UL (ref 0.1–1)
MONOCYTES NFR BLD: 3 %
MORPHOLOGY: NORMAL
NEUTROPHILS # BLD AUTO: 12.22 X10 (3) UL (ref 1.5–7.7)
NEUTROPHILS NFR BLD: 89 %
NEUTS BAND NFR BLD: 1 %
NEUTS HYPERSEG # BLD: 13.23 X10(3) UL (ref 1.5–7.7)
OSMOLALITY SERPL CALC.SUM OF ELEC: 296 MOSM/KG (ref 275–295)
PLATELET # BLD AUTO: 330 10(3)UL (ref 150–450)
PLATELET MORPHOLOGY: NORMAL
POTASSIUM SERPL-SCNC: 3.7 MMOL/L (ref 3.5–5.1)
RBC # BLD AUTO: 4.36 X10(6)UL
SODIUM SERPL-SCNC: 139 MMOL/L (ref 136–145)
TOTAL CELLS COUNTED BLD: 100
WBC # BLD AUTO: 14.7 X10(3) UL (ref 4–11)

## 2022-07-22 PROCEDURE — 99232 SBSQ HOSP IP/OBS MODERATE 35: CPT | Performed by: HOSPITALIST

## 2022-07-22 RX ORDER — ALBUTEROL SULFATE 2.5 MG/3ML
2.5 SOLUTION RESPIRATORY (INHALATION) EVERY 4 HOURS
Status: DISCONTINUED | OUTPATIENT
Start: 2022-07-22 | End: 2022-07-23

## 2022-07-22 RX ORDER — PREDNISONE 20 MG/1
40 TABLET ORAL
Status: DISCONTINUED | OUTPATIENT
Start: 2022-07-23 | End: 2022-07-23

## 2022-07-22 RX ORDER — METHYLPREDNISOLONE SODIUM SUCCINATE 40 MG/ML
40 INJECTION, POWDER, LYOPHILIZED, FOR SOLUTION INTRAMUSCULAR; INTRAVENOUS ONCE
Status: COMPLETED | OUTPATIENT
Start: 2022-07-22 | End: 2022-07-22

## 2022-07-22 NOTE — PLAN OF CARE
Problem: Patient/Family Goals  Goal: Patient/Family Long Term Goal  Description: Patient's Long Term Goal: Go home    Interventions:  - NEB tx  - Consults    - See additional Care Plan goals for specific interventions  Outcome: Progressing  Goal: Patient/Family Short Term Goal  Description: Patient's Short Term Goal: breath better    Interventions:   - NEB tx  - steroids  - consults    - See additional Care Plan goals for specific interventions  Outcome: Progressing     Problem: RESPIRATORY - ADULT  Goal: Achieves optimal ventilation and oxygenation  Description: INTERVENTIONS:  - Assess for changes in respiratory status  - Assess for changes in mentation and behavior  - Position to facilitate oxygenation and minimize respiratory effort  - Oxygen supplementation based on oxygen saturation or ABGs  - Provide Smoking Cessation handout, if applicable  - Encourage broncho-pulmonary hygiene including cough, deep breathe, Incentive Spirometry  - Assess the need for suctioning and perform as needed  - Assess and instruct to report SOB or any respiratory difficulty  - Respiratory Therapy support as indicated  - Manage/alleviate anxiety  - Monitor for signs/symptoms of CO2 retention  Outcome: Progressing

## 2022-07-22 NOTE — PLAN OF CARE
Assumed pt care around 0730. Pt AOx4, follows commands. Afebrile, SR/ST on tele, RA, VSS. Denies pain. Up ad pelon. Emotional support provided. See flowsheets and emar for further data.

## 2022-07-22 NOTE — PROGRESS NOTES
Nurse called patient seeking support. Pt in [facility] for virus. 3 yr old GD diagnosed with cancer recently. Pt. Expressed sadness and anxiety over diagnosis.  provided emotional and spiritual support to pt. Pt asked for support on Friday.  will visit if time allows. Page 2000 for  support.    07/21/22 1933   Clinical Encounter Type   Visited With Patient   Routine Visit Introduction   Continue Visiting Yes  (Patient asked for follow up support visit tomorrow.)   Referral From Nurse   Referral To    Patient Spiritual Encounters   Spiritual Assessment Completed Yes   Taxonomy   Intended Effects Build relationship of care and support   Methods Collaborate with care team member;Encourage sharing of feelings;Encourage story-telling; Offer emotional support   Interventions Acknowledge current situation; Acknowledge response to difficult experience; Active listening; Ask guided questions;Prayer for healing;Discuss coping mechanism with someone

## 2022-07-22 NOTE — PLAN OF CARE
Pt received after report. Pt alert and oriented. Following commands. Pupils equal and reactive. No reports of pain. Nebulizer treatment Q4hrs. Pt received on room air. Oxygen saturation stable. Afebrile. Normal sinus rhythm on telemetry strip. Blood pressure stable. Carb controlled diet in place. Accuchecks QID. BM x1. Ambulating to bathroom. Family at bedside and updated on pt status and plan of care. Transfer orders in place.

## 2022-07-22 NOTE — PLAN OF CARE
Received pt AOX4 on room air. Pastoral care at bedside pt received difficult news. Pt on emotional distress. Pt afebrile, VSS, receiving neb treatment every 3 hours, now changed to every 4 hrs. Bilateral breast sound clear and diminished at base, no cough present. Pt denies pain or discomfort. On CPAP during the night. Will continue to monitor and assess.           Problem: Patient/Family Goals  Goal: Patient/Family Long Term Goal  Description: Patient's Long Term Goal: Go home    Interventions:  - NEB tx  - Consults    - See additional Care Plan goals for specific interventions  Outcome: Progressing  Goal: Patient/Family Short Term Goal  Description: Patient's Short Term Goal: breath better    Interventions:   - NEB tx  - steroids  - consults    - See additional Care Plan goals for specific interventions  Outcome: Progressing     Problem: RESPIRATORY - ADULT  Goal: Achieves optimal ventilation and oxygenation  Description: INTERVENTIONS:  - Assess for changes in respiratory status  - Assess for changes in mentation and behavior  - Position to facilitate oxygenation and minimize respiratory effort  - Oxygen supplementation based on oxygen saturation or ABGs  - Provide Smoking Cessation handout, if applicable  - Encourage broncho-pulmonary hygiene including cough, deep breathe, Incentive Spirometry  - Assess the need for suctioning and perform as needed  - Assess and instruct to report SOB or any respiratory difficulty  - Respiratory Therapy support as indicated  - Manage/alleviate anxiety  - Monitor for signs/symptoms of CO2 retention  Outcome: Progressing

## 2022-07-23 VITALS
SYSTOLIC BLOOD PRESSURE: 132 MMHG | WEIGHT: 208.75 LBS | TEMPERATURE: 97 F | HEART RATE: 69 BPM | DIASTOLIC BLOOD PRESSURE: 62 MMHG | BODY MASS INDEX: 38 KG/M2 | RESPIRATION RATE: 16 BRPM | OXYGEN SATURATION: 97 %

## 2022-07-23 LAB
ANION GAP SERPL CALC-SCNC: 5 MMOL/L (ref 0–18)
BASOPHILS # BLD: 0 X10(3) UL (ref 0–0.2)
BASOPHILS NFR BLD: 0 %
BUN BLD-MCNC: 26 MG/DL (ref 7–18)
CALCIUM BLD-MCNC: 8.8 MG/DL (ref 8.5–10.1)
CHLORIDE SERPL-SCNC: 102 MMOL/L (ref 98–112)
CO2 SERPL-SCNC: 33 MMOL/L (ref 21–32)
CREAT BLD-MCNC: 0.72 MG/DL
EOSINOPHIL # BLD: 0 X10(3) UL (ref 0–0.7)
EOSINOPHIL NFR BLD: 0 %
ERYTHROCYTE [DISTWIDTH] IN BLOOD BY AUTOMATED COUNT: 13.7 %
GLUCOSE BLD-MCNC: 103 MG/DL (ref 70–99)
GLUCOSE BLD-MCNC: 136 MG/DL (ref 70–99)
HCT VFR BLD AUTO: 40.3 %
HGB BLD-MCNC: 13.2 G/DL
LYMPHOCYTES NFR BLD: 19 %
LYMPHOCYTES NFR BLD: 2.62 X10(3) UL (ref 1–4)
MCH RBC QN AUTO: 29.7 PG (ref 26–34)
MCHC RBC AUTO-ENTMCNC: 32.8 G/DL (ref 31–37)
MCV RBC AUTO: 90.6 FL
MONOCYTES # BLD: 0.55 X10(3) UL (ref 0.1–1)
MONOCYTES NFR BLD: 4 %
MORPHOLOGY: NORMAL
NEUTROPHILS # BLD AUTO: 9.82 X10 (3) UL (ref 1.5–7.7)
NEUTROPHILS NFR BLD: 73 %
NEUTS BAND NFR BLD: 4 %
NEUTS HYPERSEG # BLD: 10.63 X10(3) UL (ref 1.5–7.7)
OSMOLALITY SERPL CALC.SUM OF ELEC: 297 MOSM/KG (ref 275–295)
PLATELET # BLD AUTO: 324 10(3)UL (ref 150–450)
PLATELET MORPHOLOGY: NORMAL
POTASSIUM SERPL-SCNC: 3.6 MMOL/L (ref 3.5–5.1)
RBC # BLD AUTO: 4.45 X10(6)UL
SODIUM SERPL-SCNC: 140 MMOL/L (ref 136–145)
TOTAL CELLS COUNTED BLD: 100
WBC # BLD AUTO: 13.8 X10(3) UL (ref 4–11)

## 2022-07-23 PROCEDURE — 99239 HOSP IP/OBS DSCHRG MGMT >30: CPT | Performed by: HOSPITALIST

## 2022-07-23 RX ORDER — PREDNISONE 10 MG/1
TABLET ORAL
Qty: 42 TABLET | Refills: 0 | Status: SHIPPED | OUTPATIENT
Start: 2022-07-23

## 2022-07-23 RX ORDER — ALPRAZOLAM 0.25 MG/1
0.25 TABLET ORAL 2 TIMES DAILY PRN
Qty: 30 TABLET | Refills: 0 | Status: SHIPPED | OUTPATIENT
Start: 2022-07-23 | End: 2022-08-01

## 2022-07-23 NOTE — PLAN OF CARE
Pt discharged home. IV removed, tele dc'd. F/U instructions provided and discussed. Pt and family v/u. Rx given. Discussed adverse reactions and side effects of all new medication and provided appropriate handouts. Pt and family vu. Pt wheeled down by staff with all belongings. Pt  Left denying c/o pain, malaise or cardiac symptoms. All needs met by staff.

## 2022-07-23 NOTE — PROGRESS NOTES
Patient seen and examined. Medically clear to discharge today. Gen: NAD  Resp: CTA  CVS: s1s2  Abd: soft, +bowel sounds  Neck: trachea is midline      Viral bronchitis: improving  Asthma exacerbation: better. PO steroids and inhalers  DM2: insulin  DARSHANA  Anxiety: better on xanax    35 minutes spent on discharge planning.       Sarah Rocha MD

## 2022-07-23 NOTE — PLAN OF CARE
Assumed pt care around 0730. Pt AOx4, SR on tele, RA, VSS,  up ad pelon. Pt states that breathing is \"getting better. \" see flowsheets and emar for further data.      Problem: Patient/Family Goals  Goal: Patient/Family Long Term Goal  Description: Patient's Long Term Goal: Go home    Interventions:  - NEB tx  - Consults    - See additional Care Plan goals for specific interventions  Outcome: Progressing  Goal: Patient/Family Short Term Goal  Description: Patient's Short Term Goal: breath better    Interventions:   - NEB tx  - steroids  - consults    - See additional Care Plan goals for specific interventions  Outcome: Progressing     Problem: RESPIRATORY - ADULT  Goal: Achieves optimal ventilation and oxygenation  Description: INTERVENTIONS:  - Assess for changes in respiratory status  - Assess for changes in mentation and behavior  - Position to facilitate oxygenation and minimize respiratory effort  - Oxygen supplementation based on oxygen saturation or ABGs  - Provide Smoking Cessation handout, if applicable  - Encourage broncho-pulmonary hygiene including cough, deep breathe, Incentive Spirometry  - Assess the need for suctioning and perform as needed  - Assess and instruct to report SOB or any respiratory difficulty  - Respiratory Therapy support as indicated  - Manage/alleviate anxiety  - Monitor for signs/symptoms of CO2 retention  Outcome: Progressing

## 2022-07-25 ENCOUNTER — PATIENT OUTREACH (OUTPATIENT)
Dept: CASE MANAGEMENT | Age: 57
End: 2022-07-25

## 2022-07-25 DIAGNOSIS — Z02.9 ENCOUNTERS FOR UNSPECIFIED ADMINISTRATIVE PURPOSE: ICD-10-CM

## 2022-07-25 NOTE — PROGRESS NOTES
LM for pt to call [facility] for TCM since discharge. NCM phone number was provided for pt to call back. [facility] contact information was provided for pt to call back as well.

## 2022-07-26 RX ORDER — DEXTROAMPHETAMINE SULFATE, DEXTROAMPHETAMINE SACCHARATE, AMPHETAMINE SULFATE AND AMPHETAMINE ASPARTATE 5; 5; 5; 5 MG/1; MG/1; MG/1; MG/1
CAPSULE, EXTENDED RELEASE ORAL
Qty: 30 CAPSULE | Refills: 0 | Status: SHIPPED | OUTPATIENT
Start: 2022-07-26

## 2022-07-26 NOTE — TELEPHONE ENCOUNTER
Requesting Adderall XR 20 mg  LOV: 6/2/22  RTC: not noted  Last Relevant Labs: na  Filled: 6/12/22 #30 with 0 refills [place]    8/8/2022 10:20 AM CARMEN BaughI [facility] [address]

## 2022-08-01 ENCOUNTER — OFFICE VISIT (OUTPATIENT)
Dept: FAMILY MEDICINE CLINIC | Facility: CLINIC | Age: 57
End: 2022-08-01
Payer: COMMERCIAL

## 2022-08-01 VITALS
SYSTOLIC BLOOD PRESSURE: 126 MMHG | DIASTOLIC BLOOD PRESSURE: 82 MMHG | WEIGHT: 202.19 LBS | TEMPERATURE: 98 F | BODY MASS INDEX: 36.28 KG/M2 | RESPIRATION RATE: 16 BRPM | HEART RATE: 74 BPM | HEIGHT: 62.5 IN

## 2022-08-01 DIAGNOSIS — R74.8 ELEVATED LIVER ENZYMES: ICD-10-CM

## 2022-08-01 DIAGNOSIS — Z12.31 VISIT FOR SCREENING MAMMOGRAM: ICD-10-CM

## 2022-08-01 DIAGNOSIS — I10 ESSENTIAL HYPERTENSION: ICD-10-CM

## 2022-08-01 DIAGNOSIS — E55.9 VITAMIN D DEFICIENCY: ICD-10-CM

## 2022-08-01 DIAGNOSIS — E89.0 POSTOPERATIVE HYPOTHYROIDISM: ICD-10-CM

## 2022-08-01 DIAGNOSIS — D50.9 IRON DEFICIENCY ANEMIA, UNSPECIFIED IRON DEFICIENCY ANEMIA TYPE: ICD-10-CM

## 2022-08-01 DIAGNOSIS — E78.5 HYPERLIPIDEMIA, UNSPECIFIED HYPERLIPIDEMIA TYPE: ICD-10-CM

## 2022-08-01 DIAGNOSIS — E11.9 TYPE 2 DIABETES MELLITUS WITHOUT COMPLICATION, WITHOUT LONG-TERM CURRENT USE OF INSULIN (HCC): ICD-10-CM

## 2022-08-01 DIAGNOSIS — J45.41 MODERATE PERSISTENT ASTHMA WITH ACUTE EXACERBATION: Primary | ICD-10-CM

## 2022-08-01 RX ORDER — ALPRAZOLAM 0.25 MG/1
0.25 TABLET ORAL 2 TIMES DAILY PRN
Qty: 30 TABLET | Refills: 0 | Status: SHIPPED | OUTPATIENT
Start: 2022-08-01

## 2022-08-02 ENCOUNTER — OFFICE VISIT (OUTPATIENT)
Dept: SURGERY | Facility: CLINIC | Age: 57
End: 2022-08-02
Payer: COMMERCIAL

## 2022-08-02 VITALS
HEIGHT: 62.5 IN | WEIGHT: 204.31 LBS | SYSTOLIC BLOOD PRESSURE: 128 MMHG | OXYGEN SATURATION: 96 % | BODY MASS INDEX: 36.66 KG/M2 | HEART RATE: 94 BPM | DIASTOLIC BLOOD PRESSURE: 78 MMHG

## 2022-08-02 NOTE — PROGRESS NOTES
Multiple attempts to reach pt and messages left with no return call. Patient went in for HFU appt with PCP on 8/1/22. Encounter closing.

## 2022-08-04 ENCOUNTER — OFFICE VISIT (OUTPATIENT)
Dept: INTERNAL MEDICINE CLINIC | Facility: CLINIC | Age: 57
End: 2022-08-04
Payer: COMMERCIAL

## 2022-08-04 VITALS — WEIGHT: 205.81 LBS | HEIGHT: 62.5 IN | BODY MASS INDEX: 36.92 KG/M2

## 2022-08-04 DIAGNOSIS — I10 ESSENTIAL HYPERTENSION: ICD-10-CM

## 2022-08-04 DIAGNOSIS — E78.5 HYPERLIPIDEMIA, UNSPECIFIED HYPERLIPIDEMIA TYPE: ICD-10-CM

## 2022-08-04 DIAGNOSIS — R73.9 HYPERGLYCEMIA: ICD-10-CM

## 2022-08-04 DIAGNOSIS — E55.9 VITAMIN D DEFICIENCY: ICD-10-CM

## 2022-08-04 DIAGNOSIS — E11.9 TYPE 2 DIABETES MELLITUS WITHOUT COMPLICATION, WITHOUT LONG-TERM CURRENT USE OF INSULIN (HCC): ICD-10-CM

## 2022-08-04 DIAGNOSIS — E66.9 OBESITY (BMI 30-39.9): ICD-10-CM

## 2022-08-04 PROCEDURE — 3008F BODY MASS INDEX DOCD: CPT

## 2022-08-04 PROCEDURE — 97803 MED NUTRITION INDIV SUBSEQ: CPT

## 2022-08-04 NOTE — PATIENT INSTRUCTIONS
Recommendations/goals:    1. Restart food records, MFP.  2.  Continue to eat 4-6 times per day. Continue getting in protein with produce. Aim for 42-52 grams per day. Try to keep the carbs at 120 grams per day or less. (Add shannon seeds to water)  3. Continue to drink at least 64 oz of water per day. 4.  Continue to aim to move 10 minutes per day within your means. 5.  Add strength training 10 minutes 3 days per week. (Sit and Be Fit on YouTube). 6.  Continue to practice eating strategies.

## 2022-08-05 ENCOUNTER — OFFICE VISIT (OUTPATIENT)
Dept: INTERNAL MEDICINE CLINIC | Facility: CLINIC | Age: 57
End: 2022-08-05
Payer: COMMERCIAL

## 2022-08-05 VITALS
SYSTOLIC BLOOD PRESSURE: 128 MMHG | WEIGHT: 202.38 LBS | HEART RATE: 91 BPM | HEIGHT: 62 IN | OXYGEN SATURATION: 92 % | BODY MASS INDEX: 37.24 KG/M2 | DIASTOLIC BLOOD PRESSURE: 88 MMHG | RESPIRATION RATE: 16 BRPM

## 2022-08-05 DIAGNOSIS — E55.9 VITAMIN D DEFICIENCY: ICD-10-CM

## 2022-08-05 DIAGNOSIS — Z99.89 OSA ON CPAP: ICD-10-CM

## 2022-08-05 DIAGNOSIS — E78.5 HYPERLIPIDEMIA, UNSPECIFIED HYPERLIPIDEMIA TYPE: ICD-10-CM

## 2022-08-05 DIAGNOSIS — E11.9 TYPE 2 DIABETES MELLITUS WITHOUT COMPLICATION, WITHOUT LONG-TERM CURRENT USE OF INSULIN (HCC): ICD-10-CM

## 2022-08-05 DIAGNOSIS — I10 ESSENTIAL HYPERTENSION: ICD-10-CM

## 2022-08-05 DIAGNOSIS — E66.01 CLASS 2 SEVERE OBESITY WITH SERIOUS COMORBIDITY AND BODY MASS INDEX (BMI) OF 37.0 TO 37.9 IN ADULT, UNSPECIFIED OBESITY TYPE (HCC): ICD-10-CM

## 2022-08-05 DIAGNOSIS — G47.33 OSA ON CPAP: ICD-10-CM

## 2022-08-05 DIAGNOSIS — Z51.81 ENCOUNTER FOR THERAPEUTIC DRUG MONITORING: Primary | ICD-10-CM

## 2022-08-05 PROCEDURE — 3079F DIAST BP 80-89 MM HG: CPT | Performed by: PHYSICIAN ASSISTANT

## 2022-08-05 PROCEDURE — 3074F SYST BP LT 130 MM HG: CPT | Performed by: PHYSICIAN ASSISTANT

## 2022-08-05 PROCEDURE — 3008F BODY MASS INDEX DOCD: CPT | Performed by: PHYSICIAN ASSISTANT

## 2022-08-05 PROCEDURE — 99214 OFFICE O/P EST MOD 30 MIN: CPT | Performed by: PHYSICIAN ASSISTANT

## 2022-08-07 RX ORDER — HYDROCHLOROTHIAZIDE 25 MG/1
25 TABLET ORAL DAILY
Qty: 90 TABLET | Refills: 1 | Status: SHIPPED | OUTPATIENT
Start: 2022-08-07

## 2022-08-07 RX ORDER — FUROSEMIDE 20 MG/1
TABLET ORAL
Qty: 90 TABLET | Refills: 1 | Status: SHIPPED | OUTPATIENT
Start: 2022-08-07

## 2022-08-07 RX ORDER — POTASSIUM CHLORIDE 1500 MG/1
TABLET, FILM COATED, EXTENDED RELEASE ORAL
Qty: 90 TABLET | Refills: 1 | Status: SHIPPED | OUTPATIENT
Start: 2022-08-07

## 2022-08-12 ENCOUNTER — TELEPHONE (OUTPATIENT)
Dept: FAMILY MEDICINE CLINIC | Facility: CLINIC | Age: 57
End: 2022-08-12

## 2022-08-12 NOTE — TELEPHONE ENCOUNTER
Future Appointments   Date Time Provider Batsheva Jade   8/15/2022  5:00 PM Trina Beard MD [facility] [address] [facility] [address]   9/1/2022 12:40 PM [facility] [place]   9/9/2022  8:30 AM Juanjo Deluna RD [facility] [address]   10/5/2022  8:00 AM Lisa Ayala PA-C [address]   11/1/2022  8:30 AM Trina Beard MD [facility] [address] [facility] [address]

## 2022-08-12 NOTE — TELEPHONE ENCOUNTER
Pt states that she started yesterday with sinus pressure and congestion. No exposure, no Covid test. Ears hurt and sinus hurt, sore throat could be post-nasal drip. No appts available today-please advise.

## 2022-08-15 ENCOUNTER — TELEMEDICINE (OUTPATIENT)
Dept: FAMILY MEDICINE CLINIC | Facility: CLINIC | Age: 57
End: 2022-08-15

## 2022-08-15 ENCOUNTER — TELEPHONE (OUTPATIENT)
Dept: INTERNAL MEDICINE CLINIC | Facility: CLINIC | Age: 57
End: 2022-08-15

## 2022-08-15 DIAGNOSIS — J01.01 ACUTE RECURRENT MAXILLARY SINUSITIS: Primary | ICD-10-CM

## 2022-08-15 RX ORDER — CLINDAMYCIN HYDROCHLORIDE 300 MG/1
300 CAPSULE ORAL 3 TIMES DAILY
Qty: 42 CAPSULE | Refills: 0 | Status: SHIPPED | OUTPATIENT
Start: 2022-08-15 | End: 2022-08-29

## 2022-08-15 NOTE — TELEPHONE ENCOUNTER
Patient took her last dose of Ozempic yesterday and is looking for a sample from you as discussed. [place].

## 2022-08-30 RX ORDER — DEXTROAMPHETAMINE SULFATE, DEXTROAMPHETAMINE SACCHARATE, AMPHETAMINE SULFATE AND AMPHETAMINE ASPARTATE 5; 5; 5; 5 MG/1; MG/1; MG/1; MG/1
CAPSULE, EXTENDED RELEASE ORAL
Qty: 30 CAPSULE | Refills: 0 | Status: SHIPPED | OUTPATIENT
Start: 2022-08-30

## 2022-08-30 NOTE — TELEPHONE ENCOUNTER
Requesting Adderall XR 20 mg  LOV: 8/5/22  RTC: not noted  Last Relevant Labs: na  Filled: 7/26/22 #30 with 0 refills last [place]26[place]22 [place]    10/5/2022  8:00 AM Claudell Sill, PA-C [address]

## 2022-08-31 ENCOUNTER — LAB ENCOUNTER (OUTPATIENT)
Dept: LAB | Age: 57
End: 2022-08-31
Attending: FAMILY MEDICINE
Payer: COMMERCIAL

## 2022-08-31 DIAGNOSIS — G47.33 OSA ON CPAP: ICD-10-CM

## 2022-08-31 DIAGNOSIS — D50.9 IRON DEFICIENCY ANEMIA, UNSPECIFIED IRON DEFICIENCY ANEMIA TYPE: ICD-10-CM

## 2022-08-31 DIAGNOSIS — I10 ESSENTIAL HYPERTENSION: ICD-10-CM

## 2022-08-31 DIAGNOSIS — Z99.89 OSA ON CPAP: ICD-10-CM

## 2022-08-31 DIAGNOSIS — R74.8 ELEVATED LIVER ENZYMES: ICD-10-CM

## 2022-08-31 DIAGNOSIS — Z51.81 ENCOUNTER FOR THERAPEUTIC DRUG MONITORING: ICD-10-CM

## 2022-08-31 DIAGNOSIS — E89.0 POSTOPERATIVE HYPOTHYROIDISM: ICD-10-CM

## 2022-08-31 DIAGNOSIS — E66.01 CLASS 2 SEVERE OBESITY WITH SERIOUS COMORBIDITY AND BODY MASS INDEX (BMI) OF 37.0 TO 37.9 IN ADULT, UNSPECIFIED OBESITY TYPE (HCC): ICD-10-CM

## 2022-08-31 DIAGNOSIS — E11.9 TYPE 2 DIABETES MELLITUS WITHOUT COMPLICATION, WITHOUT LONG-TERM CURRENT USE OF INSULIN (HCC): ICD-10-CM

## 2022-08-31 DIAGNOSIS — E55.9 VITAMIN D DEFICIENCY: ICD-10-CM

## 2022-08-31 DIAGNOSIS — E78.5 HYPERLIPIDEMIA, UNSPECIFIED HYPERLIPIDEMIA TYPE: ICD-10-CM

## 2022-08-31 LAB
ALBUMIN SERPL-MCNC: 4.2 G/DL (ref 3.4–5)
ALBUMIN/GLOB SERPL: 1.4 {RATIO} (ref 1–2)
ALP LIVER SERPL-CCNC: 79 U/L
ALT SERPL-CCNC: 61 U/L
ANION GAP SERPL CALC-SCNC: 4 MMOL/L (ref 0–18)
AST SERPL-CCNC: 28 U/L (ref 15–37)
BASOPHILS # BLD AUTO: 0.07 X10(3) UL (ref 0–0.2)
BASOPHILS NFR BLD AUTO: 0.9 %
BILIRUB SERPL-MCNC: 0.6 MG/DL (ref 0.1–2)
BUN BLD-MCNC: 18 MG/DL (ref 7–18)
CALCIUM BLD-MCNC: 9.8 MG/DL (ref 8.5–10.1)
CHLORIDE SERPL-SCNC: 105 MMOL/L (ref 98–112)
CHOLEST SERPL-MCNC: 159 MG/DL (ref ?–200)
CO2 SERPL-SCNC: 32 MMOL/L (ref 21–32)
CREAT BLD-MCNC: 0.8 MG/DL
CREAT UR-SCNC: 148 MG/DL
DEPRECATED HBV CORE AB SER IA-ACNC: 25.8 NG/ML
EOSINOPHIL # BLD AUTO: 0.13 X10(3) UL (ref 0–0.7)
EOSINOPHIL NFR BLD AUTO: 1.7 %
ERYTHROCYTE [DISTWIDTH] IN BLOOD BY AUTOMATED COUNT: 14.5 %
EST. AVERAGE GLUCOSE BLD GHB EST-MCNC: 123 MG/DL (ref 68–126)
FASTING PATIENT LIPID ANSWER: YES
FASTING STATUS PATIENT QL REPORTED: YES
FOLATE SERPL-MCNC: 65.7 NG/ML (ref 8.7–?)
GFR SERPLBLD BASED ON 1.73 SQ M-ARVRAT: 86 ML/MIN/1.73M2 (ref 60–?)
GLOBULIN PLAS-MCNC: 3.1 G/DL (ref 2.8–4.4)
GLUCOSE BLD-MCNC: 93 MG/DL (ref 70–99)
HBA1C MFR BLD: 5.9 % (ref ?–5.7)
HCT VFR BLD AUTO: 45.2 %
HDLC SERPL-MCNC: 40 MG/DL (ref 40–59)
HGB BLD-MCNC: 14.5 G/DL
IMM GRANULOCYTES # BLD AUTO: 0.16 X10(3) UL (ref 0–1)
IMM GRANULOCYTES NFR BLD: 2.1 %
IRON SATN MFR SERPL: 15 %
IRON SERPL-MCNC: 78 UG/DL
LDLC SERPL CALC-MCNC: 78 MG/DL (ref ?–100)
LYMPHOCYTES # BLD AUTO: 2.07 X10(3) UL (ref 1–4)
LYMPHOCYTES NFR BLD AUTO: 27 %
MCH RBC QN AUTO: 29.2 PG (ref 26–34)
MCHC RBC AUTO-ENTMCNC: 32.1 G/DL (ref 31–37)
MCV RBC AUTO: 91.1 FL
MICROALBUMIN UR-MCNC: 1.51 MG/DL
MICROALBUMIN/CREAT 24H UR-RTO: 10.2 UG/MG (ref ?–30)
MONOCYTES # BLD AUTO: 0.49 X10(3) UL (ref 0.1–1)
MONOCYTES NFR BLD AUTO: 6.4 %
NEUTROPHILS # BLD AUTO: 4.74 X10 (3) UL (ref 1.5–7.7)
NEUTROPHILS # BLD AUTO: 4.74 X10(3) UL (ref 1.5–7.7)
NEUTROPHILS NFR BLD AUTO: 61.9 %
NONHDLC SERPL-MCNC: 119 MG/DL (ref ?–130)
OSMOLALITY SERPL CALC.SUM OF ELEC: 294 MOSM/KG (ref 275–295)
PLATELET # BLD AUTO: 354 10(3)UL (ref 150–450)
POTASSIUM SERPL-SCNC: 3.6 MMOL/L (ref 3.5–5.1)
PROT SERPL-MCNC: 7.3 G/DL (ref 6.4–8.2)
RBC # BLD AUTO: 4.96 X10(6)UL
SODIUM SERPL-SCNC: 141 MMOL/L (ref 136–145)
T4 FREE SERPL-MCNC: 1.2 NG/DL (ref 0.8–1.7)
TIBC SERPL-MCNC: 508 UG/DL (ref 240–450)
TRANSFERRIN SERPL-MCNC: 341 MG/DL (ref 200–360)
TRIGL SERPL-MCNC: 250 MG/DL (ref 30–149)
TSI SER-ACNC: 0.33 MIU/ML (ref 0.36–3.74)
VIT B12 SERPL-MCNC: 753 PG/ML (ref 193–986)
VIT D+METAB SERPL-MCNC: 92.7 NG/ML (ref 30–100)
VLDLC SERPL CALC-MCNC: 39 MG/DL (ref 0–30)
WBC # BLD AUTO: 7.7 X10(3) UL (ref 4–11)

## 2022-08-31 PROCEDURE — 83036 HEMOGLOBIN GLYCOSYLATED A1C: CPT

## 2022-08-31 PROCEDURE — 80061 LIPID PANEL: CPT

## 2022-08-31 PROCEDURE — 83540 ASSAY OF IRON: CPT

## 2022-08-31 PROCEDURE — 36415 COLL VENOUS BLD VENIPUNCTURE: CPT

## 2022-08-31 PROCEDURE — 84443 ASSAY THYROID STIM HORMONE: CPT

## 2022-08-31 PROCEDURE — 82746 ASSAY OF FOLIC ACID SERUM: CPT

## 2022-08-31 PROCEDURE — 84439 ASSAY OF FREE THYROXINE: CPT

## 2022-08-31 PROCEDURE — 84425 ASSAY OF VITAMIN B-1: CPT

## 2022-08-31 PROCEDURE — 82306 VITAMIN D 25 HYDROXY: CPT

## 2022-08-31 PROCEDURE — 82570 ASSAY OF URINE CREATININE: CPT

## 2022-08-31 PROCEDURE — 3044F HG A1C LEVEL LT 7.0%: CPT

## 2022-08-31 PROCEDURE — 82607 VITAMIN B-12: CPT

## 2022-08-31 PROCEDURE — 83550 IRON BINDING TEST: CPT

## 2022-08-31 PROCEDURE — 85025 COMPLETE CBC W/AUTO DIFF WBC: CPT

## 2022-08-31 PROCEDURE — 80053 COMPREHEN METABOLIC PANEL: CPT

## 2022-08-31 PROCEDURE — 3061F NEG MICROALBUMINURIA REV: CPT

## 2022-08-31 PROCEDURE — 82728 ASSAY OF FERRITIN: CPT

## 2022-08-31 PROCEDURE — 82043 UR ALBUMIN QUANTITATIVE: CPT

## 2022-09-01 ENCOUNTER — HOSPITAL ENCOUNTER (OUTPATIENT)
Dept: MAMMOGRAPHY | Age: 57
Discharge: HOME | End: 2022-09-01
Attending: FAMILY MEDICINE
Payer: COMMERCIAL

## 2022-09-01 DIAGNOSIS — Z12.31 VISIT FOR SCREENING MAMMOGRAM: ICD-10-CM

## 2022-09-01 PROCEDURE — 77067 SCR MAMMO BI INCL CAD: CPT | Performed by: FAMILY MEDICINE

## 2022-09-01 PROCEDURE — 77063 BREAST TOMOSYNTHESIS BI: CPT | Performed by: FAMILY MEDICINE

## 2022-09-04 LAB — VITAMIN B1 (THIAMINE), WHOLE B: 221 NMOL/L

## 2022-09-06 ENCOUNTER — OFFICE VISIT (OUTPATIENT)
Dept: SURGERY | Facility: CLINIC | Age: 57
End: 2022-09-06
Payer: COMMERCIAL

## 2022-09-06 VITALS
BODY MASS INDEX: 36.09 KG/M2 | DIASTOLIC BLOOD PRESSURE: 76 MMHG | HEIGHT: 62.5 IN | OXYGEN SATURATION: 98 % | HEART RATE: 96 BPM | SYSTOLIC BLOOD PRESSURE: 118 MMHG | WEIGHT: 201.13 LBS

## 2022-09-09 ENCOUNTER — OFFICE VISIT (OUTPATIENT)
Dept: INTERNAL MEDICINE CLINIC | Facility: CLINIC | Age: 57
End: 2022-09-09
Payer: COMMERCIAL

## 2022-09-09 VITALS — HEIGHT: 62.5 IN | WEIGHT: 201 LBS | BODY MASS INDEX: 36.06 KG/M2

## 2022-09-09 DIAGNOSIS — K21.9 GASTROESOPHAGEAL REFLUX DISEASE, UNSPECIFIED WHETHER ESOPHAGITIS PRESENT: ICD-10-CM

## 2022-09-09 DIAGNOSIS — G47.33 OSA ON CPAP: ICD-10-CM

## 2022-09-09 DIAGNOSIS — E66.9 OBESITY (BMI 30-39.9): ICD-10-CM

## 2022-09-09 DIAGNOSIS — I10 ESSENTIAL HYPERTENSION: ICD-10-CM

## 2022-09-09 DIAGNOSIS — Z99.89 OSA ON CPAP: ICD-10-CM

## 2022-09-09 DIAGNOSIS — E11.9 TYPE 2 DIABETES MELLITUS WITHOUT COMPLICATION, WITHOUT LONG-TERM CURRENT USE OF INSULIN (HCC): ICD-10-CM

## 2022-09-09 DIAGNOSIS — E55.9 VITAMIN D DEFICIENCY: ICD-10-CM

## 2022-09-09 DIAGNOSIS — E61.1 IRON DEFICIENCY: ICD-10-CM

## 2022-09-09 PROCEDURE — 3008F BODY MASS INDEX DOCD: CPT

## 2022-09-09 PROCEDURE — 97803 MED NUTRITION INDIV SUBSEQ: CPT

## 2022-09-09 NOTE — PATIENT INSTRUCTIONS
Recommendations/goals:   Keep it going goals:       1. Continue food records, MFP.  2.  Continue to eat 4-6 times per day. Continue getting in protein with produce. Aim for 42-52 grams per day. Try to keep the carbs at 120 grams per day or less. (Add shannon seeds to water)  3. Continue to drink at least 64 oz of water per day. 4.  Continue to aim to move 10 minutes per day within your means. 5.  Continue strength training 10 minutes 3 days per week. (Sit and Be Fit on YouTube). 6.  Continue to practice eating strategies. Britta Sapp  Work in progress goals:   1. Get your supplements in order for liquid protein. 2.  Buy the CHG soap, 1 bottle. 3.  Buy Ensure pre-surgery, 2 bottles. 4.  Buy the liquid or dissolve Tylenol. Questions/comments [email or web address]

## 2022-09-20 ENCOUNTER — TELEMEDICINE (OUTPATIENT)
Dept: FAMILY MEDICINE CLINIC | Facility: CLINIC | Age: 57
End: 2022-09-20

## 2022-09-20 DIAGNOSIS — J01.01 ACUTE RECURRENT MAXILLARY SINUSITIS: Primary | ICD-10-CM

## 2022-09-20 PROCEDURE — 99213 OFFICE O/P EST LOW 20 MIN: CPT | Performed by: FAMILY MEDICINE

## 2022-09-20 RX ORDER — CLINDAMYCIN HYDROCHLORIDE 300 MG/1
300 CAPSULE ORAL 3 TIMES DAILY
Qty: 30 CAPSULE | Refills: 0 | Status: SHIPPED | OUTPATIENT
Start: 2022-09-20 | End: 2022-09-30

## 2022-09-28 ENCOUNTER — TELEPHONE (OUTPATIENT)
Dept: FAMILY MEDICINE CLINIC | Facility: CLINIC | Age: 57
End: 2022-09-28

## 2022-09-28 NOTE — ED INITIAL ASSESSMENT (HPI)
Patient notified.   Pt c/o dry cough x \"a couple of days\". Denies fever. Pt states she went to [facility] and she diagnosed me with a sinus infection, but sent me here for a chest x-ray\".

## 2022-09-28 NOTE — TELEPHONE ENCOUNTER
Dr.Tomacruz- Orellana is running fever and was exposed to covid on Sunday. Breathing is getting a little harder.       Please advise Evelina 30. [phone]

## 2022-10-03 ENCOUNTER — TELEPHONE (OUTPATIENT)
Dept: SURGERY | Facility: CLINIC | Age: 57
End: 2022-10-03

## 2022-10-03 NOTE — TELEPHONE ENCOUNTER
LVM message for patient regarding answering pt questions regarding the liquid protein diet guidelines prior to surgery. Will plan on sending my chart message as well.

## 2022-10-04 ENCOUNTER — OFFICE VISIT (OUTPATIENT)
Dept: SURGERY | Facility: CLINIC | Age: 57
End: 2022-10-04
Payer: COMMERCIAL

## 2022-10-04 VITALS
WEIGHT: 197 LBS | HEIGHT: 62.5 IN | OXYGEN SATURATION: 98 % | BODY MASS INDEX: 35.35 KG/M2 | HEART RATE: 83 BPM | DIASTOLIC BLOOD PRESSURE: 80 MMHG | SYSTOLIC BLOOD PRESSURE: 132 MMHG

## 2022-10-05 ENCOUNTER — TELEPHONE (OUTPATIENT)
Dept: SURGERY | Facility: CLINIC | Age: 57
End: 2022-10-05

## 2022-10-05 ENCOUNTER — OFFICE VISIT (OUTPATIENT)
Dept: INTERNAL MEDICINE CLINIC | Facility: CLINIC | Age: 57
End: 2022-10-05
Payer: COMMERCIAL

## 2022-10-05 VITALS
BODY MASS INDEX: 34.99 KG/M2 | HEIGHT: 62.5 IN | HEART RATE: 84 BPM | DIASTOLIC BLOOD PRESSURE: 80 MMHG | WEIGHT: 195 LBS | SYSTOLIC BLOOD PRESSURE: 128 MMHG | RESPIRATION RATE: 16 BRPM

## 2022-10-05 DIAGNOSIS — Z51.81 ENCOUNTER FOR THERAPEUTIC DRUG MONITORING: Primary | ICD-10-CM

## 2022-10-05 DIAGNOSIS — Z99.89 OSA ON CPAP: ICD-10-CM

## 2022-10-05 DIAGNOSIS — E11.9 TYPE 2 DIABETES MELLITUS WITHOUT COMPLICATION, WITHOUT LONG-TERM CURRENT USE OF INSULIN (HCC): ICD-10-CM

## 2022-10-05 DIAGNOSIS — G47.33 OSA ON CPAP: ICD-10-CM

## 2022-10-05 DIAGNOSIS — E78.5 HYPERLIPIDEMIA, UNSPECIFIED HYPERLIPIDEMIA TYPE: ICD-10-CM

## 2022-10-05 DIAGNOSIS — I10 ESSENTIAL HYPERTENSION: ICD-10-CM

## 2022-10-05 DIAGNOSIS — E66.01 CLASS 2 SEVERE OBESITY WITH SERIOUS COMORBIDITY AND BODY MASS INDEX (BMI) OF 35.0 TO 35.9 IN ADULT, UNSPECIFIED OBESITY TYPE (HCC): ICD-10-CM

## 2022-10-05 PROCEDURE — 3074F SYST BP LT 130 MM HG: CPT | Performed by: PHYSICIAN ASSISTANT

## 2022-10-05 PROCEDURE — 3079F DIAST BP 80-89 MM HG: CPT | Performed by: PHYSICIAN ASSISTANT

## 2022-10-05 PROCEDURE — 99214 OFFICE O/P EST MOD 30 MIN: CPT | Performed by: PHYSICIAN ASSISTANT

## 2022-10-05 PROCEDURE — 3008F BODY MASS INDEX DOCD: CPT | Performed by: PHYSICIAN ASSISTANT

## 2022-10-05 NOTE — TELEPHONE ENCOUNTER
LVM message to address pt c/o nausea. Suggestions made for tonia tea and to possibly change protein supplements to pea based protein supplelment due to dairy intolerance.

## 2022-10-06 RX ORDER — DEXTROAMPHETAMINE SULFATE, DEXTROAMPHETAMINE SACCHARATE, AMPHETAMINE SULFATE AND AMPHETAMINE ASPARTATE 5; 5; 5; 5 MG/1; MG/1; MG/1; MG/1
CAPSULE, EXTENDED RELEASE ORAL
Qty: 30 CAPSULE | Refills: 0 | Status: SHIPPED | OUTPATIENT
Start: 2022-10-06

## 2022-10-12 ENCOUNTER — PATIENT MESSAGE (OUTPATIENT)
Dept: FAMILY MEDICINE CLINIC | Facility: CLINIC | Age: 57
End: 2022-10-12

## 2022-10-12 RX ORDER — LINACLOTIDE 145 UG/1
145 CAPSULE, GELATIN COATED ORAL
Qty: 90 CAPSULE | Refills: 0 | Status: ON HOLD | OUTPATIENT
Start: 2022-10-12

## 2022-10-12 NOTE — TELEPHONE ENCOUNTER
From: Balaji Carmona Book  To: Ajay Sharma MD  Sent: 10/12/2022 11:44 AM CDT  Subject: Marcio Beltrán if you have any samples that I can  of Linzess? I looked it up with my insurance. Name brand will cost me well over $600, and I only have one left. Thank you!   Radha Morgan

## 2022-10-14 ENCOUNTER — LAB ENCOUNTER (OUTPATIENT)
Dept: LAB | Age: 57
End: 2022-10-14
Attending: SURGERY
Payer: COMMERCIAL

## 2022-10-14 ENCOUNTER — HOSPITAL ENCOUNTER (EMERGENCY)
Age: 57
Discharge: HOME | End: 2022-10-14
Attending: EMERGENCY MEDICINE
Payer: COMMERCIAL

## 2022-10-14 VITALS
DIASTOLIC BLOOD PRESSURE: 71 MMHG | BODY MASS INDEX: 34.6 KG/M2 | OXYGEN SATURATION: 96 % | SYSTOLIC BLOOD PRESSURE: 128 MMHG | TEMPERATURE: 98 F | HEIGHT: 62 IN | RESPIRATION RATE: 16 BRPM | WEIGHT: 188 LBS | HEART RATE: 75 BPM

## 2022-10-14 DIAGNOSIS — E87.6 HYPOKALEMIA: Primary | ICD-10-CM

## 2022-10-14 DIAGNOSIS — Z01.818 PRE-OP TESTING: ICD-10-CM

## 2022-10-14 LAB
ALBUMIN SERPL-MCNC: 4.5 G/DL (ref 3.4–5)
ALBUMIN/GLOB SERPL: 1.5 {RATIO} (ref 1–2)
ALP LIVER SERPL-CCNC: 77 U/L
ALT SERPL-CCNC: 89 U/L
ANION GAP SERPL CALC-SCNC: 5 MMOL/L (ref 0–18)
ANION GAP SERPL CALC-SCNC: 7 MMOL/L (ref 0–18)
ANTIBODY SCREEN: NEGATIVE
AST SERPL-CCNC: 48 U/L (ref 15–37)
BASOPHILS # BLD AUTO: 0.05 X10(3) UL (ref 0–0.2)
BASOPHILS NFR BLD AUTO: 0.6 %
BILIRUB SERPL-MCNC: 0.9 MG/DL (ref 0.1–2)
BUN BLD-MCNC: 19 MG/DL (ref 7–18)
BUN BLD-MCNC: 22 MG/DL (ref 7–18)
CALCIUM BLD-MCNC: 9.6 MG/DL (ref 8.5–10.1)
CALCIUM BLD-MCNC: 9.9 MG/DL (ref 8.5–10.1)
CHLORIDE SERPL-SCNC: 95 MMOL/L (ref 98–112)
CHLORIDE SERPL-SCNC: 95 MMOL/L (ref 98–112)
CO2 SERPL-SCNC: 32 MMOL/L (ref 21–32)
CO2 SERPL-SCNC: 35 MMOL/L (ref 21–32)
CREAT BLD-MCNC: 0.73 MG/DL
CREAT BLD-MCNC: 0.73 MG/DL
EOSINOPHIL # BLD AUTO: 0.11 X10(3) UL (ref 0–0.7)
EOSINOPHIL NFR BLD AUTO: 1.4 %
ERYTHROCYTE [DISTWIDTH] IN BLOOD BY AUTOMATED COUNT: 13.3 %
GFR SERPLBLD BASED ON 1.73 SQ M-ARVRAT: 96 ML/MIN/1.73M2 (ref 60–?)
GFR SERPLBLD BASED ON 1.73 SQ M-ARVRAT: 96 ML/MIN/1.73M2 (ref 60–?)
GLOBULIN PLAS-MCNC: 3.1 G/DL (ref 2.8–4.4)
GLUCOSE BLD-MCNC: 100 MG/DL (ref 70–99)
GLUCOSE BLD-MCNC: 103 MG/DL (ref 70–99)
HCT VFR BLD AUTO: 42.8 %
HGB BLD-MCNC: 15 G/DL
IMM GRANULOCYTES # BLD AUTO: 0.03 X10(3) UL (ref 0–1)
IMM GRANULOCYTES NFR BLD: 0.4 %
LYMPHOCYTES # BLD AUTO: 2.26 X10(3) UL (ref 1–4)
LYMPHOCYTES NFR BLD AUTO: 28.3 %
MCH RBC QN AUTO: 29.6 PG (ref 26–34)
MCHC RBC AUTO-ENTMCNC: 35 G/DL (ref 31–37)
MCV RBC AUTO: 84.4 FL
MONOCYTES # BLD AUTO: 0.66 X10(3) UL (ref 0.1–1)
MONOCYTES NFR BLD AUTO: 8.3 %
NEUTROPHILS # BLD AUTO: 4.87 X10 (3) UL (ref 1.5–7.7)
NEUTROPHILS # BLD AUTO: 4.87 X10(3) UL (ref 1.5–7.7)
NEUTROPHILS NFR BLD AUTO: 61 %
OSMOLALITY SERPL CALC.SUM OF ELEC: 281 MOSM/KG (ref 275–295)
OSMOLALITY SERPL CALC.SUM OF ELEC: 283 MOSM/KG (ref 275–295)
PLATELET # BLD AUTO: 379 10(3)UL (ref 150–450)
POTASSIUM SERPL-SCNC: 2.6 MMOL/L (ref 3.5–5.1)
POTASSIUM SERPL-SCNC: 2.7 MMOL/L (ref 3.5–5.1)
PROT SERPL-MCNC: 7.6 G/DL (ref 6.4–8.2)
RBC # BLD AUTO: 5.07 X10(6)UL
RH BLOOD TYPE: POSITIVE
SODIUM SERPL-SCNC: 134 MMOL/L (ref 136–145)
SODIUM SERPL-SCNC: 135 MMOL/L (ref 136–145)
WBC # BLD AUTO: 8 X10(3) UL (ref 4–11)

## 2022-10-14 PROCEDURE — 93005 ELECTROCARDIOGRAM TRACING: CPT

## 2022-10-14 PROCEDURE — 86900 BLOOD TYPING SEROLOGIC ABO: CPT

## 2022-10-14 PROCEDURE — 86850 RBC ANTIBODY SCREEN: CPT

## 2022-10-14 PROCEDURE — 36415 COLL VENOUS BLD VENIPUNCTURE: CPT

## 2022-10-14 PROCEDURE — 99284 EMERGENCY DEPT VISIT MOD MDM: CPT

## 2022-10-14 PROCEDURE — 85025 COMPLETE CBC W/AUTO DIFF WBC: CPT | Performed by: EMERGENCY MEDICINE

## 2022-10-14 PROCEDURE — 86901 BLOOD TYPING SEROLOGIC RH(D): CPT

## 2022-10-14 PROCEDURE — 96365 THER/PROPH/DIAG IV INF INIT: CPT

## 2022-10-14 PROCEDURE — 80048 BASIC METABOLIC PNL TOTAL CA: CPT

## 2022-10-14 PROCEDURE — 85025 COMPLETE CBC W/AUTO DIFF WBC: CPT

## 2022-10-14 PROCEDURE — 93010 ELECTROCARDIOGRAM REPORT: CPT

## 2022-10-14 PROCEDURE — 96366 THER/PROPH/DIAG IV INF ADDON: CPT

## 2022-10-14 PROCEDURE — 80053 COMPREHEN METABOLIC PANEL: CPT

## 2022-10-14 RX ORDER — POTASSIUM CHLORIDE 14.9 MG/ML
20 INJECTION INTRAVENOUS ONCE
Status: COMPLETED | OUTPATIENT
Start: 2022-10-14 | End: 2022-10-14

## 2022-10-14 RX ORDER — POTASSIUM CHLORIDE 20 MEQ/1
40 TABLET, EXTENDED RELEASE ORAL ONCE
Status: COMPLETED | OUTPATIENT
Start: 2022-10-14 | End: 2022-10-14

## 2022-10-14 NOTE — ED INITIAL ASSESSMENT (HPI)
K+level 2.6 today. From pre-op testing for gastric sleeve on Monday. C/o muscle cramps x few days. No chest pain.

## 2022-10-15 LAB — SARS-COV-2 RNA RESP QL NAA+PROBE: NOT DETECTED

## 2022-10-16 LAB
ATRIAL RATE: 96 BPM
P AXIS: 60 DEGREES
P-R INTERVAL: 160 MS
Q-T INTERVAL: 376 MS
QRS DURATION: 108 MS
QTC CALCULATION (BEZET): 475 MS
R AXIS: 33 DEGREES
T AXIS: -15 DEGREES
VENTRICULAR RATE: 96 BPM

## 2022-10-17 ENCOUNTER — HOSPITAL ENCOUNTER (INPATIENT)
Facility: HOSPITAL | Age: 57
LOS: 1 days | Discharge: HOME | End: 2022-10-18
Attending: SURGERY | Admitting: SURGERY
Payer: COMMERCIAL

## 2022-10-17 ENCOUNTER — ANESTHESIA EVENT (OUTPATIENT)
Dept: SURGERY | Facility: HOSPITAL | Age: 57
End: 2022-10-17
Payer: COMMERCIAL

## 2022-10-17 ENCOUNTER — ANESTHESIA (OUTPATIENT)
Dept: SURGERY | Facility: HOSPITAL | Age: 57
End: 2022-10-17
Payer: COMMERCIAL

## 2022-10-17 DIAGNOSIS — Z01.818 PRE-OP TESTING: Primary | ICD-10-CM

## 2022-10-17 DIAGNOSIS — E66.01 MORBID OBESITY (HCC): ICD-10-CM

## 2022-10-17 PROBLEM — J45.41 MODERATE PERSISTENT ASTHMA WITH EXACERBATION (HCC): Status: RESOLVED | Noted: 2022-03-31 | Resolved: 2022-10-17

## 2022-10-17 PROBLEM — J45.41 MODERATE PERSISTENT ASTHMA WITH EXACERBATION: Status: RESOLVED | Noted: 2022-03-31 | Resolved: 2022-10-17

## 2022-10-17 PROBLEM — E66.9 OBESITY: Status: ACTIVE | Noted: 2022-10-17

## 2022-10-17 LAB
ANION GAP SERPL CALC-SCNC: 7 MMOL/L (ref 0–18)
BUN BLD-MCNC: 17 MG/DL (ref 7–18)
BUN/CREAT SERPL: 25.4 (ref 10–20)
CALCIUM BLD-MCNC: 9.4 MG/DL (ref 8.5–10.1)
CHLORIDE SERPL-SCNC: 100 MMOL/L (ref 98–112)
CO2 SERPL-SCNC: 32 MMOL/L (ref 21–32)
CREAT BLD-MCNC: 0.67 MG/DL
GFR SERPLBLD BASED ON 1.73 SQ M-ARVRAT: 103 ML/MIN/1.73M2 (ref 60–?)
GLUCOSE BLD-MCNC: 102 MG/DL (ref 70–99)
GLUCOSE BLDC GLUCOMTR-MCNC: 117 MG/DL (ref 70–99)
GLUCOSE BLDC GLUCOMTR-MCNC: 130 MG/DL (ref 70–99)
GLUCOSE BLDC GLUCOMTR-MCNC: 135 MG/DL (ref 70–99)
GLUCOSE BLDC GLUCOMTR-MCNC: 95 MG/DL (ref 70–99)
OSMOLALITY SERPL CALC.SUM OF ELEC: 290 MOSM/KG (ref 275–295)
POTASSIUM SERPL-SCNC: 3 MMOL/L (ref 3.5–5.1)
POTASSIUM SERPL-SCNC: 3.5 MMOL/L (ref 3.5–5.1)
SODIUM SERPL-SCNC: 139 MMOL/L (ref 136–145)

## 2022-10-17 PROCEDURE — 99232 SBSQ HOSP IP/OBS MODERATE 35: CPT | Performed by: HOSPITALIST

## 2022-10-17 PROCEDURE — 0DB64Z3 EXCISION OF STOMACH, PERCUTANEOUS ENDOSCOPIC APPROACH, VERTICAL: ICD-10-PCS | Performed by: SURGERY

## 2022-10-17 PROCEDURE — S0077 INJECTION, CLINDAMYCIN PHOSP: HCPCS | Performed by: ANESTHESIOLOGY

## 2022-10-17 PROCEDURE — 3E0T3BZ INTRODUCTION OF ANESTHETIC AGENT INTO PERIPHERAL NERVES AND PLEXI, PERCUTANEOUS APPROACH: ICD-10-PCS | Performed by: SURGERY

## 2022-10-17 RX ORDER — ACETAMINOPHEN 160 MG/5ML
1000 SOLUTION ORAL EVERY 8 HOURS
Status: DISCONTINUED | OUTPATIENT
Start: 2022-10-17 | End: 2022-10-18

## 2022-10-17 RX ORDER — PHENYLEPHRINE HCL 10 MG/ML
VIAL (ML) INJECTION AS NEEDED
Status: DISCONTINUED | OUTPATIENT
Start: 2022-10-17 | End: 2022-10-17 | Stop reason: SURG

## 2022-10-17 RX ORDER — FAMOTIDINE 20 MG/1
20 TABLET, FILM COATED ORAL ONCE
Status: COMPLETED | OUTPATIENT
Start: 2022-10-17 | End: 2022-10-17

## 2022-10-17 RX ORDER — GABAPENTIN 100 MG/1
200 CAPSULE ORAL NIGHTLY
Status: DISCONTINUED | OUTPATIENT
Start: 2022-10-17 | End: 2022-10-18

## 2022-10-17 RX ORDER — NICOTINE POLACRILEX 4 MG
15 LOZENGE BUCCAL
Status: DISCONTINUED | OUTPATIENT
Start: 2022-10-17 | End: 2022-10-17 | Stop reason: HOSPADM

## 2022-10-17 RX ORDER — MORPHINE SULFATE 4 MG/ML
2 INJECTION, SOLUTION INTRAMUSCULAR; INTRAVENOUS EVERY 10 MIN PRN
Status: DISCONTINUED | OUTPATIENT
Start: 2022-10-17 | End: 2022-10-17 | Stop reason: HOSPADM

## 2022-10-17 RX ORDER — NICOTINE POLACRILEX 4 MG
30 LOZENGE BUCCAL
Status: DISCONTINUED | OUTPATIENT
Start: 2022-10-17 | End: 2022-10-18

## 2022-10-17 RX ORDER — DEXTROAMPHETAMINE SACCHARATE, AMPHETAMINE ASPARTATE, DEXTROAMPHETAMINE SULFATE AND AMPHETAMINE SULFATE 2.5; 2.5; 2.5; 2.5 MG/1; MG/1; MG/1; MG/1
10 TABLET ORAL
Status: DISCONTINUED | OUTPATIENT
Start: 2022-10-18 | End: 2022-10-18

## 2022-10-17 RX ORDER — DEXAMETHASONE SODIUM PHOSPHATE 4 MG/ML
VIAL (ML) INJECTION AS NEEDED
Status: DISCONTINUED | OUTPATIENT
Start: 2022-10-17 | End: 2022-10-17 | Stop reason: SURG

## 2022-10-17 RX ORDER — HYDROMORPHONE HYDROCHLORIDE 1 MG/ML
0.6 INJECTION, SOLUTION INTRAMUSCULAR; INTRAVENOUS; SUBCUTANEOUS EVERY 5 MIN PRN
Status: DISCONTINUED | OUTPATIENT
Start: 2022-10-17 | End: 2022-10-17 | Stop reason: HOSPADM

## 2022-10-17 RX ORDER — ENOXAPARIN SODIUM 100 MG/ML
40 INJECTION SUBCUTANEOUS DAILY
Status: DISCONTINUED | OUTPATIENT
Start: 2022-10-18 | End: 2022-10-18

## 2022-10-17 RX ORDER — MONTELUKAST SODIUM 10 MG/1
10 TABLET ORAL NIGHTLY
Status: DISCONTINUED | OUTPATIENT
Start: 2022-10-17 | End: 2022-10-18

## 2022-10-17 RX ORDER — CLINDAMYCIN PHOSPHATE 900 MG/50ML
900 INJECTION INTRAVENOUS ONCE
Status: DISCONTINUED | OUTPATIENT
Start: 2022-10-17 | End: 2022-10-17 | Stop reason: HOSPADM

## 2022-10-17 RX ORDER — NICOTINE POLACRILEX 4 MG
15 LOZENGE BUCCAL
Status: DISCONTINUED | OUTPATIENT
Start: 2022-10-17 | End: 2022-10-18

## 2022-10-17 RX ORDER — BUDESONIDE 0.5 MG/2ML
0.5 INHALANT ORAL 2 TIMES DAILY
Status: DISCONTINUED | OUTPATIENT
Start: 2022-10-17 | End: 2022-10-17 | Stop reason: CLARIF

## 2022-10-17 RX ORDER — HEPARIN SODIUM 5000 [USP'U]/ML
5000 INJECTION, SOLUTION INTRAVENOUS; SUBCUTANEOUS ONCE
Status: COMPLETED | OUTPATIENT
Start: 2022-10-17 | End: 2022-10-17

## 2022-10-17 RX ORDER — SODIUM CHLORIDE, SODIUM LACTATE, POTASSIUM CHLORIDE, CALCIUM CHLORIDE 600; 310; 30; 20 MG/100ML; MG/100ML; MG/100ML; MG/100ML
INJECTION, SOLUTION INTRAVENOUS CONTINUOUS
Status: DISCONTINUED | OUTPATIENT
Start: 2022-10-17 | End: 2022-10-17 | Stop reason: HOSPADM

## 2022-10-17 RX ORDER — SODIUM CHLORIDE, SODIUM LACTATE, POTASSIUM CHLORIDE, CALCIUM CHLORIDE 600; 310; 30; 20 MG/100ML; MG/100ML; MG/100ML; MG/100ML
INJECTION, SOLUTION INTRAVENOUS CONTINUOUS
Status: DISCONTINUED | OUTPATIENT
Start: 2022-10-17 | End: 2022-10-18

## 2022-10-17 RX ORDER — GABAPENTIN 100 MG/1
100 CAPSULE ORAL 2 TIMES DAILY
Status: DISCONTINUED | OUTPATIENT
Start: 2022-10-18 | End: 2022-10-18

## 2022-10-17 RX ORDER — PROCHLORPERAZINE EDISYLATE 5 MG/ML
5 INJECTION INTRAMUSCULAR; INTRAVENOUS ONCE
Status: COMPLETED | OUTPATIENT
Start: 2022-10-17 | End: 2022-10-17

## 2022-10-17 RX ORDER — MORPHINE SULFATE 4 MG/ML
4 INJECTION, SOLUTION INTRAMUSCULAR; INTRAVENOUS EVERY 10 MIN PRN
Status: DISCONTINUED | OUTPATIENT
Start: 2022-10-17 | End: 2022-10-17 | Stop reason: HOSPADM

## 2022-10-17 RX ORDER — GLYCOPYRROLATE 0.2 MG/ML
INJECTION, SOLUTION INTRAMUSCULAR; INTRAVENOUS AS NEEDED
Status: DISCONTINUED | OUTPATIENT
Start: 2022-10-17 | End: 2022-10-17 | Stop reason: SURG

## 2022-10-17 RX ORDER — DEXTROSE MONOHYDRATE 25 G/50ML
50 INJECTION, SOLUTION INTRAVENOUS
Status: DISCONTINUED | OUTPATIENT
Start: 2022-10-17 | End: 2022-10-17 | Stop reason: HOSPADM

## 2022-10-17 RX ORDER — BUPIVACAINE HYDROCHLORIDE AND EPINEPHRINE 2.5; 5 MG/ML; UG/ML
INJECTION, SOLUTION INFILTRATION; PERINEURAL AS NEEDED
Status: DISCONTINUED | OUTPATIENT
Start: 2022-10-17 | End: 2022-10-17 | Stop reason: HOSPADM

## 2022-10-17 RX ORDER — HYDROMORPHONE HYDROCHLORIDE 1 MG/ML
0.2 INJECTION, SOLUTION INTRAMUSCULAR; INTRAVENOUS; SUBCUTANEOUS EVERY 5 MIN PRN
Status: DISCONTINUED | OUTPATIENT
Start: 2022-10-17 | End: 2022-10-17 | Stop reason: HOSPADM

## 2022-10-17 RX ORDER — GABAPENTIN 300 MG/1
300 CAPSULE ORAL ONCE
Status: COMPLETED | OUTPATIENT
Start: 2022-10-17 | End: 2022-10-17

## 2022-10-17 RX ORDER — ONDANSETRON 2 MG/ML
4 INJECTION INTRAMUSCULAR; INTRAVENOUS EVERY 6 HOURS PRN
Status: DISCONTINUED | OUTPATIENT
Start: 2022-10-17 | End: 2022-10-17 | Stop reason: HOSPADM

## 2022-10-17 RX ORDER — ACETAMINOPHEN 500 MG
1000 TABLET ORAL ONCE
Status: COMPLETED | OUTPATIENT
Start: 2022-10-17 | End: 2022-10-17

## 2022-10-17 RX ORDER — DIPHENHYDRAMINE HYDROCHLORIDE 50 MG/ML
25 INJECTION INTRAMUSCULAR; INTRAVENOUS ONCE
Status: COMPLETED | OUTPATIENT
Start: 2022-10-17 | End: 2022-10-17

## 2022-10-17 RX ORDER — LEVOTHYROXINE SODIUM 0.15 MG/1
150 TABLET ORAL
Status: DISCONTINUED | OUTPATIENT
Start: 2022-10-18 | End: 2022-10-18

## 2022-10-17 RX ORDER — METOCLOPRAMIDE HYDROCHLORIDE 5 MG/ML
10 INJECTION INTRAMUSCULAR; INTRAVENOUS EVERY 6 HOURS PRN
Status: DISCONTINUED | OUTPATIENT
Start: 2022-10-17 | End: 2022-10-18

## 2022-10-17 RX ORDER — PANTOPRAZOLE SODIUM 20 MG/1
20 TABLET, DELAYED RELEASE ORAL NIGHTLY
Status: DISCONTINUED | OUTPATIENT
Start: 2022-10-17 | End: 2022-10-17 | Stop reason: ALTCHOICE

## 2022-10-17 RX ORDER — GABAPENTIN 100 MG/1
CAPSULE ORAL 3 TIMES DAILY
Status: DISCONTINUED | OUTPATIENT
Start: 2022-10-17 | End: 2022-10-17 | Stop reason: ALTCHOICE

## 2022-10-17 RX ORDER — NALOXONE HYDROCHLORIDE 0.4 MG/ML
80 INJECTION, SOLUTION INTRAMUSCULAR; INTRAVENOUS; SUBCUTANEOUS AS NEEDED
Status: DISCONTINUED | OUTPATIENT
Start: 2022-10-17 | End: 2022-10-17 | Stop reason: HOSPADM

## 2022-10-17 RX ORDER — ALPRAZOLAM 0.25 MG/1
0.25 TABLET ORAL 2 TIMES DAILY PRN
Status: DISCONTINUED | OUTPATIENT
Start: 2022-10-17 | End: 2022-10-18

## 2022-10-17 RX ORDER — ONDANSETRON 2 MG/ML
INJECTION INTRAMUSCULAR; INTRAVENOUS AS NEEDED
Status: DISCONTINUED | OUTPATIENT
Start: 2022-10-17 | End: 2022-10-17 | Stop reason: SURG

## 2022-10-17 RX ORDER — CLINDAMYCIN PHOSPHATE 150 MG/ML
INJECTION, SOLUTION INTRAVENOUS AS NEEDED
Status: DISCONTINUED | OUTPATIENT
Start: 2022-10-17 | End: 2022-10-17 | Stop reason: SURG

## 2022-10-17 RX ORDER — ONDANSETRON 2 MG/ML
4 INJECTION INTRAMUSCULAR; INTRAVENOUS EVERY 6 HOURS PRN
Status: DISCONTINUED | OUTPATIENT
Start: 2022-10-17 | End: 2022-10-18

## 2022-10-17 RX ORDER — IPRATROPIUM BROMIDE AND ALBUTEROL SULFATE 2.5; .5 MG/3ML; MG/3ML
3 SOLUTION RESPIRATORY (INHALATION) EVERY 4 HOURS PRN
Status: DISCONTINUED | OUTPATIENT
Start: 2022-10-17 | End: 2022-10-18

## 2022-10-17 RX ORDER — MORPHINE SULFATE 10 MG/ML
6 INJECTION, SOLUTION INTRAMUSCULAR; INTRAVENOUS EVERY 10 MIN PRN
Status: DISCONTINUED | OUTPATIENT
Start: 2022-10-17 | End: 2022-10-17 | Stop reason: HOSPADM

## 2022-10-17 RX ORDER — KETOROLAC TROMETHAMINE 30 MG/ML
30 INJECTION, SOLUTION INTRAMUSCULAR; INTRAVENOUS EVERY 6 HOURS
Status: DISCONTINUED | OUTPATIENT
Start: 2022-10-17 | End: 2022-10-18

## 2022-10-17 RX ORDER — HYDROMORPHONE HYDROCHLORIDE 1 MG/ML
0.4 INJECTION, SOLUTION INTRAMUSCULAR; INTRAVENOUS; SUBCUTANEOUS EVERY 5 MIN PRN
Status: DISCONTINUED | OUTPATIENT
Start: 2022-10-17 | End: 2022-10-17 | Stop reason: HOSPADM

## 2022-10-17 RX ORDER — SCOLOPAMINE TRANSDERMAL SYSTEM 1 MG/1
1 PATCH, EXTENDED RELEASE TRANSDERMAL ONCE
Status: DISCONTINUED | OUTPATIENT
Start: 2022-10-17 | End: 2022-10-18

## 2022-10-17 RX ORDER — NICOTINE POLACRILEX 4 MG
30 LOZENGE BUCCAL
Status: DISCONTINUED | OUTPATIENT
Start: 2022-10-17 | End: 2022-10-17 | Stop reason: HOSPADM

## 2022-10-17 RX ORDER — CELECOXIB 200 MG/1
400 CAPSULE ORAL ONCE
Status: COMPLETED | OUTPATIENT
Start: 2022-10-17 | End: 2022-10-17

## 2022-10-17 RX ORDER — DEXTROSE MONOHYDRATE 25 G/50ML
50 INJECTION, SOLUTION INTRAVENOUS
Status: DISCONTINUED | OUTPATIENT
Start: 2022-10-17 | End: 2022-10-18

## 2022-10-17 RX ORDER — SODIUM CHLORIDE, SODIUM LACTATE, POTASSIUM CHLORIDE, CALCIUM CHLORIDE 600; 310; 30; 20 MG/100ML; MG/100ML; MG/100ML; MG/100ML
INJECTION, SOLUTION INTRAVENOUS CONTINUOUS
Status: DISCONTINUED | OUTPATIENT
Start: 2022-10-17 | End: 2022-10-17 | Stop reason: ALTCHOICE

## 2022-10-17 RX ORDER — ROCURONIUM BROMIDE 10 MG/ML
INJECTION, SOLUTION INTRAVENOUS AS NEEDED
Status: DISCONTINUED | OUTPATIENT
Start: 2022-10-17 | End: 2022-10-17 | Stop reason: SURG

## 2022-10-17 RX ORDER — OXYCODONE HYDROCHLORIDE 5 MG/1
5 TABLET ORAL EVERY 6 HOURS PRN
Status: DISCONTINUED | OUTPATIENT
Start: 2022-10-17 | End: 2022-10-18

## 2022-10-17 RX ORDER — HYDROCHLOROTHIAZIDE 25 MG/1
25 TABLET ORAL DAILY
Status: DISCONTINUED | OUTPATIENT
Start: 2022-10-18 | End: 2022-10-18

## 2022-10-17 RX ORDER — LIDOCAINE HYDROCHLORIDE 10 MG/ML
INJECTION, SOLUTION EPIDURAL; INFILTRATION; INTRACAUDAL; PERINEURAL AS NEEDED
Status: DISCONTINUED | OUTPATIENT
Start: 2022-10-17 | End: 2022-10-17 | Stop reason: SURG

## 2022-10-17 RX ORDER — METOCLOPRAMIDE 10 MG/1
10 TABLET ORAL ONCE
Status: DISCONTINUED | OUTPATIENT
Start: 2022-10-17 | End: 2022-10-17 | Stop reason: HOSPADM

## 2022-10-17 RX ORDER — PANTOPRAZOLE SODIUM 40 MG/1
40 TABLET, DELAYED RELEASE ORAL
Status: DISCONTINUED | OUTPATIENT
Start: 2022-10-18 | End: 2022-10-18

## 2022-10-17 RX ORDER — ACETAMINOPHEN 500 MG
1000 TABLET ORAL ONCE
Status: DISCONTINUED | OUTPATIENT
Start: 2022-10-17 | End: 2022-10-17 | Stop reason: HOSPADM

## 2022-10-17 RX ADMIN — LIDOCAINE HYDROCHLORIDE 40 MG: 10 INJECTION, SOLUTION EPIDURAL; INFILTRATION; INTRACAUDAL; PERINEURAL at 11:28:00

## 2022-10-17 RX ADMIN — PHENYLEPHRINE HCL 100 MCG: 10 MG/ML VIAL (ML) INJECTION at 11:42:00

## 2022-10-17 RX ADMIN — ROCURONIUM BROMIDE 20 MG: 10 INJECTION, SOLUTION INTRAVENOUS at 11:37:00

## 2022-10-17 RX ADMIN — GLYCOPYRROLATE 0.2 MG: 0.2 INJECTION, SOLUTION INTRAMUSCULAR; INTRAVENOUS at 11:27:00

## 2022-10-17 RX ADMIN — ROCURONIUM BROMIDE 5 MG: 10 INJECTION, SOLUTION INTRAVENOUS at 11:27:00

## 2022-10-17 RX ADMIN — CLINDAMYCIN PHOSPHATE 900 MG: 150 INJECTION, SOLUTION INTRAVENOUS at 11:34:00

## 2022-10-17 RX ADMIN — SODIUM CHLORIDE, SODIUM LACTATE, POTASSIUM CHLORIDE, CALCIUM CHLORIDE: 600; 310; 30; 20 INJECTION, SOLUTION INTRAVENOUS at 11:50:00

## 2022-10-17 RX ADMIN — PHENYLEPHRINE HCL 100 MCG: 10 MG/ML VIAL (ML) INJECTION at 11:39:00

## 2022-10-17 RX ADMIN — SODIUM CHLORIDE, SODIUM LACTATE, POTASSIUM CHLORIDE, CALCIUM CHLORIDE: 600; 310; 30; 20 INJECTION, SOLUTION INTRAVENOUS at 11:25:00

## 2022-10-17 RX ADMIN — SODIUM CHLORIDE, SODIUM LACTATE, POTASSIUM CHLORIDE, CALCIUM CHLORIDE: 600; 310; 30; 20 INJECTION, SOLUTION INTRAVENOUS at 12:28:00

## 2022-10-17 RX ADMIN — ONDANSETRON 4 MG: 2 INJECTION INTRAMUSCULAR; INTRAVENOUS at 11:27:00

## 2022-10-17 RX ADMIN — DEXAMETHASONE SODIUM PHOSPHATE 4 MG: 4 MG/ML VIAL (ML) INJECTION at 11:27:00

## 2022-10-17 RX ADMIN — PHENYLEPHRINE HCL 100 MCG: 10 MG/ML VIAL (ML) INJECTION at 11:44:00

## 2022-10-17 NOTE — ANESTHESIA PROCEDURE NOTES
Airway  Date/Time: 10/17/2022 11:29 AM  Urgency: Elective    Airway not difficult    General Information and Staff    Patient location during procedure: OR  Anesthesiologist: Lissett Rivera MD  Resident/CRNA: Guerline Worley CRNA  Performed: CRNA     Indications and Patient Condition  Indications for airway management: anesthesia  Spontaneous ventilation: present  Sedation level: deep  Preoxygenated: yes  Patient position: sniffing  MILS maintained throughout  Mask difficulty assessment: 1 - vent by mask  Planned trial extubation    Final Airway Details  Final airway type: endotracheal airway      Successful airway: ETT  Cuffed: yes   Successful intubation technique: Video laryngoscopy  Facilitating devices/methods: intubating stylet  Endotracheal tube insertion site: oral  Blade: GlideScope  Blade size: #3  ETT size (mm): 7.0    Cormack-Lehane Classification: grade I - full view of glottis  Placement verified by: chest auscultation and capnometry   Inital cuff pressure (cm H2O): 5  Cuff volume (mL): 5  Measured from: lips  ETT to lips (cm): 21  Number of attempts at approach: 1

## 2022-10-17 NOTE — OPERATIVE REPORT
Alejandro Covarrubias / Idania Rosado / Lara Giles / Jozef Estrella / Cate Luke / Marcela [facility] [phone]  Answering Service [phone]        Preop Diagnosis: Morbid Obesity secondary to excess calories   Postop Diagnosis: Morbid Obesity secondary to excess calories  Procedure: Laparoscopic sleeve gastrectomy with TAP block  Surgeon: Lara Giles  Co-surgeon: angela  Anesthesia: GETA  EBL: 5ml  Complications: None  Date of operation: 10/17/22    Indications: Patient is a 64year old female here for elective bariatric surgery. The patient has elected as for a sleeve gastrectomy as their procedure of choice. she   underwent a lengthy preoperative process and understands the alternatives, the laparoscopic approach, possibility of conversion to open, the anticipated pain recovery, the potential benefits of weight loss surgery, and the risks of surgery particularly the short-term risks such as DVT, PE, particularly with his prior history, cardiac complications, staple line leak, postoperative reflux and stricture, weight regain, nutritional deficiencies, and death. she  stated understanding and agreed to proceed. Operative Summary: Patient was brought to the operating room and placed under general anesthesia. Coy catheter was placed. Perioperative antibiotics and heparin were given and was secured to the operating room table. Prepped and draped in a sterile fashion. An Optiview technique was used to insert an 11 mm left paramedian trocar and the abdomen was insufflated and the patient tolerated the insufflation well throughout the entire case. Cursory examination of the abdomen was unremarkable, there was no evidence of hiatal hernia. Of note the patient periodically has been on steroids over the years and her tissues were relatively thinned out. The patient was then placed in steep reverse Trendelenburg position. Laparoscopic TAP block was performed using . 25% marcaine bilaterally.    A 5 mm left lateral trocar, a 5 mm right upper quadrant trocar, and a 12 mm right paramedian trocar were all inserted under direct vision. A Geovanny liver retractor was inserted through an epigastric port. We began by mobilizing the greater curvature of the stomach using a LigaSure device. We took this dissection all the way up along the greater curve and mobilize the entire fundus. The angle of His was carefully dissected out identifying the anterior border of the left january. The posterior fundus was carefully mobilized off of the retroperitoneum. The patient had quite a significant amount of posterior fundic tissue that required mobilization. Once the fundus was completely mobilized we continued our dissection distally along the greater curvature towards the antrum. We stopped her dissection at the starting point of our sleeve gastrectomy, which was about 6 cm proximal to the pylorus. We then advanced a 40 [place] VISI G bougie that was inserted by anesthesia and guided along the lesser curve towards the antrum. We then performed our sleeve gastrectomy starting with 1 fires of the covPigmata Mediaen black load stapler was seam guard reinforcements. Again we started about 6 cm proximal to the pylorus. Along our sleeve, we ensured that we did not narrow the sleeve at the incisura and that we did not cause any twisting or corkscrewing of the sleeve along its path. We completed our sleeve with 4 fires of the purple stapler, with seam guard reinforcement, taking care to ensure that we did not leave a significant posterior fundic pouch. The final seam guard was cut sharply, completing her sleeve gastrectomy. We were very deliberate with her stapling and inspected each staple line very closely afterwards. We ensured that there was excellent hemostasis along all dissection lines. The Visi G was pulled back a few centimeters and the antrum was occluded with atraumatic grasper.   A leak test was performed by insufflating the VISI G under irrigated water. There was good distention of the sleeve. There is no evidence of any leak. There is no evidence of any bleeding along the staple lines. The irrigation was suctioned out. The [facility] liver retractor was removed under direct vision. The patient was flattened out. The sleeve gastrectomy was extracted for the right paramedian trocar site. Trochars were removed under direct vision. The fascia the extraction site was closed with two figure-of-eight 0 PDS sutures. Extraction site port was closed with a running 3-0 Vicryl deep dermal suture and a running 4-0 vicrylsubcuticular. Trocar sites were closed with 4-0 Vicryl subcuticular sutures. Sterile dressing was applied with Dermabond. Patient tolerated the procedure well and was brought to the postanesthesia recovery unit in stable condition.

## 2022-10-17 NOTE — PLAN OF CARE
Pt A&O x4. Clear liquid diet. Walking 1 with a walker. Check void. ACU check SCHS. RA. SCDS. Antiemetic medication was given. Patient has remained free from falls throughout the day. Hourly rounding maintained. Pt's bed in lowest position w/ side rails up. Patient has been educated and is compliant with aileen light system. The patient has been frequently assessed  and evaluated pursuant to at risk medications. Pt's room tidy and clear from potential fall risk.      Problem: Patient Centered Care  Goal: Patient preferences are identified and integrated in the patient's plan of care  Description: Interventions:  - What would you like us to know as we care for you?   - Provide timely, complete, and accurate information to patient/family  - Incorporate patient and family knowledge, values, beliefs, and cultural backgrounds into the planning and delivery of care  - Encourage patient/family to participate in care and decision-making at the level they choose  - Honor patient and family perspectives and choices  Outcome: Progressing     Problem: Patient/Family Goals  Goal: Patient/Family Long Term Goal  Description: Patient's Long Term Goal:     Interventions:  -   - See additional Care Plan goals for specific interventions  Outcome: Progressing  Goal: Patient/Family Short Term Goal  Description: Patient's Short Term Goal:     Interventions:   -   - See additional Care Plan goals for specific interventions  Outcome: Progressing     Problem: PAIN - ADULT  Goal: Verbalizes/displays adequate comfort level or patient's stated pain goal  Description: INTERVENTIONS:  - Encourage pt to monitor pain and request assistance  - Assess pain using appropriate pain scale  - Administer analgesics based on type and severity of pain and evaluate response  - Implement non-pharmacological measures as appropriate and evaluate response  - Consider cultural and social influences on pain and pain management  - Manage/alleviate anxiety  - Utilize distraction and/or relaxation techniques  - Monitor for opioid side effects  - Notify MD/LIP if interventions unsuccessful or patient reports new pain  - Anticipate increased pain with activity and pre-medicate as appropriate  Outcome: Progressing     Problem: RISK FOR INFECTION - ADULT  Goal: Absence of fever/infection during anticipated neutropenic period  Description: INTERVENTIONS  - Monitor WBC  - Administer growth factors as ordered  - Implement neutropenic guidelines  Outcome: Progressing     Problem: SAFETY ADULT - FALL  Goal: Free from fall injury  Description: INTERVENTIONS:  - Assess pt frequently for physical needs  - Identify cognitive and physical deficits and behaviors that affect risk of falls.   - [place] fall precautions as indicated by assessment.  - Educate pt/family on patient safety including physical limitations  - Instruct pt to call for assistance with activity based on assessment  - Modify environment to reduce risk of injury  - Provide assistive devices as appropriate  - Consider OT/PT consult to assist with strengthening/mobility  - Encourage toileting schedule  Outcome: Progressing

## 2022-10-18 ENCOUNTER — TELEPHONE (OUTPATIENT)
Dept: INTERNAL MEDICINE CLINIC | Facility: CLINIC | Age: 57
End: 2022-10-18

## 2022-10-18 VITALS
SYSTOLIC BLOOD PRESSURE: 129 MMHG | WEIGHT: 188 LBS | HEART RATE: 71 BPM | RESPIRATION RATE: 16 BRPM | DIASTOLIC BLOOD PRESSURE: 73 MMHG | OXYGEN SATURATION: 98 % | TEMPERATURE: 98 F | HEIGHT: 62 IN | BODY MASS INDEX: 34.6 KG/M2

## 2022-10-18 LAB
ANION GAP SERPL CALC-SCNC: 6 MMOL/L (ref 0–18)
BASOPHILS # BLD AUTO: 0.03 X10(3) UL (ref 0–0.2)
BASOPHILS NFR BLD AUTO: 0.4 %
BUN BLD-MCNC: 10 MG/DL (ref 7–18)
BUN/CREAT SERPL: 17.9 (ref 10–20)
CALCIUM BLD-MCNC: 8.5 MG/DL (ref 8.5–10.1)
CHLORIDE SERPL-SCNC: 104 MMOL/L (ref 98–112)
CO2 SERPL-SCNC: 31 MMOL/L (ref 21–32)
CREAT BLD-MCNC: 0.56 MG/DL
DEPRECATED RDW RBC AUTO: 44.2 FL (ref 35.1–46.3)
EOSINOPHIL # BLD AUTO: 0.02 X10(3) UL (ref 0–0.7)
EOSINOPHIL NFR BLD AUTO: 0.3 %
ERYTHROCYTE [DISTWIDTH] IN BLOOD BY AUTOMATED COUNT: 13.3 % (ref 11–15)
GFR SERPLBLD BASED ON 1.73 SQ M-ARVRAT: 107 ML/MIN/1.73M2 (ref 60–?)
GLUCOSE BLD-MCNC: 105 MG/DL (ref 70–99)
GLUCOSE BLDC GLUCOMTR-MCNC: 103 MG/DL (ref 70–99)
GLUCOSE BLDC GLUCOMTR-MCNC: 99 MG/DL (ref 70–99)
HCT VFR BLD AUTO: 38.6 %
HGB BLD-MCNC: 12.7 G/DL
IMM GRANULOCYTES # BLD AUTO: 0.02 X10(3) UL (ref 0–1)
IMM GRANULOCYTES NFR BLD: 0.3 %
LYMPHOCYTES # BLD AUTO: 1.67 X10(3) UL (ref 1–4)
LYMPHOCYTES NFR BLD AUTO: 21.7 %
MAGNESIUM SERPL-MCNC: 2 MG/DL (ref 1.6–2.6)
MCH RBC QN AUTO: 29.7 PG (ref 26–34)
MCHC RBC AUTO-ENTMCNC: 32.9 G/DL (ref 31–37)
MCV RBC AUTO: 90.2 FL
MONOCYTES # BLD AUTO: 0.65 X10(3) UL (ref 0.1–1)
MONOCYTES NFR BLD AUTO: 8.4 %
NEUTROPHILS # BLD AUTO: 5.31 X10 (3) UL (ref 1.5–7.7)
NEUTROPHILS # BLD AUTO: 5.31 X10(3) UL (ref 1.5–7.7)
NEUTROPHILS NFR BLD AUTO: 68.9 %
OSMOLALITY SERPL CALC.SUM OF ELEC: 291 MOSM/KG (ref 275–295)
PLATELET # BLD AUTO: 312 10(3)UL (ref 150–450)
POTASSIUM SERPL-SCNC: 3 MMOL/L (ref 3.5–5.1)
RBC # BLD AUTO: 4.28 X10(6)UL
SODIUM SERPL-SCNC: 141 MMOL/L (ref 136–145)
WBC # BLD AUTO: 7.7 X10(3) UL (ref 4–11)

## 2022-10-18 PROCEDURE — 99239 HOSP IP/OBS DSCHRG MGMT >30: CPT | Performed by: HOSPITALIST

## 2022-10-18 RX ORDER — PANTOPRAZOLE SODIUM 40 MG/1
40 TABLET, DELAYED RELEASE ORAL
Qty: 90 TABLET | Refills: 0 | Status: SHIPPED | OUTPATIENT
Start: 2022-10-19

## 2022-10-18 RX ORDER — OXYCODONE HYDROCHLORIDE 5 MG/1
5 TABLET ORAL EVERY 6 HOURS PRN
Qty: 10 TABLET | Refills: 0 | Status: SHIPPED | OUTPATIENT
Start: 2022-10-18

## 2022-10-18 RX ORDER — POTASSIUM CHLORIDE 14.9 MG/ML
20 INJECTION INTRAVENOUS ONCE
Status: COMPLETED | OUTPATIENT
Start: 2022-10-18 | End: 2022-10-18

## 2022-10-18 RX ORDER — ACETAMINOPHEN 160 MG/5ML
1000 SOLUTION ORAL EVERY 8 HOURS
Qty: 473 ML | Refills: 2 | Status: SHIPPED | OUTPATIENT
Start: 2022-10-18

## 2022-10-18 RX ORDER — ONDANSETRON 4 MG/1
4 TABLET, ORALLY DISINTEGRATING ORAL EVERY 4 HOURS PRN
Qty: 20 TABLET | Refills: 0 | Status: SHIPPED | OUTPATIENT
Start: 2022-10-18

## 2022-10-18 RX ORDER — POTASSIUM CHLORIDE 1.5 G/1.77G
20 POWDER, FOR SOLUTION ORAL 2 TIMES DAILY
Qty: 14 PACKET | Refills: 0 | Status: SHIPPED | OUTPATIENT
Start: 2022-10-18 | End: 2022-10-25

## 2022-10-18 NOTE — PROGRESS NOTES
Provided/reviewed bariatric post-op discharge instructions; stressed importance of fluids aim for 64 ozs/day, pt drank ~ 16 ozs so far caring for incisions, activities/allowed/avoid; meds to take/avoid; importance of bariatric vits; pt planning to take Bariatric Choice; managing constipation; post-op fu; pt has appt next week w/ surgeon; signs and symptoms of concern; contact info; pt agreed and verbalized understanding.

## 2022-10-18 NOTE — PLAN OF CARE
Pt is A&O x4. Breathing on room air. Pt uses CPAP at night. SCDs and Darell hoses for DVT prophylaxis. Remote tele in place, no calls. Continuous IV fluid infusing. Pain is managed with schedule Tylenol and Toradol. Up independently. Call light within reach. Safety precaution in place. D.C. plan is return home when medically clear.     Problem: Patient Centered Care  Goal: Patient preferences are identified and integrated in the patient's plan of care  Description: Interventions:  - What would you like us to know as we care for you?   - Provide timely, complete, and accurate information to patient/family  - Incorporate patient and family knowledge, values, beliefs, and cultural backgrounds into the planning and delivery of care  - Encourage patient/family to participate in care and decision-making at the level they choose  - Honor patient and family perspectives and choices  Outcome: Progressing     Goal: Patient/Family Short Term Goal  Description: Patient's Short Term Goal: Return  home    Interventions:   - Follows plan of care  - Monitor labs  - See additional Care Plan goals for specific interventions  Outcome: Progressing     Problem: PAIN - ADULT  Goal: Verbalizes/displays adequate comfort level or patient's stated pain goal  Description: INTERVENTIONS:  - Encourage pt to monitor pain and request assistance  - Assess pain using appropriate pain scale  - Administer analgesics based on type and severity of pain and evaluate response  - Implement non-pharmacological measures as appropriate and evaluate response  - Consider cultural and social influences on pain and pain management  - Manage/alleviate anxiety  - Utilize distraction and/or relaxation techniques  - Monitor for opioid side effects  - Notify MD/LIP if interventions unsuccessful or patient reports new pain  - Anticipate increased pain with activity and pre-medicate as appropriate  Outcome: Progressing     Problem: RISK FOR INFECTION - ADULT  Goal: Absence of fever/infection during anticipated neutropenic period  Description: INTERVENTIONS  - Monitor WBC  - Administer growth factors as ordered  - Implement neutropenic guidelines  Outcome: Progressing     Problem: SAFETY ADULT - FALL  Goal: Free from fall injury  Description: INTERVENTIONS:  - Assess pt frequently for physical needs  - Identify cognitive and physical deficits and behaviors that affect risk of falls.   - [place] fall precautions as indicated by assessment.  - Educate pt/family on patient safety including physical limitations  - Instruct pt to call for assistance with activity based on assessment  - Modify environment to reduce risk of injury  - Provide assistive devices as appropriate  - Consider OT/PT consult to assist with strengthening/mobility  - Encourage toileting schedule  Outcome: Progressing     Problem: DISCHARGE PLANNING  Goal: Discharge to home or other facility with appropriate resources  Description: INTERVENTIONS:  - Identify barriers to discharge w/pt and caregiver  - Include patient/family/discharge partner in discharge planning  - Arrange for needed discharge resources and transportation as appropriate  - Identify discharge learning needs (meds, wound care, etc)  - Arrange for interpreters to assist at discharge as needed  - Consider post-discharge preferences of patient/family/discharge partner  - Complete POLST form as appropriate  - Assess patient's ability to be responsible for managing their own health  - Refer to Case Management Department for coordinating discharge planning if the patient needs post-hospital services based on physician/LIP order or complex needs related to functional status, cognitive ability or social support system  Outcome: Progressing

## 2022-10-18 NOTE — PLAN OF CARE
Dara James is up independently ambulating in room and hallway. She is voiding, passed gas. Taking Toradol IV for pain and scheduled liquid tylenol. Potassium replaced via IV today. Will be discharged home with potassium at home. She is tolerating her bariatric clear liquid diet. Plan for discharge home today. Problem: PAIN - ADULT  Goal: Verbalizes/displays adequate comfort level or patient's stated pain goal  Description: INTERVENTIONS:  - Encourage pt to monitor pain and request assistance  - Assess pain using appropriate pain scale  - Administer analgesics based on type and severity of pain and evaluate response  - Implement non-pharmacological measures as appropriate and evaluate response  - Consider cultural and social influences on pain and pain management  - Manage/alleviate anxiety  - Utilize distraction and/or relaxation techniques  - Monitor for opioid side effects  - Notify MD/LIP if interventions unsuccessful or patient reports new pain  - Anticipate increased pain with activity and pre-medicate as appropriate  Outcome: Adequate for Discharge     Problem: RISK FOR INFECTION - ADULT  Goal: Absence of fever/infection during anticipated neutropenic period  Description: INTERVENTIONS  - Monitor WBC  - Administer growth factors as ordered  - Implement neutropenic guidelines  Outcome: Adequate for Discharge     Problem: SAFETY ADULT - FALL  Goal: Free from fall injury  Description: INTERVENTIONS:  - Assess pt frequently for physical needs  - Identify cognitive and physical deficits and behaviors that affect risk of falls.   - [place] fall precautions as indicated by assessment.  - Educate pt/family on patient safety including physical limitations  - Instruct pt to call for assistance with activity based on assessment  - Modify environment to reduce risk of injury  - Provide assistive devices as appropriate  - Consider OT/PT consult to assist with strengthening/mobility  - Encourage toileting schedule  Outcome: Adequate for Discharge     Problem: DISCHARGE PLANNING  Goal: Discharge to home or other facility with appropriate resources  Description: INTERVENTIONS:  - Identify barriers to discharge w/pt and caregiver  - Include patient/family/discharge partner in discharge planning  - Arrange for needed discharge resources and transportation as appropriate  - Identify discharge learning needs (meds, wound care, etc)  - Arrange for interpreters to assist at discharge as needed  - Consider post-discharge preferences of patient/family/discharge partner  - Complete POLST form as appropriate  - Assess patient's ability to be responsible for managing their own health  - Refer to Case Management Department for coordinating discharge planning if the patient needs post-hospital services based on physician/LIP order or complex needs related to functional status, cognitive ability or social support system  Outcome: Adequate for Discharge

## 2022-10-18 NOTE — TELEPHONE ENCOUNTER
Patient had gastric sleeve surgery and is to be discharged. She needs to verify if she can take her linzess and probiotic s/p surgery. Denita Penny is not noted as discontinuing on med review visit and there is no probiotic listed. I have reached out to [place] to verify. Per [place]  Patient can open capsule of linzess and probiotic and pour onto applesauce and use  linzess is listed PRN so she can hold off on taking that at all and see if she even needs it first    I called patient back and got her voicemail. I left detailed message.

## 2022-10-19 ENCOUNTER — PATIENT OUTREACH (OUTPATIENT)
Dept: CASE MANAGEMENT | Age: 57
End: 2022-10-19

## 2022-10-19 DIAGNOSIS — Z02.9 ENCOUNTERS FOR UNSPECIFIED ADMINISTRATIVE PURPOSE: ICD-10-CM

## 2022-10-19 DIAGNOSIS — E66.01 MORBID OBESITY (HCC): ICD-10-CM

## 2022-10-19 NOTE — PROGRESS NOTES
Left message on mailbox for pt to call NCM back for TCM. NCM contact information included in message.         Future Appointments   Date Time Provider Batsheva Rojasi   11/1/2022  8:30 AM Brigida Dubois MD [facility] [address] [facility]   11/14/2022  8:30 AM Rakel Priest RD [facility] [address]   11/14/2022 11:30 AM PF US RM3 PF [facility]   12/14/2022  8:20 AM Fabio Orantes PA-C [address]      Basic Metabolic Panel (8)  Complete by: 10/25/22 (Approximate)

## 2022-10-20 ENCOUNTER — TELEPHONE (OUTPATIENT)
Dept: SURGERY | Facility: CLINIC | Age: 57
End: 2022-10-20

## 2022-10-20 NOTE — TELEPHONE ENCOUNTER
Any major problems? LMTCB   Is the abdominal pain to the point where you still need narcotic pain medication? A. If YES,**How much and how often are you taking it? **Describe the pain- location,type,etc.    3. Any of the below? Fever/ Trouble breathing/ Chest pain/ Cough/ Calf pain / Heart racing      Check heart rate-if patient able to do ______  -If heart rate> 110--page the surgeon     4. Any redness or drainage from the incision that is more than a dab or fluid on the band-aid? If YES, Does the redness extend past the incision more than cm? Is there tenderness or extreme sensitivity of the area around the incision? If YES, to either-- Page surgeon    5. How much fluid are you taking in daily? Any vomiting or retching with fluids? If any major issues--page surgeon     6. What stage of the diet are you up to? Any major issues with diet? If YES--page surgeon     7. How much protein are you taking in daily? 8. Are you walking around ok? 9. Do you have your follow-up appointment schedule? If patient should experience any symptoms of Abdominal pain, diarrhea, dehydration, fever, constipation, nausea/vomiting and any wound issues she should contact [facility] office in [place]. If she has any shortness of breath, chest pain or leg swelling call 911 or go to ER.   Pt verbalized understanding 1

## 2022-10-21 ENCOUNTER — LAB ENCOUNTER (OUTPATIENT)
Dept: LAB | Age: 57
End: 2022-10-21
Attending: HOSPITALIST
Payer: COMMERCIAL

## 2022-10-21 DIAGNOSIS — E66.01 MORBID OBESITY (HCC): ICD-10-CM

## 2022-10-21 LAB
ANION GAP SERPL CALC-SCNC: 6 MMOL/L (ref 0–18)
BUN BLD-MCNC: 20 MG/DL (ref 7–18)
CALCIUM BLD-MCNC: 10.3 MG/DL (ref 8.5–10.1)
CHLORIDE SERPL-SCNC: 96 MMOL/L (ref 98–112)
CO2 SERPL-SCNC: 35 MMOL/L (ref 21–32)
CREAT BLD-MCNC: 0.9 MG/DL
FASTING STATUS PATIENT QL REPORTED: NO
GFR SERPLBLD BASED ON 1.73 SQ M-ARVRAT: 75 ML/MIN/1.73M2 (ref 60–?)
GLUCOSE BLD-MCNC: 114 MG/DL (ref 70–99)
OSMOLALITY SERPL CALC.SUM OF ELEC: 287 MOSM/KG (ref 275–295)
POTASSIUM SERPL-SCNC: 3.9 MMOL/L (ref 3.5–5.1)
SODIUM SERPL-SCNC: 137 MMOL/L (ref 136–145)

## 2022-10-21 PROCEDURE — 80048 BASIC METABOLIC PNL TOTAL CA: CPT

## 2022-10-21 PROCEDURE — 36415 COLL VENOUS BLD VENIPUNCTURE: CPT

## 2022-10-24 ENCOUNTER — OFFICE VISIT (OUTPATIENT)
Dept: FAMILY MEDICINE CLINIC | Facility: CLINIC | Age: 57
End: 2022-10-24
Payer: COMMERCIAL

## 2022-10-24 VITALS
HEIGHT: 62 IN | DIASTOLIC BLOOD PRESSURE: 80 MMHG | RESPIRATION RATE: 16 BRPM | WEIGHT: 181 LBS | TEMPERATURE: 97 F | OXYGEN SATURATION: 98 % | SYSTOLIC BLOOD PRESSURE: 132 MMHG | BODY MASS INDEX: 33.31 KG/M2 | HEART RATE: 97 BPM

## 2022-10-24 DIAGNOSIS — E87.6 HYPOKALEMIA: Primary | ICD-10-CM

## 2022-10-24 DIAGNOSIS — Z90.3 S/P GASTRIC SLEEVE PROCEDURE: ICD-10-CM

## 2022-10-24 PROCEDURE — 99214 OFFICE O/P EST MOD 30 MIN: CPT | Performed by: FAMILY MEDICINE

## 2022-10-24 PROCEDURE — 3079F DIAST BP 80-89 MM HG: CPT | Performed by: FAMILY MEDICINE

## 2022-10-24 PROCEDURE — 3008F BODY MASS INDEX DOCD: CPT | Performed by: FAMILY MEDICINE

## 2022-10-24 PROCEDURE — 3075F SYST BP GE 130 - 139MM HG: CPT | Performed by: FAMILY MEDICINE

## 2022-10-24 RX ORDER — POTASSIUM CHLORIDE 1.5 G/1.77G
20 POWDER, FOR SOLUTION ORAL DAILY
Qty: 30 PACKET | Refills: 0 | Status: SHIPPED | OUTPATIENT
Start: 2022-10-24

## 2022-11-01 ENCOUNTER — TELEPHONE (OUTPATIENT)
Dept: INTERNAL MEDICINE CLINIC | Facility: CLINIC | Age: 57
End: 2022-11-01

## 2022-11-01 ENCOUNTER — OFFICE VISIT (OUTPATIENT)
Dept: SURGERY | Facility: CLINIC | Age: 57
End: 2022-11-01
Payer: COMMERCIAL

## 2022-11-01 ENCOUNTER — DOCUMENTATION ONLY (OUTPATIENT)
Dept: SURGERY | Facility: CLINIC | Age: 57
End: 2022-11-01

## 2022-11-01 VITALS
WEIGHT: 173 LBS | BODY MASS INDEX: 31.04 KG/M2 | OXYGEN SATURATION: 99 % | SYSTOLIC BLOOD PRESSURE: 132 MMHG | TEMPERATURE: 98 F | DIASTOLIC BLOOD PRESSURE: 80 MMHG | HEIGHT: 62.5 IN | HEART RATE: 71 BPM

## 2022-11-01 NOTE — PROGRESS NOTES
Provided/reviewed bariatric post-op orientation and Bariatric HELP card; instructed pt to aim for 64 ozs fluids/day; pt currently drinking ~ 50 ozs/day; taking Bariatric Choice 4x/day; meds to take/avoid; activities/allowed/avoid; signs and symptoms of concern; contact info; also instructed pt to keep card in wallet and in the unlikely event pt ends up int ED/IC/UC to present card to MD and inform bariatric staff; pt agreed and verbalized understanding.

## 2022-11-01 NOTE — TELEPHONE ENCOUNTER
Susie Mcleod, ANNIE Vazquez, Guerline APONTE, RN  Hi Guerlnie,     Pls schedule a post-op f/u w/ Tosin Councilman in 1-3 mos. Thank you!    Already scheduled      12/14/2022  8:20 AM Tiffany Zazueta PA-C [address]

## 2022-11-08 ENCOUNTER — TELEMEDICINE (OUTPATIENT)
Dept: FAMILY MEDICINE CLINIC | Facility: CLINIC | Age: 57
End: 2022-11-08

## 2022-11-08 DIAGNOSIS — J01.01 ACUTE RECURRENT MAXILLARY SINUSITIS: Primary | ICD-10-CM

## 2022-11-08 DIAGNOSIS — E87.6 HYPOKALEMIA: ICD-10-CM

## 2022-11-08 PROCEDURE — 99214 OFFICE O/P EST MOD 30 MIN: CPT | Performed by: FAMILY MEDICINE

## 2022-11-08 RX ORDER — CLINDAMYCIN HYDROCHLORIDE 300 MG/1
300 CAPSULE ORAL 3 TIMES DAILY
Qty: 42 CAPSULE | Refills: 0 | Status: SHIPPED | OUTPATIENT
Start: 2022-11-08 | End: 2022-11-22

## 2022-11-08 NOTE — PATIENT INSTRUCTIONS
Thank you for choosing Jackelyn Camarillo MD at [place] [address]  To Do: [address]  1. Please take meds as directed. [facility] is located in [address]. Monday, Tuesday & Friday - 8 a.m. to 4 p.m. Wednesday, Thursday - 7 a.m. to 3 p.m. The lab is closed daily from 12 p.m.-12:30 p.m. Saturday lab hours by appointment. Call [phone] to schedule the appointment. Please signup for Bioscan, which is electronic access to your record if you have not done so. All your results will post on there. [email or web address]   You can NOW use Bioscan to book your appointments with us, or consider using open access scheduling which is through the edward website [email or web address] and type in Jackelyn Camarillo MD and follow the links for \"Schedule Online Now\"    To schedule Imaging or tests at [facility] Scheduling [phone], Go to [facility] A ER Building (For example: CT scans, X rays, Ultrasound, MRI)  Cardiac Testing in ER building Building A second floor Cardiac Testing [phone] (For example: Holter Monitor, Cardiac Stress tests,Event Monitor, or 2D Echocardiograms)  [facility] call [phone] usually in [facility] A  Walk in Clinic in [place] at [facility]. [address] Mon-Fri at 8am-7:30 p.m., and Sat/Sun 9:00a. m.-4:30 p.m. Also at [address]  Call [phone] for info     Please call our office about any questions regarding your treatment/medicines/tests as a result of today's visit. For your safety, read the entire package insert of all medicines prescribed to you and be aware of all of the risks of treatment even beyond those discussed today. All therapies have potential risk of harm or side effects or medication interactions.   It is your duty and for your safety to discuss with the pharmacist and our office with questions, and to notify us and stop treatment if problems arise, but know that our intention is that the benefits outweigh those potential risks and we strive to make you healthier and to improve your quality of life. Referrals, and Radiology Information:    If your insurance requires a referral to a specialist, please allow 5 business days to process your referral request.    If Can cMallister MD orders a CT or MRI, it may take up to 10 business days to receive approval from your insurance company. Once our office has called informing you that the insurance company approved your testing, please call Central Scheduling at [phone]  Please allow our office 5 business days to contact you regarding any testing results. Refill policies:   Allow 3 business days for refills; controlled substances may take longer and must be picked up from the office in person. Narcotic medications can only be filled in 30 day increments and must be refilled at an office visit only. If your prescription is due for a refill, you may be due for a follow-up appointment. We cannot refill your maintenance medications at a preventative wellness visit. To best provide you care, patients receiving maintenance medications need to be seen at least twice a year.

## 2022-11-09 ENCOUNTER — LAB ENCOUNTER (OUTPATIENT)
Dept: LAB | Age: 57
End: 2022-11-09
Attending: FAMILY MEDICINE
Payer: COMMERCIAL

## 2022-11-09 DIAGNOSIS — E87.6 HYPOKALEMIA: ICD-10-CM

## 2022-11-09 LAB
ANION GAP SERPL CALC-SCNC: 6 MMOL/L (ref 0–18)
BUN BLD-MCNC: 16 MG/DL (ref 7–18)
CALCIUM BLD-MCNC: 9.3 MG/DL (ref 8.5–10.1)
CHLORIDE SERPL-SCNC: 107 MMOL/L (ref 98–112)
CO2 SERPL-SCNC: 33 MMOL/L (ref 21–32)
CREAT BLD-MCNC: 0.52 MG/DL
FASTING STATUS PATIENT QL REPORTED: YES
GFR SERPLBLD BASED ON 1.73 SQ M-ARVRAT: 109 ML/MIN/1.73M2 (ref 60–?)
GLUCOSE BLD-MCNC: 97 MG/DL (ref 70–99)
OSMOLALITY SERPL CALC.SUM OF ELEC: 303 MOSM/KG (ref 275–295)
POTASSIUM SERPL-SCNC: 3.2 MMOL/L (ref 3.5–5.1)
SODIUM SERPL-SCNC: 146 MMOL/L (ref 136–145)

## 2022-11-09 PROCEDURE — 36415 COLL VENOUS BLD VENIPUNCTURE: CPT

## 2022-11-09 PROCEDURE — 80048 BASIC METABOLIC PNL TOTAL CA: CPT

## 2022-11-10 DIAGNOSIS — E87.6 HYPOKALEMIA: Primary | ICD-10-CM

## 2022-11-14 ENCOUNTER — OFFICE VISIT (OUTPATIENT)
Dept: INTERNAL MEDICINE CLINIC | Facility: CLINIC | Age: 57
End: 2022-11-14
Payer: COMMERCIAL

## 2022-11-14 ENCOUNTER — HOSPITAL ENCOUNTER (OUTPATIENT)
Dept: ULTRASOUND IMAGING | Age: 57
Discharge: HOME | End: 2022-11-14
Attending: INTERNAL MEDICINE
Payer: COMMERCIAL

## 2022-11-14 VITALS — WEIGHT: 179.38 LBS | BODY MASS INDEX: 32 KG/M2

## 2022-11-14 DIAGNOSIS — E66.9 OBESITY (BMI 30-39.9): ICD-10-CM

## 2022-11-14 DIAGNOSIS — R73.03 PREDIABETES: ICD-10-CM

## 2022-11-14 DIAGNOSIS — E89.0 POST-SURGICAL HYPOTHYROIDISM: ICD-10-CM

## 2022-11-14 DIAGNOSIS — E66.01 MORBID OBESITY (HCC): ICD-10-CM

## 2022-11-14 DIAGNOSIS — E66.9 CLASS 1 OBESITY WITH BODY MASS INDEX (BMI) OF 32.0 TO 32.9 IN ADULT, UNSPECIFIED OBESITY TYPE, UNSPECIFIED WHETHER SERIOUS COMORBIDITY PRESENT: ICD-10-CM

## 2022-11-14 DIAGNOSIS — C73 THYROID CANCER (HCC): ICD-10-CM

## 2022-11-14 PROCEDURE — 76536 US EXAM OF HEAD AND NECK: CPT | Performed by: INTERNAL MEDICINE

## 2022-11-14 NOTE — PATIENT INSTRUCTIONS
GOALS  1. Continue to keep a food record, My Net Diary, select the macros dashboard or My Fitness Pal.  2.  Continue to consume at least 4-6 meals/snacks per day. Aim for 60 grams of protein per day. Try a higher protein/lower calorie protein drink (premier protein or Fairlife 30 g protein)  3. Continue to eat separately from drinking, avoid straws, chew food 20-30 times before swallowing. Make the meals last 30 minutes. 4.  Aim for 64 oz per day of water. (May try Protein water, adding True Lemon, Crystal Light). 5.  Continue to avoid caffeine and carbonation. 6.  Continue walking for exercise, ease into very mild strength training at 6 weeks post op.       7. Take Bariatric vitamin as directed

## 2022-11-14 NOTE — TELEPHONE ENCOUNTER
Requesting adderall XR 20 mg  LOV: 10/5/22  RTC: not noted  Last Relevant Labs: na  Filled: 10/6/22 #30 with 0 refills last [place] 10/6/22 [place]    Future Appointments   Date Time Provider Batsheva Hansen   12/14/2022  8:20 AM Savita Cox PA-C [address] [facility]   1/25/2023 10:00 AM Tory Madden MD [facility] [address] [facility]   4/3/2023  8:30 AM Real Jonathan, ANNIE [facility] [address]

## 2022-11-15 RX ORDER — DEXTROAMPHETAMINE SULFATE, DEXTROAMPHETAMINE SACCHARATE, AMPHETAMINE SULFATE AND AMPHETAMINE ASPARTATE 5; 5; 5; 5 MG/1; MG/1; MG/1; MG/1
CAPSULE, EXTENDED RELEASE ORAL
Qty: 30 CAPSULE | Refills: 0 | Status: SHIPPED | OUTPATIENT
Start: 2022-11-15

## 2022-11-16 RX ORDER — POTASSIUM CHLORIDE 1.5 G/1
POWDER, FOR SOLUTION ORAL
Qty: 30 PACKET | Refills: 0 | Status: SHIPPED | OUTPATIENT
Start: 2022-11-16

## 2022-11-17 ENCOUNTER — LAB ENCOUNTER (OUTPATIENT)
Dept: LAB | Age: 57
End: 2022-11-17
Attending: FAMILY MEDICINE
Payer: COMMERCIAL

## 2022-11-17 DIAGNOSIS — E87.6 HYPOKALEMIA: ICD-10-CM

## 2022-11-17 LAB
ANION GAP SERPL CALC-SCNC: 3 MMOL/L (ref 0–18)
BUN BLD-MCNC: 19 MG/DL (ref 7–18)
CALCIUM BLD-MCNC: 9.7 MG/DL (ref 8.5–10.1)
CHLORIDE SERPL-SCNC: 107 MMOL/L (ref 98–112)
CO2 SERPL-SCNC: 33 MMOL/L (ref 21–32)
CREAT BLD-MCNC: 0.58 MG/DL
FASTING STATUS PATIENT QL REPORTED: NO
GFR SERPLBLD BASED ON 1.73 SQ M-ARVRAT: 105 ML/MIN/1.73M2 (ref 60–?)
GLUCOSE BLD-MCNC: 82 MG/DL (ref 70–99)
OSMOLALITY SERPL CALC.SUM OF ELEC: 297 MOSM/KG (ref 275–295)
POTASSIUM SERPL-SCNC: 4.2 MMOL/L (ref 3.5–5.1)
SODIUM SERPL-SCNC: 143 MMOL/L (ref 136–145)

## 2022-11-17 PROCEDURE — 36415 COLL VENOUS BLD VENIPUNCTURE: CPT

## 2022-11-17 PROCEDURE — 80048 BASIC METABOLIC PNL TOTAL CA: CPT

## 2022-11-23 RX ORDER — MONTELUKAST SODIUM 10 MG/1
TABLET ORAL
Qty: 90 TABLET | Refills: 1 | Status: SHIPPED | OUTPATIENT
Start: 2022-11-23

## 2022-11-28 ENCOUNTER — TELEPHONE (OUTPATIENT)
Dept: GENETICS | Facility: HOSPITAL | Age: 57
End: 2022-11-28

## 2022-11-28 ENCOUNTER — TELEPHONE (OUTPATIENT)
Dept: HEMATOLOGY/ONCOLOGY | Facility: HOSPITAL | Age: 57
End: 2022-11-28

## 2022-11-28 ENCOUNTER — PATIENT MESSAGE (OUTPATIENT)
Dept: FAMILY MEDICINE CLINIC | Facility: CLINIC | Age: 57
End: 2022-11-28

## 2022-11-28 RX ORDER — LINACLOTIDE 145 UG/1
2 CAPSULE, GELATIN COATED ORAL DAILY PRN
Qty: 60 CAPSULE | Refills: 0 | Status: SHIPPED | OUTPATIENT
Start: 2022-11-28

## 2022-11-28 NOTE — TELEPHONE ENCOUNTER
Patient is calling because she saw you back 11-4-2021 and she was wondering if she needed a follow up appointment from that appointment.  Call back number is [phone]

## 2022-11-28 NOTE — TELEPHONE ENCOUNTER
Advised patient that updated testing not currently recommended. Patient's only reported change to family history is that her granddaughter was diagnosed with brain cancer this year, she had surgery at [facility] and radiation treatment. Pt encouraged to call me next year to check in.

## 2022-11-29 ENCOUNTER — OFFICE VISIT (OUTPATIENT)
Dept: SURGERY | Facility: CLINIC | Age: 57
End: 2022-11-29
Payer: COMMERCIAL

## 2022-11-29 VITALS
HEIGHT: 62.5 IN | DIASTOLIC BLOOD PRESSURE: 80 MMHG | OXYGEN SATURATION: 100 % | WEIGHT: 174 LBS | HEART RATE: 79 BPM | SYSTOLIC BLOOD PRESSURE: 118 MMHG | BODY MASS INDEX: 31.22 KG/M2

## 2022-12-02 ENCOUNTER — HOSPITAL ENCOUNTER (EMERGENCY)
Age: 57
Discharge: HOME | End: 2022-12-02
Payer: COMMERCIAL

## 2022-12-02 ENCOUNTER — APPOINTMENT (OUTPATIENT)
Dept: GENERAL RADIOLOGY | Age: 57
End: 2022-12-02
Payer: COMMERCIAL

## 2022-12-02 VITALS
HEIGHT: 62 IN | TEMPERATURE: 98 F | OXYGEN SATURATION: 96 % | RESPIRATION RATE: 16 BRPM | DIASTOLIC BLOOD PRESSURE: 79 MMHG | WEIGHT: 173 LBS | SYSTOLIC BLOOD PRESSURE: 146 MMHG | BODY MASS INDEX: 31.83 KG/M2 | HEART RATE: 79 BPM

## 2022-12-02 DIAGNOSIS — S90.122A CONTUSION OF LESSER TOE OF LEFT FOOT WITHOUT DAMAGE TO NAIL, INITIAL ENCOUNTER: Primary | ICD-10-CM

## 2022-12-02 PROCEDURE — 99283 EMERGENCY DEPT VISIT LOW MDM: CPT

## 2022-12-02 PROCEDURE — 73630 X-RAY EXAM OF FOOT: CPT

## 2022-12-02 RX ORDER — PREDNISOLONE SODIUM PHOSPHATE 15 MG/5ML
30 SOLUTION ORAL DAILY
Qty: 50 ML | Refills: 0 | Status: SHIPPED | OUTPATIENT
Start: 2022-12-02 | End: 2022-12-07

## 2022-12-02 RX ORDER — POTASSIUM CHLORIDE 1.5 G/1.77G
20 POWDER, FOR SOLUTION ORAL DAILY
Qty: 30 PACKET | Refills: 0 | Status: SHIPPED | OUTPATIENT
Start: 2022-12-02

## 2022-12-02 NOTE — TELEPHONE ENCOUNTER
Non-protocol medication pended for your review and approval/denial.     Last VV 11/8/22 acute    Last potassium 4.8 on 11/17/22  Future Appointments   Date Time Provider Batsheva Rojasi   12/5/2022 10:00 AM Palma Hernandez MD [facility] Surg/Onc   12/14/2022  8:20 AM Shantel Leary PA-C [address] [facility] [address]   1/25/2023 10:00 AM Manuel Hennessy MD [facility] [address] [facility]   4/3/2023  8:30 AM Eboni Marques RD [facility] EM[facility] [address]

## 2022-12-05 ENCOUNTER — OFFICE VISIT (OUTPATIENT)
Dept: SURGERY | Facility: CLINIC | Age: 57
End: 2022-12-05
Payer: COMMERCIAL

## 2022-12-05 VITALS
DIASTOLIC BLOOD PRESSURE: 77 MMHG | WEIGHT: 172 LBS | OXYGEN SATURATION: 98 % | SYSTOLIC BLOOD PRESSURE: 130 MMHG | BODY MASS INDEX: 31.65 KG/M2 | HEIGHT: 62 IN | TEMPERATURE: 98 F | RESPIRATION RATE: 16 BRPM | HEART RATE: 78 BPM

## 2022-12-05 DIAGNOSIS — K75.81 NASH (NONALCOHOLIC STEATOHEPATITIS): Primary | ICD-10-CM

## 2022-12-06 ENCOUNTER — TELEMEDICINE (OUTPATIENT)
Dept: FAMILY MEDICINE CLINIC | Facility: CLINIC | Age: 57
End: 2022-12-06

## 2022-12-06 DIAGNOSIS — K59.09 CHRONIC CONSTIPATION: ICD-10-CM

## 2022-12-06 DIAGNOSIS — J32.9 RECURRENT SINUS INFECTIONS: Primary | ICD-10-CM

## 2022-12-06 PROCEDURE — 99213 OFFICE O/P EST LOW 20 MIN: CPT | Performed by: FAMILY MEDICINE

## 2022-12-06 RX ORDER — CLINDAMYCIN HYDROCHLORIDE 300 MG/1
300 CAPSULE ORAL 3 TIMES DAILY
Qty: 42 CAPSULE | Refills: 0 | Status: SHIPPED | OUTPATIENT
Start: 2022-12-06 | End: 2022-12-20

## 2022-12-06 RX ORDER — LUBIPROSTONE 24 UG/1
24 CAPSULE, GELATIN COATED ORAL
Qty: 30 CAPSULE | Refills: 1 | Status: SHIPPED | OUTPATIENT
Start: 2022-12-06

## 2022-12-06 NOTE — PATIENT INSTRUCTIONS
Thank you for choosing Cristal Morgan MD at [place] [address]  To Do: [address]  1. Please take meds as directed. [facility] is located in [address]. Monday, Tuesday & Friday - 8 a.m. to 4 p.m. Wednesday, Thursday - 7 a.m. to 3 p.m. The lab is closed daily from 12 p.m.-12:30 p.m. Saturday lab hours by appointment. Call [phone] to schedule the appointment. Please signup for Impact Radius, which is electronic access to your record if you have not done so. All your results will post on there. [email or web address]   You can NOW use Impact Radius to book your appointments with us, or consider using open access scheduling which is through the edward website [email or web address] and type in Cristal Morgan MD and follow the links for \"Schedule Online Now\"    To schedule Imaging or tests at [facility] Scheduling [phone], Go to [facility] A ER Building (For example: CT scans, X rays, Ultrasound, MRI)  Cardiac Testing in ER building Building A second floor Cardiac Testing [phone] (For example: Holter Monitor, Cardiac Stress tests,Event Monitor, or 2D Echocardiograms)  [facility] call [phone] usually in [facility] A  Walk in Clinic in [place] at [facility]. [address] Mon-Fri at 8am-7:30 p.m., and Sat/Sun 9:00a. m.-4:30 p.m. Also at [address]  Call [phone] for info     Please call our office about any questions regarding your treatment/medicines/tests as a result of today's visit. For your safety, read the entire package insert of all medicines prescribed to you and be aware of all of the risks of treatment even beyond those discussed today. All therapies have potential risk of harm or side effects or medication interactions.   It is your duty and for your safety to discuss with the pharmacist and our office with questions, and to notify us and stop treatment if problems arise, but know that our intention is that the benefits outweigh those potential risks and we strive to make you healthier and to improve your quality of life. Referrals, and Radiology Information:    If your insurance requires a referral to a specialist, please allow 5 business days to process your referral request.    If Can Mcallister MD orders a CT or MRI, it may take up to 10 business days to receive approval from your insurance company. Once our office has called informing you that the insurance company approved your testing, please call Central Scheduling at [phone]  Please allow our office 5 business days to contact you regarding any testing results. Refill policies:   Allow 3 business days for refills; controlled substances may take longer and must be picked up from the office in person. Narcotic medications can only be filled in 30 day increments and must be refilled at an office visit only. If your prescription is due for a refill, you may be due for a follow-up appointment. We cannot refill your maintenance medications at a preventative wellness visit. To best provide you care, patients receiving maintenance medications need to be seen at least twice a year.

## 2022-12-14 ENCOUNTER — OFFICE VISIT (OUTPATIENT)
Dept: INTERNAL MEDICINE CLINIC | Facility: CLINIC | Age: 57
End: 2022-12-14
Payer: COMMERCIAL

## 2022-12-14 VITALS
HEART RATE: 69 BPM | WEIGHT: 174.81 LBS | BODY MASS INDEX: 31.36 KG/M2 | HEIGHT: 62.5 IN | RESPIRATION RATE: 16 BRPM | DIASTOLIC BLOOD PRESSURE: 74 MMHG | OXYGEN SATURATION: 98 % | SYSTOLIC BLOOD PRESSURE: 120 MMHG

## 2022-12-14 DIAGNOSIS — E11.9 TYPE 2 DIABETES MELLITUS WITHOUT COMPLICATION, WITHOUT LONG-TERM CURRENT USE OF INSULIN (HCC): ICD-10-CM

## 2022-12-14 DIAGNOSIS — Z98.84 S/P LAPAROSCOPIC SLEEVE GASTRECTOMY: ICD-10-CM

## 2022-12-14 DIAGNOSIS — I10 ESSENTIAL HYPERTENSION: ICD-10-CM

## 2022-12-14 DIAGNOSIS — F98.8 ATTENTION DEFICIT DISORDER (ADD) WITHOUT HYPERACTIVITY: ICD-10-CM

## 2022-12-14 DIAGNOSIS — E78.5 HYPERLIPIDEMIA, UNSPECIFIED HYPERLIPIDEMIA TYPE: ICD-10-CM

## 2022-12-14 DIAGNOSIS — E55.9 VITAMIN D DEFICIENCY: ICD-10-CM

## 2022-12-14 DIAGNOSIS — E66.9 CLASS 1 OBESITY WITH SERIOUS COMORBIDITY AND BODY MASS INDEX (BMI) OF 31.0 TO 31.9 IN ADULT, UNSPECIFIED OBESITY TYPE: ICD-10-CM

## 2022-12-14 DIAGNOSIS — G47.33 OSA ON CPAP: ICD-10-CM

## 2022-12-14 DIAGNOSIS — Z99.89 OSA ON CPAP: ICD-10-CM

## 2022-12-14 DIAGNOSIS — Z51.81 ENCOUNTER FOR THERAPEUTIC DRUG MONITORING: Primary | ICD-10-CM

## 2022-12-14 PROCEDURE — 3074F SYST BP LT 130 MM HG: CPT | Performed by: PHYSICIAN ASSISTANT

## 2022-12-14 PROCEDURE — 3008F BODY MASS INDEX DOCD: CPT | Performed by: PHYSICIAN ASSISTANT

## 2022-12-14 PROCEDURE — 3078F DIAST BP <80 MM HG: CPT | Performed by: PHYSICIAN ASSISTANT

## 2022-12-14 PROCEDURE — 99214 OFFICE O/P EST MOD 30 MIN: CPT | Performed by: PHYSICIAN ASSISTANT

## 2022-12-14 RX ORDER — DEXTROAMPHETAMINE SACCHARATE, AMPHETAMINE ASPARTATE MONOHYDRATE, DEXTROAMPHETAMINE SULFATE AND AMPHETAMINE SULFATE 5; 5; 5; 5 MG/1; MG/1; MG/1; MG/1
20 CAPSULE, EXTENDED RELEASE ORAL DAILY
Qty: 30 CAPSULE | Refills: 0 | Status: SHIPPED | OUTPATIENT
Start: 2023-02-14 | End: 2023-03-16

## 2022-12-14 RX ORDER — DEXTROAMPHETAMINE SACCHARATE, AMPHETAMINE ASPARTATE MONOHYDRATE, DEXTROAMPHETAMINE SULFATE AND AMPHETAMINE SULFATE 5; 5; 5; 5 MG/1; MG/1; MG/1; MG/1
20 CAPSULE, EXTENDED RELEASE ORAL DAILY
Qty: 30 CAPSULE | Refills: 0 | Status: SHIPPED | OUTPATIENT
Start: 2022-12-14 | End: 2023-01-13

## 2022-12-14 RX ORDER — DEXTROAMPHETAMINE SACCHARATE, AMPHETAMINE ASPARTATE MONOHYDRATE, DEXTROAMPHETAMINE SULFATE AND AMPHETAMINE SULFATE 5; 5; 5; 5 MG/1; MG/1; MG/1; MG/1
20 CAPSULE, EXTENDED RELEASE ORAL DAILY
Qty: 30 CAPSULE | Refills: 0 | Status: SHIPPED | OUTPATIENT
Start: 2023-01-14 | End: 2023-02-13

## 2022-12-20 ENCOUNTER — PATIENT MESSAGE (OUTPATIENT)
Dept: FAMILY MEDICINE CLINIC | Facility: CLINIC | Age: 57
End: 2022-12-20

## 2022-12-21 RX ORDER — METHYLPREDNISOLONE 4 MG/1
TABLET ORAL
Qty: 1 EACH | Refills: 0 | Status: SHIPPED | OUTPATIENT
Start: 2022-12-21

## 2022-12-21 NOTE — TELEPHONE ENCOUNTER
Video visit 12/6/22  Recurrent sinus infections  (primary encounter diagnosis)  -We will going to treat with clindamycin  - Continue with supportive care  - Keep hydrated  -May take Tylenol as needed for fever or pain    Please advise

## 2023-01-02 ENCOUNTER — PATIENT MESSAGE (OUTPATIENT)
Dept: FAMILY MEDICINE CLINIC | Facility: CLINIC | Age: 58
End: 2023-01-02

## 2023-01-03 ENCOUNTER — PATIENT MESSAGE (OUTPATIENT)
Dept: FAMILY MEDICINE CLINIC | Facility: CLINIC | Age: 58
End: 2023-01-03

## 2023-01-03 LAB — AMB EXT COVID-19 RESULT: DETECTED

## 2023-01-03 NOTE — TELEPHONE ENCOUNTER
From: Kami Viveros Book  To: Erich Rhodse MD  Sent: 1/2/2023 8:23 PM CST  Subject: Sinuses/ Flu? Dr. Evy Xiao,    I was exposed to Influenza A on Christmas Eve. Unfortunately, I had already been getting sinus headaches even before that. On Friday 12/27, the headaches were getting worse. On Sunday 1/1, the headaches started getting worse. My sinuses really hurt, massive sinus pressure. (Yellow, to greenish when i blow my nose). My muscles really ache, I have been coughing & sneezing. Fever has gone up to 101.9, but on average has been running 100.4. Whole body chills, fatigue, head & chest congestion. I have some shortness of breath and have been doing neb treatments. I need to find my pulse ox to check my levels. I am also going to let my pulmonologist know. I have been taking tamiflu since yesterday.  (Sunday)  Dalia Ruelas

## 2023-01-03 NOTE — TELEPHONE ENCOUNTER
From: Elner Halsted Book  To: Cristal Morgan MD  Sent: 1/2/2023 8:23 PM CST  Subject: Sinuses/ Flu? Dr. Vicky Rowland,    I was exposed to Influenza A on Christmas Eve. Unfortunately, I had already been getting sinus headaches even before that. On Friday 12/27, the headaches were getting worse. On Sunday 1/1, the headaches started getting worse. My sinuses really hurt, massive sinus pressure. (Yellow, to greenish when i blow my nose). My muscles really ache, I have been coughing & sneezing. Fever has gone up to 101.9, but on average has been running 100.4. Whole body chills, fatigue, head & chest congestion. I have some shortness of breath and have been doing neb treatments. I need to find my pulse ox to check my levels. I am also going to let my pulmonologist know. I have been taking tamiflu since yesterday.  (Sunday)  Dale Oden

## 2023-01-03 NOTE — TELEPHONE ENCOUNTER
From: Bella Xie Book  To: Marisol Le MD  Sent: 1/2/2023 8:23 PM CST  Subject: Sinuses/ Flu? Dr. Machelle Montez,    I was exposed to Influenza A on Christmas Eve. Unfortunately, I had already been getting sinus headaches even before that. On Friday 12/27, the headaches were getting worse. On Sunday 1/1, the headaches started getting worse. My sinuses really hurt, massive sinus pressure. (Yellow, to greenish when i blow my nose). My muscles really ache, I have been coughing & sneezing. Fever has gone up to 101.9, but on average has been running 100.4. Whole body chills, fatigue, head & chest congestion. I have some shortness of breath and have been doing neb treatments. I need to find my pulse ox to check my levels. I am also going to let my pulmonologist know. I have been taking tamiflu since yesterday.  (Sunday)  Preethi Dumont

## 2023-01-05 ENCOUNTER — TELEMEDICINE (OUTPATIENT)
Dept: FAMILY MEDICINE CLINIC | Facility: CLINIC | Age: 58
End: 2023-01-05
Payer: COMMERCIAL

## 2023-01-05 DIAGNOSIS — U07.1 COVID-19: Primary | ICD-10-CM

## 2023-01-05 PROCEDURE — 99213 OFFICE O/P EST LOW 20 MIN: CPT | Performed by: FAMILY MEDICINE

## 2023-01-10 ENCOUNTER — PATIENT MESSAGE (OUTPATIENT)
Dept: FAMILY MEDICINE CLINIC | Facility: CLINIC | Age: 58
End: 2023-01-10

## 2023-01-10 NOTE — TELEPHONE ENCOUNTER
From: Emma Laurent Book  To: Tamiko Rome MD  Sent: 1/10/2023 1:37 PM CST  Subject: Visit f/u    Dr. Lachelle Hernández,  I took my last dose of Paxlovid this morning. Just wanting to give you a update and get your opinion. Today has been the first time that I woke up with a little energy. Of course it is completely gone now. I am ready for a nap. Still very fatigued, coughing real hard, runny nose, fever, pulse ox averaging 95%, chest tightness.     Lovette Aver

## 2023-01-12 RX ORDER — IPRATROPIUM BROMIDE 42 UG/1
2 SPRAY, METERED NASAL 2 TIMES DAILY
Qty: 15 ML | Refills: 3 | Status: SHIPPED | OUTPATIENT
Start: 2023-01-12

## 2023-01-17 ENCOUNTER — HOSPITAL ENCOUNTER (OUTPATIENT)
Dept: GENERAL RADIOLOGY | Age: 58
Discharge: HOME | End: 2023-01-17
Attending: FAMILY MEDICINE
Payer: COMMERCIAL

## 2023-01-17 ENCOUNTER — OFFICE VISIT (OUTPATIENT)
Dept: FAMILY MEDICINE CLINIC | Facility: CLINIC | Age: 58
End: 2023-01-17
Payer: COMMERCIAL

## 2023-01-17 VITALS
TEMPERATURE: 98 F | RESPIRATION RATE: 16 BRPM | DIASTOLIC BLOOD PRESSURE: 70 MMHG | HEART RATE: 79 BPM | OXYGEN SATURATION: 98 % | SYSTOLIC BLOOD PRESSURE: 124 MMHG

## 2023-01-17 DIAGNOSIS — J01.01 ACUTE RECURRENT MAXILLARY SINUSITIS: ICD-10-CM

## 2023-01-17 DIAGNOSIS — R05.9 COUGH, UNSPECIFIED TYPE: ICD-10-CM

## 2023-01-17 DIAGNOSIS — R05.9 COUGH, UNSPECIFIED TYPE: Primary | ICD-10-CM

## 2023-01-17 PROBLEM — J45.909 ACUTE ASTHMA: Status: ACTIVE | Noted: 2022-03-31

## 2023-01-17 PROBLEM — J45.909 ACUTE ASTHMA (HCC): Status: ACTIVE | Noted: 2022-03-31

## 2023-01-17 PROCEDURE — 3078F DIAST BP <80 MM HG: CPT | Performed by: FAMILY MEDICINE

## 2023-01-17 PROCEDURE — 71046 X-RAY EXAM CHEST 2 VIEWS: CPT | Performed by: FAMILY MEDICINE

## 2023-01-17 PROCEDURE — 3074F SYST BP LT 130 MM HG: CPT | Performed by: FAMILY MEDICINE

## 2023-01-17 PROCEDURE — 99214 OFFICE O/P EST MOD 30 MIN: CPT | Performed by: FAMILY MEDICINE

## 2023-01-17 RX ORDER — PREDNISONE 20 MG/1
TABLET ORAL
Qty: 21 TABLET | Refills: 0 | Status: SHIPPED | OUTPATIENT
Start: 2023-01-17 | End: 2023-02-01

## 2023-01-17 RX ORDER — LEVOTHYROXINE SODIUM 112 UG/1
112 TABLET ORAL
COMMUNITY
Start: 2023-01-02

## 2023-01-17 RX ORDER — DOXYCYCLINE HYCLATE 100 MG
100 TABLET ORAL 2 TIMES DAILY
Qty: 20 TABLET | Refills: 0 | Status: SHIPPED | OUTPATIENT
Start: 2023-01-17 | End: 2023-01-27

## 2023-01-17 NOTE — PROGRESS NOTES
Imaging report reviewed and shows no concerning findings.  Please notify patient.    -Dr. Jahaira Castle

## 2023-01-17 NOTE — PATIENT INSTRUCTIONS
Thank you for choosing Tamiko Rome MD at [place] [address]  To Do: [address]  1. Please take meds as directed. [facility] is located in [address]. Monday, Tuesday & Friday - 8 a.m. to 4 p.m. Wednesday, Thursday - 7 a.m. to 3 p.m. The lab is closed daily from 12 p.m.-12:30 p.m. Saturday lab hours by appointment. Call [phone] to schedule the appointment. Please signup for Agrican, which is electronic access to your record if you have not done so. All your results will post on there. [email or web address]   You can NOW use Agrican to book your appointments with us, or consider using open access scheduling which is through the edward website [email or web address] Famely and type in Tamiko Rome MD and follow the links for \"Schedule Online Now\"    To schedule Imaging or tests at [facility] Scheduling [phone], Go to [facility] A ER Building (For example: CT scans, X rays, Ultrasound, MRI)  Cardiac Testing in ER building Building A second floor Cardiac Testing [phone] (For example: Holter Monitor, Cardiac Stress tests,Event Monitor, or 2D Echocardiograms)  [facility] call [phone] usually in [facility] A  Walk in Clinic in [place] at [facility]. [address] Mon-Fri at 8am-7:30 p.m., and Sat/Sun 9:00a. m.-4:30 p.m. Also at [address]  Call [phone] for info     Please call our office about any questions regarding your treatment/medicines/tests as a result of today's visit. For your safety, read the entire package insert of all medicines prescribed to you and be aware of all of the risks of treatment even beyond those discussed today. All therapies have potential risk of harm or side effects or medication interactions.   It is your duty and for your safety to discuss with the pharmacist and our office with questions, and to notify us and stop treatment if problems arise, but know that our intention is that the benefits outweigh those potential risks and we strive to make you healthier and to improve your quality of life. Referrals, and Radiology Information:    If your insurance requires a referral to a specialist, please allow 5 business days to process your referral request.    If Ramón Clark MD orders a CT or MRI, it may take up to 10 business days to receive approval from your insurance company. Once our office has called informing you that the insurance company approved your testing, please call Central Scheduling at [phone]  Please allow our office 5 business days to contact you regarding any testing results. Refill policies:   Allow 3 business days for refills; controlled substances may take longer and must be picked up from the office in person. Narcotic medications can only be filled in 30 day increments and must be refilled at an office visit only. If your prescription is due for a refill, you may be due for a follow-up appointment. We cannot refill your maintenance medications at a preventative wellness visit. To best provide you care, patients receiving maintenance medications need to be seen at least twice a year.

## 2023-01-25 ENCOUNTER — OFFICE VISIT (OUTPATIENT)
Dept: FAMILY MEDICINE CLINIC | Facility: CLINIC | Age: 58
End: 2023-01-25
Payer: COMMERCIAL

## 2023-01-25 VITALS
HEART RATE: 88 BPM | HEIGHT: 62.5 IN | RESPIRATION RATE: 16 BRPM | BODY MASS INDEX: 30.68 KG/M2 | SYSTOLIC BLOOD PRESSURE: 118 MMHG | DIASTOLIC BLOOD PRESSURE: 74 MMHG | WEIGHT: 171 LBS | TEMPERATURE: 98 F | OXYGEN SATURATION: 98 %

## 2023-01-25 DIAGNOSIS — J45.901 MILD ASTHMA WITH EXACERBATION, UNSPECIFIED WHETHER PERSISTENT: ICD-10-CM

## 2023-01-25 DIAGNOSIS — J01.01 ACUTE RECURRENT MAXILLARY SINUSITIS: Primary | ICD-10-CM

## 2023-01-25 PROBLEM — R10.9 RIGHT FLANK PAIN: Status: RESOLVED | Noted: 2020-06-21 | Resolved: 2023-01-25

## 2023-01-25 PROBLEM — S37.011A HEMATOMA OF RIGHT KIDNEY, INITIAL ENCOUNTER: Status: RESOLVED | Noted: 2020-08-14 | Resolved: 2023-01-25

## 2023-01-25 PROBLEM — D72.829 LEUKOCYTOSIS: Status: RESOLVED | Noted: 2020-09-29 | Resolved: 2023-01-25

## 2023-01-25 PROBLEM — R74.8 ELEVATED LIVER ENZYMES: Status: RESOLVED | Noted: 2018-10-24 | Resolved: 2023-01-25

## 2023-01-25 PROBLEM — R06.02 SHORTNESS OF BREATH: Status: RESOLVED | Noted: 2020-10-04 | Resolved: 2023-01-25

## 2023-01-25 PROBLEM — N39.0 URINARY TRACT INFECTION WITHOUT HEMATURIA, SITE UNSPECIFIED: Status: RESOLVED | Noted: 2020-08-14 | Resolved: 2023-01-25

## 2023-01-25 PROBLEM — R06.00 DYSPNEA, UNSPECIFIED TYPE: Status: RESOLVED | Noted: 2020-10-04 | Resolved: 2023-01-25

## 2023-01-25 PROBLEM — J45.909 ACUTE ASTHMA: Status: RESOLVED | Noted: 2022-03-31 | Resolved: 2023-01-25

## 2023-01-25 PROBLEM — R50.9 FEVER, UNSPECIFIED FEVER CAUSE: Status: RESOLVED | Noted: 2020-10-04 | Resolved: 2023-01-25

## 2023-01-25 PROBLEM — R06.09 EXERTIONAL DYSPNEA: Status: RESOLVED | Noted: 2021-10-29 | Resolved: 2023-01-25

## 2023-01-25 PROBLEM — E66.01 MORBID OBESITY (HCC): Status: RESOLVED | Noted: 2022-10-17 | Resolved: 2023-01-25

## 2023-01-25 PROBLEM — E66.9 OBESITY: Status: RESOLVED | Noted: 2022-10-17 | Resolved: 2023-01-25

## 2023-01-25 PROBLEM — R07.89 CHEST PAIN, ATYPICAL: Status: RESOLVED | Noted: 2021-10-29 | Resolved: 2023-01-25

## 2023-01-25 PROBLEM — J45.909 ACUTE ASTHMA (HCC): Status: RESOLVED | Noted: 2022-03-31 | Resolved: 2023-01-25

## 2023-01-25 PROBLEM — R73.9 HYPERGLYCEMIA: Status: RESOLVED | Noted: 2018-09-20 | Resolved: 2023-01-25

## 2023-01-25 PROBLEM — J18.9 COMMUNITY ACQUIRED PNEUMONIA: Status: RESOLVED | Noted: 2018-09-20 | Resolved: 2023-01-25

## 2023-01-25 PROCEDURE — 3078F DIAST BP <80 MM HG: CPT | Performed by: FAMILY MEDICINE

## 2023-01-25 PROCEDURE — 99214 OFFICE O/P EST MOD 30 MIN: CPT | Performed by: FAMILY MEDICINE

## 2023-01-25 PROCEDURE — 3008F BODY MASS INDEX DOCD: CPT | Performed by: FAMILY MEDICINE

## 2023-01-25 PROCEDURE — 3074F SYST BP LT 130 MM HG: CPT | Performed by: FAMILY MEDICINE

## 2023-01-25 NOTE — PATIENT INSTRUCTIONS
Thank you for choosing Erich Rhodes MD at [place] [address]  To Do: [address]  1. Please take meds as directed. [facility] is located in [address]. Monday, Tuesday & Friday - 8 a.m. to 4 p.m. Wednesday, Thursday - 7 a.m. to 3 p.m. The lab is closed daily from 12 p.m.-12:30 p.m. Saturday lab hours by appointment. Call [phone] to schedule the appointment. Please signup for Heart Metabolics, which is electronic access to your record if you have not done so. All your results will post on there. [email or web address]   You can NOW use Heart Metabolics to book your appointments with us, or consider using open access scheduling which is through the edward website [email or web address] and type in Erich Rhodes MD and follow the links for \"Schedule Online Now\"    To schedule Imaging or tests at [facility] Scheduling [phone], Go to [facility] A ER Building (For example: CT scans, X rays, Ultrasound, MRI)  Cardiac Testing in ER building Building A second floor Cardiac Testing [phone] (For example: Holter Monitor, Cardiac Stress tests,Event Monitor, or 2D Echocardiograms)  [facility] call [phone] usually in dg A  Walk in Clinic in [place] at [facility]. [address] Mon-Fri at 8am-7:30 p.m., and Sat/Sun 9:00a. m.-4:30 p.m. Also at [address]  Call [phone] for info     Please call our office about any questions regarding your treatment/medicines/tests as a result of today's visit. For your safety, read the entire package insert of all medicines prescribed to you and be aware of all of the risks of treatment even beyond those discussed today. All therapies have potential risk of harm or side effects or medication interactions.   It is your duty and for your safety to discuss with the pharmacist and our office with questions, and to notify us and stop treatment if problems arise, but know that our intention is that the benefits outweigh those potential risks and we strive to make you healthier and to improve your quality of life. Referrals, and Radiology Information:    If your insurance requires a referral to a specialist, please allow 5 business days to process your referral request.    If Nemo Santamaria MD orders a CT or MRI, it may take up to 10 business days to receive approval from your insurance company. Once our office has called informing you that the insurance company approved your testing, please call Central Scheduling at [phone]  Please allow our office 5 business days to contact you regarding any testing results. Refill policies:   Allow 3 business days for refills; controlled substances may take longer and must be picked up from the office in person. Narcotic medications can only be filled in 30 day increments and must be refilled at an office visit only. If your prescription is due for a refill, you may be due for a follow-up appointment. We cannot refill your maintenance medications at a preventative wellness visit. To best provide you care, patients receiving maintenance medications need to be seen at least twice a year.

## 2023-01-31 DIAGNOSIS — I10 ESSENTIAL HYPERTENSION: ICD-10-CM

## 2023-01-31 RX ORDER — HYDROCHLOROTHIAZIDE 25 MG/1
25 TABLET ORAL DAILY
Qty: 90 TABLET | Refills: 1 | Status: SHIPPED | OUTPATIENT
Start: 2023-01-31

## 2023-01-31 RX ORDER — FUROSEMIDE 20 MG/1
TABLET ORAL
Qty: 90 TABLET | Refills: 1 | Status: SHIPPED | OUTPATIENT
Start: 2023-01-31

## 2023-02-02 ENCOUNTER — PATIENT MESSAGE (OUTPATIENT)
Dept: FAMILY MEDICINE CLINIC | Facility: CLINIC | Age: 58
End: 2023-02-02

## 2023-02-02 RX ORDER — SULFAMETHOXAZOLE AND TRIMETHOPRIM 800; 160 MG/1; MG/1
1 TABLET ORAL 2 TIMES DAILY
Qty: 20 TABLET | Refills: 0 | Status: SHIPPED | OUTPATIENT
Start: 2023-02-02

## 2023-02-02 NOTE — TELEPHONE ENCOUNTER
From: Winnie Mcburney Book  To: Joaquim Horowitz MD  Sent: 2/2/2023 2:05 PM CST  Subject: Fever/ Sinus Infection    Dr. Zarina Alford,  Bluntly putting it. I feel like crap. Running a fever of 100.4 this afternoon. Sinuses hurting and blowing a lot of junk out of my nose. Feeling a lot of sinus pressure, headache, feeling of fever with chills, and coughing. Please advise. I have been taking Tylenol every 4 hours.   Kendra Mathias

## 2023-02-22 ENCOUNTER — PATIENT MESSAGE (OUTPATIENT)
Dept: FAMILY MEDICINE CLINIC | Facility: CLINIC | Age: 58
End: 2023-02-22

## 2023-02-22 ENCOUNTER — OFFICE VISIT (OUTPATIENT)
Dept: FAMILY MEDICINE CLINIC | Facility: CLINIC | Age: 58
End: 2023-02-22
Payer: COMMERCIAL

## 2023-02-22 VITALS
OXYGEN SATURATION: 98 % | WEIGHT: 171 LBS | TEMPERATURE: 98 F | SYSTOLIC BLOOD PRESSURE: 124 MMHG | RESPIRATION RATE: 16 BRPM | BODY MASS INDEX: 30.68 KG/M2 | HEART RATE: 65 BPM | HEIGHT: 62.5 IN | DIASTOLIC BLOOD PRESSURE: 70 MMHG

## 2023-02-22 DIAGNOSIS — M54.2 NECK PAIN: ICD-10-CM

## 2023-02-22 DIAGNOSIS — J32.9 CHRONIC SINUSITIS, UNSPECIFIED LOCATION: Primary | ICD-10-CM

## 2023-02-22 PROCEDURE — 3008F BODY MASS INDEX DOCD: CPT | Performed by: FAMILY MEDICINE

## 2023-02-22 PROCEDURE — 99214 OFFICE O/P EST MOD 30 MIN: CPT | Performed by: FAMILY MEDICINE

## 2023-02-22 PROCEDURE — 3078F DIAST BP <80 MM HG: CPT | Performed by: FAMILY MEDICINE

## 2023-02-22 PROCEDURE — 3074F SYST BP LT 130 MM HG: CPT | Performed by: FAMILY MEDICINE

## 2023-02-22 RX ORDER — CYCLOBENZAPRINE HCL 10 MG
10 TABLET ORAL NIGHTLY PRN
Qty: 15 TABLET | Refills: 1 | Status: SHIPPED | OUTPATIENT
Start: 2023-02-22

## 2023-02-22 RX ORDER — SULFAMETHOXAZOLE AND TRIMETHOPRIM 800; 160 MG/1; MG/1
1 TABLET ORAL 2 TIMES DAILY
Qty: 42 TABLET | Refills: 0 | Status: SHIPPED | OUTPATIENT
Start: 2023-02-22 | End: 2023-03-15

## 2023-02-22 NOTE — PATIENT INSTRUCTIONS
Thank you for choosing Halie Hyatt MD at [place] [address]  To Do: [address]  1. Please take meds as directed. [facility] is located in [address]. Monday, Tuesday & Friday - 8 a.m. to 4 p.m. Wednesday, Thursday - 7 a.m. to 3 p.m. The lab is closed daily from 12 p.m.-12:30 p.m. Saturday lab hours by appointment. Call [phone] to schedule the appointment. Please signup for MagForce, which is electronic access to your record if you have not done so. All your results will post on there. [email or web address]   You can NOW use MagForce to book your appointments with us, or consider using open access scheduling which is through the edward website [email or web address] and type in Halie Hyatt MD and follow the links for \"Schedule Online Now\"    To schedule Imaging or tests at [facility] Scheduling [phone], Go to [facility] A ER Building (For example: CT scans, X rays, Ultrasound, MRI)  Cardiac Testing in ER building Building A second floor Cardiac Testing [phone] (For example: Holter Monitor, Cardiac Stress tests,Event Monitor, or 2D Echocardiograms)  [facility] call [phone] usually in dg A  Walk in Clinic in [place] at [facility]. [address] Mon-Fri at 8am-7:30 p.m., and Sat/Sun 9:00a. m.-4:30 p.m. Also at [address]  Call [phone] for info     Please call our office about any questions regarding your treatment/medicines/tests as a result of today's visit. For your safety, read the entire package insert of all medicines prescribed to you and be aware of all of the risks of treatment even beyond those discussed today. All therapies have potential risk of harm or side effects or medication interactions.   It is your duty and for your safety to discuss with the pharmacist and our office with questions, and to notify us and stop treatment if problems arise, but know that our intention is that the benefits outweigh those potential risks and we strive to make you healthier and to improve your quality of life. Referrals, and Radiology Information:    If your insurance requires a referral to a specialist, please allow 5 business days to process your referral request.    If Aleida Jackson MD orders a CT or MRI, it may take up to 10 business days to receive approval from your insurance company. Once our office has called informing you that the insurance company approved your testing, please call Central Scheduling at [phone]  Please allow our office 5 business days to contact you regarding any testing results. Refill policies:   Allow 3 business days for refills; controlled substances may take longer and must be picked up from the office in person. Narcotic medications can only be filled in 30 day increments and must be refilled at an office visit only. If your prescription is due for a refill, you may be due for a follow-up appointment. We cannot refill your maintenance medications at a preventative wellness visit. To best provide you care, patients receiving maintenance medications need to be seen at least twice a year.

## 2023-03-08 ENCOUNTER — PATIENT OUTREACH (OUTPATIENT)
Dept: CASE MANAGEMENT | Age: 58
End: 2023-03-08

## 2023-03-13 ENCOUNTER — LAB ENCOUNTER (OUTPATIENT)
Dept: LAB | Age: 58
End: 2023-03-13
Attending: PHYSICIAN ASSISTANT
Payer: COMMERCIAL

## 2023-03-13 DIAGNOSIS — E78.5 HYPERLIPIDEMIA, UNSPECIFIED HYPERLIPIDEMIA TYPE: ICD-10-CM

## 2023-03-13 DIAGNOSIS — Z51.81 ENCOUNTER FOR THERAPEUTIC DRUG MONITORING: ICD-10-CM

## 2023-03-13 DIAGNOSIS — E55.9 VITAMIN D DEFICIENCY: ICD-10-CM

## 2023-03-13 DIAGNOSIS — I10 ESSENTIAL HYPERTENSION: ICD-10-CM

## 2023-03-13 DIAGNOSIS — G47.33 OSA ON CPAP: ICD-10-CM

## 2023-03-13 DIAGNOSIS — Z99.89 OSA ON CPAP: ICD-10-CM

## 2023-03-13 DIAGNOSIS — E66.9 CLASS 1 OBESITY WITH SERIOUS COMORBIDITY AND BODY MASS INDEX (BMI) OF 31.0 TO 31.9 IN ADULT, UNSPECIFIED OBESITY TYPE: ICD-10-CM

## 2023-03-13 DIAGNOSIS — F98.8 ATTENTION DEFICIT DISORDER (ADD) WITHOUT HYPERACTIVITY: ICD-10-CM

## 2023-03-13 DIAGNOSIS — E11.9 TYPE 2 DIABETES MELLITUS WITHOUT COMPLICATION, WITHOUT LONG-TERM CURRENT USE OF INSULIN (HCC): ICD-10-CM

## 2023-03-13 DIAGNOSIS — Z98.84 S/P LAPAROSCOPIC SLEEVE GASTRECTOMY: ICD-10-CM

## 2023-03-13 LAB
ALBUMIN SERPL-MCNC: 4 G/DL (ref 3.4–5)
ALBUMIN/GLOB SERPL: 1.2 {RATIO} (ref 1–2)
ALP LIVER SERPL-CCNC: 95 U/L
ALT SERPL-CCNC: 50 U/L
ANION GAP SERPL CALC-SCNC: 7 MMOL/L (ref 0–18)
AST SERPL-CCNC: 38 U/L (ref 15–37)
BASOPHILS # BLD AUTO: 0.07 X10(3) UL (ref 0–0.2)
BASOPHILS NFR BLD AUTO: 1.3 %
BILIRUB SERPL-MCNC: 0.5 MG/DL (ref 0.1–2)
BUN BLD-MCNC: 20 MG/DL (ref 7–18)
CALCIUM BLD-MCNC: 9.5 MG/DL (ref 8.5–10.1)
CHLORIDE SERPL-SCNC: 108 MMOL/L (ref 98–112)
CHOLEST SERPL-MCNC: 176 MG/DL (ref ?–200)
CO2 SERPL-SCNC: 25 MMOL/L (ref 21–32)
CREAT BLD-MCNC: 0.75 MG/DL
DEPRECATED HBV CORE AB SER IA-ACNC: 39.7 NG/ML
EOSINOPHIL # BLD AUTO: 0.12 X10(3) UL (ref 0–0.7)
EOSINOPHIL NFR BLD AUTO: 2.2 %
ERYTHROCYTE [DISTWIDTH] IN BLOOD BY AUTOMATED COUNT: 14.8 %
EST. AVERAGE GLUCOSE BLD GHB EST-MCNC: 123 MG/DL (ref 68–126)
FASTING PATIENT LIPID ANSWER: YES
FASTING STATUS PATIENT QL REPORTED: YES
FOLATE SERPL-MCNC: 40.7 NG/ML (ref 8.7–?)
GFR SERPLBLD BASED ON 1.73 SQ M-ARVRAT: 93 ML/MIN/1.73M2 (ref 60–?)
GLOBULIN PLAS-MCNC: 3.3 G/DL (ref 2.8–4.4)
GLUCOSE BLD-MCNC: 113 MG/DL (ref 70–99)
HBA1C MFR BLD: 5.9 % (ref ?–5.7)
HCT VFR BLD AUTO: 45.7 %
HDLC SERPL-MCNC: 39 MG/DL (ref 40–59)
HGB BLD-MCNC: 15 G/DL
IMM GRANULOCYTES # BLD AUTO: 0.03 X10(3) UL (ref 0–1)
IMM GRANULOCYTES NFR BLD: 0.5 %
IRON SATN MFR SERPL: 29 %
IRON SERPL-MCNC: 120 UG/DL
LDLC SERPL CALC-MCNC: 92 MG/DL (ref ?–100)
LYMPHOCYTES # BLD AUTO: 1.7 X10(3) UL (ref 1–4)
LYMPHOCYTES NFR BLD AUTO: 31.1 %
MCH RBC QN AUTO: 29.2 PG (ref 26–34)
MCHC RBC AUTO-ENTMCNC: 32.8 G/DL (ref 31–37)
MCV RBC AUTO: 89.1 FL
MONOCYTES # BLD AUTO: 0.31 X10(3) UL (ref 0.1–1)
MONOCYTES NFR BLD AUTO: 5.7 %
NEUTROPHILS # BLD AUTO: 3.23 X10 (3) UL (ref 1.5–7.7)
NEUTROPHILS # BLD AUTO: 3.23 X10(3) UL (ref 1.5–7.7)
NEUTROPHILS NFR BLD AUTO: 59.2 %
NONHDLC SERPL-MCNC: 137 MG/DL (ref ?–130)
OSMOLALITY SERPL CALC.SUM OF ELEC: 293 MOSM/KG (ref 275–295)
PLATELET # BLD AUTO: 308 10(3)UL (ref 150–450)
POTASSIUM SERPL-SCNC: 3.7 MMOL/L (ref 3.5–5.1)
PROT SERPL-MCNC: 7.3 G/DL (ref 6.4–8.2)
RBC # BLD AUTO: 5.13 X10(6)UL
SODIUM SERPL-SCNC: 140 MMOL/L (ref 136–145)
T4 FREE SERPL-MCNC: 1.1 NG/DL (ref 0.8–1.7)
TIBC SERPL-MCNC: 408 UG/DL (ref 240–450)
TRANSFERRIN SERPL-MCNC: 274 MG/DL (ref 200–360)
TRIGL SERPL-MCNC: 269 MG/DL (ref 30–149)
TSI SER-ACNC: 0.06 MIU/ML (ref 0.36–3.74)
VIT B12 SERPL-MCNC: 1373 PG/ML (ref 193–986)
VIT D+METAB SERPL-MCNC: 65.8 NG/ML (ref 30–100)
VLDLC SERPL CALC-MCNC: 44 MG/DL (ref 0–30)
WBC # BLD AUTO: 5.5 X10(3) UL (ref 4–11)

## 2023-03-13 PROCEDURE — 83540 ASSAY OF IRON: CPT

## 2023-03-13 PROCEDURE — 84439 ASSAY OF FREE THYROXINE: CPT

## 2023-03-13 PROCEDURE — 84425 ASSAY OF VITAMIN B-1: CPT

## 2023-03-13 PROCEDURE — 84443 ASSAY THYROID STIM HORMONE: CPT

## 2023-03-13 PROCEDURE — 80061 LIPID PANEL: CPT

## 2023-03-13 PROCEDURE — 83550 IRON BINDING TEST: CPT

## 2023-03-13 PROCEDURE — 82306 VITAMIN D 25 HYDROXY: CPT

## 2023-03-13 PROCEDURE — 82607 VITAMIN B-12: CPT

## 2023-03-13 PROCEDURE — 83036 HEMOGLOBIN GLYCOSYLATED A1C: CPT

## 2023-03-13 PROCEDURE — 82746 ASSAY OF FOLIC ACID SERUM: CPT

## 2023-03-13 PROCEDURE — 80053 COMPREHEN METABOLIC PANEL: CPT

## 2023-03-13 PROCEDURE — 36415 COLL VENOUS BLD VENIPUNCTURE: CPT

## 2023-03-13 PROCEDURE — 85025 COMPLETE CBC W/AUTO DIFF WBC: CPT

## 2023-03-13 PROCEDURE — 82728 ASSAY OF FERRITIN: CPT

## 2023-03-15 ENCOUNTER — LAB ENCOUNTER (OUTPATIENT)
Dept: LAB | Age: 58
End: 2023-03-15
Attending: PHYSICIAN ASSISTANT
Payer: COMMERCIAL

## 2023-03-15 ENCOUNTER — OFFICE VISIT (OUTPATIENT)
Dept: INTERNAL MEDICINE CLINIC | Facility: CLINIC | Age: 58
End: 2023-03-15
Payer: COMMERCIAL

## 2023-03-15 VITALS
OXYGEN SATURATION: 98 % | DIASTOLIC BLOOD PRESSURE: 80 MMHG | RESPIRATION RATE: 18 BRPM | WEIGHT: 179 LBS | HEIGHT: 62.5 IN | BODY MASS INDEX: 32.12 KG/M2 | HEART RATE: 88 BPM | SYSTOLIC BLOOD PRESSURE: 140 MMHG

## 2023-03-15 DIAGNOSIS — I10 ESSENTIAL HYPERTENSION: ICD-10-CM

## 2023-03-15 DIAGNOSIS — E55.9 VITAMIN D DEFICIENCY: ICD-10-CM

## 2023-03-15 DIAGNOSIS — R73.03 PREDIABETES: ICD-10-CM

## 2023-03-15 DIAGNOSIS — F98.8 ATTENTION DEFICIT DISORDER (ADD) WITHOUT HYPERACTIVITY: ICD-10-CM

## 2023-03-15 DIAGNOSIS — Z51.81 ENCOUNTER FOR THERAPEUTIC DRUG MONITORING: Primary | ICD-10-CM

## 2023-03-15 DIAGNOSIS — G47.33 OSA ON CPAP: ICD-10-CM

## 2023-03-15 DIAGNOSIS — E66.9 CLASS 1 OBESITY WITH SERIOUS COMORBIDITY AND BODY MASS INDEX (BMI) OF 31.0 TO 31.9 IN ADULT, UNSPECIFIED OBESITY TYPE: ICD-10-CM

## 2023-03-15 DIAGNOSIS — Z98.84 S/P LAPAROSCOPIC SLEEVE GASTRECTOMY: ICD-10-CM

## 2023-03-15 DIAGNOSIS — Z51.81 ENCOUNTER FOR THERAPEUTIC DRUG MONITORING: ICD-10-CM

## 2023-03-15 DIAGNOSIS — E11.9 TYPE 2 DIABETES MELLITUS WITHOUT COMPLICATION, WITHOUT LONG-TERM CURRENT USE OF INSULIN (HCC): ICD-10-CM

## 2023-03-15 DIAGNOSIS — Z99.89 OSA ON CPAP: ICD-10-CM

## 2023-03-15 DIAGNOSIS — E78.5 HYPERLIPIDEMIA, UNSPECIFIED HYPERLIPIDEMIA TYPE: ICD-10-CM

## 2023-03-15 DIAGNOSIS — E88.81 INSULIN RESISTANCE: ICD-10-CM

## 2023-03-15 PROCEDURE — 3079F DIAST BP 80-89 MM HG: CPT | Performed by: PHYSICIAN ASSISTANT

## 2023-03-15 PROCEDURE — 3077F SYST BP >= 140 MM HG: CPT | Performed by: PHYSICIAN ASSISTANT

## 2023-03-15 PROCEDURE — 84425 ASSAY OF VITAMIN B-1: CPT

## 2023-03-15 PROCEDURE — 99214 OFFICE O/P EST MOD 30 MIN: CPT | Performed by: PHYSICIAN ASSISTANT

## 2023-03-15 PROCEDURE — 3008F BODY MASS INDEX DOCD: CPT | Performed by: PHYSICIAN ASSISTANT

## 2023-03-15 PROCEDURE — 36415 COLL VENOUS BLD VENIPUNCTURE: CPT

## 2023-03-21 ENCOUNTER — OFFICE VISIT (OUTPATIENT)
Dept: SURGERY | Facility: CLINIC | Age: 58
End: 2023-03-21
Payer: COMMERCIAL

## 2023-03-21 VITALS
BODY MASS INDEX: 31.22 KG/M2 | HEIGHT: 62.5 IN | DIASTOLIC BLOOD PRESSURE: 80 MMHG | HEART RATE: 86 BPM | WEIGHT: 174 LBS | SYSTOLIC BLOOD PRESSURE: 118 MMHG | OXYGEN SATURATION: 100 %

## 2023-03-21 LAB — VITAMIN B1 (THIAMINE), WHOLE B: 208 NMOL/L

## 2023-03-30 ENCOUNTER — PATIENT MESSAGE (OUTPATIENT)
Dept: INTERNAL MEDICINE CLINIC | Facility: CLINIC | Age: 58
End: 2023-03-30

## 2023-03-30 NOTE — TELEPHONE ENCOUNTER
From: Hi Holguin Book  To: Sergio Coy PA-C  Sent: 3/30/2023 8:49 AM CDT  Subject: Adan Tierney,  My pharmacy does not have enough of the Amphetamine salts 10 mg tablet in stock. [facility] [address] [facility] [place] [facility]. [facility]? They said that they can not transfer from one to the other (even within their system). Also, what were your thoughts on the B1 and thiamine test results? Thank you!

## 2023-03-31 RX ORDER — DEXTROAMPHETAMINE SACCHARATE, AMPHETAMINE ASPARTATE, DEXTROAMPHETAMINE SULFATE AND AMPHETAMINE SULFATE 2.5; 2.5; 2.5; 2.5 MG/1; MG/1; MG/1; MG/1
10 TABLET ORAL 2 TIMES DAILY
Qty: 60 TABLET | Refills: 0 | Status: SHIPPED | OUTPATIENT
Start: 2023-04-30 | End: 2023-05-30

## 2023-03-31 RX ORDER — DEXTROAMPHETAMINE SACCHARATE, AMPHETAMINE ASPARTATE, DEXTROAMPHETAMINE SULFATE AND AMPHETAMINE SULFATE 2.5; 2.5; 2.5; 2.5 MG/1; MG/1; MG/1; MG/1
10 TABLET ORAL 2 TIMES DAILY
Qty: 60 TABLET | Refills: 0 | Status: SHIPPED | OUTPATIENT
Start: 2023-03-31 | End: 2023-04-30

## 2023-04-03 ENCOUNTER — OFFICE VISIT (OUTPATIENT)
Dept: INTERNAL MEDICINE CLINIC | Facility: CLINIC | Age: 58
End: 2023-04-03
Payer: COMMERCIAL

## 2023-04-03 VITALS — BODY MASS INDEX: 32.23 KG/M2 | HEIGHT: 62.5 IN | WEIGHT: 179.63 LBS

## 2023-04-03 DIAGNOSIS — K58.1 IRRITABLE BOWEL SYNDROME WITH CONSTIPATION: ICD-10-CM

## 2023-04-03 DIAGNOSIS — I10 ESSENTIAL HYPERTENSION: ICD-10-CM

## 2023-04-03 DIAGNOSIS — E66.9 OBESITY (BMI 30-39.9): ICD-10-CM

## 2023-04-03 DIAGNOSIS — E89.0 POSTOPERATIVE HYPOTHYROIDISM: ICD-10-CM

## 2023-04-03 DIAGNOSIS — G47.33 OSA ON CPAP: ICD-10-CM

## 2023-04-03 DIAGNOSIS — Z99.89 OSA ON CPAP: ICD-10-CM

## 2023-04-03 DIAGNOSIS — K21.9 GASTROESOPHAGEAL REFLUX DISEASE, UNSPECIFIED WHETHER ESOPHAGITIS PRESENT: ICD-10-CM

## 2023-04-03 RX ORDER — DEXTROAMPHETAMINE SACCHARATE, AMPHETAMINE ASPARTATE, DEXTROAMPHETAMINE SULFATE AND AMPHETAMINE SULFATE 2.5; 2.5; 2.5; 2.5 MG/1; MG/1; MG/1; MG/1
10 TABLET ORAL 2 TIMES DAILY
Qty: 60 TABLET | Refills: 0 | Status: SHIPPED | OUTPATIENT
Start: 2023-05-04

## 2023-04-03 RX ORDER — DEXTROAMPHETAMINE SACCHARATE, AMPHETAMINE ASPARTATE, DEXTROAMPHETAMINE SULFATE AND AMPHETAMINE SULFATE 2.5; 2.5; 2.5; 2.5 MG/1; MG/1; MG/1; MG/1
10 TABLET ORAL 2 TIMES DAILY
Qty: 60 TABLET | Refills: 0 | Status: SHIPPED | OUTPATIENT
Start: 2023-04-03

## 2023-04-03 RX ORDER — DEXTROAMPHETAMINE SACCHARATE, AMPHETAMINE ASPARTATE, DEXTROAMPHETAMINE SULFATE AND AMPHETAMINE SULFATE 2.5; 2.5; 2.5; 2.5 MG/1; MG/1; MG/1; MG/1
10 TABLET ORAL 2 TIMES DAILY
Qty: 60 TABLET | Refills: 0 | Status: SHIPPED | OUTPATIENT
Start: 2023-06-04

## 2023-04-03 NOTE — TELEPHONE ENCOUNTER
Patient left message on Friday that she needs her adderall 10 mg plain [facility] [place]. [facility] was out of med by the time we sent the order. I left her message today to see if this is resolved?

## 2023-04-03 NOTE — TELEPHONE ENCOUNTER
I spoke to patient and she said she has had terrible trouble trying to get her adderall. Needs it now sent to [facility] in [facility] the plain adderall 10 mg bid.

## 2023-04-03 NOTE — PATIENT INSTRUCTIONS
Recommendations/goals:    1. Continue to keep a food record, My Net Diary, select the macros dashboard. 2.  Continue to consume at least 4-6 meals/snacks per day. Aim for 63-78 grams of protein per day. 3.  Continue to eat separately from drinking, avoid straws, chew food 20-30 times before swallowing. Make the meals last 30 minutes. 4.  Continue to drink at least 64 oz per day of water. (May try Protein water, adding True Lemon, Crystal Light). 5. Continue getting in exercise and strength training 10 minutes 3 days per week.

## 2023-05-01 RX ORDER — MONTELUKAST SODIUM 10 MG/1
TABLET ORAL
Qty: 90 TABLET | Refills: 1 | Status: SHIPPED | OUTPATIENT
Start: 2023-05-01

## 2023-05-25 ENCOUNTER — OFFICE VISIT (OUTPATIENT)
Dept: FAMILY MEDICINE CLINIC | Facility: CLINIC | Age: 58
End: 2023-05-25
Payer: COMMERCIAL

## 2023-05-25 VITALS
WEIGHT: 177 LBS | BODY MASS INDEX: 31.76 KG/M2 | RESPIRATION RATE: 16 BRPM | HEART RATE: 84 BPM | HEIGHT: 62.5 IN | SYSTOLIC BLOOD PRESSURE: 138 MMHG | DIASTOLIC BLOOD PRESSURE: 82 MMHG | TEMPERATURE: 98 F | OXYGEN SATURATION: 99 %

## 2023-05-25 DIAGNOSIS — Z00.00 WELLNESS EXAMINATION: Primary | ICD-10-CM

## 2023-05-25 PROCEDURE — 99396 PREV VISIT EST AGE 40-64: CPT | Performed by: FAMILY MEDICINE

## 2023-05-25 PROCEDURE — 3079F DIAST BP 80-89 MM HG: CPT | Performed by: FAMILY MEDICINE

## 2023-05-25 PROCEDURE — 3075F SYST BP GE 130 - 139MM HG: CPT | Performed by: FAMILY MEDICINE

## 2023-05-25 PROCEDURE — 3008F BODY MASS INDEX DOCD: CPT | Performed by: FAMILY MEDICINE

## 2023-05-25 RX ORDER — DOXYCYCLINE HYCLATE 100 MG
100 TABLET ORAL 2 TIMES DAILY
Qty: 20 TABLET | Refills: 0 | Status: SHIPPED | OUTPATIENT
Start: 2023-05-25 | End: 2023-06-04

## 2023-05-25 NOTE — PATIENT INSTRUCTIONS
Thank you for choosing Clark Lucio MD at [place] [address]  To Do: [address]  1. Please see age appropriate health prevention below    [facility] is located in [address]. Monday, Tuesday & Friday - 8 a.m. to 4 p.m. Wednesday, Thursday - 7 a.m. to 3 p.m. The lab is closed daily from 12 p.m.-12:30 p.m. Saturday lab hours by appointment. Call [phone] to schedule the appointment. Please signup for Tokopedia, which is electronic access to your record if you have not done so. All your results will post on there. [email or web address]   You can NOW use Tokopedia to book your appointments with us, or consider using open access scheduling which is through the edward website [email or web address] and type in Clark Lucio MD and follow the links for \"Schedule Online Now\"    To schedule Imaging or tests at [facility] Scheduling [phone], Go to [facility] A ER Building (For example: CT scans, X rays, Ultrasound, MRI)  Cardiac Testing in ER building Building A second floor Cardiac Testing [phone] (For example: Holter Monitor, Cardiac Stress tests,Event Monitor, or 2D Echocardiograms)  [facility] call [phone] usually in Bldg A  Walk in Clinic in [place] at [facility]. [address] Mon-Fri at 8am-7:30 p.m., and Sat/Sun 9:00a. m.-4:30 p.m. Also at [address]  Call [phone] for info     Please call our office about any questions regarding your treatment/medicines/tests as a result of today's visit. For your safety, read the entire package insert of all medicines prescribed to you and be aware of all of the risks of treatment even beyond those discussed today. All therapies have potential risk of harm or side effects or medication interactions.   It is your duty and for your safety to discuss with the pharmacist and our office with questions, and to notify us and stop treatment if problems arise, but know that our intention is that the benefits outweigh those potential risks and we strive to make you healthier and to improve your quality of life. Referrals, and Radiology Information:    If your insurance requires a referral to a specialist, please allow 5 business days to process your referral request.    If Miky Mcallister MD orders a CT or MRI, it may take up to 10 business days to receive approval from your insurance company. Once our office has called informing you that the insurance company approved your testing, please call Central Scheduling at [phone]  Please allow our office 5 business days to contact you regarding any testing results. Refill policies:   Allow 3 business days for refills; controlled substances may take longer and must be picked up from the office in person. Narcotic medications can only be filled in 30 day increments and must be refilled at an office visit only. If your prescription is due for a refill, you may be due for a follow-up appointment. We cannot refill your maintenance medications at a preventative wellness visit. To best provide you care, patients receiving maintenance medications need to be seen at least twice a year.

## 2023-06-22 ENCOUNTER — OFFICE VISIT (OUTPATIENT)
Dept: INTERNAL MEDICINE CLINIC | Facility: CLINIC | Age: 58
End: 2023-06-22
Payer: COMMERCIAL

## 2023-06-22 VITALS
DIASTOLIC BLOOD PRESSURE: 70 MMHG | BODY MASS INDEX: 31.4 KG/M2 | SYSTOLIC BLOOD PRESSURE: 110 MMHG | WEIGHT: 175 LBS | RESPIRATION RATE: 16 BRPM | HEIGHT: 62.5 IN | HEART RATE: 68 BPM

## 2023-06-22 DIAGNOSIS — I10 ESSENTIAL HYPERTENSION: ICD-10-CM

## 2023-06-22 DIAGNOSIS — Z51.81 ENCOUNTER FOR THERAPEUTIC DRUG MONITORING: Primary | ICD-10-CM

## 2023-06-22 DIAGNOSIS — R73.03 PREDIABETES: ICD-10-CM

## 2023-06-22 DIAGNOSIS — E78.5 HYPERLIPIDEMIA, UNSPECIFIED HYPERLIPIDEMIA TYPE: ICD-10-CM

## 2023-06-22 DIAGNOSIS — E55.9 VITAMIN D DEFICIENCY: ICD-10-CM

## 2023-06-22 DIAGNOSIS — Z98.84 S/P LAPAROSCOPIC SLEEVE GASTRECTOMY: ICD-10-CM

## 2023-06-22 DIAGNOSIS — E66.9 CLASS 1 OBESITY WITH SERIOUS COMORBIDITY AND BODY MASS INDEX (BMI) OF 31.0 TO 31.9 IN ADULT, UNSPECIFIED OBESITY TYPE: ICD-10-CM

## 2023-06-22 PROCEDURE — 3008F BODY MASS INDEX DOCD: CPT | Performed by: PHYSICIAN ASSISTANT

## 2023-06-22 PROCEDURE — 99214 OFFICE O/P EST MOD 30 MIN: CPT | Performed by: PHYSICIAN ASSISTANT

## 2023-06-22 PROCEDURE — 3074F SYST BP LT 130 MM HG: CPT | Performed by: PHYSICIAN ASSISTANT

## 2023-06-22 PROCEDURE — 3078F DIAST BP <80 MM HG: CPT | Performed by: PHYSICIAN ASSISTANT

## 2023-06-22 RX ORDER — METFORMIN HYDROCHLORIDE 750 MG/1
750 TABLET, EXTENDED RELEASE ORAL DAILY
Qty: 90 TABLET | Refills: 0 | Status: SHIPPED | OUTPATIENT
Start: 2023-06-22

## 2023-06-22 RX ORDER — DEXTROAMPHETAMINE SACCHARATE, AMPHETAMINE ASPARTATE, DEXTROAMPHETAMINE SULFATE AND AMPHETAMINE SULFATE 5; 5; 5; 5 MG/1; MG/1; MG/1; MG/1
20 TABLET ORAL 2 TIMES DAILY
Qty: 60 TABLET | Refills: 0 | Status: SHIPPED | OUTPATIENT
Start: 2023-06-22 | End: 2023-07-22

## 2023-06-22 RX ORDER — DEXTROAMPHETAMINE SACCHARATE, AMPHETAMINE ASPARTATE, DEXTROAMPHETAMINE SULFATE AND AMPHETAMINE SULFATE 5; 5; 5; 5 MG/1; MG/1; MG/1; MG/1
20 TABLET ORAL 2 TIMES DAILY
Qty: 60 TABLET | Refills: 0 | Status: SHIPPED | OUTPATIENT
Start: 2023-07-23 | End: 2023-08-22

## 2023-06-22 RX ORDER — DEXTROAMPHETAMINE SACCHARATE, AMPHETAMINE ASPARTATE, DEXTROAMPHETAMINE SULFATE AND AMPHETAMINE SULFATE 5; 5; 5; 5 MG/1; MG/1; MG/1; MG/1
20 TABLET ORAL 2 TIMES DAILY
Qty: 60 TABLET | Refills: 0 | Status: SHIPPED | OUTPATIENT
Start: 2023-08-23 | End: 2023-09-22

## 2023-06-26 ENCOUNTER — HOSPITAL ENCOUNTER (EMERGENCY)
Facility: HOSPITAL | Age: 58
Discharge: HOME | End: 2023-06-26
Attending: EMERGENCY MEDICINE
Payer: COMMERCIAL

## 2023-06-26 ENCOUNTER — TELEPHONE (OUTPATIENT)
Dept: FAMILY MEDICINE CLINIC | Facility: CLINIC | Age: 58
End: 2023-06-26

## 2023-06-26 ENCOUNTER — APPOINTMENT (OUTPATIENT)
Dept: GENERAL RADIOLOGY | Facility: HOSPITAL | Age: 58
End: 2023-06-26
Payer: COMMERCIAL

## 2023-06-26 VITALS
TEMPERATURE: 97 F | OXYGEN SATURATION: 98 % | RESPIRATION RATE: 19 BRPM | HEART RATE: 67 BPM | DIASTOLIC BLOOD PRESSURE: 71 MMHG | SYSTOLIC BLOOD PRESSURE: 119 MMHG

## 2023-06-26 DIAGNOSIS — R07.89 CHEST PAIN, MUSCULOSKELETAL: Primary | ICD-10-CM

## 2023-06-26 LAB
ALBUMIN SERPL-MCNC: 4.1 G/DL (ref 3.4–5)
ALBUMIN/GLOB SERPL: 1.1 {RATIO} (ref 1–2)
ALP LIVER SERPL-CCNC: 93 U/L
ALT SERPL-CCNC: 31 U/L
ANION GAP SERPL CALC-SCNC: 9 MMOL/L (ref 0–18)
AST SERPL-CCNC: 26 U/L (ref 15–37)
BASOPHILS # BLD AUTO: 0.1 X10(3) UL (ref 0–0.2)
BASOPHILS NFR BLD AUTO: 1.1 %
BILIRUB SERPL-MCNC: 0.9 MG/DL (ref 0.1–2)
BUN BLD-MCNC: 18 MG/DL (ref 7–18)
CALCIUM BLD-MCNC: 10.1 MG/DL (ref 8.5–10.1)
CHLORIDE SERPL-SCNC: 102 MMOL/L (ref 98–112)
CO2 SERPL-SCNC: 29 MMOL/L (ref 21–32)
CREAT BLD-MCNC: 0.95 MG/DL
D DIMER PPP FEU-MCNC: <0.27 UG/ML FEU (ref ?–0.57)
EOSINOPHIL # BLD AUTO: 0.1 X10(3) UL (ref 0–0.7)
EOSINOPHIL NFR BLD AUTO: 1.1 %
ERYTHROCYTE [DISTWIDTH] IN BLOOD BY AUTOMATED COUNT: 13 %
GFR SERPLBLD BASED ON 1.73 SQ M-ARVRAT: 70 ML/MIN/1.73M2 (ref 60–?)
GLOBULIN PLAS-MCNC: 3.8 G/DL (ref 2.8–4.4)
GLUCOSE BLD-MCNC: 112 MG/DL (ref 70–99)
HCT VFR BLD AUTO: 46.7 %
HGB BLD-MCNC: 15.7 G/DL
IMM GRANULOCYTES # BLD AUTO: 0.04 X10(3) UL (ref 0–1)
IMM GRANULOCYTES NFR BLD: 0.4 %
LYMPHOCYTES # BLD AUTO: 3.12 X10(3) UL (ref 1–4)
LYMPHOCYTES NFR BLD AUTO: 33.3 %
MCH RBC QN AUTO: 29.4 PG (ref 26–34)
MCHC RBC AUTO-ENTMCNC: 33.6 G/DL (ref 31–37)
MCV RBC AUTO: 87.5 FL
MONOCYTES # BLD AUTO: 0.54 X10(3) UL (ref 0.1–1)
MONOCYTES NFR BLD AUTO: 5.8 %
NEUTROPHILS # BLD AUTO: 5.48 X10 (3) UL (ref 1.5–7.7)
NEUTROPHILS # BLD AUTO: 5.48 X10(3) UL (ref 1.5–7.7)
NEUTROPHILS NFR BLD AUTO: 58.3 %
OSMOLALITY SERPL CALC.SUM OF ELEC: 293 MOSM/KG (ref 275–295)
PLATELET # BLD AUTO: 372 10(3)UL (ref 150–450)
POTASSIUM SERPL-SCNC: 3 MMOL/L (ref 3.5–5.1)
PROT SERPL-MCNC: 7.9 G/DL (ref 6.4–8.2)
RBC # BLD AUTO: 5.34 X10(6)UL
SODIUM SERPL-SCNC: 140 MMOL/L (ref 136–145)
TROPONIN I HIGH SENSITIVITY: 4 NG/L
WBC # BLD AUTO: 9.4 X10(3) UL (ref 4–11)

## 2023-06-26 PROCEDURE — 84484 ASSAY OF TROPONIN QUANT: CPT | Performed by: EMERGENCY MEDICINE

## 2023-06-26 PROCEDURE — 93005 ELECTROCARDIOGRAM TRACING: CPT

## 2023-06-26 PROCEDURE — 85025 COMPLETE CBC W/AUTO DIFF WBC: CPT

## 2023-06-26 PROCEDURE — 80053 COMPREHEN METABOLIC PANEL: CPT | Performed by: EMERGENCY MEDICINE

## 2023-06-26 PROCEDURE — 93010 ELECTROCARDIOGRAM REPORT: CPT

## 2023-06-26 PROCEDURE — 99284 EMERGENCY DEPT VISIT MOD MDM: CPT

## 2023-06-26 PROCEDURE — 85025 COMPLETE CBC W/AUTO DIFF WBC: CPT | Performed by: EMERGENCY MEDICINE

## 2023-06-26 PROCEDURE — 71045 X-RAY EXAM CHEST 1 VIEW: CPT

## 2023-06-26 PROCEDURE — 85379 FIBRIN DEGRADATION QUANT: CPT | Performed by: EMERGENCY MEDICINE

## 2023-06-26 PROCEDURE — 80053 COMPREHEN METABOLIC PANEL: CPT

## 2023-06-26 PROCEDURE — 99285 EMERGENCY DEPT VISIT HI MDM: CPT

## 2023-06-26 PROCEDURE — 96374 THER/PROPH/DIAG INJ IV PUSH: CPT

## 2023-06-26 RX ORDER — CYCLOBENZAPRINE HCL 10 MG
10 TABLET ORAL 3 TIMES DAILY PRN
Qty: 20 TABLET | Refills: 0 | Status: SHIPPED | OUTPATIENT
Start: 2023-06-26 | End: 2023-07-03

## 2023-06-26 RX ORDER — CYCLOBENZAPRINE HCL 10 MG
10 TABLET ORAL 3 TIMES DAILY PRN
Status: DISCONTINUED | OUTPATIENT
Start: 2023-06-26 | End: 2023-06-26

## 2023-06-26 RX ORDER — KETOROLAC TROMETHAMINE 15 MG/ML
15 INJECTION, SOLUTION INTRAMUSCULAR; INTRAVENOUS ONCE
Status: COMPLETED | OUTPATIENT
Start: 2023-06-26 | End: 2023-06-26

## 2023-06-26 RX ORDER — CYCLOBENZAPRINE HCL 10 MG
10 TABLET ORAL 3 TIMES DAILY PRN
Qty: 20 TABLET | Refills: 0 | Status: SHIPPED | OUTPATIENT
Start: 2023-06-26 | End: 2023-06-26 | Stop reason: CLARIF

## 2023-06-26 RX ORDER — POTASSIUM CHLORIDE 20 MEQ/1
40 TABLET, EXTENDED RELEASE ORAL ONCE
Status: COMPLETED | OUTPATIENT
Start: 2023-06-26 | End: 2023-06-26

## 2023-06-26 NOTE — TELEPHONE ENCOUNTER
Spoke with pt, states she went kayaking 2 days ago and states today her \"right lung hurts\". States it hurts to take a deep breath  +SOB  States her pulse ox is 90-92% after her inhaler and neb treatment  Pt is audibly short of breath on the phone  States she called pulm but they told her to call her PCP office  Advised pt to seek care at ER/IC to be evaluated for her shortness of breath. Pt verbalized understanding and agreement. All questions answered.

## 2023-06-26 NOTE — TELEPHONE ENCOUNTER
Pt called and said her right lung hurts and she is having trouble breathing. The call was transferred to Nurse station.

## 2023-06-27 LAB
ATRIAL RATE: 75 BPM
P AXIS: 61 DEGREES
P-R INTERVAL: 156 MS
Q-T INTERVAL: 412 MS
QRS DURATION: 106 MS
QTC CALCULATION (BEZET): 460 MS
R AXIS: 20 DEGREES
T AXIS: 39 DEGREES
VENTRICULAR RATE: 75 BPM

## 2023-06-27 NOTE — DISCHARGE INSTRUCTIONS
Take ibuprofen 600 mg 3 times a day with food. Can take 1 Flexeril every 8 hours. Cannot drive on the Flexeril. Return if worsening symptoms, new complaints.

## 2023-07-03 ENCOUNTER — TELEMEDICINE (OUTPATIENT)
Dept: FAMILY MEDICINE CLINIC | Facility: CLINIC | Age: 58
End: 2023-07-03

## 2023-07-03 DIAGNOSIS — J45.41 MODERATE PERSISTENT ASTHMA WITH ACUTE EXACERBATION: Primary | ICD-10-CM

## 2023-07-03 PROCEDURE — 99213 OFFICE O/P EST LOW 20 MIN: CPT | Performed by: FAMILY MEDICINE

## 2023-07-03 RX ORDER — PREDNISONE 20 MG/1
TABLET ORAL
Qty: 21 TABLET | Refills: 0 | Status: SHIPPED | OUTPATIENT
Start: 2023-07-03 | End: 2023-07-18

## 2023-07-26 ENCOUNTER — PATIENT MESSAGE (OUTPATIENT)
Dept: FAMILY MEDICINE CLINIC | Facility: CLINIC | Age: 58
End: 2023-07-26

## 2023-07-26 ENCOUNTER — TELEMEDICINE (OUTPATIENT)
Dept: FAMILY MEDICINE CLINIC | Facility: CLINIC | Age: 58
End: 2023-07-26

## 2023-07-26 DIAGNOSIS — J06.9 UPPER RESPIRATORY TRACT INFECTION, UNSPECIFIED TYPE: ICD-10-CM

## 2023-07-26 DIAGNOSIS — J45.901 MILD ASTHMA WITH EXACERBATION, UNSPECIFIED WHETHER PERSISTENT: Primary | ICD-10-CM

## 2023-07-26 DIAGNOSIS — R25.2 CRAMPS, EXTREMITY: Primary | ICD-10-CM

## 2023-07-26 PROCEDURE — 99214 OFFICE O/P EST MOD 30 MIN: CPT | Performed by: FAMILY MEDICINE

## 2023-07-26 RX ORDER — DOXYCYCLINE HYCLATE 100 MG/1
100 CAPSULE ORAL 2 TIMES DAILY
Qty: 20 CAPSULE | Refills: 0 | Status: SHIPPED | OUTPATIENT
Start: 2023-07-26 | End: 2023-08-05

## 2023-07-26 RX ORDER — PREDNISONE 20 MG/1
TABLET ORAL
Qty: 21 TABLET | Refills: 0 | Status: SHIPPED | OUTPATIENT
Start: 2023-07-26 | End: 2023-08-10

## 2023-07-26 NOTE — PROGRESS NOTES
Subjective:   Patient ID: Axel Johnston is a 62year old female. HPI  Ms. Reyes is a pleasant 59-year-old female with history of hypertension, hyperlipidemia, asthma, hypothyroidism, migraines presenting today for video visit follow-up shortness of breath which seem to have started Saturday but worsened Monday and Tuesday. She has been checking her O2 sat and would be above 90s. She has not had leftover prednisone and has been taking 10 mg but with not much improvement. She has been doing her nebulization treatment but with not much improvement. She reports mild fever. No sick contacts. Cough is nonproductive. She also has been experiencing wheezing. She is currently at work. I had reviewed past medical and family histories together with allergy and medication lists documented. History/Other:   Review of Systems   Constitutional:  Positive for fatigue and fever. HENT:  Positive for congestion. Negative for sore throat. Respiratory:  Positive for cough, shortness of breath and wheezing. Cardiovascular:  Negative for chest pain and palpitations. Gastrointestinal:  Negative for abdominal pain. Current Outpatient Medications   Medication Sig Dispense Refill    predniSONE 20 MG Oral Tab Take 2 tablets (40 mg total) by mouth daily for 5 days, THEN 1 tablet (20 mg total) daily for 5 days, THEN 0.5 tablets (10 mg total) daily for 5 days. 21 tablet 0    doxycycline 100 MG Oral Cap Take 1 capsule (100 mg total) by mouth 2 (two) times daily for 10 days. 20 capsule 0    amphetamine-dextroamphetamine (ADDERALL) 20 MG Oral Tab Take 1 tablet (20 mg total) by mouth 2 (two) times daily. 60 tablet 0    [START ON 8/23/2023] amphetamine-dextroamphetamine (ADDERALL) 20 MG Oral Tab Take 1 tablet (20 mg total) by mouth 2 (two) times daily. 60 tablet 0    metFORMIN  MG Oral Tablet 24 Hr Take 1 tablet (750 mg total) by mouth daily.  90 tablet 0    MONTELUKAST 10 MG Oral Tab TAKE 1 TABLET BY MOUTH EVERY DAY 90 tablet 1    amphetamine-dextroamphetamine (ADDERALL) 10 MG Oral Tab Take 1 tablet (10 mg total) by mouth 2 (two) times daily. 60 tablet 0    amphetamine-dextroamphetamine (ADDERALL) 10 MG Oral Tab Take 1 tablet (10 mg total) by mouth 2 (two) times daily. 60 tablet 0    amphetamine-dextroamphetamine (ADDERALL) 10 MG Oral Tab Take 1 tablet (10 mg total) by mouth 2 (two) times daily. 60 tablet 0    HYDROCHLOROTHIAZIDE 25 MG Oral Tab TAKE 1 TABLET (25 MG TOTAL) BY MOUTH DAILY. (Patient not taking: Reported on 6/22/2023) 90 tablet 1    LEVOXYL 112 MCG Oral Tab Take 1 tablet (112 mcg total) by mouth before breakfast.      ipratropium 0.06 % Nasal Solution 2 sprays by Nasal route in the morning and 2 sprays before bedtime. 15 mL 3    potassium chloride (KLOR-CON) 20 MEQ Oral Powd Pack Take 20 mEq by mouth daily. 30 packet 0    ALPRAZolam 0.25 MG Oral Tab Take 1 tablet (0.25 mg total) by mouth 2 (two) times daily as needed for Anxiety. 30 tablet 0    albuterol (2.5 MG/3ML) 0.083% Inhalation Nebu Soln Take 3 mL (2.5 mg total) by nebulization every 6 (six) hours as needed for Wheezing. alendronate 70 MG Oral Tab Take 1 tablet (70 mg total) by mouth every 7 days. TRELEGY ELLIPTA 100-62.5-25 MCG/INH Inhalation Aerosol Powder, Breath Activated Inhale 1 puff into the lungs daily. ipratropium-albuterol 0.5-2.5 (3) MG/3ML Inhalation Solution Take 1 vial by nebulization every 6 (six) hours as needed. Vitamin D3, Cholecalciferol, 25 MCG (1000 UT) Oral Tab Take 1 tablet (1,000 Units total) by mouth daily. gabapentin 100 MG Oral Cap Take 1-3 capsules (100-300 mg total) by mouth in the morning, at noon, and at bedtime.  As directed       Allergies:  Amoxicillin             HIVES, UNKNOWN    Comment:TABS  Levofloxacin            ANAPHYLAXIS, UNKNOWN  Lyrica [Pregabalin]     SWELLING  Cat Hair Extract        ASTHMA, Coughing, Runny nose,                            SHORTNESS OF BREATH, WHEEZING  [place]rees, [place] ASTHMA, Runny nose, WHEEZING    Objective:   Physical Exam  Constitutional:       General: She is not in acute distress. Comments: Able to speak in full sentences with no difficulty   Neurological:      Mental Status: She is alert. Psychiatric:         Mood and Affect: Mood normal.         Assessment & Plan:   Mild asthma with exacerbation, unspecified whether persistent  (primary encounter diagnosis)  -Continue to monitor vital signs including oxygen saturations  - Go to the emergency room if progresses to severe respiratory distress  - We will continue with current nebulization treatment  - We will prescribe with prednisone taper as directed  Upper respiratory tract infection, unspecified type  -With reported fever and history of respiratory tract infection, I will also prescribe her doxycycline as directed    Call or come in sooner if symptoms worsen or persist    This note was prepared using Dragon Medical voice recognition dictation software. As a result errors may occur. When identified these errors have been corrected. While every attempt is made to correct errors during dictation discrepancies may still exist          No orders of the defined types were placed in this encounter. Meds This Visit:  Requested Prescriptions     Signed Prescriptions Disp Refills    predniSONE 20 MG Oral Tab 21 tablet 0     Sig: Take 2 tablets (40 mg total) by mouth daily for 5 days, THEN 1 tablet (20 mg total) daily for 5 days, THEN 0.5 tablets (10 mg total) daily for 5 days. doxycycline 100 MG Oral Cap 20 capsule 0     Sig: Take 1 capsule (100 mg total) by mouth 2 (two) times daily for 10 days.        Imaging & Referrals:  None

## 2023-07-26 NOTE — PATIENT INSTRUCTIONS
Thank you for choosing Sherwin Marcial MD at [place] [address]  To Do: [address]  1. Please take meds as directed. [facility] is located in [address]. Monday, Tuesday & Friday - 8 a.m. to 4 p.m. Wednesday, Thursday - 7 a.m. to 3 p.m. The lab is closed daily from 12 p.m.-12:30 p.m. Saturday lab hours by appointment. Call [phone] to schedule the appointment. Please signup for [employer], which is electronic access to your record if you have not done so. All your results will post on there. [email or web address]   You can NOW use United By Blue to book your appointments with us, or consider using open access scheduling which is through the edward website [email or web address] and type in Sherwin Marcial MD and follow the links for \"Schedule Online Now\"    To schedule Imaging or tests at [facility] Scheduling [phone], Go to [facility] A ER Building (For example: CT scans, X rays, Ultrasound, MRI)  Cardiac Testing in ER building Building A second floor Cardiac Testing [phone] (For example: Holter Monitor, Cardiac Stress tests,Event Monitor, or 2D Echocardiograms)  [facility] call [phone] usually in dg A  Walk in Clinic in [place] at [facility]. [address] Mon-Fri at 8am-7:30 p.m., and Sat/Sun 9:00a. m.-4:30 p.m. Also at [address]  Call [phone] for info     Please call our office about any questions regarding your treatment/medicines/tests as a result of today's visit. For your safety, read the entire package insert of all medicines prescribed to you and be aware of all of the risks of treatment even beyond those discussed today. All therapies have potential risk of harm or side effects or medication interactions.   It is your duty and for your safety to discuss with the pharmacist and our office with questions, and to notify us and stop treatment if problems arise, but know that our intention is that the benefits outweigh those potential risks and we strive to make you healthier and to improve your quality of life. Referrals, and Radiology Information:    If your insurance requires a referral to a specialist, please allow 5 business days to process your referral request.    If Orlando Menon MD orders a CT or MRI, it may take up to 10 business days to receive approval from your insurance company. Once our office has called informing you that the insurance company approved your testing, please call Central Scheduling at [phone]  Please allow our office 5 business days to contact you regarding any testing results. Refill policies:   Allow 3 business days for refills; controlled substances may take longer and must be picked up from the office in person. Narcotic medications can only be filled in 30 day increments and must be refilled at an office visit only. If your prescription is due for a refill, you may be due for a follow-up appointment. We cannot refill your maintenance medications at a preventative wellness visit. To best provide you care, patients receiving maintenance medications need to be seen at least twice a year.

## 2023-07-27 ENCOUNTER — LAB ENCOUNTER (OUTPATIENT)
Dept: LAB | Age: 58
End: 2023-07-27
Attending: FAMILY MEDICINE
Payer: COMMERCIAL

## 2023-07-27 DIAGNOSIS — R25.2 CRAMPS, EXTREMITY: ICD-10-CM

## 2023-07-27 LAB
ALBUMIN SERPL-MCNC: 4 G/DL (ref 3.4–5)
ALBUMIN/GLOB SERPL: 1.3 {RATIO} (ref 1–2)
ALP LIVER SERPL-CCNC: 72 U/L
ALT SERPL-CCNC: 33 U/L
ANION GAP SERPL CALC-SCNC: 5 MMOL/L (ref 0–18)
AST SERPL-CCNC: 21 U/L (ref 15–37)
BASOPHILS # BLD AUTO: 0.07 X10(3) UL (ref 0–0.2)
BASOPHILS NFR BLD AUTO: 0.8 %
BILIRUB SERPL-MCNC: 0.6 MG/DL (ref 0.1–2)
BUN BLD-MCNC: 19 MG/DL (ref 7–18)
CALCIUM BLD-MCNC: 9.5 MG/DL (ref 8.5–10.1)
CHLORIDE SERPL-SCNC: 102 MMOL/L (ref 98–112)
CO2 SERPL-SCNC: 33 MMOL/L (ref 21–32)
CREAT BLD-MCNC: 0.83 MG/DL
EGFRCR SERPLBLD CKD-EPI 2021: 82 ML/MIN/1.73M2 (ref 60–?)
EOSINOPHIL # BLD AUTO: 0.08 X10(3) UL (ref 0–0.7)
EOSINOPHIL NFR BLD AUTO: 0.9 %
ERYTHROCYTE [DISTWIDTH] IN BLOOD BY AUTOMATED COUNT: 14 %
FASTING STATUS PATIENT QL REPORTED: YES
GLOBULIN PLAS-MCNC: 3.2 G/DL (ref 2.8–4.4)
GLUCOSE BLD-MCNC: 103 MG/DL (ref 70–99)
HCT VFR BLD AUTO: 45.3 %
HGB BLD-MCNC: 14.8 G/DL
IMM GRANULOCYTES # BLD AUTO: 0.07 X10(3) UL (ref 0–1)
IMM GRANULOCYTES NFR BLD: 0.8 %
LYMPHOCYTES # BLD AUTO: 3.33 X10(3) UL (ref 1–4)
LYMPHOCYTES NFR BLD AUTO: 35.7 %
MCH RBC QN AUTO: 29.4 PG (ref 26–34)
MCHC RBC AUTO-ENTMCNC: 32.7 G/DL (ref 31–37)
MCV RBC AUTO: 90.1 FL
MONOCYTES # BLD AUTO: 0.47 X10(3) UL (ref 0.1–1)
MONOCYTES NFR BLD AUTO: 5 %
NEUTROPHILS # BLD AUTO: 5.3 X10 (3) UL (ref 1.5–7.7)
NEUTROPHILS # BLD AUTO: 5.3 X10(3) UL (ref 1.5–7.7)
NEUTROPHILS NFR BLD AUTO: 56.8 %
OSMOLALITY SERPL CALC.SUM OF ELEC: 293 MOSM/KG (ref 275–295)
PLATELET # BLD AUTO: 350 10(3)UL (ref 150–450)
POTASSIUM SERPL-SCNC: 3.1 MMOL/L (ref 3.5–5.1)
PROT SERPL-MCNC: 7.2 G/DL (ref 6.4–8.2)
RBC # BLD AUTO: 5.03 X10(6)UL
SODIUM SERPL-SCNC: 140 MMOL/L (ref 136–145)
WBC # BLD AUTO: 9.3 X10(3) UL (ref 4–11)

## 2023-07-27 PROCEDURE — 36415 COLL VENOUS BLD VENIPUNCTURE: CPT

## 2023-07-27 PROCEDURE — 80053 COMPREHEN METABOLIC PANEL: CPT

## 2023-07-27 PROCEDURE — 85025 COMPLETE CBC W/AUTO DIFF WBC: CPT

## 2023-07-30 RX ORDER — ALENDRONATE SODIUM 70 MG/1
70 TABLET ORAL WEEKLY
Qty: 12 TABLET | Refills: 3 | Status: SHIPPED | OUTPATIENT
Start: 2023-07-30

## 2023-07-30 RX ORDER — POTASSIUM CHLORIDE 1500 MG/1
1 TABLET, EXTENDED RELEASE ORAL DAILY
Qty: 90 TABLET | Refills: 0 | Status: SHIPPED | OUTPATIENT
Start: 2023-07-30

## 2023-08-02 ENCOUNTER — LAB ENCOUNTER (OUTPATIENT)
Dept: LAB | Age: 58
End: 2023-08-02
Attending: FAMILY MEDICINE
Payer: COMMERCIAL

## 2023-08-02 DIAGNOSIS — K75.81 NASH (NONALCOHOLIC STEATOHEPATITIS): ICD-10-CM

## 2023-08-02 DIAGNOSIS — E87.6 LOW SERUM POTASSIUM: Primary | ICD-10-CM

## 2023-08-02 DIAGNOSIS — E87.6 LOW SERUM POTASSIUM: ICD-10-CM

## 2023-08-02 LAB
ALBUMIN SERPL-MCNC: 3.6 G/DL (ref 3.4–5)
ALP LIVER SERPL-CCNC: 55 U/L
ALT SERPL-CCNC: 38 U/L
ANION GAP SERPL CALC-SCNC: 2 MMOL/L (ref 0–18)
AST SERPL-CCNC: 19 U/L (ref 15–37)
BILIRUB DIRECT SERPL-MCNC: 0.1 MG/DL (ref 0–0.2)
BILIRUB SERPL-MCNC: 0.7 MG/DL (ref 0.1–2)
BUN BLD-MCNC: 26 MG/DL (ref 7–18)
CALCIUM BLD-MCNC: 8.8 MG/DL (ref 8.5–10.1)
CHLORIDE SERPL-SCNC: 107 MMOL/L (ref 98–112)
CO2 SERPL-SCNC: 32 MMOL/L (ref 21–32)
CREAT BLD-MCNC: 0.73 MG/DL
EGFRCR SERPLBLD CKD-EPI 2021: 96 ML/MIN/1.73M2 (ref 60–?)
FASTING STATUS PATIENT QL REPORTED: YES
GLUCOSE BLD-MCNC: 94 MG/DL (ref 70–99)
OSMOLALITY SERPL CALC.SUM OF ELEC: 297 MOSM/KG (ref 275–295)
POTASSIUM SERPL-SCNC: 3.4 MMOL/L (ref 3.5–5.1)
PROT SERPL-MCNC: 6.2 G/DL (ref 6.4–8.2)
SODIUM SERPL-SCNC: 141 MMOL/L (ref 136–145)

## 2023-08-02 PROCEDURE — 80076 HEPATIC FUNCTION PANEL: CPT

## 2023-08-02 PROCEDURE — 80048 BASIC METABOLIC PNL TOTAL CA: CPT

## 2023-08-02 PROCEDURE — 36415 COLL VENOUS BLD VENIPUNCTURE: CPT

## 2023-08-09 ENCOUNTER — LAB ENCOUNTER (OUTPATIENT)
Dept: LAB | Age: 58
End: 2023-08-09
Attending: FAMILY MEDICINE
Payer: COMMERCIAL

## 2023-08-09 DIAGNOSIS — E87.6 LOW SERUM POTASSIUM: ICD-10-CM

## 2023-08-09 LAB
ANION GAP SERPL CALC-SCNC: 2 MMOL/L (ref 0–18)
BUN BLD-MCNC: 21 MG/DL (ref 7–18)
CALCIUM BLD-MCNC: 8.7 MG/DL (ref 8.5–10.1)
CHLORIDE SERPL-SCNC: 109 MMOL/L (ref 98–112)
CO2 SERPL-SCNC: 31 MMOL/L (ref 21–32)
CREAT BLD-MCNC: 0.8 MG/DL
EGFRCR SERPLBLD CKD-EPI 2021: 86 ML/MIN/1.73M2 (ref 60–?)
FASTING STATUS PATIENT QL REPORTED: NO
GLUCOSE BLD-MCNC: 111 MG/DL (ref 70–99)
OSMOLALITY SERPL CALC.SUM OF ELEC: 298 MOSM/KG (ref 275–295)
POTASSIUM SERPL-SCNC: 4.3 MMOL/L (ref 3.5–5.1)
SODIUM SERPL-SCNC: 142 MMOL/L (ref 136–145)

## 2023-08-09 PROCEDURE — 36415 COLL VENOUS BLD VENIPUNCTURE: CPT

## 2023-08-09 PROCEDURE — 80048 BASIC METABOLIC PNL TOTAL CA: CPT

## 2023-08-20 ENCOUNTER — PATIENT MESSAGE (OUTPATIENT)
Dept: FAMILY MEDICINE CLINIC | Facility: CLINIC | Age: 58
End: 2023-08-20

## 2023-08-20 RX ORDER — SPIRONOLACTONE 25 MG/1
25 TABLET ORAL 2 TIMES DAILY
Qty: 180 TABLET | Refills: 1 | Status: SHIPPED | OUTPATIENT
Start: 2023-08-20 | End: 2024-08-14

## 2023-08-20 NOTE — TELEPHONE ENCOUNTER
From: Kami Viveros Book  To: Erich Rhodes MD  Sent: 8/20/2023 4:18 AM CDT  Subject: Update! Dr. Evy Xiao,   You asked that I send you a update on how the RX Spironolactone 25mg 2 X daily was working out for me. The feet and ankle swelling did finally go down about 3-4 days ago. So it is finally working. Since we increased my dosage I am actually out of the medication. Can you please send a rx for 90 days into the my pharmacy? [facility] [place] on [address] [place] [address]. Thank you so much!   Dalia Ruelas

## 2023-09-06 ENCOUNTER — HOSPITAL ENCOUNTER (OUTPATIENT)
Dept: MAMMOGRAPHY | Age: 58
Discharge: HOME | End: 2023-09-06
Attending: FAMILY MEDICINE
Payer: COMMERCIAL

## 2023-09-06 DIAGNOSIS — Z12.31 SCREENING MAMMOGRAM, ENCOUNTER FOR: ICD-10-CM

## 2023-09-06 PROCEDURE — 77063 BREAST TOMOSYNTHESIS BI: CPT | Performed by: FAMILY MEDICINE

## 2023-09-06 PROCEDURE — 77067 SCR MAMMO BI INCL CAD: CPT | Performed by: FAMILY MEDICINE

## 2023-09-12 ENCOUNTER — TELEMEDICINE (OUTPATIENT)
Dept: FAMILY MEDICINE CLINIC | Facility: CLINIC | Age: 58
End: 2023-09-12

## 2023-09-12 DIAGNOSIS — J45.901 MILD ASTHMA WITH EXACERBATION, UNSPECIFIED WHETHER PERSISTENT: Primary | ICD-10-CM

## 2023-09-12 PROCEDURE — 99213 OFFICE O/P EST LOW 20 MIN: CPT | Performed by: FAMILY MEDICINE

## 2023-09-12 RX ORDER — PREDNISONE 20 MG/1
TABLET ORAL
Qty: 25 TABLET | Refills: 1 | Status: SHIPPED | OUTPATIENT
Start: 2023-09-12 | End: 2023-10-03

## 2023-09-18 RX ORDER — METFORMIN HYDROCHLORIDE 750 MG/1
750 TABLET, EXTENDED RELEASE ORAL DAILY
Qty: 90 TABLET | Refills: 0 | Status: SHIPPED | OUTPATIENT
Start: 2023-09-18

## 2023-10-01 ENCOUNTER — PATIENT MESSAGE (OUTPATIENT)
Dept: INTERNAL MEDICINE CLINIC | Facility: CLINIC | Age: 58
End: 2023-10-01

## 2023-10-02 NOTE — TELEPHONE ENCOUNTER
Requesting   Requested Prescriptions     Pending Prescriptions Disp Refills    amphetamine-dextroamphetamine (ADDERALL) 20 MG Oral Tab 60 tablet 0     Sig: Take 1 tablet (20 mg total) by mouth 2 (two) times daily. LOV: 6/22/23  RTC: not noted  Filled: 8/23/23 #60 with 0 refills    No future appointments.

## 2023-10-04 RX ORDER — DEXTROAMPHETAMINE SACCHARATE, AMPHETAMINE ASPARTATE, DEXTROAMPHETAMINE SULFATE AND AMPHETAMINE SULFATE 5; 5; 5; 5 MG/1; MG/1; MG/1; MG/1
20 TABLET ORAL 2 TIMES DAILY
Qty: 60 TABLET | Refills: 0 | Status: SHIPPED | OUTPATIENT
Start: 2023-10-04 | End: 2023-11-03

## 2023-10-11 RX ORDER — MONTELUKAST SODIUM 10 MG/1
10 TABLET ORAL DAILY
Qty: 90 TABLET | Refills: 1 | Status: SHIPPED | OUTPATIENT
Start: 2023-10-11

## 2023-10-12 DIAGNOSIS — I10 ESSENTIAL HYPERTENSION: ICD-10-CM

## 2023-10-13 RX ORDER — FUROSEMIDE 20 MG/1
TABLET ORAL
Qty: 90 TABLET | Refills: 1 | OUTPATIENT
Start: 2023-10-13

## 2023-10-13 NOTE — TELEPHONE ENCOUNTER
Requested Renewals     Name from pharmacy: FUROSEMIDE 20 MG TABLET         Will file in chart as: FUROSEMIDE 20 MG Oral Tab    The original prescription was discontinued on 5/25/2023 by Una Jiménez MD. Renewing this prescription may not be appropriate. Sig: TAKE 1 TABLET BY MOUTH EVERY DAY    Disp: 90 tablet    Refills: 1    Start: 10/12/2023    Class: Normal    Non-formulary For: Essential hypertension    Last ordered: 8 months ago (1/31/2023) by Radha Matute MD    Last refill: 4/29/2023    Rx #: [id number]    Hypertension Medications Protocol Neheyl60/12/2023 07:06 PM   Protocol Details CMP or BMP in past 12 months    Last serum creatinine< 2.0    Appointment in past 6 or next 3 months             This medication was discontinued on 5/25/23  This RX denied.

## 2023-10-17 DIAGNOSIS — J45.901 MILD ASTHMA WITH EXACERBATION, UNSPECIFIED WHETHER PERSISTENT: ICD-10-CM

## 2023-10-17 RX ORDER — PREDNISONE 20 MG/1
TABLET ORAL
Qty: 25 TABLET | Refills: 1 | Status: SHIPPED | OUTPATIENT
Start: 2023-10-17 | End: 2023-10-19

## 2023-10-18 ENCOUNTER — PATIENT MESSAGE (OUTPATIENT)
Dept: FAMILY MEDICINE CLINIC | Facility: CLINIC | Age: 58
End: 2023-10-18

## 2023-10-18 NOTE — TELEPHONE ENCOUNTER
From: Lanny Bias Book  To: Nicolas Kingston  Sent: 10/18/2023 3:18 PM CDT  Subject: Flu    Dr. Flora Seip,  On Monday, I started feeling a little under the weather. Later into the evening I started running a fever. I stayed home from work both Tuesday and today (Wednesday). This afternoon my temp went the highest that it has been. 100.6. I am still feeling like I have the flu, and my head feels like it wants to explode. Just wanted to check in with you. I did take a at home Covid test yesterday which was negative. 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 And two other staff was off on Monday with the flu.   [place]

## 2023-10-19 ENCOUNTER — HOSPITAL ENCOUNTER (EMERGENCY)
Age: 58
Discharge: HOME | End: 2023-10-19
Attending: EMERGENCY MEDICINE
Payer: COMMERCIAL

## 2023-10-19 VITALS
RESPIRATION RATE: 18 BRPM | DIASTOLIC BLOOD PRESSURE: 80 MMHG | TEMPERATURE: 98 F | BODY MASS INDEX: 31.28 KG/M2 | WEIGHT: 170 LBS | OXYGEN SATURATION: 95 % | HEIGHT: 62 IN | SYSTOLIC BLOOD PRESSURE: 132 MMHG | HEART RATE: 101 BPM

## 2023-10-19 DIAGNOSIS — J40 BRONCHITIS: Primary | ICD-10-CM

## 2023-10-19 LAB
POCT INFLUENZA A: NEGATIVE
POCT INFLUENZA B: NEGATIVE
SARS-COV-2 RNA RESP QL NAA+PROBE: NOT DETECTED

## 2023-10-19 PROCEDURE — 87502 INFLUENZA DNA AMP PROBE: CPT | Performed by: EMERGENCY MEDICINE

## 2023-10-19 PROCEDURE — 99283 EMERGENCY DEPT VISIT LOW MDM: CPT

## 2023-10-19 RX ORDER — FUROSEMIDE 20 MG/1
20 TABLET ORAL 2 TIMES DAILY
COMMUNITY

## 2023-10-26 RX ORDER — POTASSIUM CHLORIDE 20 MEQ/1
20 TABLET, EXTENDED RELEASE ORAL DAILY
Qty: 90 TABLET | Refills: 0 | Status: SHIPPED | OUTPATIENT
Start: 2023-10-26

## 2023-11-10 NOTE — TELEPHONE ENCOUNTER
Requesting Yassine 145mcg  LOV: 9/12/23 VV  RTC: prn  Last Relevant Labs: 8/9/23  Filled: 11/28/22 #60 with 0 refills    Future Appointments   Date Time Provider Batsheva Hansen   11/24/2023  3:00 PM JAN SINGH [facility] JAN Ocampo     Non-protocol med:  Rx pended and routed for approval/denial

## 2023-11-11 ENCOUNTER — APPOINTMENT (OUTPATIENT)
Dept: GENERAL RADIOLOGY | Age: 58
End: 2023-11-11
Attending: NURSE PRACTITIONER
Payer: COMMERCIAL

## 2023-11-11 ENCOUNTER — HOSPITAL ENCOUNTER (EMERGENCY)
Age: 58
Discharge: HOME | End: 2023-11-11
Payer: COMMERCIAL

## 2023-11-11 VITALS
HEART RATE: 71 BPM | SYSTOLIC BLOOD PRESSURE: 137 MMHG | DIASTOLIC BLOOD PRESSURE: 74 MMHG | TEMPERATURE: 99 F | WEIGHT: 175 LBS | HEIGHT: 63 IN | BODY MASS INDEX: 31.01 KG/M2 | RESPIRATION RATE: 16 BRPM | OXYGEN SATURATION: 98 %

## 2023-11-11 DIAGNOSIS — J06.9 UPPER RESPIRATORY INFECTION WITH COUGH AND CONGESTION: Primary | ICD-10-CM

## 2023-11-11 LAB
ALBUMIN SERPL-MCNC: 3.7 G/DL (ref 3.4–5)
ALBUMIN/GLOB SERPL: 1.2 {RATIO} (ref 1–2)
ALP LIVER SERPL-CCNC: 73 U/L
ALT SERPL-CCNC: 22 U/L
ANION GAP SERPL CALC-SCNC: 4 MMOL/L (ref 0–18)
AST SERPL-CCNC: 13 U/L (ref 15–37)
BASOPHILS # BLD AUTO: 0.08 X10(3) UL (ref 0–0.2)
BASOPHILS NFR BLD AUTO: 0.8 %
BILIRUB SERPL-MCNC: 0.6 MG/DL (ref 0.1–2)
BUN BLD-MCNC: 17 MG/DL (ref 9–23)
CALCIUM BLD-MCNC: 8.6 MG/DL (ref 8.5–10.1)
CHLORIDE SERPL-SCNC: 108 MMOL/L (ref 98–112)
CO2 SERPL-SCNC: 27 MMOL/L (ref 21–32)
CREAT BLD-MCNC: 0.68 MG/DL
EGFRCR SERPLBLD CKD-EPI 2021: 102 ML/MIN/1.73M2 (ref 60–?)
EOSINOPHIL # BLD AUTO: 0.16 X10(3) UL (ref 0–0.7)
EOSINOPHIL NFR BLD AUTO: 1.5 %
ERYTHROCYTE [DISTWIDTH] IN BLOOD BY AUTOMATED COUNT: 13.1 %
GLOBULIN PLAS-MCNC: 3.1 G/DL (ref 2.8–4.4)
GLUCOSE BLD-MCNC: 96 MG/DL (ref 70–99)
HCT VFR BLD AUTO: 41.2 %
HGB BLD-MCNC: 13.8 G/DL
IMM GRANULOCYTES # BLD AUTO: 0.08 X10(3) UL (ref 0–1)
IMM GRANULOCYTES NFR BLD: 0.8 %
LYMPHOCYTES # BLD AUTO: 2.29 X10(3) UL (ref 1–4)
LYMPHOCYTES NFR BLD AUTO: 22 %
MAGNESIUM SERPL-MCNC: 2.2 MG/DL (ref 1.6–2.6)
MCH RBC QN AUTO: 30.5 PG (ref 26–34)
MCHC RBC AUTO-ENTMCNC: 33.5 G/DL (ref 31–37)
MCV RBC AUTO: 90.9 FL
MONOCYTES # BLD AUTO: 0.59 X10(3) UL (ref 0.1–1)
MONOCYTES NFR BLD AUTO: 5.7 %
NEUTROPHILS # BLD AUTO: 7.22 X10 (3) UL (ref 1.5–7.7)
NEUTROPHILS # BLD AUTO: 7.22 X10(3) UL (ref 1.5–7.7)
NEUTROPHILS NFR BLD AUTO: 69.2 %
OSMOLALITY SERPL CALC.SUM OF ELEC: 289 MOSM/KG (ref 275–295)
PLATELET # BLD AUTO: 330 10(3)UL (ref 150–450)
POCT INFLUENZA A: NEGATIVE
POCT INFLUENZA B: NEGATIVE
POTASSIUM SERPL-SCNC: 3.4 MMOL/L (ref 3.5–5.1)
PROT SERPL-MCNC: 6.8 G/DL (ref 6.4–8.2)
RBC # BLD AUTO: 4.53 X10(6)UL
SARS-COV-2 RNA RESP QL NAA+PROBE: NOT DETECTED
SODIUM SERPL-SCNC: 139 MMOL/L (ref 136–145)
WBC # BLD AUTO: 10.4 X10(3) UL (ref 4–11)

## 2023-11-11 PROCEDURE — 71046 X-RAY EXAM CHEST 2 VIEWS: CPT | Performed by: NURSE PRACTITIONER

## 2023-11-11 PROCEDURE — 87430 STREP A AG IA: CPT | Performed by: NURSE PRACTITIONER

## 2023-11-11 PROCEDURE — 87502 INFLUENZA DNA AMP PROBE: CPT | Performed by: NURSE PRACTITIONER

## 2023-11-11 PROCEDURE — 93010 ELECTROCARDIOGRAM REPORT: CPT

## 2023-11-11 PROCEDURE — 85025 COMPLETE CBC W/AUTO DIFF WBC: CPT | Performed by: NURSE PRACTITIONER

## 2023-11-11 PROCEDURE — 94640 AIRWAY INHALATION TREATMENT: CPT

## 2023-11-11 PROCEDURE — 80053 COMPREHEN METABOLIC PANEL: CPT | Performed by: NURSE PRACTITIONER

## 2023-11-11 PROCEDURE — 99284 EMERGENCY DEPT VISIT MOD MDM: CPT

## 2023-11-11 PROCEDURE — 36415 COLL VENOUS BLD VENIPUNCTURE: CPT

## 2023-11-11 PROCEDURE — 93005 ELECTROCARDIOGRAM TRACING: CPT

## 2023-11-11 PROCEDURE — 83735 ASSAY OF MAGNESIUM: CPT | Performed by: NURSE PRACTITIONER

## 2023-11-11 RX ORDER — PREDNISONE 20 MG/1
40 TABLET ORAL DAILY
Qty: 10 TABLET | Refills: 0 | Status: SHIPPED | OUTPATIENT
Start: 2023-11-11 | End: 2023-11-16

## 2023-11-11 RX ORDER — LINACLOTIDE 145 UG/1
2 CAPSULE, GELATIN COATED ORAL DAILY PRN
Qty: 60 CAPSULE | Refills: 0 | Status: SHIPPED | OUTPATIENT
Start: 2023-11-11

## 2023-11-11 RX ORDER — IPRATROPIUM BROMIDE AND ALBUTEROL SULFATE 2.5; .5 MG/3ML; MG/3ML
3 SOLUTION RESPIRATORY (INHALATION) ONCE
Status: COMPLETED | OUTPATIENT
Start: 2023-11-11 | End: 2023-11-11

## 2023-11-11 NOTE — ED INITIAL ASSESSMENT (HPI)
Pt having cough and sore throat for 2-3 days. Reports history of pneumonia and severe viral infections.

## 2023-11-12 LAB
ATRIAL RATE: 79 BPM
P AXIS: 49 DEGREES
P-R INTERVAL: 158 MS
Q-T INTERVAL: 384 MS
QRS DURATION: 102 MS
QTC CALCULATION (BEZET): 440 MS
R AXIS: 16 DEGREES
T AXIS: 36 DEGREES
VENTRICULAR RATE: 79 BPM

## 2023-11-12 NOTE — DISCHARGE INSTRUCTIONS
Interventions to help decrease congestion: Over the counter Flonase, hot steam showers, saline irrigation such as the [employer], Over the counter Zyrtec, Claritin, or Allegra. Use your albuterol inhaler/nebulizer as needed for cough, chest tightness, wheezing, shortness of breath. Drink plenty fluids, especially water. The next couple days eat 2 bananas daily as your potassium is just slightly low. Take the prednisone as prescribed. Return turn as needed for new or worsening symptoms.

## 2023-11-13 ENCOUNTER — PATIENT MESSAGE (OUTPATIENT)
Dept: FAMILY MEDICINE CLINIC | Facility: CLINIC | Age: 58
End: 2023-11-13

## 2023-11-13 NOTE — TELEPHONE ENCOUNTER
From: Nehemiah Rosales Book  To: Lidia Contreras  Sent: 11/13/2023 6:13 AM CST  Subject: ER Visit    Dr. Marilyn Lofton,  I was seen Saturday evening. They did a EKG, chest X-ray, lab work, and a dual neb tx. My potassium was just slightly low. I did tell the PA that I felt like I had a sinus infection, which was exacerbating my asthma. He did not want to give me an antibiotic. However, I know how thing progress with me. I did start taking Clarithromycin 500mg that I had left over. Unfortunately I only have enough for 4.5 days. We know that with my system, that will not be long enough. He also put me on Prednisone for 5 days for my breathing.   Shannon Castillo

## 2023-11-14 ENCOUNTER — TELEMEDICINE (OUTPATIENT)
Dept: FAMILY MEDICINE CLINIC | Facility: CLINIC | Age: 58
End: 2023-11-14
Payer: COMMERCIAL

## 2023-11-14 DIAGNOSIS — E66.9 OBESITY (BMI 30-39.9): ICD-10-CM

## 2023-11-14 DIAGNOSIS — J06.9 UPPER RESPIRATORY TRACT INFECTION, UNSPECIFIED TYPE: Primary | ICD-10-CM

## 2023-11-14 PROCEDURE — 99214 OFFICE O/P EST MOD 30 MIN: CPT | Performed by: FAMILY MEDICINE

## 2023-11-14 RX ORDER — DEXTROAMPHETAMINE SACCHARATE, AMPHETAMINE ASPARTATE, DEXTROAMPHETAMINE SULFATE AND AMPHETAMINE SULFATE 2.5; 2.5; 2.5; 2.5 MG/1; MG/1; MG/1; MG/1
10 TABLET ORAL 2 TIMES DAILY
Qty: 60 TABLET | Refills: 0 | Status: SHIPPED | OUTPATIENT
Start: 2023-11-14 | End: 2023-12-14

## 2023-11-14 RX ORDER — CLINDAMYCIN HYDROCHLORIDE 300 MG/1
300 CAPSULE ORAL 3 TIMES DAILY
Qty: 30 CAPSULE | Refills: 0 | Status: SHIPPED | OUTPATIENT
Start: 2023-11-14 | End: 2023-11-24

## 2023-11-14 NOTE — PROGRESS NOTES
Subjective:   Patient ID: Justin Haque is a 62year old female. HPI  Ms. Lola Lincoln is a very pleasant 51-year-old female with multiple medical conditions well-known to me presenting today for video visit follow-up after she was seen in the emergency room. She presented with cough, congestion and shortness of breath. X-ray was done which showed changes related to bronchitis or reactive airway disease. However in the past when she has had episodes such as this this would progress to an actual bacterial infection such as sinus infection or pneumonia. She had leftover clindamycin and is starting to feel better after she had taken. She was also prescribed with prednisone which she forgot to take today. She would also need to have refills for Adderall. This is prescribed by her weight loss specialist but has not seen them since June. I had reviewed past medical and family histories together with allergy and medication lists documented. History/Other:   Review of Systems   Constitutional:  Negative for fatigue and fever. HENT:  Positive for congestion. Respiratory:  Positive for shortness of breath and wheezing. Negative for cough. Cardiovascular:  Negative for chest pain. Gastrointestinal:  Negative for abdominal pain. Current Outpatient Medications   Medication Sig Dispense Refill    clindamycin 300 MG Oral Cap Take 1 capsule (300 mg total) by mouth 3 (three) times daily for 10 days. 30 capsule 0    amphetamine-dextroamphetamine (ADDERALL) 10 MG Oral Tab Take 1 tablet (10 mg total) by mouth 2 (two) times daily. 60 tablet 0    LINZESS 145 MCG Oral Cap TAKE 2 CAPSULES BY MOUTH DAILY AS NEEDED. 60 capsule 0    predniSONE 20 MG Oral Tab Take 2 tablets (40 mg total) by mouth daily for 5 days. 10 tablet 0    potassium chloride (KLOR-CON M20) 20 MEQ Oral Tab CR Take 1 tablet (20 mEq total) by mouth daily. 90 tablet 0    furosemide 20 MG Oral Tab Take 1 tablet (20 mg total) by mouth 2 (two) times daily. montelukast 10 MG Oral Tab Take 1 tablet (10 mg total) by mouth daily. 90 tablet 1    metFORMIN  MG Oral Tablet 24 Hr Take 1 tablet (750 mg total) by mouth daily. 90 tablet 0    spironolactone 25 MG Oral Tab Take 1 tablet (25 mg total) by mouth 2 (two) times daily. 180 tablet 1    alendronate 70 MG Oral Tab Take 1 tablet (70 mg total) by mouth once a week. 12 tablet 3    amphetamine-dextroamphetamine (ADDERALL) 10 MG Oral Tab Take 1 tablet (10 mg total) by mouth 2 (two) times daily. 60 tablet 0    amphetamine-dextroamphetamine (ADDERALL) 10 MG Oral Tab Take 1 tablet (10 mg total) by mouth 2 (two) times daily. 60 tablet 0    amphetamine-dextroamphetamine (ADDERALL) 10 MG Oral Tab Take 1 tablet (10 mg total) by mouth 2 (two) times daily. 60 tablet 0    HYDROCHLOROTHIAZIDE 25 MG Oral Tab TAKE 1 TABLET (25 MG TOTAL) BY MOUTH DAILY. (Patient not taking: Reported on 6/22/2023) 90 tablet 1    LEVOXYL 112 MCG Oral Tab Take 1 tablet (112 mcg total) by mouth before breakfast.      ipratropium 0.06 % Nasal Solution 2 sprays by Nasal route in the morning and 2 sprays before bedtime. 15 mL 3    ALPRAZolam 0.25 MG Oral Tab Take 1 tablet (0.25 mg total) by mouth 2 (two) times daily as needed for Anxiety. 30 tablet 0    albuterol (2.5 MG/3ML) 0.083% Inhalation Nebu Soln Take 3 mL (2.5 mg total) by nebulization every 6 (six) hours as needed for Wheezing. TRELEGY ELLIPTA 100-62.5-25 MCG/INH Inhalation Aerosol Powder, Breath Activated Inhale 1 puff into the lungs daily. ipratropium-albuterol 0.5-2.5 (3) MG/3ML Inhalation Solution Take 1 vial by nebulization every 6 (six) hours as needed. Vitamin D3, Cholecalciferol, 25 MCG (1000 UT) Oral Tab Take 1 tablet (1,000 Units total) by mouth daily. gabapentin 100 MG Oral Cap Take 1-3 capsules (100-300 mg total) by mouth in the morning, at noon, and at bedtime. As directed       Allergies:   Allergies   Allergen Reactions    Amoxicillin HIVES and UNKNOWN     TABS Levofloxacin ANAPHYLAXIS and UNKNOWN    Lyrica [Pregabalin] SWELLING    Cat Hair Extract ASTHMA, Coughing, Runny nose, SHORTNESS OF BREATH and WHEEZING    [place], [place] ASTHMA, Runny nose and WHEEZING       Objective:   Physical Exam  Constitutional:       General: She is not in acute distress. Neurological:      Mental Status: She is alert. Assessment & Plan:   1. Upper respiratory tract infection, unspecified type   -We will prescribe with clindamycin  - Continue with drug aerosol treatment and inhalers  - Finish prednisone treatment   2. Obesity (BMI 30-39.9)   -Will renew Adderall 10 mg twice a day for 1 month but had encouraged to follow-up with weight loss clinic likely be manages this. This note was prepared using [employer] voice recognition dictation software. As a result errors may occur. When identified these errors have been corrected. While every attempt is made to correct errors during dictation discrepancies may still exist          No orders of the defined types were placed in this encounter. Meds This Visit:  Requested Prescriptions     Signed Prescriptions Disp Refills    clindamycin 300 MG Oral Cap 30 capsule 0     Sig: Take 1 capsule (300 mg total) by mouth 3 (three) times daily for 10 days. amphetamine-dextroamphetamine (ADDERALL) 10 MG Oral Tab 60 tablet 0     Sig: Take 1 tablet (10 mg total) by mouth 2 (two) times daily.        Imaging & Referrals:  None

## 2023-11-17 ENCOUNTER — TELEPHONE (OUTPATIENT)
Dept: FAMILY MEDICINE CLINIC | Facility: CLINIC | Age: 58
End: 2023-11-17

## 2023-11-17 NOTE — TELEPHONE ENCOUNTER
Patient came into the office and dropped off parking placard form. Fee ticket & flowsheet attached & placed in MA folder.

## 2023-11-24 ENCOUNTER — HOSPITAL ENCOUNTER (OUTPATIENT)
Dept: BONE DENSITY | Age: 58
Discharge: HOME | End: 2023-11-24
Attending: INTERNAL MEDICINE
Payer: COMMERCIAL

## 2023-11-24 DIAGNOSIS — M81.0 SENILE OSTEOPOROSIS: ICD-10-CM

## 2023-11-24 PROCEDURE — 77080 DXA BONE DENSITY AXIAL: CPT | Performed by: INTERNAL MEDICINE

## 2023-11-27 ENCOUNTER — PATIENT MESSAGE (OUTPATIENT)
Dept: FAMILY MEDICINE CLINIC | Facility: CLINIC | Age: 58
End: 2023-11-27

## 2023-11-28 NOTE — TELEPHONE ENCOUNTER
From: Kami Viveros Book  To: Erich Rhodes  Sent: 11/27/2023 9:53 AM CST  Subject: Fever    Dr Evy Xiao,  I am still on the antibiotic. Running a fever, bad headache, bronchitis cough is pretty rough and hard, and breathing is a little tough. Pulse ox is 95%. Work would not allow me to come in while running a temp. Please advise, if anything needs to be done.   Dalia Ruelas

## 2023-11-29 ENCOUNTER — OFFICE VISIT (OUTPATIENT)
Dept: INTERNAL MEDICINE CLINIC | Facility: CLINIC | Age: 58
End: 2023-11-29
Payer: COMMERCIAL

## 2023-11-29 VITALS
HEIGHT: 63 IN | RESPIRATION RATE: 18 BRPM | SYSTOLIC BLOOD PRESSURE: 140 MMHG | DIASTOLIC BLOOD PRESSURE: 82 MMHG | WEIGHT: 183 LBS | BODY MASS INDEX: 32.43 KG/M2 | OXYGEN SATURATION: 97 % | HEART RATE: 85 BPM

## 2023-11-29 DIAGNOSIS — R73.03 PREDIABETES: ICD-10-CM

## 2023-11-29 DIAGNOSIS — Z51.81 ENCOUNTER FOR THERAPEUTIC DRUG MONITORING: Primary | ICD-10-CM

## 2023-11-29 DIAGNOSIS — E66.9 CLASS 1 OBESITY WITH SERIOUS COMORBIDITY AND BODY MASS INDEX (BMI) OF 32.0 TO 32.9 IN ADULT, UNSPECIFIED OBESITY TYPE: ICD-10-CM

## 2023-11-29 DIAGNOSIS — I10 ESSENTIAL HYPERTENSION: ICD-10-CM

## 2023-11-29 DIAGNOSIS — E78.5 HYPERLIPIDEMIA, UNSPECIFIED HYPERLIPIDEMIA TYPE: ICD-10-CM

## 2023-11-29 DIAGNOSIS — Z98.84 S/P LAPAROSCOPIC SLEEVE GASTRECTOMY: ICD-10-CM

## 2023-11-29 DIAGNOSIS — E88.819 INSULIN RESISTANCE: ICD-10-CM

## 2023-11-29 PROCEDURE — 3077F SYST BP >= 140 MM HG: CPT | Performed by: PHYSICIAN ASSISTANT

## 2023-11-29 PROCEDURE — 99214 OFFICE O/P EST MOD 30 MIN: CPT | Performed by: PHYSICIAN ASSISTANT

## 2023-11-29 PROCEDURE — 3079F DIAST BP 80-89 MM HG: CPT | Performed by: PHYSICIAN ASSISTANT

## 2023-11-29 PROCEDURE — 3008F BODY MASS INDEX DOCD: CPT | Performed by: PHYSICIAN ASSISTANT

## 2023-11-29 RX ORDER — IPRATROPIUM BROMIDE 42 UG/1
SPRAY, METERED NASAL
Qty: 45 ML | Refills: 1 | Status: SHIPPED | OUTPATIENT
Start: 2023-11-29

## 2023-11-29 RX ORDER — PREDNISONE 20 MG/1
40 TABLET ORAL DAILY
COMMUNITY
Start: 2023-11-23 | End: 2023-12-02

## 2023-11-29 RX ORDER — DEXTROAMPHETAMINE SACCHARATE, AMPHETAMINE ASPARTATE MONOHYDRATE, DEXTROAMPHETAMINE SULFATE AND AMPHETAMINE SULFATE 5; 5; 5; 5 MG/1; MG/1; MG/1; MG/1
20 CAPSULE, EXTENDED RELEASE ORAL DAILY
Qty: 30 CAPSULE | Refills: 0 | Status: SHIPPED | OUTPATIENT
Start: 2024-01-30 | End: 2024-02-29

## 2023-11-29 RX ORDER — METFORMIN HYDROCHLORIDE 750 MG/1
750 TABLET, EXTENDED RELEASE ORAL DAILY
Qty: 90 TABLET | Refills: 0 | Status: SHIPPED | OUTPATIENT
Start: 2023-11-29

## 2023-11-29 RX ORDER — DEXTROAMPHETAMINE SACCHARATE, AMPHETAMINE ASPARTATE MONOHYDRATE, DEXTROAMPHETAMINE SULFATE AND AMPHETAMINE SULFATE 5; 5; 5; 5 MG/1; MG/1; MG/1; MG/1
20 CAPSULE, EXTENDED RELEASE ORAL DAILY
Qty: 30 CAPSULE | Refills: 0 | Status: SHIPPED | OUTPATIENT
Start: 2023-11-29 | End: 2023-12-29

## 2023-11-29 RX ORDER — SEMAGLUTIDE 0.68 MG/ML
INJECTION, SOLUTION SUBCUTANEOUS
Qty: 9 ML | Refills: 0 | Status: SHIPPED | OUTPATIENT
Start: 2023-11-29

## 2023-11-29 RX ORDER — DEXTROAMPHETAMINE SACCHARATE, AMPHETAMINE ASPARTATE MONOHYDRATE, DEXTROAMPHETAMINE SULFATE AND AMPHETAMINE SULFATE 5; 5; 5; 5 MG/1; MG/1; MG/1; MG/1
20 CAPSULE, EXTENDED RELEASE ORAL DAILY
Qty: 30 CAPSULE | Refills: 0 | Status: SHIPPED | OUTPATIENT
Start: 2023-12-30 | End: 2024-01-29

## 2023-11-29 RX ORDER — SPIRONOLACTONE 25 MG/1
25 TABLET ORAL 2 TIMES DAILY
Qty: 180 TABLET | Refills: 1 | Status: SHIPPED | OUTPATIENT
Start: 2023-11-29

## 2023-12-02 DIAGNOSIS — J45.901 UNSPECIFIED ASTHMA WITH (ACUTE) EXACERBATION: ICD-10-CM

## 2023-12-02 RX ORDER — PREDNISONE 20 MG/1
TABLET ORAL
Qty: 25 TABLET | Refills: 1 | Status: SHIPPED | OUTPATIENT
Start: 2023-12-02

## 2023-12-11 ENCOUNTER — APPOINTMENT (OUTPATIENT)
Dept: GENERAL RADIOLOGY | Age: 58
End: 2023-12-11
Attending: EMERGENCY MEDICINE
Payer: COMMERCIAL

## 2023-12-11 ENCOUNTER — HOSPITAL ENCOUNTER (EMERGENCY)
Age: 58
Discharge: HOME | End: 2023-12-11
Attending: EMERGENCY MEDICINE
Payer: COMMERCIAL

## 2023-12-11 VITALS
DIASTOLIC BLOOD PRESSURE: 94 MMHG | BODY MASS INDEX: 31.89 KG/M2 | TEMPERATURE: 98 F | WEIGHT: 180 LBS | HEIGHT: 63 IN | OXYGEN SATURATION: 94 % | HEART RATE: 87 BPM | RESPIRATION RATE: 18 BRPM | SYSTOLIC BLOOD PRESSURE: 133 MMHG

## 2023-12-11 DIAGNOSIS — J06.9 UPPER RESPIRATORY TRACT INFECTION, UNSPECIFIED TYPE: Primary | ICD-10-CM

## 2023-12-11 DIAGNOSIS — J01.90 ACUTE SINUSITIS, RECURRENCE NOT SPECIFIED, UNSPECIFIED LOCATION: ICD-10-CM

## 2023-12-11 PROCEDURE — 71045 X-RAY EXAM CHEST 1 VIEW: CPT | Performed by: EMERGENCY MEDICINE

## 2023-12-11 PROCEDURE — 87502 INFLUENZA DNA AMP PROBE: CPT

## 2023-12-11 PROCEDURE — 99284 EMERGENCY DEPT VISIT MOD MDM: CPT

## 2023-12-11 PROCEDURE — 87502 INFLUENZA DNA AMP PROBE: CPT | Performed by: EMERGENCY MEDICINE

## 2023-12-11 RX ORDER — PREDNISONE 20 MG/1
40 TABLET ORAL DAILY
Qty: 10 TABLET | Refills: 0 | Status: SHIPPED | OUTPATIENT
Start: 2023-12-11 | End: 2023-12-16

## 2023-12-11 RX ORDER — PREDNISONE 20 MG/1
40 TABLET ORAL ONCE
Status: COMPLETED | OUTPATIENT
Start: 2023-12-11 | End: 2023-12-11

## 2023-12-11 RX ORDER — CEFDINIR 300 MG/1
300 CAPSULE ORAL 2 TIMES DAILY
Qty: 20 CAPSULE | Refills: 0 | Status: SHIPPED | OUTPATIENT
Start: 2023-12-11 | End: 2023-12-21

## 2023-12-11 NOTE — DISCHARGE INSTRUCTIONS
Follow-up for further evaluation primary physician. Call for an appointment. Return if increased trouble breathing, new or worse symptoms. Rest, plenty of fluids. Start prednisone tomorrow. Continue your albuterol and inhalers as directed. Antibiotic as prescribed.

## 2023-12-13 ENCOUNTER — TELEMEDICINE (OUTPATIENT)
Dept: FAMILY MEDICINE CLINIC | Facility: CLINIC | Age: 58
End: 2023-12-13
Payer: COMMERCIAL

## 2023-12-13 DIAGNOSIS — J40 BRONCHITIS: Primary | ICD-10-CM

## 2023-12-13 PROCEDURE — 99214 OFFICE O/P EST MOD 30 MIN: CPT | Performed by: FAMILY MEDICINE

## 2023-12-13 RX ORDER — PREDNISONE 20 MG/1
TABLET ORAL
Qty: 18 TABLET | Refills: 0 | Status: SHIPPED | OUTPATIENT
Start: 2023-12-13 | End: 2023-12-28

## 2023-12-13 NOTE — PROGRESS NOTES
Subjective:   Patient ID: Noah Mendoza is a 62year old female. HPI  Ms. Reyes is a pleasant 49-year-old female presenting for video visit follow-up today after she was seen at the emergency room a few days ago. She presented with shortness of breath, cough and fever. Chest x-ray was done which was negative for pneumonia but showed evidence of bronchitis. She also was diagnosed to have sinusitis. She was prescribed Omnicef and prednisone. She continues to feel short of breath. She is unable to work. She continues to have low-grade fever. No chest pain no palpitation no nausea no vomiting no abdominal pain. O2 sats at home at room air is 95%. I had reviewed past medical and family histories together with allergy and medication lists documented. History/Other:   Review of Systems   Constitutional:  Positive for fatigue and fever. HENT:  Positive for congestion. Respiratory:  Positive for cough, shortness of breath and wheezing. Cardiovascular:  Negative for chest pain. Gastrointestinal:  Negative for abdominal pain, nausea and vomiting. Current Outpatient Medications   Medication Sig Dispense Refill    predniSONE 20 MG Oral Tab Take 1 tablet (20 mg total) by mouth 2 (two) times daily for 5 days, THEN 1 tablet (20 mg total) daily for 5 days, THEN 0.5 tablets (10 mg total) daily for 5 days. 18 tablet 0    predniSONE 20 MG Oral Tab Take 2 tablets (40 mg total) by mouth daily for 5 days. 10 tablet 0    cefdinir 300 MG Oral Cap Take 1 capsule (300 mg total) by mouth 2 (two) times daily for 10 days. 20 capsule 0    semaglutide-weight management 0.25 MG/0.5ML Subcutaneous Solution Auto-injector Inject 0.5 mL (0.25 mg total) into the skin once a week for 4 doses.  2 mL 0    predniSONE 20 MG Oral Tab TAKE 1 TABLET BY MOUTH TWICE A DAY FOR 7 DAYS, 1 TABLET DAILY X7 DAYS, 1/2 TABLET X7 DAYS (Patient not taking: Reported on 12/11/2023) 25 tablet 1    Amphetamine-Dextroamphet ER (ADDERALL XR) 20 MG Oral Capsule SR 24 Hr Take 1 capsule (20 mg total) by mouth daily. 30 capsule 0    [START ON 12/30/2023] Amphetamine-Dextroamphet ER (ADDERALL XR) 20 MG Oral Capsule SR 24 Hr Take 1 capsule (20 mg total) by mouth daily. 30 capsule 0    [START ON 1/30/2024] Amphetamine-Dextroamphet ER (ADDERALL XR) 20 MG Oral Capsule SR 24 Hr Take 1 capsule (20 mg total) by mouth daily. 30 capsule 0    semaglutide (OZEMPIC, 0.25 OR 0.5 MG/DOSE,) 2 MG/3ML Subcutaneous Solution Pen-injector Begin with 0.25mg into skin weekly x 4 weeks, and then increase to 0.5mg into skin weekly (Patient not taking: Reported on 12/11/2023) 9 mL 0    metFORMIN  MG Oral Tablet 24 Hr Take 1 tablet (750 mg total) by mouth daily. 90 tablet 0    ipratropium 0.06 % Nasal Solution INSTILL 2 SPRAYS INTO EACH NOSTRIL IN THE MORNING AND BEFORE BEDTIME (Patient not taking: Reported on 12/11/2023) 45 mL 1    spironolactone 25 MG Oral Tab Take 1 tablet (25 mg total) by mouth 2 (two) times daily. 180 tablet 1    amphetamine-dextroamphetamine (ADDERALL) 10 MG Oral Tab Take 1 tablet (10 mg total) by mouth 2 (two) times daily. (Patient not taking: Reported on 11/29/2023) 60 tablet 0    LINZESS 145 MCG Oral Cap TAKE 2 CAPSULES BY MOUTH DAILY AS NEEDED. 60 capsule 0    potassium chloride (KLOR-CON M20) 20 MEQ Oral Tab CR Take 1 tablet (20 mEq total) by mouth daily. 90 tablet 0    furosemide 20 MG Oral Tab Take 1 tablet (20 mg total) by mouth 2 (two) times daily. montelukast 10 MG Oral Tab Take 1 tablet (10 mg total) by mouth daily. 90 tablet 1    alendronate 70 MG Oral Tab Take 1 tablet (70 mg total) by mouth once a week. 12 tablet 3    amphetamine-dextroamphetamine (ADDERALL) 10 MG Oral Tab Take 1 tablet (10 mg total) by mouth 2 (two) times daily. 60 tablet 0    amphetamine-dextroamphetamine (ADDERALL) 10 MG Oral Tab Take 1 tablet (10 mg total) by mouth 2 (two) times daily.  (Patient not taking: Reported on 11/29/2023) 60 tablet 0 amphetamine-dextroamphetamine (ADDERALL) 10 MG Oral Tab Take 1 tablet (10 mg total) by mouth 2 (two) times daily. (Patient not taking: Reported on 11/29/2023) 60 tablet 0    HYDROCHLOROTHIAZIDE 25 MG Oral Tab TAKE 1 TABLET (25 MG TOTAL) BY MOUTH DAILY. (Patient not taking: Reported on 6/22/2023) 90 tablet 1    LEVOXYL 112 MCG Oral Tab Take 1 tablet (112 mcg total) by mouth before breakfast.      ALPRAZolam 0.25 MG Oral Tab Take 1 tablet (0.25 mg total) by mouth 2 (two) times daily as needed for Anxiety. 30 tablet 0    albuterol (2.5 MG/3ML) 0.083% Inhalation Nebu Soln Take 3 mL (2.5 mg total) by nebulization every 6 (six) hours as needed for Wheezing. TRELEGY ELLIPTA 100-62.5-25 MCG/INH Inhalation Aerosol Powder, Breath Activated Inhale 1 puff into the lungs daily. ipratropium-albuterol 0.5-2.5 (3) MG/3ML Inhalation Solution Take 1 vial by nebulization every 6 (six) hours as needed. Vitamin D3, Cholecalciferol, 25 MCG (1000 UT) Oral Tab Take 1 tablet (1,000 Units total) by mouth daily. gabapentin 100 MG Oral Cap Take 1-3 capsules (100-300 mg total) by mouth in the morning, at noon, and at bedtime. As directed       Allergies: Allergies   Allergen Reactions    Amoxicillin HIVES and UNKNOWN     TABS    Levofloxacin ANAPHYLAXIS and UNKNOWN    Lyrica [Pregabalin] SWELLING    Cat Hair Extract ASTHMA, Coughing, Runny nose, SHORTNESS OF BREATH and WHEEZING    Trees, [place] ASTHMA, Runny nose and WHEEZING       Objective:   Physical Exam  Constitutional:       General: She is not in acute distress. Neurological:      Mental Status: She is alert. Psychiatric:         Mood and Affect: Mood normal.         Assessment & Plan:   1. Bronchitis    -Will extend prednisone but will taper down.   - Continue Omnicef  - Continue to monitor O2 sat but if it goes down to 90%, she must go to the emergency room to be evaluated further  - Letter has been written for her to miss the rest of the week from work.      This note was prepared using [employer] [facility] voice recognition dictation software. As a result errors may occur. When identified these errors have been corrected. While every attempt is made to correct errors during dictation discrepancies may still exist            No orders of the defined types were placed in this encounter. Meds This Visit:  Requested Prescriptions     Signed Prescriptions Disp Refills    predniSONE 20 MG Oral Tab 18 tablet 0     Sig: Take 1 tablet (20 mg total) by mouth 2 (two) times daily for 5 days, THEN 1 tablet (20 mg total) daily for 5 days, THEN 0.5 tablets (10 mg total) daily for 5 days.        Imaging & Referrals:  None

## 2023-12-20 ENCOUNTER — HOSPITAL ENCOUNTER (EMERGENCY)
Facility: HOSPITAL | Age: 58
Discharge: HOME | End: 2023-12-20
Attending: EMERGENCY MEDICINE
Payer: COMMERCIAL

## 2023-12-20 ENCOUNTER — PATIENT MESSAGE (OUTPATIENT)
Dept: FAMILY MEDICINE CLINIC | Facility: CLINIC | Age: 58
End: 2023-12-20

## 2023-12-20 ENCOUNTER — APPOINTMENT (OUTPATIENT)
Dept: GENERAL RADIOLOGY | Facility: HOSPITAL | Age: 58
End: 2023-12-20
Attending: EMERGENCY MEDICINE
Payer: COMMERCIAL

## 2023-12-20 VITALS
HEIGHT: 62 IN | RESPIRATION RATE: 17 BRPM | OXYGEN SATURATION: 96 % | TEMPERATURE: 99 F | SYSTOLIC BLOOD PRESSURE: 147 MMHG | DIASTOLIC BLOOD PRESSURE: 79 MMHG | BODY MASS INDEX: 33.13 KG/M2 | HEART RATE: 100 BPM | WEIGHT: 180 LBS

## 2023-12-20 DIAGNOSIS — B33.8 RESPIRATORY SYNCYTIAL VIRUS (RSV): Primary | ICD-10-CM

## 2023-12-20 DIAGNOSIS — J45.41 MODERATE PERSISTENT ASTHMA WITH EXACERBATION: ICD-10-CM

## 2023-12-20 LAB
ALBUMIN SERPL-MCNC: 4 G/DL (ref 3.4–5)
ALBUMIN/GLOB SERPL: 1.2 {RATIO} (ref 1–2)
ALP LIVER SERPL-CCNC: 62 U/L
ALT SERPL-CCNC: 38 U/L
ANION GAP SERPL CALC-SCNC: 6 MMOL/L (ref 0–18)
AST SERPL-CCNC: 62 U/L (ref 15–37)
BASOPHILS # BLD: 0 X10(3) UL (ref 0–0.2)
BASOPHILS NFR BLD: 0 %
BILIRUB SERPL-MCNC: 0.9 MG/DL (ref 0.1–2)
BUN BLD-MCNC: 23 MG/DL (ref 9–23)
CALCIUM BLD-MCNC: 9.6 MG/DL (ref 8.5–10.1)
CHLORIDE SERPL-SCNC: 101 MMOL/L (ref 98–112)
CO2 SERPL-SCNC: 27 MMOL/L (ref 21–32)
CREAT BLD-MCNC: 1.1 MG/DL
D DIMER PPP FEU-MCNC: 0.49 UG/ML FEU (ref ?–0.58)
EGFRCR SERPLBLD CKD-EPI 2021: 58 ML/MIN/1.73M2 (ref 60–?)
EOSINOPHIL # BLD: 0 X10(3) UL (ref 0–0.7)
EOSINOPHIL NFR BLD: 0 %
ERYTHROCYTE [DISTWIDTH] IN BLOOD BY AUTOMATED COUNT: 13.8 %
FLUAV + FLUBV RNA SPEC NAA+PROBE: NEGATIVE
FLUAV + FLUBV RNA SPEC NAA+PROBE: NEGATIVE
GLOBULIN PLAS-MCNC: 3.4 G/DL (ref 2.8–4.4)
GLUCOSE BLD-MCNC: 121 MG/DL (ref 70–99)
HCT VFR BLD AUTO: 41.7 %
HGB BLD-MCNC: 14.3 G/DL
LYMPHOCYTES NFR BLD: 16 %
LYMPHOCYTES NFR BLD: 2.19 X10(3) UL (ref 1–4)
MCH RBC QN AUTO: 30.4 PG (ref 26–34)
MCHC RBC AUTO-ENTMCNC: 34.3 G/DL (ref 31–37)
MCV RBC AUTO: 88.7 FL
METAMYELOCYTES # BLD: 0.14 X10(3) UL
METAMYELOCYTES NFR BLD: 1 %
MONOCYTES # BLD: 0.82 X10(3) UL (ref 0.1–1)
MONOCYTES NFR BLD: 6 %
MORPHOLOGY: NORMAL
MYELOCYTES # BLD: 0.14 X10(3) UL
MYELOCYTES NFR BLD: 1 %
NEUTROPHILS # BLD AUTO: 10.14 X10 (3) UL (ref 1.5–7.7)
NEUTROPHILS NFR BLD: 76 %
NEUTS HYPERSEG # BLD: 10.41 X10(3) UL (ref 1.5–7.7)
NT-PROBNP SERPL-MCNC: 38 PG/ML (ref ?–125)
OSMOLALITY SERPL CALC.SUM OF ELEC: 283 MOSM/KG (ref 275–295)
PLATELET # BLD AUTO: 393 10(3)UL (ref 150–450)
PLATELET MORPHOLOGY: NORMAL
POTASSIUM SERPL-SCNC: 5.8 MMOL/L (ref 3.5–5.1)
PROT SERPL-MCNC: 7.4 G/DL (ref 6.4–8.2)
RBC # BLD AUTO: 4.7 X10(6)UL
RSV RNA SPEC NAA+PROBE: POSITIVE
SARS-COV-2 RNA RESP QL NAA+PROBE: NOT DETECTED
SODIUM SERPL-SCNC: 134 MMOL/L (ref 136–145)
TOTAL CELLS COUNTED BLD: 100
WBC # BLD AUTO: 13.7 X10(3) UL (ref 4–11)

## 2023-12-20 PROCEDURE — 94644 CONT INHLJ TX 1ST HOUR: CPT

## 2023-12-20 PROCEDURE — 96375 TX/PRO/DX INJ NEW DRUG ADDON: CPT

## 2023-12-20 PROCEDURE — 0241U SARS-COV-2/FLU A AND B/RSV BY PCR (GENEXPERT): CPT | Performed by: EMERGENCY MEDICINE

## 2023-12-20 PROCEDURE — 85379 FIBRIN DEGRADATION QUANT: CPT | Performed by: EMERGENCY MEDICINE

## 2023-12-20 PROCEDURE — 80053 COMPREHEN METABOLIC PANEL: CPT | Performed by: EMERGENCY MEDICINE

## 2023-12-20 PROCEDURE — 83880 ASSAY OF NATRIURETIC PEPTIDE: CPT | Performed by: EMERGENCY MEDICINE

## 2023-12-20 PROCEDURE — 85025 COMPLETE CBC W/AUTO DIFF WBC: CPT | Performed by: EMERGENCY MEDICINE

## 2023-12-20 PROCEDURE — 71045 X-RAY EXAM CHEST 1 VIEW: CPT | Performed by: EMERGENCY MEDICINE

## 2023-12-20 PROCEDURE — 85027 COMPLETE CBC AUTOMATED: CPT | Performed by: EMERGENCY MEDICINE

## 2023-12-20 PROCEDURE — 96365 THER/PROPH/DIAG IV INF INIT: CPT

## 2023-12-20 PROCEDURE — 99284 EMERGENCY DEPT VISIT MOD MDM: CPT

## 2023-12-20 PROCEDURE — 85007 BL SMEAR W/DIFF WBC COUNT: CPT | Performed by: EMERGENCY MEDICINE

## 2023-12-20 RX ORDER — IPRATROPIUM BROMIDE AND ALBUTEROL SULFATE 2.5; .5 MG/3ML; MG/3ML
3 SOLUTION RESPIRATORY (INHALATION)
Qty: 50 EACH | Refills: 0 | Status: SHIPPED | OUTPATIENT
Start: 2023-12-20

## 2023-12-20 RX ORDER — MAGNESIUM SULFATE HEPTAHYDRATE 40 MG/ML
2 INJECTION, SOLUTION INTRAVENOUS ONCE
Status: COMPLETED | OUTPATIENT
Start: 2023-12-20 | End: 2023-12-20

## 2023-12-20 RX ORDER — BENZONATATE 200 MG/1
200 CAPSULE ORAL 3 TIMES DAILY PRN
Qty: 30 CAPSULE | Refills: 0 | Status: SHIPPED | OUTPATIENT
Start: 2023-12-20 | End: 2024-01-19

## 2023-12-20 RX ORDER — METHYLPREDNISOLONE SODIUM SUCCINATE 125 MG/2ML
125 INJECTION, POWDER, LYOPHILIZED, FOR SOLUTION INTRAMUSCULAR; INTRAVENOUS ONCE
Status: COMPLETED | OUTPATIENT
Start: 2023-12-20 | End: 2023-12-20

## 2023-12-20 RX ORDER — PREDNISONE 20 MG/1
60 TABLET ORAL DAILY
Qty: 15 TABLET | Refills: 0 | Status: SHIPPED | OUTPATIENT
Start: 2023-12-21 | End: 2023-12-23

## 2023-12-20 NOTE — DISCHARGE INSTRUCTIONS
You may also take Mucinex DM for cough suppression. Return to the emergency department for new or worsening symptoms.

## 2023-12-20 NOTE — ED INITIAL ASSESSMENT (HPI)
DIFFICULTY IN BREATHING SINCE FEW DAYS GOT WORSE TODAY . BRONCHITIS SINCE LAST FEW WEEK .  PATIENT IS ASTHMATIC TAKING NEBS AT HOME  ON AND OFF FEVER SINCE LAST 2 WEEKS PATIENT IS ALREADY ON ANTIBIOTIC DENIES ANY CARDIAC ISSUES

## 2023-12-20 NOTE — TELEPHONE ENCOUNTER
Please send for guaifenesin dextromethorphan 5 mL every 8 hours as needed for cough. Hopefully this will help suppress cough.

## 2023-12-20 NOTE — TELEPHONE ENCOUNTER
From: Farzana Law Book  To: Margarita Eden  Sent: 12/20/2023 10:26 AM CST  Subject: Visit Follow Up    Dr Waldo Meckel,  I have still been coughing real hard. To the point of wetting myself. I have also been coughing up and blowing out discolored mucus. Breathing is labored. Pulse ox is 94% after neb tx. Still on antibiotic and steroids. Happy Holidays!

## 2023-12-21 ENCOUNTER — TELEMEDICINE (OUTPATIENT)
Dept: FAMILY MEDICINE CLINIC | Facility: CLINIC | Age: 58
End: 2023-12-21
Payer: COMMERCIAL

## 2023-12-21 DIAGNOSIS — J20.5 RSV BRONCHITIS: Primary | ICD-10-CM

## 2023-12-21 PROCEDURE — 99214 OFFICE O/P EST MOD 30 MIN: CPT | Performed by: FAMILY MEDICINE

## 2023-12-21 NOTE — TELEPHONE ENCOUNTER
I called the pharmacy and they confirmed that she got nothing on file, last refill  in September 18/2023 for 90 days

## 2023-12-21 NOTE — PROGRESS NOTES
Subjective:   Patient ID: Miguel Che is a 62year old female. HPI  Ms. Reyes is a pleasant 51-year-old female with multiple medical conditions presenting for video visit follow-up after she was seen at the emergency room for progressive shortness of breath. She tested positive for RSV. Previously to this visit she had a visit with me and was prescribed with antibiotics as she has had sinus infections in the past.  She was asked not to take the antibiotics as this will not be helpful in treating her RSV. She was prescribed prednisone taper, drug aerosol treatment with albuterol, Tessalon Perles. She is feeling slightly better at this point. I had reviewed past medical and family histories together with allergy and medication lists documented. History/Other:   Review of Systems   Constitutional:  Negative for fever. Respiratory:  Positive for cough and shortness of breath. Cardiovascular:  Negative for chest pain. Gastrointestinal:  Negative for abdominal pain, diarrhea, nausea and vomiting. Current Outpatient Medications   Medication Sig Dispense Refill    Dextromethorphan-guaiFENesin 5-100 MG/5ML Oral Liquid Take 5 mL by mouth every 8 (eight) hours as needed (cough). 177 mL 0    ipratropium-albuterol 0.5-2.5 (3) MG/3ML Inhalation Solution Take 3 mL by nebulization every 6 (six) hours while awake. 50 each 0    predniSONE 20 MG Oral Tab Take 3 tablets (60 mg total) by mouth daily for 2 days. Decreased to 40 mg daily for 3 days beginning December 23. Decrease to 20 mg or 1 tablet daily December 26. 15 tablet 0    benzonatate 200 MG Oral Cap Take 1 capsule (200 mg total) by mouth 3 (three) times daily as needed. 30 capsule 0    predniSONE 20 MG Oral Tab Take 1 tablet (20 mg total) by mouth 2 (two) times daily for 5 days, THEN 1 tablet (20 mg total) daily for 5 days, THEN 0.5 tablets (10 mg total) daily for 5 days.  18 tablet 0    cefdinir 300 MG Oral Cap Take 1 capsule (300 mg total) by mouth 2 (two) times daily for 10 days. 20 capsule 0    semaglutide-weight management 0.25 MG/0.5ML Subcutaneous Solution Auto-injector Inject 0.5 mL (0.25 mg total) into the skin once a week for 4 doses. 2 mL 0    predniSONE 20 MG Oral Tab TAKE 1 TABLET BY MOUTH TWICE A DAY FOR 7 DAYS, 1 TABLET DAILY X7 DAYS, 1/2 TABLET X7 DAYS (Patient not taking: Reported on 12/11/2023) 25 tablet 1    Amphetamine-Dextroamphet ER (ADDERALL XR) 20 MG Oral Capsule SR 24 Hr Take 1 capsule (20 mg total) by mouth daily. 30 capsule 0    [START ON 12/30/2023] Amphetamine-Dextroamphet ER (ADDERALL XR) 20 MG Oral Capsule SR 24 Hr Take 1 capsule (20 mg total) by mouth daily. 30 capsule 0    [START ON 1/30/2024] Amphetamine-Dextroamphet ER (ADDERALL XR) 20 MG Oral Capsule SR 24 Hr Take 1 capsule (20 mg total) by mouth daily. 30 capsule 0    semaglutide (OZEMPIC, 0.25 OR 0.5 MG/DOSE,) 2 MG/3ML Subcutaneous Solution Pen-injector Begin with 0.25mg into skin weekly x 4 weeks, and then increase to 0.5mg into skin weekly (Patient not taking: Reported on 12/11/2023) 9 mL 0    metFORMIN  MG Oral Tablet 24 Hr Take 1 tablet (750 mg total) by mouth daily. 90 tablet 0    ipratropium 0.06 % Nasal Solution INSTILL 2 SPRAYS INTO EACH NOSTRIL IN THE MORNING AND BEFORE BEDTIME (Patient not taking: Reported on 12/11/2023) 45 mL 1    spironolactone 25 MG Oral Tab Take 1 tablet (25 mg total) by mouth 2 (two) times daily. 180 tablet 1    LINZESS 145 MCG Oral Cap TAKE 2 CAPSULES BY MOUTH DAILY AS NEEDED. 60 capsule 0    potassium chloride (KLOR-CON M20) 20 MEQ Oral Tab CR Take 1 tablet (20 mEq total) by mouth daily. 90 tablet 0    furosemide 20 MG Oral Tab Take 1 tablet (20 mg total) by mouth 2 (two) times daily. montelukast 10 MG Oral Tab Take 1 tablet (10 mg total) by mouth daily. 90 tablet 1    alendronate 70 MG Oral Tab Take 1 tablet (70 mg total) by mouth once a week.  12 tablet 3    amphetamine-dextroamphetamine (ADDERALL) 10 MG Oral Tab Take 1 tablet (10 mg total) by mouth 2 (two) times daily. 60 tablet 0    amphetamine-dextroamphetamine (ADDERALL) 10 MG Oral Tab Take 1 tablet (10 mg total) by mouth 2 (two) times daily. (Patient not taking: Reported on 11/29/2023) 60 tablet 0    amphetamine-dextroamphetamine (ADDERALL) 10 MG Oral Tab Take 1 tablet (10 mg total) by mouth 2 (two) times daily. (Patient not taking: Reported on 11/29/2023) 60 tablet 0    HYDROCHLOROTHIAZIDE 25 MG Oral Tab TAKE 1 TABLET (25 MG TOTAL) BY MOUTH DAILY. (Patient not taking: Reported on 6/22/2023) 90 tablet 1    LEVOXYL 112 MCG Oral Tab Take 1 tablet (112 mcg total) by mouth before breakfast.      ALPRAZolam 0.25 MG Oral Tab Take 1 tablet (0.25 mg total) by mouth 2 (two) times daily as needed for Anxiety. 30 tablet 0    albuterol (2.5 MG/3ML) 0.083% Inhalation Nebu Soln Take 3 mL (2.5 mg total) by nebulization every 6 (six) hours as needed for Wheezing. TRELEGY ELLIPTA 100-62.5-25 MCG/INH Inhalation Aerosol Powder, Breath Activated Inhale 1 puff into the lungs daily. ipratropium-albuterol 0.5-2.5 (3) MG/3ML Inhalation Solution Take 1 vial by nebulization every 6 (six) hours as needed. Vitamin D3, Cholecalciferol, 25 MCG (1000 UT) Oral Tab Take 1 tablet (1,000 Units total) by mouth daily. gabapentin 100 MG Oral Cap Take 1-3 capsules (100-300 mg total) by mouth in the morning, at noon, and at bedtime. As directed       Allergies: Allergies   Allergen Reactions    Amoxicillin HIVES and UNKNOWN     TABS    Levofloxacin ANAPHYLAXIS and UNKNOWN    Lyrica [Pregabalin] SWELLING    Cat Hair Extract ASTHMA, Coughing, Runny nose, SHORTNESS OF BREATH and WHEEZING    Trees, [place] ASTHMA, Runny nose and WHEEZING       Objective:   Physical Exam  Constitutional:       General: She is not in acute distress.         Assessment & Plan:   1. RSV bronchitis    -O2 sat has been above 90%  - Continue to monitor  - Go to the emergency room if with severe respiratory distress and with hypoxia  - Continue with current meds  - She was supposed to go to go to [place] to be with family but has elderly family members and had discouraged her from going with them. I have asked her to focus recovering for now. This note was prepared using [employer] voice recognition dictation software. As a result errors may occur. When identified these errors have been corrected. While every attempt is made to correct errors during dictation discrepancies may still exist            No orders of the defined types were placed in this encounter.       Meds This Visit:  Requested Prescriptions      No prescriptions requested or ordered in this encounter       Imaging & Referrals:  None

## 2023-12-22 RX ORDER — METFORMIN HYDROCHLORIDE 750 MG/1
750 TABLET, EXTENDED RELEASE ORAL DAILY
Qty: 90 TABLET | Refills: 0 | Status: SHIPPED | OUTPATIENT
Start: 2023-12-22

## 2023-12-27 ENCOUNTER — TELEMEDICINE (OUTPATIENT)
Dept: FAMILY MEDICINE CLINIC | Facility: CLINIC | Age: 58
End: 2023-12-27
Payer: COMMERCIAL

## 2023-12-27 DIAGNOSIS — R06.02 SOB (SHORTNESS OF BREATH): ICD-10-CM

## 2023-12-27 DIAGNOSIS — J22 LRTI (LOWER RESPIRATORY TRACT INFECTION): Primary | ICD-10-CM

## 2023-12-27 RX ORDER — CLINDAMYCIN HYDROCHLORIDE 300 MG/1
300 CAPSULE ORAL 3 TIMES DAILY
Qty: 30 CAPSULE | Refills: 0 | Status: SHIPPED | OUTPATIENT
Start: 2023-12-27 | End: 2024-01-06

## 2023-12-27 RX ORDER — PREDNISONE 20 MG/1
TABLET ORAL
Qty: 18 TABLET | Refills: 0 | Status: SHIPPED | OUTPATIENT
Start: 2023-12-27 | End: 2024-01-10

## 2023-12-27 RX ORDER — CODEINE PHOSPHATE AND GUAIFENESIN 10; 100 MG/5ML; MG/5ML
5 SOLUTION ORAL 4 TIMES DAILY PRN
Qty: 180 ML | Refills: 1 | Status: SHIPPED | OUTPATIENT
Start: 2023-12-27

## 2023-12-28 RX ORDER — FUROSEMIDE 20 MG/1
20 TABLET ORAL DAILY
Qty: 90 TABLET | Refills: 1 | Status: SHIPPED | OUTPATIENT
Start: 2023-12-28

## 2023-12-28 NOTE — PROGRESS NOTES
Subjective:   Patient ID: Nico Linares is a 62year old female. HPI  Ms. Eddie Nesbitt is a pleasant 59-year-old female with multiple medical conditions which include hypertension, hyperlipidemia, asthma, hypothyroidism presenting today for video visit for shortness of breath. She recently was seen at the emergency room for RSV bronchiolitis and was prescribed a prednisone taper. She is on her last dose but continues to have shortness of breath associated with cough. She also had developed fever. No nausea no vomiting no abdominal pain. I had reviewed past medical and family histories together with allergy and medication lists documented. History/Other:   Review of Systems   Constitutional:  Positive for fatigue and fever. HENT:  Positive for congestion. Negative for sore throat. Respiratory:  Positive for cough, shortness of breath and wheezing. Cardiovascular:  Negative for chest pain. Gastrointestinal:  Negative for abdominal pain, nausea and vomiting. Neurological:  Positive for weakness. Current Outpatient Medications   Medication Sig Dispense Refill    predniSONE 20 MG Oral Tab Take 1 tablet (20 mg total) by mouth 2 (two) times daily for 5 days, THEN 1 tablet (20 mg total) daily for 5 days, THEN 0.5 tablets (10 mg total) daily for 5 days. 18 tablet 0    clindamycin 300 MG Oral Cap Take 1 capsule (300 mg total) by mouth 3 (three) times daily for 10 days. 30 capsule 0    guaiFENesin-codeine 100-10 MG/5ML Oral Solution Take 5 mL by mouth 4 (four) times daily as needed for cough. 180 mL 1    metFORMIN  MG Oral Tablet 24 Hr Take 1 tablet (750 mg total) by mouth daily. 90 tablet 0    ipratropium-albuterol 0.5-2.5 (3) MG/3ML Inhalation Solution Take 3 mL by nebulization every 6 (six) hours while awake. 50 each 0    benzonatate 200 MG Oral Cap Take 1 capsule (200 mg total) by mouth 3 (three) times daily as needed.  30 capsule 0    predniSONE 20 MG Oral Tab TAKE 1 TABLET BY MOUTH TWICE A DAY FOR 7 DAYS, 1 TABLET DAILY X7 DAYS, 1/2 TABLET X7 DAYS (Patient not taking: Reported on 12/11/2023) 25 tablet 1    Amphetamine-Dextroamphet ER (ADDERALL XR) 20 MG Oral Capsule SR 24 Hr Take 1 capsule (20 mg total) by mouth daily. 30 capsule 0    [START ON 12/30/2023] Amphetamine-Dextroamphet ER (ADDERALL XR) 20 MG Oral Capsule SR 24 Hr Take 1 capsule (20 mg total) by mouth daily. 30 capsule 0    [START ON 1/30/2024] Amphetamine-Dextroamphet ER (ADDERALL XR) 20 MG Oral Capsule SR 24 Hr Take 1 capsule (20 mg total) by mouth daily. 30 capsule 0    semaglutide (OZEMPIC, 0.25 OR 0.5 MG/DOSE,) 2 MG/3ML Subcutaneous Solution Pen-injector Begin with 0.25mg into skin weekly x 4 weeks, and then increase to 0.5mg into skin weekly (Patient not taking: Reported on 12/11/2023) 9 mL 0    ipratropium 0.06 % Nasal Solution INSTILL 2 SPRAYS INTO EACH NOSTRIL IN THE MORNING AND BEFORE BEDTIME (Patient not taking: Reported on 12/11/2023) 45 mL 1    spironolactone 25 MG Oral Tab Take 1 tablet (25 mg total) by mouth 2 (two) times daily. 180 tablet 1    LINZESS 145 MCG Oral Cap TAKE 2 CAPSULES BY MOUTH DAILY AS NEEDED. 60 capsule 0    potassium chloride (KLOR-CON M20) 20 MEQ Oral Tab CR Take 1 tablet (20 mEq total) by mouth daily. 90 tablet 0    furosemide 20 MG Oral Tab Take 1 tablet (20 mg total) by mouth 2 (two) times daily. montelukast 10 MG Oral Tab Take 1 tablet (10 mg total) by mouth daily. 90 tablet 1    alendronate 70 MG Oral Tab Take 1 tablet (70 mg total) by mouth once a week. 12 tablet 3    amphetamine-dextroamphetamine (ADDERALL) 10 MG Oral Tab Take 1 tablet (10 mg total) by mouth 2 (two) times daily. 60 tablet 0    amphetamine-dextroamphetamine (ADDERALL) 10 MG Oral Tab Take 1 tablet (10 mg total) by mouth 2 (two) times daily. (Patient not taking: Reported on 11/29/2023) 60 tablet 0    amphetamine-dextroamphetamine (ADDERALL) 10 MG Oral Tab Take 1 tablet (10 mg total) by mouth 2 (two) times daily.  (Patient not taking: Reported on 11/29/2023) 60 tablet 0    HYDROCHLOROTHIAZIDE 25 MG Oral Tab TAKE 1 TABLET (25 MG TOTAL) BY MOUTH DAILY. (Patient not taking: Reported on 6/22/2023) 90 tablet 1    LEVOXYL 112 MCG Oral Tab Take 1 tablet (112 mcg total) by mouth before breakfast.      ALPRAZolam 0.25 MG Oral Tab Take 1 tablet (0.25 mg total) by mouth 2 (two) times daily as needed for Anxiety. 30 tablet 0    albuterol (2.5 MG/3ML) 0.083% Inhalation Nebu Soln Take 3 mL (2.5 mg total) by nebulization every 6 (six) hours as needed for Wheezing. TRELEGY ELLIPTA 100-62.5-25 MCG/INH Inhalation Aerosol Powder, Breath Activated Inhale 1 puff into the lungs daily. ipratropium-albuterol 0.5-2.5 (3) MG/3ML Inhalation Solution Take 1 vial by nebulization every 6 (six) hours as needed. Vitamin D3, Cholecalciferol, 25 MCG (1000 UT) Oral Tab Take 1 tablet (1,000 Units total) by mouth daily. gabapentin 100 MG Oral Cap Take 1-3 capsules (100-300 mg total) by mouth in the morning, at noon, and at bedtime. As directed       Allergies: Allergies   Allergen Reactions    Amoxicillin HIVES and UNKNOWN     TABS    Levofloxacin ANAPHYLAXIS and UNKNOWN    Lyrica [Pregabalin] SWELLING    Cat Hair Extract ASTHMA, Coughing, Runny nose, SHORTNESS OF BREATH and WHEEZING    Trees, [place] ASTHMA, Runny nose and WHEEZING       Objective:   Physical Exam  Constitutional:       General: She is not in acute distress. Neurological:      Mental Status: She is alert. Psychiatric:         Mood and Affect: Mood normal.         Assessment & Plan:   1. LRTI (lower respiratory tract infection)    2.  SOB (shortness of breath)    -With give another round of prednisone taper  - Continue with nebulization treatment  - Will prescribe with clindamycin for which she had taken before for lower respiratory tract infections with no side effects  - Go to the emergency room if with respiratory distress and/or severe dehydration  - Will also prescribe with guaifenesin with codeine as needed for cough suppression. This note was prepared using [employer] voice recognition dictation software. As a result errors may occur. When identified these errors have been corrected. While every attempt is made to correct errors during dictation discrepancies may still exist            No orders of the defined types were placed in this encounter. Meds This Visit:  Requested Prescriptions     Signed Prescriptions Disp Refills    predniSONE 20 MG Oral Tab 18 tablet 0     Sig: Take 1 tablet (20 mg total) by mouth 2 (two) times daily for 5 days, THEN 1 tablet (20 mg total) daily for 5 days, THEN 0.5 tablets (10 mg total) daily for 5 days. clindamycin 300 MG Oral Cap 30 capsule 0     Sig: Take 1 capsule (300 mg total) by mouth 3 (three) times daily for 10 days. guaiFENesin-codeine 100-10 MG/5ML Oral Solution 180 mL 1     Sig: Take 5 mL by mouth 4 (four) times daily as needed for cough.        Imaging & Referrals:  None

## 2024-01-06 RX ORDER — LINACLOTIDE 145 UG/1
2 CAPSULE, GELATIN COATED ORAL DAILY PRN
Qty: 60 CAPSULE | Refills: 0 | Status: SHIPPED | OUTPATIENT
Start: 2024-01-06

## 2024-01-18 ENCOUNTER — PATIENT MESSAGE (OUTPATIENT)
Dept: FAMILY MEDICINE CLINIC | Facility: CLINIC | Age: 59
End: 2024-01-18

## 2024-01-18 NOTE — TELEPHONE ENCOUNTER
From: Maryanne GAVIN Book  To: Rodrigo Estrella  Sent: 1/18/2024 11:56 AM CST  Subject: Bp    Dr Estrella,  I have been very nauseous today and have a really bad migraine.   The dds I work with took my BP. It was 163/113. She thinks that we may need to up my BP meds.   What are your thoughts?  Horacio

## 2024-01-22 ENCOUNTER — OFFICE VISIT (OUTPATIENT)
Dept: FAMILY MEDICINE CLINIC | Facility: CLINIC | Age: 59
End: 2024-01-22
Payer: COMMERCIAL

## 2024-01-22 VITALS
WEIGHT: 197 LBS | BODY MASS INDEX: 36.25 KG/M2 | RESPIRATION RATE: 16 BRPM | TEMPERATURE: 98 F | DIASTOLIC BLOOD PRESSURE: 84 MMHG | HEIGHT: 62 IN | HEART RATE: 76 BPM | OXYGEN SATURATION: 98 % | SYSTOLIC BLOOD PRESSURE: 134 MMHG

## 2024-01-22 DIAGNOSIS — R51.9 ACUTE NONINTRACTABLE HEADACHE, UNSPECIFIED HEADACHE TYPE: ICD-10-CM

## 2024-01-22 DIAGNOSIS — I10 ESSENTIAL HYPERTENSION: Primary | ICD-10-CM

## 2024-01-22 PROCEDURE — 99214 OFFICE O/P EST MOD 30 MIN: CPT | Performed by: FAMILY MEDICINE

## 2024-01-22 PROCEDURE — 3079F DIAST BP 80-89 MM HG: CPT | Performed by: FAMILY MEDICINE

## 2024-01-22 PROCEDURE — 3008F BODY MASS INDEX DOCD: CPT | Performed by: FAMILY MEDICINE

## 2024-01-22 PROCEDURE — 3075F SYST BP GE 130 - 139MM HG: CPT | Performed by: FAMILY MEDICINE

## 2024-01-22 NOTE — PROGRESS NOTES
Subjective:   Patient ID: Maryanne Reyes is a 58 year old female.    HPI  Ms. Reyes is a pleasant 58-year-old female with history of hypertension, hyperlipidemia, asthma, hypothyroidism, migraines here today for blood pressure management.  While she was at work last week she has had headaches.  She thought that she had migraines.  This persisted the next day.  She had her blood pressure checked and was noted to be systolic blood pressure above 200.  Since then it had significantly improved.  She continues to take spironolactone.  She does however say that she has some stress at work.  Otherwise no fever no cough no chest pain or shortness of breath no nausea no vomiting no abdominal pain. I had reviewed past medical and family histories together with allergy and medication lists documented.          History/Other:   Review of Systems   Constitutional:  Positive for fatigue. Negative for fever.   HENT:  Negative for sore throat and trouble swallowing.    Respiratory:  Negative for cough and shortness of breath.    Cardiovascular:  Negative for chest pain.   Gastrointestinal:  Negative for abdominal pain, diarrhea, nausea and vomiting.   Neurological:  Negative for dizziness.     Current Outpatient Medications   Medication Sig Dispense Refill    linaCLOtide (LINZESS) 145 MCG Oral Cap Take 2 capsules by mouth daily as needed. 60 capsule 0    furosemide 20 MG Oral Tab Take 1 tablet (20 mg total) by mouth daily. 90 tablet 1    metFORMIN  MG Oral Tablet 24 Hr Take 1 tablet (750 mg total) by mouth daily. 90 tablet 0    ipratropium-albuterol 0.5-2.5 (3) MG/3ML Inhalation Solution Take 3 mL by nebulization every 6 (six) hours while awake. 50 each 0    Amphetamine-Dextroamphet ER (ADDERALL XR) 20 MG Oral Capsule SR 24 Hr Take 1 capsule (20 mg total) by mouth daily. 30 capsule 0    [START ON 1/30/2024] Amphetamine-Dextroamphet ER (ADDERALL XR) 20 MG Oral Capsule SR 24 Hr Take 1 capsule (20 mg total) by mouth daily. 30  capsule 0    spironolactone 25 MG Oral Tab Take 1 tablet (25 mg total) by mouth 2 (two) times daily. 180 tablet 1    potassium chloride (KLOR-CON M20) 20 MEQ Oral Tab CR Take 1 tablet (20 mEq total) by mouth daily. 90 tablet 0    montelukast 10 MG Oral Tab Take 1 tablet (10 mg total) by mouth daily. 90 tablet 1    alendronate 70 MG Oral Tab Take 1 tablet (70 mg total) by mouth once a week. 12 tablet 3    amphetamine-dextroamphetamine (ADDERALL) 10 MG Oral Tab Take 1 tablet (10 mg total) by mouth 2 (two) times daily. 60 tablet 0    LEVOXYL 112 MCG Oral Tab Take 1 tablet (112 mcg total) by mouth before breakfast.      ALPRAZolam 0.25 MG Oral Tab Take 1 tablet (0.25 mg total) by mouth 2 (two) times daily as needed for Anxiety. 30 tablet 0    albuterol (2.5 MG/3ML) 0.083% Inhalation Nebu Soln Take 3 mL (2.5 mg total) by nebulization every 6 (six) hours as needed for Wheezing.      TRELEGY ELLIPTA 100-62.5-25 MCG/INH Inhalation Aerosol Powder, Breath Activated Inhale 1 puff into the lungs daily.      ipratropium-albuterol 0.5-2.5 (3) MG/3ML Inhalation Solution Take 1 vial by nebulization every 6 (six) hours as needed.      Vitamin D3, Cholecalciferol, 25 MCG (1000 UT) Oral Tab Take 1 tablet (1,000 Units total) by mouth daily.      gabapentin 100 MG Oral Cap Take 1-3 capsules (100-300 mg total) by mouth in the morning, at noon, and at bedtime. As directed      amphetamine-dextroamphetamine (ADDERALL) 10 MG Oral Tab Take 1 tablet (10 mg total) by mouth 2 (two) times daily. (Patient not taking: Reported on 11/29/2023) 60 tablet 0    amphetamine-dextroamphetamine (ADDERALL) 10 MG Oral Tab Take 1 tablet (10 mg total) by mouth 2 (two) times daily. (Patient not taking: Reported on 11/29/2023) 60 tablet 0     Allergies:  Allergies   Allergen Reactions    Amoxicillin HIVES and UNKNOWN     TABS    Levofloxacin ANAPHYLAXIS and UNKNOWN    Lyrica [Pregabalin] SWELLING    Cat Hair Extract ASTHMA, Coughing, Runny nose, SHORTNESS OF  BREATH and WHEEZING    [place]rees, [place] ASTHMA, Runny nose and WHEEZING       Objective:   Physical Exam  Vitals reviewed.   Constitutional:       General: She is not in acute distress.  HENT:      Mouth/Throat:      Mouth: Mucous membranes are moist.      Pharynx: Oropharynx is clear.   Eyes:      General: No scleral icterus.     Conjunctiva/sclera: Conjunctivae normal.   Cardiovascular:      Rate and Rhythm: Normal rate and regular rhythm.      Heart sounds: Normal heart sounds. No murmur heard.  Pulmonary:      Effort: Pulmonary effort is normal. No respiratory distress.      Breath sounds: Normal breath sounds. No wheezing or rales.   Abdominal:      General: Bowel sounds are normal. There is no distension.      Palpations: Abdomen is soft.      Tenderness: There is no abdominal tenderness.   Musculoskeletal:      Cervical back: Neck supple.      Right lower leg: No edema.      Left lower leg: No edema.   Lymphadenopathy:      Cervical: No cervical adenopathy.   Neurological:      Mental Status: She is alert.   Psychiatric:         Mood and Affect: Mood normal.         Behavior: Behavior normal.         Assessment & Plan:   1. Essential hypertension   -Normal today  - Recommended for her to monitor blood pressure for now continue current medication regimen  - Give us an update regarding her blood pressure levels  - Avoid unnecessary stress   2. Headaches  - Possibly tension headaches versus migraines  - This had resolved already  - Will monitor for recurrence       This note was prepared using Dragon [employer] voice recognition dictation software. As a result errors may occur. When identified these errors have been corrected. While every attempt is made to correct errors during dictation discrepancies may still exist            No orders of the defined types were placed in this encounter.      Meds This Visit:  Requested Prescriptions      No prescriptions requested or ordered in this encounter       Imaging &  Referrals:  None

## 2024-01-22 NOTE — PATIENT INSTRUCTIONS
Thank you for choosing Rodrigo Estrella MD at [facility]  To Do: Maryanne GAVIN Book  1. High Blood pressure  What Is a Normal Blood Pressure?  The Joint National Committee on Prevention, Detection, Evaluation, and Treatment of High Blood Pressure has classified blood pressure measurements into several categories:  Normal blood pressure is systolic pressure less than 120 and diastolic pressure less than 80 mmHg.   \"Prehypertension\" is systolic pressure of 120-139 or diastolic pressure of 80-89 mmHg.   Stage 1 Hypertension is blood pressure greater than systolic pressure of 140-159 or diastolic pressure of 90-99 mmHg or greater.   Stage 2 Hypertension is systolic pressure of 160 or greater or diastolic pressure of 100 or greater.  What Health Problems Are Associated With High Blood Pressure?  Several potentially serious health conditions are linked to high blood pressure, including:  Atherosclerosis: a disease of the arteries caused by a buildup of plaque, or fatty material, on the inside walls of the blood vessels; hypertension contributes to this buildup by putting added stress and force on the artery walls.   Heart Disease: Heart failure (the heart is not strong enough to pump blood adequately), ischemic heart disease (the heart tissue doesn't get enough blood), and hypertensive hypertrophic cardiomyopathy (thickened, abnormally functioning heart muscle) are all associated with high blood pressure.   Kidney Disease: Hypertension can damage the blood vessels and filters in the kidneys, so that the kidneys cannot excrete waste properly.   Stroke: Hypertension can lead to stroke, either by contributing to the process of atherosclerosis (which can lead to blockages and/or clots), or by weakening the blood vessel wall and causing it to rupture.   Eye Disease: Hypertension can damage the very small blood vessels in the retina.  Bleeding from the aorta, the large blood vessel that supplies blood to the abdomen,  pelvis, and legs   Heart failure   Poor blood supply to the legs  Erectile Dysfunction  Problems with your vision    Overview  \"Blood pressure\" is the force of blood pushing against the walls of the arteries as the heart pumps blood. If this pressure rises and stays high over time, it can damage the body in many ways.  About 1 in 3 adults in the [place] has HBP. The condition itself usually has no signs or symptoms. You can have it for years without knowing it. During this time, though, HBP can damage your heart, blood vessels, kidneys, and other parts of your body.  Knowing your blood pressure numbers is important, even when you're feeling fine. If your blood pressure is normal, you can work with your health care team to keep it that way. If your blood pressure is too high, treatment may help prevent damage to your body's organs.    By [facility] staff   DASH stands for Dietary Approaches to Stop Hypertension. The DASH diet is a lifelong approach to healthy eating that's designed to help treat or prevent high blood pressure (hypertension). The DASH diet encourages you to reduce the sodium in your diet and eat a variety of foods rich in nutrients that help lower blood pressure, such as potassium, calcium and magnesium.   By following the DASH diet, you may be able to reduce your blood pressure by a few points in just two weeks. Over time, your blood pressure could drop by eight to 14 points, which can make a significant difference in your health risks.   DASH DIET  The low-salt Dietary Approaches to Stop Hypertension (DASH) diet is proven to help lower blood pressure. Its effects on blood pressure are sometimes seen within a few weeks.  This diet is not only rich in important nutrients and fiber, but it also includes foods that contain far more potassium (4,700 milligrams (mg)/day), calcium (1,250 mg/day), and magnesium (500 mg/day) and much less sodium (salt) than the typical American diet.  Limit sodium to  no more than 2,300 mg a day (eating only 1,500 mg a day is an even better goal).   Reduce saturated fat to no more than 6% of daily calories and total fat to 27% of daily calories. Low-fat dairy products appear to be especially beneficial for lowering systolic blood pressure.   When choosing fats, select monounsaturated oils, such as olive or canola oils.   Choose whole grains over white flour or pasta products.   Choose fresh fruits and vegetables every day. Many of these foods are rich in potassium, fiber, or both.   Eat nuts, seeds, or legumes (dried beans or peas) daily.   Choose modest amounts of protein (no more than 18% of total daily calories). Fish, skinless poultry, and soy products are the best protein sources.   Other daily nutrient goals in the DASH diet include limiting carbohydrates to 55% of daily calories and dietary cholesterol to 150 mg. Try to get at least 30 grams (g) of daily fiber.    Grains (6 to 8 servings a day)  Grains include bread, cereal, rice and pasta. Examples of one serving of grains include 1 slice whole-wheat bread, 1 ounce (oz.) dry cereal, or 1/2 cup cooked cereal, rice or pasta.   Focus on whole grains because they have more fiber and nutrients than do refined grains. For instance, use brown rice instead of white rice, whole-wheat pasta instead of regular pasta and whole-grain bread instead of white bread. Look for products labeled \"100 percent whole grain\" or \"100 percent whole wheat.\"   Grains are naturally low in fat, so avoid spreading on butter or adding cream and cheese sauces.   Vegetables (4 to 5 servings a day)  Tomatoes, carrots, broccoli, sweet potatoes, greens and other vegetables are full of fiber, vitamins, and such minerals as potassium and magnesium. Examples of one serving include 1 cup raw leafy green vegetables or 1/2 cup cut-up raw or cooked vegetables.   Don't think of vegetables only as side dishes -- a hearty blend of vegetables served over brown rice or  whole-wheat noodles can serve as the main dish for a meal.   Fresh or frozen vegetables are both good choices. When buying frozen and canned vegetables, choose those labeled as low sodium or without added salt.   To increase the number of servings you fit in daily, be creative. In a stir-fermin, for instance, cut the amount of meat in half and double up on the vegetables.   Fruits (4 to 5 servings a day)  Many fruits need little preparation to become a healthy part of a meal or snack. Like vegetables, they're packed with fiber, potassium and magnesium and are typically low in fat -- exceptions include avocados and coconuts. Examples of one serving include 1 medium fruit or 1/2 cup fresh, frozen or canned fruit.   Have a piece of fruit with meals and one as a snack, then round out your day with a dessert of fresh fruits topped with a splash of low-fat yogurt.   Leave on edible peels whenever possible. The peels of apples, pears and most fruits with pits add interesting texture to recipes and contain healthy nutrients and fiber.   Remember that citrus fruits and juice, such as grapefruit, can interact with certain medications, so check with your doctor or pharmacist to see if they're OK for you.   Dairy (2 to 3 servings a day)  Milk, yogurt, cheese and other dairy products are major sources of calcium, vitamin D and protein. But the key is to make sure that you choose dairy products that are low-fat or fat-free because otherwise they can be a major source of fat. Examples of one serving include 1 cup skim or 1% milk, 1 cup yogurt or 1 1/2 oz. cheese.   Low-fat or fat-free frozen yogurt can help you boost the amount of dairy products you eat while offering a sweet treat. Add fruit for a healthy twist.   If you have trouble digesting dairy products, choose lactose-free products or consider taking an over-the-counter product that contains the enzyme lactase, which can reduce or prevent the symptoms of lactose intolerance.    Go easy on regular and even fat-free cheeses because they are typically high in sodium.   Lean meat, poultry and fish (6 or fewer servings a day)  Meat can be a rich source of protein, B vitamins, iron and zinc. But because even lean varieties contain fat and cholesterol, don't make them a mainstay of your diet -- cut back typical meat portions by one-third or one-half and pile on the vegetables instead. Examples of one serving include 1 oz. cooked skinless poultry, seafood or lean meat, 1 egg, or 1 oz. water-packed, no-salt-added canned tuna.   Trim away skin and fat from meat and then broil, grill, roast or poach instead of frying.   Eat heart-healthy fish, such as salmon, herring and tuna. These types of fish are high in omega-3 fatty acids, which can help lower your total cholesterol.   Nuts, seeds and legumes (4 to 5 servings a week)   Almonds, sunflower seeds, kidney beans, peas, lentils and other foods in this family are good sources of magnesium, potassium and protein. They're also full of fiber and phytochemicals, which are plant compounds that may protect against some cancers and cardiovascular disease. Serving sizes are small and are intended to be consumed weekly because these foods are high in calories. Examples of one serving include 1/3 cup (1 1/2 oz.) nuts, 2 tablespoons seeds or 1/2 cup cooked beans or peas.   Nuts sometimes get a bad rap because of their fat content, but they contain healthy types of fat -- monounsaturated fat and omega-3 fatty acids. They're high in calories, however, so eat them in moderation. Try adding them to stir-fries, salads or cereals.   Soybean-based products, such as tofu and tempeh, can be a good alternative to meat because they contain all of the amino acids your body needs to make a complete protein, just like meat. They also contain isoflavones, a type of natural plant compound (phytochemical) that has been shown to have some health benefits.   Fats and oils (2 to  3 servings a day)  Fat helps your body absorb essential vitamins and helps your body's immune system. But too much fat increases your risk of heart disease, diabetes and obesity. The DASH diet strives for a healthy balance by providing 30 percent or less of daily calories from fat, with a focus on the healthier unsaturated fats. Examples of one serving include 1 teaspoon soft margarine, 1 tablespoon low-fat mayonnaise or 2 tablespoons light salad dressing.   Saturated fat and trans fat are the main dietary culprits in raising your blood cholesterol and increasing your risk of coronary artery disease. DASH helps keep your daily saturated fat to less than 10 percent of your total calories by limiting use of meat, butter, cheese, whole milk, cream and eggs in your diet, along with foods made from lard, solid shortenings, and palm and coconut oils.   Avoid trans fat, commonly found in such processed foods as crackers, baked goods and fried items.   Read food labels on margarine and salad dressing so that you can choose those that are lowest in saturated fat and free of trans fat.   Sweets (5 or fewer a week)  You don't have to banish sweets entirely while following the DASH diet -- just go easy on them. Examples of one serving include 1 tablespoon sugar, jelly or jam, 1/2 cup sorbet or 1 cup (8 oz.) lemonade.   When you eat sweets, choose those that are fat-free or low-fat, such as sorbets, fruit ices, jelly beans, hard candy, gabrielle crackers or low-fat cookies.   Artificial sweeteners such as aspartame (NutraSweet, Equal) and sucralose (Splenda) may help satisfy your sweet tooth while sparing the sugar. But remember that you still must use them sensibly. It's OK to swap a diet cola for a regular cola, but not in place of a more nutritious beverage such as low-fat milk or even plain water.   Cut back on added sugar, which has no nutritional value but can pack on calories.   DASH diet: Alcohol and caffeine  Drinking too  much alcohol can increase blood pressure. The DASH diet recommends that men limit alcohol to two or fewer drinks a day and women one or less.   The DASH diet doesn't address caffeine consumption. The influence of caffeine on blood pressure remains unclear. But caffeine can cause your blood pressure to rise at least temporarily. If you already have high blood pressure or if you think caffeine is affecting your blood pressure, talk to your doctor about your caffeine consumption.   The DASH diet is not designed to promote weight loss, but it can be used as part of an overall weight-loss strategy. The DASH diet is based on a diet of about 2,000 calories a day. If you're trying to lose weight, though, you may want to eat around 1,600 a day. You may need to adjust your serving goals based on your health or individual circumstances -- something your health care team can help you decide.   Tips to cut back on sodium  The foods at the core of the DASH diet are naturally low in sodium. So just by following the DASH diet, you're likely to reduce your sodium intake. You also can cut back on sodium in your diet by:   Using sodium-free spices or flavorings with your food instead of salt   Not adding salt when cooking rice, pasta or hot cereal   Rinsing canned foods to remove some of the sodium   Buying foods labeled \"no salt added,\" \"sodium-free,\" \"low sodium\" or \"very low sodium\"   One teaspoon of table salt has about 2,300 mg of sodium, and 2/3 teaspoon of table salt has about 1,500 mg of sodium. When you read food labels, you may be surprised at just how much sodium some processed foods contain. Even low-fat soups, canned vegetables, ready-to-eat cereals and sliced turkey from the local deli -- all foods you may have considered healthy -- often have lots of sodium.   You may not notice a difference in taste when you choose low-sodium food and beverages. If things seem too bland, gradually introduce low-sodium foods and cut  back on table salt until you reach your sodium goal. That'll give your palate time to adjust. It can take several weeks for your taste buds to get used to less salty foods.     [facility] is located in [address].  Monday, Tuesday & Friday - 8 a.m. to 4 p.m.  Wednesday, Thursday - 7 a.m. to 3 p.m.  The lab is closed daily from 12 p.m.-12:30 p.m.  Saturday lab hours by appointment. Call [phone] to schedule the appointment.   Please signup for Dr. Z, which is electronic access to your record if you have not done so.  All your results will post on there.  [email or web address]   You can NOW use Dr. Z to book your appointments with us, or consider using open access scheduling which is through the [facility] website [email or web address] and type in Rodrigo Estrella MD and follow the links for \"Schedule Online Now\"    To schedule Imaging or tests at [facility] call Central Scheduling [phone], Go to [facility] ER Building (For example: CT scans, X rays, Ultrasound, MRI)  Cardiac Testing in ER building Building A second floor Cardiac Testing [phone] (For example: Holter Monitor, Cardiac Stress tests,Event Monitor, or 2D Echocardiograms)  [facility] call [phone] usually in [facility]  Walk in Clinic in [place] at [address] Mon-Fri at 8am-7:30 p.m., and Sat/Sun 9:00a.m.-4:30 p.m.  Also at [address] [place] [address]  Call [phone] for info     Please call our office about any questions regarding your treatment/medicines/tests as a result of today's visit.  For your safety, read the entire package insert of all medicines prescribed to you and be aware of all of the risks of treatment even beyond those discussed today.  All therapies have potential risk of harm or side effects or medication interactions.  It is your duty and for your safety to discuss with the pharmacist and our office with questions, and to notify us and stop treatment if problems arise, but know that  our intention is that the benefits outweigh those potential risks and we strive to make you healthier and to improve your quality of life.    Referrals, and Radiology Information:    If your insurance requires a referral to a specialist, please allow 5 business days to process your referral request.    If Rodrigo Estrella MD orders a CT or MRI, it may take up to 10 business days to receive approval from your insurance company. Once our office has called informing you that the insurance company approved your testing, please call Central Scheduling at [phone]  Please allow our office 5 business days to contact you regarding any testing results.    Refill policies:   Allow 3 business days for refills; controlled substances may take longer and must be picked up from the office in person.  Narcotic medications can only be filled in 30 day increments and must be refilled at an office visit only.  If your prescription is due for a refill, you may be due for a follow-up appointment.  We cannot refill your maintenance medications at a preventative wellness visit.  To best provide you care, patients receiving maintenance medications need to be seen at least twice a year.

## 2024-01-29 RX ORDER — POTASSIUM CHLORIDE 20 MEQ/1
20 TABLET, EXTENDED RELEASE ORAL DAILY
Qty: 90 TABLET | Refills: 0 | Status: SHIPPED | OUTPATIENT
Start: 2024-01-29

## 2024-02-15 DIAGNOSIS — J45.901 UNSPECIFIED ASTHMA WITH (ACUTE) EXACERBATION: ICD-10-CM

## 2024-02-15 RX ORDER — PREDNISONE 20 MG/1
TABLET ORAL
Qty: 25 TABLET | Refills: 1 | Status: SHIPPED | OUTPATIENT
Start: 2024-02-15

## 2024-02-26 RX ORDER — METFORMIN HYDROCHLORIDE 750 MG/1
750 TABLET, EXTENDED RELEASE ORAL DAILY
Qty: 90 TABLET | Refills: 0 | OUTPATIENT
Start: 2024-02-26

## 2024-02-26 NOTE — TELEPHONE ENCOUNTER
Requesting metformin  LOV: 11/29/23  RTC: 3 months  Filled: 12/22/23 #90 with 0 refills    Future Appointments   Date Time Provider Department Center   2/27/2024  1:20 PM Chloé Guillen PA-C [facility]     Requesting refill too soon. Denied. Mychart sent

## 2024-02-27 ENCOUNTER — TELEMEDICINE (OUTPATIENT)
Dept: INTERNAL MEDICINE CLINIC | Facility: CLINIC | Age: 59
End: 2024-02-27
Payer: COMMERCIAL

## 2024-02-27 DIAGNOSIS — I10 ESSENTIAL HYPERTENSION: ICD-10-CM

## 2024-02-27 DIAGNOSIS — E78.5 HYPERLIPIDEMIA, UNSPECIFIED HYPERLIPIDEMIA TYPE: ICD-10-CM

## 2024-02-27 DIAGNOSIS — R73.03 PREDIABETES: ICD-10-CM

## 2024-02-27 DIAGNOSIS — Z51.81 ENCOUNTER FOR THERAPEUTIC DRUG MONITORING: Primary | ICD-10-CM

## 2024-02-27 DIAGNOSIS — E66.9 CLASS 1 OBESITY WITH SERIOUS COMORBIDITY AND BODY MASS INDEX (BMI) OF 32.0 TO 32.9 IN ADULT, UNSPECIFIED OBESITY TYPE: ICD-10-CM

## 2024-02-27 DIAGNOSIS — E88.819 INSULIN RESISTANCE: ICD-10-CM

## 2024-02-27 DIAGNOSIS — Z98.84 S/P LAPAROSCOPIC SLEEVE GASTRECTOMY: ICD-10-CM

## 2024-02-27 PROCEDURE — 99213 OFFICE O/P EST LOW 20 MIN: CPT | Performed by: PHYSICIAN ASSISTANT

## 2024-02-27 RX ORDER — DEXTROAMPHETAMINE SACCHARATE, AMPHETAMINE ASPARTATE, DEXTROAMPHETAMINE SULFATE AND AMPHETAMINE SULFATE 5; 5; 5; 5 MG/1; MG/1; MG/1; MG/1
20 TABLET ORAL 2 TIMES DAILY
Qty: 60 TABLET | Refills: 0 | Status: SHIPPED | OUTPATIENT
Start: 2024-03-29 | End: 2024-04-28

## 2024-02-27 RX ORDER — DEXTROAMPHETAMINE SACCHARATE, AMPHETAMINE ASPARTATE, DEXTROAMPHETAMINE SULFATE AND AMPHETAMINE SULFATE 5; 5; 5; 5 MG/1; MG/1; MG/1; MG/1
20 TABLET ORAL 2 TIMES DAILY
Qty: 60 TABLET | Refills: 0 | Status: SHIPPED | OUTPATIENT
Start: 2024-04-29 | End: 2024-05-29

## 2024-02-27 RX ORDER — DEXTROAMPHETAMINE SACCHARATE, AMPHETAMINE ASPARTATE, DEXTROAMPHETAMINE SULFATE AND AMPHETAMINE SULFATE 5; 5; 5; 5 MG/1; MG/1; MG/1; MG/1
20 TABLET ORAL 2 TIMES DAILY
Qty: 60 TABLET | Refills: 0 | Status: SHIPPED | OUTPATIENT
Start: 2024-02-27 | End: 2024-03-28

## 2024-02-27 RX ORDER — TIRZEPATIDE 2.5 MG/.5ML
2.5 INJECTION, SOLUTION SUBCUTANEOUS WEEKLY
Qty: 2 ML | Refills: 0 | Status: SHIPPED | OUTPATIENT
Start: 2024-02-27

## 2024-02-27 NOTE — PROGRESS NOTES
This visit is conducted using Telemedicine with live, interactive video and audio.    Patient has been referred to the [facility] website at [email or web address] to review the yearly Consent to Treat document.    Patient understands and accepts financial responsibility for any deductible, co-insurance and/or co-pays associated with this service.      HISTORY OF PRESENT ILLNESS  Chief Complaint   Patient presents with    Weight Check       Maryanne Reyes is a 58 year old female here for follow up in medical weight loss program.   190lbs   Sleeve gastrectomy 10/17/22   Pt is compliant on adderall, metformin  Denies chest pain, shortness of breath, dizziness, blurred vision, headache, paresthesia, nausea/vomiting.   Rough December, had RSV, multiple rounds of steroids  Was starting to log foods and then got sick    Exercise/Activity: ortho started to talk to her about knee replacement because the pain is so bad  Nutrition: 24 hour food log reviewed, eating regular meals, +protein  Stress: 6/10  Sleep: 4 hours/night         Wt Readings from Last 6 Encounters:   01/22/24 197 lb (89.4 kg)   12/20/23 180 lb (81.6 kg)   12/11/23 180 lb (81.6 kg)   11/29/23 183 lb (83 kg)   11/11/23 175 lb (79.4 kg)   10/19/23 170 lb (77.1 kg)            Breakfast Lunch Dinner Snacks Fluids              REVIEW OF SYSTEMS  GENERAL HEALTH: feels well otherwise, denied any fevers chills or night sweats   RESPIRATORY: denies shortness of breath   CARDIOVASCULAR: denies chest pain  GI: denies abdominal pain    EXAM  There were no vitals taken for this visit.  GENERAL: well developed, well nourished,in no apparent distress, A/O x3  SKIN: no rashes,no suspicious lesions on visible skin  HEENT: atraumatic, normocephalic  LUNGS: no increased effort or work with breathing   NEURO: speaking fluently and in clear sentences    Lab Results   Component Value Date    WBC 13.7 (H) 12/20/2023    RBC 4.70 12/20/2023    HGB 14.3 12/20/2023    HCT 41.7  12/20/2023    MCV 88.7 12/20/2023    MCH 30.4 12/20/2023    MCHC 34.3 12/20/2023    RDW 13.8 12/20/2023    .0 12/20/2023    MPV 11.1 (H) 08/06/2012     Lab Results   Component Value Date     (H) 12/20/2023    BUN 23 12/20/2023    BUNCREA 17.9 10/18/2022    CREATSERUM 1.10 (H) 12/20/2023    ANIONGAP 6 12/20/2023    GFR 60 08/26/2017    GFRNAA 94 07/23/2022    GFRAA 108 07/23/2022    CA 9.6 12/20/2023    OSMOCALC 283 12/20/2023    ALKPHO 62 12/20/2023    AST 62 (H) 12/20/2023    ALT 38 12/20/2023    BILT 0.9 12/20/2023    TP 7.4 12/20/2023    ALB 4.0 12/20/2023    GLOBULIN 3.4 12/20/2023    AGRATIO 1.8 08/11/2011     (L) 12/20/2023    K 5.8 (H) 12/20/2023     12/20/2023    CO2 27.0 12/20/2023     Lab Results   Component Value Date     03/13/2023    A1C 5.9 (H) 03/13/2023     Lab Results   Component Value Date    CHOLEST 176 03/13/2023    TRIG 269 (H) 03/13/2023    HDL 39 (L) 03/13/2023    LDL 92 03/13/2023    VLDL 44 (H) 03/13/2023    TCHDLRATIO 5.39 (H) 01/25/2017    NONHDLC 137 (H) 03/13/2023     Lab Results   Component Value Date    T4F 1.1 03/13/2023    TSH 0.058 (L) 03/13/2023     Lab Results   Component Value Date    B12 1,373 (H) 03/13/2023     Lab Results   Component Value Date    VITD 65.8 03/13/2023       Current Outpatient Medications on File Prior to Visit   Medication Sig Dispense Refill    predniSONE 20 MG Oral Tab TAKE 1 TABLET BY MOUTH TWICE A DAY X7 DAYS, THEN 1 TAB DAILY X7 DAYS, THEN 1/2 TAB DAILY X7 DAYS 25 tablet 1    potassium chloride (KLOR-CON M20) 20 MEQ Oral Tab CR Take 1 tablet (20 mEq total) by mouth daily. 90 tablet 0    linaCLOtide (LINZESS) 145 MCG Oral Cap Take 2 capsules by mouth daily as needed. 60 capsule 0    furosemide 20 MG Oral Tab Take 1 tablet (20 mg total) by mouth daily. 90 tablet 1    metFORMIN  MG Oral Tablet 24 Hr Take 1 tablet (750 mg total) by mouth daily. 90 tablet 0    ipratropium-albuterol 0.5-2.5 (3) MG/3ML Inhalation Solution  Take 3 mL by nebulization every 6 (six) hours while awake. 50 each 0    Amphetamine-Dextroamphet ER (ADDERALL XR) 20 MG Oral Capsule SR 24 Hr Take 1 capsule (20 mg total) by mouth daily. 30 capsule 0    spironolactone 25 MG Oral Tab Take 1 tablet (25 mg total) by mouth 2 (two) times daily. 180 tablet 1    montelukast 10 MG Oral Tab Take 1 tablet (10 mg total) by mouth daily. 90 tablet 1    alendronate 70 MG Oral Tab Take 1 tablet (70 mg total) by mouth once a week. 12 tablet 3    amphetamine-dextroamphetamine (ADDERALL) 10 MG Oral Tab Take 1 tablet (10 mg total) by mouth 2 (two) times daily. 60 tablet 0    amphetamine-dextroamphetamine (ADDERALL) 10 MG Oral Tab Take 1 tablet (10 mg total) by mouth 2 (two) times daily. (Patient not taking: Reported on 11/29/2023) 60 tablet 0    amphetamine-dextroamphetamine (ADDERALL) 10 MG Oral Tab Take 1 tablet (10 mg total) by mouth 2 (two) times daily. (Patient not taking: Reported on 11/29/2023) 60 tablet 0    LEVOXYL 112 MCG Oral Tab Take 1 tablet (112 mcg total) by mouth before breakfast.      ALPRAZolam 0.25 MG Oral Tab Take 1 tablet (0.25 mg total) by mouth 2 (two) times daily as needed for Anxiety. 30 tablet 0    albuterol (2.5 MG/3ML) 0.083% Inhalation Nebu Soln Take 3 mL (2.5 mg total) by nebulization every 6 (six) hours as needed for Wheezing.      TRELEGY ELLIPTA 100-62.5-25 MCG/INH Inhalation Aerosol Powder, Breath Activated Inhale 1 puff into the lungs daily.      ipratropium-albuterol 0.5-2.5 (3) MG/3ML Inhalation Solution Take 1 vial by nebulization every 6 (six) hours as needed.      Vitamin D3, Cholecalciferol, 25 MCG (1000 UT) Oral Tab Take 1 tablet (1,000 Units total) by mouth daily.      gabapentin 100 MG Oral Cap Take 1-3 capsules (100-300 mg total) by mouth in the morning, at noon, and at bedtime. As directed       No current facility-administered medications on file prior to visit.       ASSESSMENT  Analyzed weight data:       Diagnoses and all orders for  this visit:    Encounter for therapeutic drug monitoring  -     amphetamine-dextroamphetamine (ADDERALL) 20 MG Oral Tab; Take 1 tablet (20 mg total) by mouth 2 (two) times daily.  -     amphetamine-dextroamphetamine (ADDERALL) 20 MG Oral Tab; Take 1 tablet (20 mg total) by mouth 2 (two) times daily.  -     amphetamine-dextroamphetamine (ADDERALL) 20 MG Oral Tab; Take 1 tablet (20 mg total) by mouth 2 (two) times daily.  -     Tirzepatide-Weight Management (ZEPBOUND) 2.5 MG/0.5ML Subcutaneous Solution Auto-injector; Inject 2.5 mg into the skin once a week.    Class 1 obesity with serious comorbidity and body mass index (BMI) of 32.0 to 32.9 in adult, unspecified obesity type  -     amphetamine-dextroamphetamine (ADDERALL) 20 MG Oral Tab; Take 1 tablet (20 mg total) by mouth 2 (two) times daily.  -     amphetamine-dextroamphetamine (ADDERALL) 20 MG Oral Tab; Take 1 tablet (20 mg total) by mouth 2 (two) times daily.  -     amphetamine-dextroamphetamine (ADDERALL) 20 MG Oral Tab; Take 1 tablet (20 mg total) by mouth 2 (two) times daily.  -     Tirzepatide-Weight Management (ZEPBOUND) 2.5 MG/0.5ML Subcutaneous Solution Auto-injector; Inject 2.5 mg into the skin once a week.    S/P laparoscopic sleeve gastrectomy  -     amphetamine-dextroamphetamine (ADDERALL) 20 MG Oral Tab; Take 1 tablet (20 mg total) by mouth 2 (two) times daily.  -     amphetamine-dextroamphetamine (ADDERALL) 20 MG Oral Tab; Take 1 tablet (20 mg total) by mouth 2 (two) times daily.  -     amphetamine-dextroamphetamine (ADDERALL) 20 MG Oral Tab; Take 1 tablet (20 mg total) by mouth 2 (two) times daily.  -     Tirzepatide-Weight Management (ZEPBOUND) 2.5 MG/0.5ML Subcutaneous Solution Auto-injector; Inject 2.5 mg into the skin once a week.    Prediabetes  -     amphetamine-dextroamphetamine (ADDERALL) 20 MG Oral Tab; Take 1 tablet (20 mg total) by mouth 2 (two) times daily.  -     amphetamine-dextroamphetamine (ADDERALL) 20 MG Oral Tab; Take 1 tablet  (20 mg total) by mouth 2 (two) times daily.  -     amphetamine-dextroamphetamine (ADDERALL) 20 MG Oral Tab; Take 1 tablet (20 mg total) by mouth 2 (two) times daily.  -     Tirzepatide-Weight Management (ZEPBOUND) 2.5 MG/0.5ML Subcutaneous Solution Auto-injector; Inject 2.5 mg into the skin once a week.    Insulin resistance  -     amphetamine-dextroamphetamine (ADDERALL) 20 MG Oral Tab; Take 1 tablet (20 mg total) by mouth 2 (two) times daily.  -     amphetamine-dextroamphetamine (ADDERALL) 20 MG Oral Tab; Take 1 tablet (20 mg total) by mouth 2 (two) times daily.  -     amphetamine-dextroamphetamine (ADDERALL) 20 MG Oral Tab; Take 1 tablet (20 mg total) by mouth 2 (two) times daily.  -     Tirzepatide-Weight Management (ZEPBOUND) 2.5 MG/0.5ML Subcutaneous Solution Auto-injector; Inject 2.5 mg into the skin once a week.    Essential hypertension  -     amphetamine-dextroamphetamine (ADDERALL) 20 MG Oral Tab; Take 1 tablet (20 mg total) by mouth 2 (two) times daily.  -     amphetamine-dextroamphetamine (ADDERALL) 20 MG Oral Tab; Take 1 tablet (20 mg total) by mouth 2 (two) times daily.  -     amphetamine-dextroamphetamine (ADDERALL) 20 MG Oral Tab; Take 1 tablet (20 mg total) by mouth 2 (two) times daily.  -     Tirzepatide-Weight Management (ZEPBOUND) 2.5 MG/0.5ML Subcutaneous Solution Auto-injector; Inject 2.5 mg into the skin once a week.    Hyperlipidemia, unspecified hyperlipidemia type  -     amphetamine-dextroamphetamine (ADDERALL) 20 MG Oral Tab; Take 1 tablet (20 mg total) by mouth 2 (two) times daily.  -     amphetamine-dextroamphetamine (ADDERALL) 20 MG Oral Tab; Take 1 tablet (20 mg total) by mouth 2 (two) times daily.  -     amphetamine-dextroamphetamine (ADDERALL) 20 MG Oral Tab; Take 1 tablet (20 mg total) by mouth 2 (two) times daily.  -     Tirzepatide-Weight Management (ZEPBOUND) 2.5 MG/0.5ML Subcutaneous Solution Auto-injector; Inject 2.5 mg into the skin once a week.        PLAN  Initial Weight:  217lbs  Initial Weight Date: 3/1/22  Today's Weight: 175lbs  5% Goal: 10.85lbs  10% Goal: 21.7lbs   Sleeve gastrectomy 10/17/22 - 197lbs  Total Weight Loss: Down 4lbs/Net loss 42lbs    Will continue metformin, change adderall to 20mg BID - advised side effects and adverse effects of medication   Will begin Zepbound 2.5mg weekly - denies any personal or family history of pancreatitis, pancreatic cancer, thyroid cancer, MEN2 - discussed MOA, advised side effects and adverse effects of medication.  Encouraged pt to make an appt with dietician  HTN - continue current medications, monitor BP at home, continue to f/u with PCP, discussed red flag signs and symptoms and to go to ER or call 911  HLD - lifestyle changes  Prediabetes - continue metformin, low carb diet  Discussed importance of getting back to logging foods - protein and produce  Low impact exercise, water aerobics, sit and be fit, chair yoga  Nutrition: low carb diet/ recommended to eat breakfast daily/ regular protein intake  Medication use and side effects reviewed with patient.  Medication contraindications: topamax   Follow up with dietitian and psychologist as recommended.  Discussed the role of sleep and stress in weight management.  Counseled on comprehensive weight loss plan including attention to nutrition, exercise and behavior/stress management for success. See patient instruction below for more details.  Discussed strategies to overcome barriers to successful weight loss and weight maintenance  FITTE: ACSM recommendations (150-300 minutes/ week in active weight loss)   Weight Loss consent to treat reviewed and signed     There are no Patient Instructions on file for this visit.    No follow-ups on file.    Patient verbalizes understanding.    Chloé Guillen PA-C  2/27/2024    Please note that the following visit was completed using two-way, real-time interactive audio and video communication.  This has been done in good cinda to provide  continuity of care in the best interest of the provider-patient relationship, due to the ongoing public health crisis/national emergency and because of restrictions of visitation.  There are limitations of this visit as no physical exam could be performed.  Every conscious effort was taken to allow for sufficient and adequate time.  This billing was spent on reviewing labs, medications, radiology tests and decision making.  Appropriate medical decision-making and tests are ordered as detailed in the plan of care above    Chloé Guillen PA-C

## 2024-03-12 ENCOUNTER — PATIENT MESSAGE (OUTPATIENT)
Dept: FAMILY MEDICINE CLINIC | Facility: CLINIC | Age: 59
End: 2024-03-12

## 2024-03-13 NOTE — TELEPHONE ENCOUNTER
From: Maryanne GAVIN Book  To: Rodrigo Estrella  Sent: 3/12/2024 9:36 PM CDT  Subject: RSV Vaccine    When I seen you for a ER follow up appt, you mentioned that I should get the RSV vaccine. However, when I was in the [facility] after that ER visit the pharmacist told me that they do not give that vaccine until the patient is 60 years old.   Do I need you to put an order in?  Thank you!

## 2024-03-16 NOTE — TELEPHONE ENCOUNTER
Requesting   Requested Prescriptions     Pending Prescriptions Disp Refills    METFORMIN  MG Oral Tablet 24 Hr [Pharmacy Med Name: METFORMIN HCL  MG TABLET] 90 tablet 0     Sig: TAKE 1 TABLET BY MOUTH EVERY DAY       LOV: 2/27/24 telemed  RTC:   Last Relevant Labs:   Filled:112/22/23  #90 with 0 refills    No future appointments.

## 2024-03-18 RX ORDER — METFORMIN HYDROCHLORIDE 750 MG/1
750 TABLET, EXTENDED RELEASE ORAL DAILY
Qty: 90 TABLET | Refills: 0 | Status: SHIPPED | OUTPATIENT
Start: 2024-03-18

## 2024-04-01 ENCOUNTER — HOSPITAL ENCOUNTER (OUTPATIENT)
Dept: ULTRASOUND IMAGING | Age: 59
Discharge: HOME | End: 2024-04-01
Attending: INTERNAL MEDICINE
Payer: COMMERCIAL

## 2024-04-01 ENCOUNTER — TELEPHONE (OUTPATIENT)
Dept: SURGERY | Facility: CLINIC | Age: 59
End: 2024-04-01

## 2024-04-01 DIAGNOSIS — C73 THYROID CANCER (HCC): ICD-10-CM

## 2024-04-01 DIAGNOSIS — E89.0 S/P THYROIDECTOMY: ICD-10-CM

## 2024-04-01 PROCEDURE — 76536 US EXAM OF HEAD AND NECK: CPT | Performed by: INTERNAL MEDICINE

## 2024-04-08 ENCOUNTER — TELEMEDICINE (OUTPATIENT)
Dept: FAMILY MEDICINE CLINIC | Facility: CLINIC | Age: 59
End: 2024-04-08
Payer: COMMERCIAL

## 2024-04-08 DIAGNOSIS — J01.90 ACUTE SINUSITIS, RECURRENCE NOT SPECIFIED, UNSPECIFIED LOCATION: Primary | ICD-10-CM

## 2024-04-08 RX ORDER — CLINDAMYCIN HYDROCHLORIDE 300 MG/1
300 CAPSULE ORAL 3 TIMES DAILY
Qty: 30 CAPSULE | Refills: 0 | Status: SHIPPED | OUTPATIENT
Start: 2024-04-08 | End: 2024-04-18

## 2024-04-08 NOTE — PROGRESS NOTES
Subjective:   Patient ID: Maryanne Reyes is a 58 year old female.    HPI  Ms. Reyes is a pleasant 58-year-old female with known history of hypertension, hyperlipidemia, asthma, hypothyroidism, sinusitis presenting for video visit today for sinus congestion associated with left ear congestion since last Monday.  She had leftover doxycycline which usually will help for sinusitis which she has had in the past but this time has not.  She has had fever associated with this.  She also has been feeling tired.  No sick contacts at home.  No chest pain or shortness of breath. I had reviewed past medical and family histories together with allergy and medication lists documented.    History/Other:   Review of Systems   Constitutional:  Positive for fatigue and fever.   HENT:  Positive for congestion and ear pain. Negative for sore throat and voice change.    Respiratory:  Positive for cough.    Gastrointestinal:  Negative for abdominal pain, nausea and vomiting.   Neurological:  Negative for dizziness.     Current Outpatient Medications   Medication Sig Dispense Refill    clindamycin 300 MG Oral Cap Take 1 capsule (300 mg total) by mouth 3 (three) times daily for 10 days. 30 capsule 0    metFORMIN  MG Oral Tablet 24 Hr Take 1 tablet (750 mg total) by mouth daily. 90 tablet 0    amphetamine-dextroamphetamine (ADDERALL) 20 MG Oral Tab Take 1 tablet (20 mg total) by mouth 2 (two) times daily. 60 tablet 0    [START ON 4/29/2024] amphetamine-dextroamphetamine (ADDERALL) 20 MG Oral Tab Take 1 tablet (20 mg total) by mouth 2 (two) times daily. 60 tablet 0    Tirzepatide-Weight Management (ZEPBOUND) 2.5 MG/0.5ML Subcutaneous Solution Auto-injector Inject 2.5 mg into the skin once a week. 2 mL 0    predniSONE 20 MG Oral Tab TAKE 1 TABLET BY MOUTH TWICE A DAY X7 DAYS, THEN 1 TAB DAILY X7 DAYS, THEN 1/2 TAB DAILY X7 DAYS 25 tablet 1    potassium chloride (KLOR-CON M20) 20 MEQ Oral Tab CR Take 1 tablet (20 mEq total) by mouth  daily. 90 tablet 0    linaCLOtide (LINZESS) 145 MCG Oral Cap Take 2 capsules by mouth daily as needed. 60 capsule 0    furosemide 20 MG Oral Tab Take 1 tablet (20 mg total) by mouth daily. 90 tablet 1    ipratropium-albuterol 0.5-2.5 (3) MG/3ML Inhalation Solution Take 3 mL by nebulization every 6 (six) hours while awake. 50 each 0    spironolactone 25 MG Oral Tab Take 1 tablet (25 mg total) by mouth 2 (two) times daily. 180 tablet 1    montelukast 10 MG Oral Tab Take 1 tablet (10 mg total) by mouth daily. 90 tablet 1    alendronate 70 MG Oral Tab Take 1 tablet (70 mg total) by mouth once a week. 12 tablet 3    amphetamine-dextroamphetamine (ADDERALL) 10 MG Oral Tab Take 1 tablet (10 mg total) by mouth 2 (two) times daily. 60 tablet 0    amphetamine-dextroamphetamine (ADDERALL) 10 MG Oral Tab Take 1 tablet (10 mg total) by mouth 2 (two) times daily. (Patient not taking: Reported on 11/29/2023) 60 tablet 0    amphetamine-dextroamphetamine (ADDERALL) 10 MG Oral Tab Take 1 tablet (10 mg total) by mouth 2 (two) times daily. (Patient not taking: Reported on 11/29/2023) 60 tablet 0    LEVOXYL 112 MCG Oral Tab Take 1 tablet (112 mcg total) by mouth before breakfast.      ALPRAZolam 0.25 MG Oral Tab Take 1 tablet (0.25 mg total) by mouth 2 (two) times daily as needed for Anxiety. 30 tablet 0    albuterol (2.5 MG/3ML) 0.083% Inhalation Nebu Soln Take 3 mL (2.5 mg total) by nebulization every 6 (six) hours as needed for Wheezing.      TRELEGY ELLIPTA 100-62.5-25 MCG/INH Inhalation Aerosol Powder, Breath Activated Inhale 1 puff into the lungs daily.      ipratropium-albuterol 0.5-2.5 (3) MG/3ML Inhalation Solution Take 1 vial by nebulization every 6 (six) hours as needed.      Vitamin D3, Cholecalciferol, 25 MCG (1000 UT) Oral Tab Take 1 tablet (1,000 Units total) by mouth daily.      gabapentin 100 MG Oral Cap Take 1-3 capsules (100-300 mg total) by mouth in the morning, at noon, and at bedtime. As directed        Allergies:  Allergies   Allergen Reactions    Amoxicillin HIVES and UNKNOWN     TABS    Levofloxacin ANAPHYLAXIS and UNKNOWN    Lyrica [Pregabalin] SWELLING    Cat Hair Extract ASTHMA, Coughing, Runny nose, SHORTNESS OF BREATH and WHEEZING    Trees, Lucas ASTHMA, Runny nose and WHEEZING       Objective:   Physical Exam  Constitutional:       General: She is not in acute distress.  Neurological:      Mental Status: She is alert.   Psychiatric:         Mood and Affect: Mood normal.         Behavior: Behavior normal.         Assessment & Plan:   1. Acute sinusitis, recurrence not specified, unspecified location    -Continue with allergy medications  - Keep hydrated  - May take Tylenol or ibuprofen as needed for fever.  - We will treat with clindamycin 300 mg 1 capsule 3 times a day for 10 days  - Call or come us if symptoms worsen or persist  - Go to the emergency room if with respiratory distress      This note was prepared using Dragon Medical voice recognition dictation software. As a result errors may occur. When identified these errors have been corrected. While every attempt is made to correct errors during dictation discrepancies may still exist            No orders of the defined types were placed in this encounter.      Meds This Visit:  Requested Prescriptions     Signed Prescriptions Disp Refills    clindamycin 300 MG Oral Cap 30 capsule 0     Sig: Take 1 capsule (300 mg total) by mouth 3 (three) times daily for 10 days.       Imaging & Referrals:  None

## 2024-04-16 ENCOUNTER — PATIENT MESSAGE (OUTPATIENT)
Dept: FAMILY MEDICINE CLINIC | Facility: CLINIC | Age: 59
End: 2024-04-16

## 2024-04-16 NOTE — TELEPHONE ENCOUNTER
From: Maryanne GAVIN Book  To: Rodrigo Estrella  Sent: 4/16/2024 9:40 AM CDT  Subject: Sinus and ear pain    Dr Estrella,  Just an update. I am still having sinus and ear pain.

## 2024-04-18 ENCOUNTER — OFFICE VISIT (OUTPATIENT)
Dept: FAMILY MEDICINE CLINIC | Facility: CLINIC | Age: 59
End: 2024-04-18
Payer: COMMERCIAL

## 2024-04-18 VITALS
HEIGHT: 62 IN | BODY MASS INDEX: 36 KG/M2 | OXYGEN SATURATION: 98 % | SYSTOLIC BLOOD PRESSURE: 124 MMHG | HEART RATE: 72 BPM | TEMPERATURE: 98 F | RESPIRATION RATE: 16 BRPM | DIASTOLIC BLOOD PRESSURE: 70 MMHG

## 2024-04-18 DIAGNOSIS — J01.90 ACUTE SINUSITIS, RECURRENCE NOT SPECIFIED, UNSPECIFIED LOCATION: Primary | ICD-10-CM

## 2024-04-18 DIAGNOSIS — H69.93 DYSFUNCTION OF BOTH EUSTACHIAN TUBES: ICD-10-CM

## 2024-04-18 PROCEDURE — 99214 OFFICE O/P EST MOD 30 MIN: CPT | Performed by: FAMILY MEDICINE

## 2024-04-18 RX ORDER — DOXYCYCLINE HYCLATE 100 MG
100 TABLET ORAL 2 TIMES DAILY
Qty: 20 TABLET | Refills: 0 | Status: SHIPPED | OUTPATIENT
Start: 2024-04-18 | End: 2024-04-28

## 2024-04-18 NOTE — PATIENT INSTRUCTIONS
Thank you for choosing Rodrigo Estrella MD at [facility]  To Do: Maryanne S Book  1. Please see info   Call [phone] to schedule the appointment.   Please signup for [employer], which is electronic access to your record if you have not done so.  All your results will post on there.  [email or web address]   You can NOW use [employer] to book your appointments with us, or consider using open access scheduling which is through the [place] website [email or web address] and type in Rodrigo Estrella MD and follow the links for \"Schedule Online Now\"    To schedule Imaging or tests at [place] call Central Scheduling [phone], Go to [facility] ER Building (For example: CT scans, X rays, Ultrasound, MRI)  Cardiac Testing in ER building Building A second floor Cardiac Testing [phone] (For example: Holter Monitor, Cardiac Stress tests,Event Monitor, or 2D Echocardiograms)  [facility] call [phone] usually in [facility]  Walk in Clinic in [place] at [address] Mon-Fri at 8am-7:30 p.m., and Sat/Sun 9:00a.m.-4:30 p.m.  Also at [address] [place] [address]  Call [phone] for info     Please call our office about any questions regarding your treatment/medicines/tests as a result of today's visit.  For your safety, read the entire package insert of all medicines prescribed to you and be aware of all of the risks of treatment even beyond those discussed today.  All therapies have potential risk of harm or side effects or medication interactions.  It is your duty and for your safety to discuss with the pharmacist and our office with questions, and to notify us and stop treatment if problems arise, but know that our intention is that the benefits outweigh those potential risks and we strive to make you healthier and to improve your quality of life.    Referrals, and Radiology Information:    If your insurance requires a referral to a specialist, please allow 5 business days to process  your referral request.    If Rodrigo Estrella MD orders a CT or MRI, it may take up to 10 business days to receive approval from your insurance company. Once our office has called informing you that the insurance company approved your testing, please call Central Scheduling at [phone]  Please allow our office 5 business days to contact you regarding any testing results.    Refill policies:   Allow 3 business days for refills; controlled substances may take longer and must be picked up from the office in person.  Narcotic medications can only be filled in 30 day increments and must be refilled at an office visit only.  If your prescription is due for a refill, you may be due for a follow-up appointment.  We cannot refill your maintenance medications at a preventative wellness visit.  To best provide you care, patients receiving maintenance medications need to be seen at least twice a year.

## 2024-04-18 NOTE — PROGRESS NOTES
Subjective:   Patient ID: Maryanne Reyes is a 58 year old female.    HPI  Ms. Reyes is a pleasant 58-year-old female with history of hypertension, hyperlipidemia, asthma, hypothyroidism, migraines here today for ear pain and sinus congestion.  She did have a virtual visit with me a few weeks ago and was treated with clindamycin for sinusitis which she has had in the past.  She continues to have pain in both ears more than left on the right.  She has had hypersensitivity to sound.  A few days ago she was mowing the lawn and noticed that her right eye was swollen.  This had resolved since then.  No fever no cough no shortness of breath no wheezing no nausea no vomiting no abdominal pain.  She has been taking Xyzal and montelukast. I had reviewed past medical and family histories together with allergy and medication lists documented.          History/Other:   Review of Systems   Constitutional:  Negative for fatigue and fever.   HENT:  Positive for sinus pressure and sore throat. Negative for trouble swallowing.    Cardiovascular:  Negative for chest pain and palpitations.   Gastrointestinal:  Negative for abdominal pain, diarrhea, nausea and vomiting.   Neurological:  Negative for dizziness, weakness, light-headedness and headaches.     Current Outpatient Medications   Medication Sig Dispense Refill    Doxycycline Hyclate 100 MG Oral Tab Take 1 tablet (100 mg total) by mouth 2 (two) times daily for 10 days. 20 tablet 0    clindamycin 300 MG Oral Cap Take 1 capsule (300 mg total) by mouth 3 (three) times daily for 10 days. 30 capsule 0    metFORMIN  MG Oral Tablet 24 Hr Take 1 tablet (750 mg total) by mouth daily. 90 tablet 0    amphetamine-dextroamphetamine (ADDERALL) 20 MG Oral Tab Take 1 tablet (20 mg total) by mouth 2 (two) times daily. 60 tablet 0    [START ON 4/29/2024] amphetamine-dextroamphetamine (ADDERALL) 20 MG Oral Tab Take 1 tablet (20 mg total) by mouth 2 (two) times daily. 60 tablet 0    potassium  chloride (KLOR-CON M20) 20 MEQ Oral Tab CR Take 1 tablet (20 mEq total) by mouth daily. 90 tablet 0    linaCLOtide (LINZESS) 145 MCG Oral Cap Take 2 capsules by mouth daily as needed. 60 capsule 0    furosemide 20 MG Oral Tab Take 1 tablet (20 mg total) by mouth daily. 90 tablet 1    ipratropium-albuterol 0.5-2.5 (3) MG/3ML Inhalation Solution Take 3 mL by nebulization every 6 (six) hours while awake. 50 each 0    spironolactone 25 MG Oral Tab Take 1 tablet (25 mg total) by mouth 2 (two) times daily. 180 tablet 1    montelukast 10 MG Oral Tab Take 1 tablet (10 mg total) by mouth daily. 90 tablet 1    alendronate 70 MG Oral Tab Take 1 tablet (70 mg total) by mouth once a week. 12 tablet 3    LEVOXYL 112 MCG Oral Tab Take 1 tablet (112 mcg total) by mouth before breakfast.      ALPRAZolam 0.25 MG Oral Tab Take 1 tablet (0.25 mg total) by mouth 2 (two) times daily as needed for Anxiety. 30 tablet 0    albuterol (2.5 MG/3ML) 0.083% Inhalation Nebu Soln Take 3 mL (2.5 mg total) by nebulization every 6 (six) hours as needed for Wheezing.      TRELEGY ELLIPTA 100-62.5-25 MCG/INH Inhalation Aerosol Powder, Breath Activated Inhale 1 puff into the lungs daily.      ipratropium-albuterol 0.5-2.5 (3) MG/3ML Inhalation Solution Take 1 vial by nebulization every 6 (six) hours as needed.      Vitamin D3, Cholecalciferol, 25 MCG (1000 UT) Oral Tab Take 1 tablet (1,000 Units total) by mouth daily.      gabapentin 100 MG Oral Cap Take 1-3 capsules (100-300 mg total) by mouth in the morning, at noon, and at bedtime. As directed      Tirzepatide-Weight Management (ZEPBOUND) 2.5 MG/0.5ML Subcutaneous Solution Auto-injector Inject 2.5 mg into the skin once a week. (Patient not taking: Reported on 4/18/2024) 2 mL 0     Allergies:  Allergies   Allergen Reactions    Amoxicillin HIVES and UNKNOWN     TABS    Levofloxacin ANAPHYLAXIS and UNKNOWN    Lyrica [Pregabalin] SWELLING    Cat Hair Extract ASTHMA, Coughing, Runny nose, SHORTNESS OF  BREATH and WHEEZING    [place]rees, [place] ASTHMA, Runny nose and WHEEZING       Objective:   Physical Exam  Vitals reviewed.   Constitutional:       General: She is not in acute distress.  HENT:      Right Ear: No middle ear effusion. Tympanic membrane is bulging. Tympanic membrane is not erythematous or retracted.      Left Ear:  No middle ear effusion. Tympanic membrane is bulging. Tympanic membrane is not erythematous or retracted.      Nose: No rhinorrhea.      Mouth/Throat:      Mouth: Mucous membranes are moist.      Pharynx: Oropharynx is clear. No oropharyngeal exudate or posterior oropharyngeal erythema.   Eyes:      General: No scleral icterus.     Conjunctiva/sclera: Conjunctivae normal.   Cardiovascular:      Rate and Rhythm: Normal rate and regular rhythm.      Heart sounds: Normal heart sounds. No murmur heard.  Pulmonary:      Effort: Pulmonary effort is normal. No respiratory distress.      Breath sounds: Normal breath sounds. No wheezing or rales.   Abdominal:      General: Bowel sounds are normal.      Palpations: Abdomen is soft.   Musculoskeletal:      Cervical back: Neck supple.      Right lower leg: No edema.      Left lower leg: No edema.   Lymphadenopathy:      Cervical: No cervical adenopathy.   Skin:     General: Skin is warm.   Neurological:      Mental Status: She is alert.   Psychiatric:         Mood and Affect: Mood normal.         Behavior: Behavior normal.         Assessment & Plan:   1. Acute sinusitis, recurrence not specified, unspecified location   -Will start on doxycycline 100 mg 1 tablet twice a day for 10 days  - Keep hydrated  - May take Tylenol as needed for fever or pain   2. Dysfunction of both eustachian tubes   -Continue taking Xyzal and montelukast but add Flonase back which she has at home  - She also has leftover prednisone 20 mg and-her to take this once a day for the next 5 days   Call or come sooner if symptoms worsen or persist.      This note was prepared using  [employer] voice recognition dictation software. As a result errors may occur. When identified these errors have been corrected. While every attempt is made to correct errors during dictation discrepancies may still exist            No orders of the defined types were placed in this encounter.      Meds This Visit:  Requested Prescriptions     Signed Prescriptions Disp Refills    Doxycycline Hyclate 100 MG Oral Tab 20 tablet 0     Sig: Take 1 tablet (100 mg total) by mouth 2 (two) times daily for 10 days.       Imaging & Referrals:  None

## 2024-04-23 RX ORDER — MONTELUKAST SODIUM 10 MG/1
10 TABLET ORAL DAILY
Qty: 90 TABLET | Refills: 1 | Status: SHIPPED | OUTPATIENT
Start: 2024-04-23

## 2024-06-05 ENCOUNTER — TELEMEDICINE (OUTPATIENT)
Dept: FAMILY MEDICINE CLINIC | Facility: CLINIC | Age: 59
End: 2024-06-05
Payer: COMMERCIAL

## 2024-06-05 DIAGNOSIS — M79.605 LEFT LEG PAIN: Primary | ICD-10-CM

## 2024-06-05 PROCEDURE — 99213 OFFICE O/P EST LOW 20 MIN: CPT | Performed by: FAMILY MEDICINE

## 2024-06-05 RX ORDER — CYCLOBENZAPRINE HCL 10 MG
10 TABLET ORAL 3 TIMES DAILY
Qty: 30 TABLET | Refills: 1 | Status: SHIPPED | OUTPATIENT
Start: 2024-06-05 | End: 2024-06-25

## 2024-06-05 NOTE — PROGRESS NOTES
Subjective:   Patient ID: Maryanne Reyes is a 58 year old female.    HPI  Ms. Reyes is a very pleasant 58-year-old female with multiple medical conditions presenting for video visit today for left leg pain which started last night.  She had difficulty sleeping because of the pain.  Pain is mostly located on the left thigh.  She does not notice any swelling or discoloration.  She took some cyclobenzaprine last night which helped with sleep.  She also had applied heat and had taken Tylenol for it with some relief.  No recent injury or trauma.  No numbness that she reports of.  No fever no shortness of breath no chest pain. I had reviewed past medical and family histories together with allergy and medication lists documented.    History/Other:   Review of Systems   Constitutional:  Negative for fever.   Respiratory:  Negative for cough and shortness of breath.    Cardiovascular:  Negative for chest pain, palpitations and leg swelling.   Gastrointestinal:  Negative for abdominal pain.     Current Outpatient Medications   Medication Sig Dispense Refill    cyclobenzaprine 10 MG Oral Tab Take 1 tablet (10 mg total) by mouth 3 (three) times daily for 20 days. 30 tablet 1    montelukast 10 MG Oral Tab Take 1 tablet (10 mg total) by mouth daily. 90 tablet 1    metFORMIN  MG Oral Tablet 24 Hr Take 1 tablet (750 mg total) by mouth daily. 90 tablet 0    Tirzepatide-Weight Management (ZEPBOUND) 2.5 MG/0.5ML Subcutaneous Solution Auto-injector Inject 2.5 mg into the skin once a week. (Patient not taking: Reported on 4/18/2024) 2 mL 0    potassium chloride (KLOR-CON M20) 20 MEQ Oral Tab CR Take 1 tablet (20 mEq total) by mouth daily. 90 tablet 0    linaCLOtide (LINZESS) 145 MCG Oral Cap Take 2 capsules by mouth daily as needed. 60 capsule 0    furosemide 20 MG Oral Tab Take 1 tablet (20 mg total) by mouth daily. 90 tablet 1    ipratropium-albuterol 0.5-2.5 (3) MG/3ML Inhalation Solution Take 3 mL by nebulization every 6  (six) hours while awake. 50 each 0    spironolactone 25 MG Oral Tab Take 1 tablet (25 mg total) by mouth 2 (two) times daily. 180 tablet 1    alendronate 70 MG Oral Tab Take 1 tablet (70 mg total) by mouth once a week. 12 tablet 3    LEVOXYL 112 MCG Oral Tab Take 1 tablet (112 mcg total) by mouth before breakfast.      ALPRAZolam 0.25 MG Oral Tab Take 1 tablet (0.25 mg total) by mouth 2 (two) times daily as needed for Anxiety. 30 tablet 0    albuterol (2.5 MG/3ML) 0.083% Inhalation Nebu Soln Take 3 mL (2.5 mg total) by nebulization every 6 (six) hours as needed for Wheezing.      TRELEGY ELLIPTA 100-62.5-25 MCG/INH Inhalation Aerosol Powder, Breath Activated Inhale 1 puff into the lungs daily.      ipratropium-albuterol 0.5-2.5 (3) MG/3ML Inhalation Solution Take 1 vial by nebulization every 6 (six) hours as needed.      Vitamin D3, Cholecalciferol, 25 MCG (1000 UT) Oral Tab Take 1 tablet (1,000 Units total) by mouth daily.      gabapentin 100 MG Oral Cap Take 1-3 capsules (100-300 mg total) by mouth in the morning, at noon, and at bedtime. As directed       Allergies:  Allergies   Allergen Reactions    Amoxicillin HIVES and UNKNOWN     TABS    Levofloxacin ANAPHYLAXIS and UNKNOWN    Lyrica [Pregabalin] SWELLING    Cat Hair Extract ASTHMA, Coughing, Runny nose, SHORTNESS OF BREATH and WHEEZING    [place], [place] ASTHMA, Runny nose and WHEEZING       Objective:   Physical Exam  Constitutional:       General: She is not in acute distress.  Neurological:      Mental Status: She is alert.   Psychiatric:         Mood and Affect: Mood normal.         Behavior: Behavior normal.         Assessment & Plan:   1. Left leg pain    -Likely due to to muscular etiology  - Continue with cyclobenzaprine and Tylenol as needed  - May apply heat or ice  - Elevate legs at rest if possible  - Call or come in sooner if symptoms worsen or persist I did ask her to call or come in sooner or go to the emergency room if with respiratory  distress  - Will renew and send new prescription for cyclobenzaprine      This note was prepared using [employer] voice recognition dictation software. As a result errors may occur. When identified these errors have been corrected. While every attempt is made to correct errors during dictation discrepancies may still exist          No orders of the defined types were placed in this encounter.      Meds This Visit:  Requested Prescriptions     Signed Prescriptions Disp Refills    cyclobenzaprine 10 MG Oral Tab 30 tablet 1     Sig: Take 1 tablet (10 mg total) by mouth 3 (three) times daily for 20 days.       Imaging & Referrals:  None

## 2024-06-09 RX ORDER — FUROSEMIDE 20 MG/1
20 TABLET ORAL DAILY
Qty: 90 TABLET | Refills: 1 | Status: SHIPPED | OUTPATIENT
Start: 2024-06-09

## 2024-06-11 NOTE — TELEPHONE ENCOUNTER
Requesting   Requested Prescriptions     Pending Prescriptions Disp Refills    METFORMIN  MG Oral Tablet 24 Hr [Pharmacy Med Name: METFORMIN HCL  MG TABLET] 90 tablet 0     Sig: TAKE 1 TABLET BY MOUTH EVERY DAY      LOV: 2/27/24 (tele)  RTC:   Last Relevant Labs:   Filled: 3/18/24 #90 with 0 refills    No future appointments.

## 2024-06-12 RX ORDER — METFORMIN HYDROCHLORIDE 750 MG/1
750 TABLET, EXTENDED RELEASE ORAL DAILY
Qty: 90 TABLET | Refills: 0 | Status: SHIPPED | OUTPATIENT
Start: 2024-06-12

## 2024-06-13 RX ORDER — POTASSIUM CHLORIDE 1500 MG/1
20 TABLET, EXTENDED RELEASE ORAL DAILY
Qty: 90 TABLET | Refills: 0 | Status: SHIPPED | OUTPATIENT
Start: 2024-06-13

## 2024-07-15 RX ORDER — LINACLOTIDE 145 UG/1
2 CAPSULE, GELATIN COATED ORAL DAILY PRN
Qty: 60 CAPSULE | Refills: 0 | Status: SHIPPED | OUTPATIENT
Start: 2024-07-15

## 2024-07-30 RX ORDER — ALENDRONATE SODIUM 70 MG/1
70 TABLET ORAL WEEKLY
Qty: 12 TABLET | Refills: 3 | Status: SHIPPED | OUTPATIENT
Start: 2024-07-30

## 2024-07-31 ENCOUNTER — OFFICE VISIT (OUTPATIENT)
Dept: FAMILY MEDICINE CLINIC | Facility: CLINIC | Age: 59
End: 2024-07-31
Payer: COMMERCIAL

## 2024-07-31 VITALS
WEIGHT: 191 LBS | RESPIRATION RATE: 16 BRPM | DIASTOLIC BLOOD PRESSURE: 80 MMHG | BODY MASS INDEX: 35.15 KG/M2 | SYSTOLIC BLOOD PRESSURE: 126 MMHG | TEMPERATURE: 98 F | HEART RATE: 86 BPM | OXYGEN SATURATION: 98 % | HEIGHT: 62 IN

## 2024-07-31 DIAGNOSIS — Z00.00 WELLNESS EXAMINATION: Primary | ICD-10-CM

## 2024-07-31 DIAGNOSIS — Z12.11 SCREENING FOR COLON CANCER: ICD-10-CM

## 2024-07-31 DIAGNOSIS — F90.0 ATTENTION DEFICIT HYPERACTIVITY DISORDER (ADHD), PREDOMINANTLY INATTENTIVE TYPE: ICD-10-CM

## 2024-07-31 DIAGNOSIS — E66.9 OBESITY (BMI 30-39.9): ICD-10-CM

## 2024-07-31 DIAGNOSIS — Z12.31 SCREENING MAMMOGRAM, ENCOUNTER FOR: ICD-10-CM

## 2024-07-31 PROCEDURE — 99396 PREV VISIT EST AGE 40-64: CPT | Performed by: FAMILY MEDICINE

## 2024-07-31 PROCEDURE — 99214 OFFICE O/P EST MOD 30 MIN: CPT | Performed by: FAMILY MEDICINE

## 2024-07-31 RX ORDER — DEXTROAMPHETAMINE SACCHARATE, AMPHETAMINE ASPARTATE MONOHYDRATE, DEXTROAMPHETAMINE SULFATE AND AMPHETAMINE SULFATE 5; 5; 5; 5 MG/1; MG/1; MG/1; MG/1
20 CAPSULE, EXTENDED RELEASE ORAL DAILY
Qty: 30 CAPSULE | Refills: 0 | Status: SHIPPED | OUTPATIENT
Start: 2024-08-31 | End: 2024-09-30

## 2024-07-31 RX ORDER — DEXTROAMPHETAMINE SACCHARATE, AMPHETAMINE ASPARTATE MONOHYDRATE, DEXTROAMPHETAMINE SULFATE AND AMPHETAMINE SULFATE 5; 5; 5; 5 MG/1; MG/1; MG/1; MG/1
20 CAPSULE, EXTENDED RELEASE ORAL DAILY
Qty: 30 CAPSULE | Refills: 0 | Status: SHIPPED | OUTPATIENT
Start: 2024-10-01 | End: 2024-10-31

## 2024-07-31 RX ORDER — SPIRONOLACTONE 25 MG/1
25 TABLET ORAL 2 TIMES DAILY
Qty: 180 TABLET | Refills: 1 | Status: SHIPPED | OUTPATIENT
Start: 2024-07-31

## 2024-07-31 RX ORDER — DEXTROAMPHETAMINE SACCHARATE, AMPHETAMINE ASPARTATE MONOHYDRATE, DEXTROAMPHETAMINE SULFATE AND AMPHETAMINE SULFATE 5; 5; 5; 5 MG/1; MG/1; MG/1; MG/1
20 CAPSULE, EXTENDED RELEASE ORAL DAILY
Qty: 30 CAPSULE | Refills: 0 | Status: SHIPPED | OUTPATIENT
Start: 2024-07-31 | End: 2024-08-30

## 2024-07-31 RX ORDER — DOXYCYCLINE HYCLATE 100 MG
100 TABLET ORAL 2 TIMES DAILY
Qty: 20 TABLET | Refills: 0 | Status: SHIPPED | OUTPATIENT
Start: 2024-07-31 | End: 2024-08-10

## 2024-07-31 RX ORDER — PHENTERMINE HYDROCHLORIDE 37.5 MG/1
37.5 TABLET ORAL
Qty: 30 TABLET | Refills: 2 | Status: SHIPPED | OUTPATIENT
Start: 2024-07-31

## 2024-07-31 RX ORDER — FUROSEMIDE 20 MG/1
20 TABLET ORAL DAILY
Qty: 90 TABLET | Refills: 1 | Status: SHIPPED | OUTPATIENT
Start: 2024-07-31
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
## 2024-07-31 NOTE — PATIENT INSTRUCTIONS
Thank you for choosing Rodrigo Estrella MD at [facility]  To Do: Maryanne GAVIN Book  1. Please see age appropriate health prevention below     Call [phone] to schedule the appointment.   Please signup for Mobile Roadie, which is electronic access to your record if you have not done so.  All your results will post on there.  [email or web address]   You can NOW use LinguaLeohart to book your appointments with us, or consider using open access scheduling which is through the [place] website [email or web address] and type in Rodrigo Estrella MD and follow the links for \"Schedule Online Now\"    To schedule Imaging or tests at [place] call Central Scheduling [phone], Go to [facility] ER Building (For example: CT scans, X rays, Ultrasound, MRI)  Cardiac Testing in ER building Building A second floor Cardiac Testing [phone] (For example: Holter Monitor, Cardiac Stress tests,Event Monitor, or 2D Echocardiograms)  [facility] call [phone] usually in [facility]  Walk in Clinic in [place] at [address] Mon-Fri at 8am-7:30 p.m., and Sat/Sun 9:00a.m.-4:30 p.m.  Also at [address] [place] [address]  Call [phone] for info     Please call our office about any questions regarding your treatment/medicines/tests as a result of today's visit.  For your safety, read the entire package insert of all medicines prescribed to you and be aware of all of the risks of treatment even beyond those discussed today.  All therapies have potential risk of harm or side effects or medication interactions.  It is your duty and for your safety to discuss with the pharmacist and our office with questions, and to notify us and stop treatment if problems arise, but know that our intention is that the benefits outweigh those potential risks and we strive to make you healthier and to improve your quality of life.    Referrals, and Radiology Information:    If your insurance requires a referral to a specialist,  please allow 5 business days to process your referral request.    If Rodrigo Estrella MD orders a CT or MRI, it may take up to 10 business days to receive approval from your insurance company. Once our office has called informing you that the insurance company approved your testing, please call Central Scheduling at [phone]  Please allow our office 5 business days to contact you regarding any testing results.    Refill policies:   Allow 3 business days for refills; controlled substances may take longer and must be picked up from the office in person.  Narcotic medications can only be filled in 30 day increments and must be refilled at an office visit only.  If your prescription is due for a refill, you may be due for a follow-up appointment.  We cannot refill your maintenance medications at a preventative wellness visit.  To best provide you care, patients receiving maintenance medications need to be seen at least twice a year.

## 2024-08-05 ENCOUNTER — LAB ENCOUNTER (OUTPATIENT)
Dept: LAB | Age: 59
End: 2024-08-05
Attending: FAMILY MEDICINE
Payer: COMMERCIAL

## 2024-08-05 DIAGNOSIS — Z00.00 WELLNESS EXAMINATION: ICD-10-CM

## 2024-08-05 LAB
ALBUMIN SERPL-MCNC: 5.1 G/DL (ref 3.2–4.8)
ALBUMIN/GLOB SERPL: 2.2 {RATIO} (ref 1–2)
ALP LIVER SERPL-CCNC: 86 U/L
ALT SERPL-CCNC: 24 U/L
ANION GAP SERPL CALC-SCNC: 8 MMOL/L (ref 0–18)
AST SERPL-CCNC: 24 U/L (ref ?–34)
BASOPHILS # BLD AUTO: 0.08 X10(3) UL (ref 0–0.2)
BASOPHILS NFR BLD AUTO: 1.2 %
BILIRUB SERPL-MCNC: 0.8 MG/DL (ref 0.3–1.2)
BUN BLD-MCNC: 19 MG/DL (ref 9–23)
CALCIUM BLD-MCNC: 10.1 MG/DL (ref 8.7–10.4)
CHLORIDE SERPL-SCNC: 101 MMOL/L (ref 98–112)
CHOLEST SERPL-MCNC: 204 MG/DL (ref ?–200)
CO2 SERPL-SCNC: 29 MMOL/L (ref 21–32)
CREAT BLD-MCNC: 0.76 MG/DL
EGFRCR SERPLBLD CKD-EPI 2021: 91 ML/MIN/1.73M2 (ref 60–?)
EOSINOPHIL # BLD AUTO: 0.24 X10(3) UL (ref 0–0.7)
EOSINOPHIL NFR BLD AUTO: 3.5 %
ERYTHROCYTE [DISTWIDTH] IN BLOOD BY AUTOMATED COUNT: 13.5 %
FASTING PATIENT LIPID ANSWER: YES
FASTING STATUS PATIENT QL REPORTED: YES
GLOBULIN PLAS-MCNC: 2.3 G/DL (ref 2–3.5)
GLUCOSE BLD-MCNC: 112 MG/DL (ref 70–99)
HCT VFR BLD AUTO: 42.9 %
HDLC SERPL-MCNC: 44 MG/DL (ref 40–59)
HGB BLD-MCNC: 14.3 G/DL
IMM GRANULOCYTES # BLD AUTO: 0.09 X10(3) UL (ref 0–1)
IMM GRANULOCYTES NFR BLD: 1.3 %
LDLC SERPL CALC-MCNC: 121 MG/DL (ref ?–100)
LYMPHOCYTES # BLD AUTO: 1.82 X10(3) UL (ref 1–4)
LYMPHOCYTES NFR BLD AUTO: 26.4 %
MCH RBC QN AUTO: 29.3 PG (ref 26–34)
MCHC RBC AUTO-ENTMCNC: 33.3 G/DL (ref 31–37)
MCV RBC AUTO: 87.9 FL
MONOCYTES # BLD AUTO: 0.46 X10(3) UL (ref 0.1–1)
MONOCYTES NFR BLD AUTO: 6.7 %
NEUTROPHILS # BLD AUTO: 4.2 X10 (3) UL (ref 1.5–7.7)
NEUTROPHILS # BLD AUTO: 4.2 X10(3) UL (ref 1.5–7.7)
NEUTROPHILS NFR BLD AUTO: 60.9 %
NONHDLC SERPL-MCNC: 160 MG/DL (ref ?–130)
OSMOLALITY SERPL CALC.SUM OF ELEC: 289 MOSM/KG (ref 275–295)
PLATELET # BLD AUTO: 400 10(3)UL (ref 150–450)
POTASSIUM SERPL-SCNC: 4.1 MMOL/L (ref 3.5–5.1)
PROT SERPL-MCNC: 7.4 G/DL (ref 5.7–8.2)
RBC # BLD AUTO: 4.88 X10(6)UL
SODIUM SERPL-SCNC: 138 MMOL/L (ref 136–145)
T4 FREE SERPL-MCNC: 1.4 NG/DL (ref 0.8–1.7)
TRIGL SERPL-MCNC: 223 MG/DL (ref 30–149)
TSI SER-ACNC: 3.29 MIU/ML (ref 0.55–4.78)
VLDLC SERPL CALC-MCNC: 40 MG/DL (ref 0–30)
WBC # BLD AUTO: 6.9 X10(3) UL (ref 4–11)

## 2024-08-05 PROCEDURE — 80061 LIPID PANEL: CPT

## 2024-08-05 PROCEDURE — 36415 COLL VENOUS BLD VENIPUNCTURE: CPT

## 2024-08-05 PROCEDURE — 85025 COMPLETE CBC W/AUTO DIFF WBC: CPT

## 2024-08-05 PROCEDURE — 80053 COMPREHEN METABOLIC PANEL: CPT

## 2024-08-05 PROCEDURE — 84443 ASSAY THYROID STIM HORMONE: CPT

## 2024-08-05 PROCEDURE — 84439 ASSAY OF FREE THYROXINE: CPT

## 2024-08-15 RX ORDER — LINACLOTIDE 145 UG/1
2 CAPSULE, GELATIN COATED ORAL DAILY PRN
Qty: 180 CAPSULE | Refills: 0 | Status: SHIPPED | OUTPATIENT
Start: 2024-08-15

## 2024-08-15 NOTE — TELEPHONE ENCOUNTER
Andre from [facility] at the [facility] called and said she needs an order for Linzess 145mg either faxed to [phone] or called in to [phone].  She said they can only receive faxes or phone calls, they are not set up for escribe.  She said she needs a 3 month supply and as many refills as the provider wants to send.

## 2024-08-28 ENCOUNTER — TELEMEDICINE (OUTPATIENT)
Dept: FAMILY MEDICINE CLINIC | Facility: CLINIC | Age: 59
End: 2024-08-28
Payer: COMMERCIAL

## 2024-08-28 DIAGNOSIS — J45.901 MILD ASTHMA WITH EXACERBATION, UNSPECIFIED WHETHER PERSISTENT (HCC): ICD-10-CM

## 2024-08-28 DIAGNOSIS — J01.01 ACUTE RECURRENT MAXILLARY SINUSITIS: Primary | ICD-10-CM

## 2024-08-28 PROCEDURE — 99214 OFFICE O/P EST MOD 30 MIN: CPT | Performed by: FAMILY MEDICINE

## 2024-08-28 RX ORDER — DOXYCYCLINE 100 MG/1
100 CAPSULE ORAL 2 TIMES DAILY
Qty: 20 CAPSULE | Refills: 0 | Status: SHIPPED | OUTPATIENT
Start: 2024-08-28 | End: 2024-09-07

## 2024-08-28 RX ORDER — PREDNISONE 20 MG/1
TABLET ORAL
Qty: 21 TABLET | Refills: 0 | Status: SHIPPED | OUTPATIENT
Start: 2024-08-28 | End: 2024-09-12

## 2024-08-28 NOTE — PROGRESS NOTES
Subjective:   Patient ID: Maryanne Reyes is a 58 year old female.    HPI  Ms. Reyes is a pleasant 58-year-old female with multiple medical conditions which she clued but not limited to hypertension, hyperlipidemia, asthma, hypothyroidism, history of sinusitis presenting today for video visit for shortness of breath.  She attended her 's health fair today and nurse had listen to her lungs which was noted to have wheezing in all her lungs.  This has been ongoing for 1 week which she has had fever, cough, congestion sinus headaches and ear fullness.  She has leftover doxycycline which she had taken but is about to run out. I had reviewed past medical and family histories together with allergy and medication lists documented.    History/Other:   Review of Systems   Constitutional:  Positive for fatigue and fever. Negative for chills.   HENT:  Positive for congestion and postnasal drip. Negative for sore throat and trouble swallowing.    Respiratory:  Positive for cough, shortness of breath and wheezing.    Cardiovascular:  Negative for chest pain.   Gastrointestinal:  Negative for abdominal pain, diarrhea, nausea and vomiting.     Current Outpatient Medications   Medication Sig Dispense Refill    doxycycline 100 MG Oral Cap Take 1 capsule (100 mg total) by mouth 2 (two) times daily for 10 days. 20 capsule 0    predniSONE 20 MG Oral Tab Take 1 tablet (20 mg total) by mouth 2 (two) times daily for 5 days, THEN 1 tablet (20 mg total) daily for 5 days, THEN 0.5 tablets (10 mg total) daily for 5 days. 21 tablet 0    linaCLOtide (LINZESS) 145 MCG Oral Cap Take 2 capsules by mouth daily as needed. 180 capsule 0    PEG 3350-KCl-Na Bicarb-NaCl 420 g Oral Recon Soln Take as directed by physician 4000 mL 0    Amphetamine-Dextroamphet ER (ADDERALL XR) 20 MG Oral Capsule SR 24 Hr Take 1 capsule (20 mg total) by mouth daily. 30 capsule 0    [START ON 8/31/2024] Amphetamine-Dextroamphet ER (ADDERALL XR) 20 MG Oral Capsule SR  24 Hr Take 1 capsule (20 mg total) by mouth daily. 30 capsule 0    [START ON 10/1/2024] Amphetamine-Dextroamphet ER (ADDERALL XR) 20 MG Oral Capsule SR 24 Hr Take 1 capsule (20 mg total) by mouth daily. 30 capsule 0    furosemide 20 MG Oral Tab Take 1 tablet (20 mg total) by mouth daily. 90 tablet 1    Phentermine HCl 37.5 MG Oral Tab Take 1 tablet (37.5 mg total) by mouth every morning before breakfast. 30 tablet 2    SPIRONOLACTONE 25 MG Oral Tab TAKE 1 TABLET BY MOUTH TWICE A  tablet 1    ALENDRONATE 70 MG Oral Tab TAKE 1 TABLET (70 MG TOTAL) BY MOUTH ONCE A WEEK 12 tablet 3    KLOR-CON M20 20 MEQ Oral Tab CR TAKE 1 TABLET BY MOUTH EVERY DAY 90 tablet 0    METFORMIN  MG Oral Tablet 24 Hr TAKE 1 TABLET BY MOUTH EVERY DAY 90 tablet 0    montelukast 10 MG Oral Tab Take 1 tablet (10 mg total) by mouth daily. 90 tablet 1    ipratropium-albuterol 0.5-2.5 (3) MG/3ML Inhalation Solution Take 3 mL by nebulization every 6 (six) hours while awake. 50 each 0    LEVOXYL 112 MCG Oral Tab Take 1 tablet (112 mcg total) by mouth before breakfast.      ALPRAZolam 0.25 MG Oral Tab Take 1 tablet (0.25 mg total) by mouth 2 (two) times daily as needed for Anxiety. 30 tablet 0    albuterol (2.5 MG/3ML) 0.083% Inhalation Nebu Soln Take 3 mL (2.5 mg total) by nebulization every 6 (six) hours as needed for Wheezing.      TRELEGY ELLIPTA 100-62.5-25 MCG/INH Inhalation Aerosol Powder, Breath Activated Inhale 1 puff into the lungs daily.      ipratropium-albuterol 0.5-2.5 (3) MG/3ML Inhalation Solution Take 1 vial by nebulization every 6 (six) hours as needed.      Vitamin D3, Cholecalciferol, 25 MCG (1000 UT) Oral Tab Take 1 tablet (1,000 Units total) by mouth daily.      gabapentin 100 MG Oral Cap Take 1-3 capsules (100-300 mg total) by mouth in the morning, at noon, and at bedtime. As directed       Allergies:  Allergies   Allergen Reactions    Amoxicillin HIVES and UNKNOWN     TABS    Levofloxacin ANAPHYLAXIS and UNKNOWN     Lyrica [Pregabalin] SWELLING    Cat Hair Extract ASTHMA, Coughing, Runny nose, SHORTNESS OF BREATH and WHEEZING    Trees, [place] ASTHMA, Runny nose and WHEEZING       Objective:   Physical Exam  Constitutional:       General: She is not in acute distress.  Neurological:      Mental Status: She is alert.         Assessment & Plan:   1. Acute recurrent maxillary sinusitis   -Keep hydrated  - We will treat with doxycycline and will renewed and will be sent to pharmacy   2. Mild asthma with exacerbation, unspecified whether persistent (HCC)   -Go to the emergency room if with severe respiratory distress  - We will treat with prednisone taper   Call or come in sooner if symptoms worsen or persist    This note was prepared using Dragon Medical voice recognition dictation software. As a result errors may occur. When identified these errors have been corrected. While every attempt is made to correct errors during dictation discrepancies may still exist            No orders of the defined types were placed in this encounter.      Meds This Visit:  Requested Prescriptions     Signed Prescriptions Disp Refills    doxycycline 100 MG Oral Cap 20 capsule 0     Sig: Take 1 capsule (100 mg total) by mouth 2 (two) times daily for 10 days.    predniSONE 20 MG Oral Tab 21 tablet 0     Sig: Take 1 tablet (20 mg total) by mouth 2 (two) times daily for 5 days, THEN 1 tablet (20 mg total) daily for 5 days, THEN 0.5 tablets (10 mg total) daily for 5 days.       Imaging & Referrals:  None

## 2024-09-02 ENCOUNTER — HOSPITAL ENCOUNTER (EMERGENCY)
Age: 59
Discharge: HOME | End: 2024-09-02
Attending: EMERGENCY MEDICINE
Payer: COMMERCIAL

## 2024-09-02 ENCOUNTER — APPOINTMENT (OUTPATIENT)
Dept: GENERAL RADIOLOGY | Age: 59
End: 2024-09-02
Payer: COMMERCIAL

## 2024-09-02 VITALS
TEMPERATURE: 98 F | WEIGHT: 195 LBS | HEART RATE: 86 BPM | SYSTOLIC BLOOD PRESSURE: 146 MMHG | RESPIRATION RATE: 16 BRPM | BODY MASS INDEX: 36 KG/M2 | OXYGEN SATURATION: 100 % | DIASTOLIC BLOOD PRESSURE: 80 MMHG

## 2024-09-02 DIAGNOSIS — B34.9 VIRAL SYNDROME: Primary | ICD-10-CM

## 2024-09-02 PROCEDURE — 99284 EMERGENCY DEPT VISIT MOD MDM: CPT

## 2024-09-02 PROCEDURE — 71046 X-RAY EXAM CHEST 2 VIEWS: CPT

## 2024-09-02 PROCEDURE — 87502 INFLUENZA DNA AMP PROBE: CPT | Performed by: EMERGENCY MEDICINE

## 2024-09-02 PROCEDURE — 87502 INFLUENZA DNA AMP PROBE: CPT

## 2024-09-02 PROCEDURE — 99283 EMERGENCY DEPT VISIT LOW MDM: CPT
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
## 2024-09-03 ENCOUNTER — TELEMEDICINE (OUTPATIENT)
Dept: FAMILY MEDICINE CLINIC | Facility: CLINIC | Age: 59
End: 2024-09-03
Payer: COMMERCIAL

## 2024-09-03 DIAGNOSIS — J45.901 EXACERBATION OF ASTHMA, UNSPECIFIED ASTHMA SEVERITY, UNSPECIFIED WHETHER PERSISTENT (HCC): ICD-10-CM

## 2024-09-03 DIAGNOSIS — B34.9 VIRAL SYNDROME: Primary | ICD-10-CM

## 2024-09-03 PROCEDURE — 99214 OFFICE O/P EST MOD 30 MIN: CPT | Performed by: FAMILY MEDICINE

## 2024-09-03 NOTE — PROGRESS NOTES
Subjective:   Patient ID: Maryanne Reyes is a 58 year old female.    HPI  Ms. Reyes is a pleasant 58-year-old female with history of hypertension, hyperlipidemia, asthma, hypothyroidism presenting today for follow-up for video visit after she was seen at the emergency room yesterday.  She continued to have symptoms such as fatigue, congestion, shortness of breath and cough and wheezing.  She had a virtual visit with me last week and was prescribed doxycycline and prednisone taper.  Workup at the emergency room including chest x-ray were unremarkable.  She tested negative for COVID-19 and influenza.  She was recommended to continue her doxycycline and prednisone taper.  She calls today as she is not certain if she could go back to work tomorrow for the rest of the week.  Currently she has mild shortness of breath congestion and wheezing.  No fever no chest pain no nausea no vomiting no abdominal pain. I had reviewed past medical and family histories together with allergy and medication lists documented.    History/Other:   Review of Systems   Gastrointestinal:  Negative for abdominal pain, diarrhea, nausea and vomiting.     Current Outpatient Medications   Medication Sig Dispense Refill    doxycycline 100 MG Oral Cap Take 1 capsule (100 mg total) by mouth 2 (two) times daily for 10 days. 20 capsule 0    predniSONE 20 MG Oral Tab Take 1 tablet (20 mg total) by mouth 2 (two) times daily for 5 days, THEN 1 tablet (20 mg total) daily for 5 days, THEN 0.5 tablets (10 mg total) daily for 5 days. 21 tablet 0    linaCLOtide (LINZESS) 145 MCG Oral Cap Take 2 capsules by mouth daily as needed. 180 capsule 0    PEG 3350-KCl-Na Bicarb-NaCl 420 g Oral Recon Soln Take as directed by physician 4000 mL 0    Amphetamine-Dextroamphet ER (ADDERALL XR) 20 MG Oral Capsule SR 24 Hr Take 1 capsule (20 mg total) by mouth daily. 30 capsule 0    [START ON 10/1/2024] Amphetamine-Dextroamphet ER (ADDERALL XR) 20 MG Oral Capsule SR 24 Hr Take 1  capsule (20 mg total) by mouth daily. 30 capsule 0    furosemide 20 MG Oral Tab Take 1 tablet (20 mg total) by mouth daily. 90 tablet 1    Phentermine HCl 37.5 MG Oral Tab Take 1 tablet (37.5 mg total) by mouth every morning before breakfast. 30 tablet 2    SPIRONOLACTONE 25 MG Oral Tab TAKE 1 TABLET BY MOUTH TWICE A  tablet 1    ALENDRONATE 70 MG Oral Tab TAKE 1 TABLET (70 MG TOTAL) BY MOUTH ONCE A WEEK 12 tablet 3    KLOR-CON M20 20 MEQ Oral Tab CR TAKE 1 TABLET BY MOUTH EVERY DAY 90 tablet 0    METFORMIN  MG Oral Tablet 24 Hr TAKE 1 TABLET BY MOUTH EVERY DAY 90 tablet 0    montelukast 10 MG Oral Tab Take 1 tablet (10 mg total) by mouth daily. 90 tablet 1    ipratropium-albuterol 0.5-2.5 (3) MG/3ML Inhalation Solution Take 3 mL by nebulization every 6 (six) hours while awake. 50 each 0    LEVOXYL 112 MCG Oral Tab Take 1 tablet (112 mcg total) by mouth before breakfast.      ALPRAZolam 0.25 MG Oral Tab Take 1 tablet (0.25 mg total) by mouth 2 (two) times daily as needed for Anxiety. 30 tablet 0    albuterol (2.5 MG/3ML) 0.083% Inhalation Nebu Soln Take 3 mL (2.5 mg total) by nebulization every 6 (six) hours as needed for Wheezing.      TRELEGY ELLIPTA 100-62.5-25 MCG/INH Inhalation Aerosol Powder, Breath Activated Inhale 1 puff into the lungs daily.      ipratropium-albuterol 0.5-2.5 (3) MG/3ML Inhalation Solution Take 1 vial by nebulization every 6 (six) hours as needed.      Vitamin D3, Cholecalciferol, 25 MCG (1000 UT) Oral Tab Take 1 tablet (1,000 Units total) by mouth daily.      gabapentin 100 MG Oral Cap Take 1-3 capsules (100-300 mg total) by mouth in the morning, at noon, and at bedtime. As directed       Allergies:  Allergies   Allergen Reactions    Amoxicillin HIVES and UNKNOWN     TABS    Levofloxacin ANAPHYLAXIS and UNKNOWN    Lyrica [Pregabalin] SWELLING    Cat Hair Extract ASTHMA, Coughing, Runny nose, SHORTNESS OF BREATH and WHEEZING    Trees, [place] ASTHMA, Runny nose and WHEEZING        Objective:   Physical Exam  Constitutional:       General: She is not in acute distress.     Comments: Able to speak in full sentences.   Neurological:      Mental Status: She is alert.         Assessment & Plan:   1. Viral syndrome    2. Exacerbation of asthma, unspecified asthma severity, unspecified whether persistent (HCC)    -Workup at the emergency room reviewed.  - Go to the emergency room if with severe respiratory distress  - Finish prednisone taper and doxycycline as directed  -Take Tylenol as needed for fever or pain  - If with no improvement will consider referral back to pulmonology  - I had written a letter for her to miss work for the rest of the week up until Monday next week.  This will also cover the dates that she had missed prior.      This note was prepared using Dragon Medical voice recognition dictation software. As a result errors may occur. When identified these errors have been corrected. While every attempt is made to correct errors during dictation discrepancies may still exist            No orders of the defined types were placed in this encounter.      Meds This Visit:  Requested Prescriptions      No prescriptions requested or ordered in this encounter       Imaging & Referrals:  None

## 2024-09-05 ENCOUNTER — TELEPHONE (OUTPATIENT)
Dept: FAMILY MEDICINE CLINIC | Facility: CLINIC | Age: 59
End: 2024-09-05

## 2024-09-05 NOTE — TELEPHONE ENCOUNTER
Patient dropped off FAMILY MEDICAL LEAVE ACT paperwork.  Patient would like to take additional time off as she is still not feeling well.  Can you extend return date to 20th.      Paperwork being sent to Forms Dept for completion.

## 2024-09-05 NOTE — TELEPHONE ENCOUNTER
Forms were received by:___Fax         ___MyChart         __x_Dropped off         ___Mail    From: Patient        Reason for Form Completion:FAMILY MEDICAL LEAVE ACT       YASMIN Signed    _x__Yes       ___No    Fee Collected    _x__Yes       ___No    Emailed to self and Forms Department.  Sent inner office mail to Forms Department.

## 2024-09-09 ENCOUNTER — HOSPITAL ENCOUNTER (OUTPATIENT)
Dept: MAMMOGRAPHY | Facility: HOSPITAL | Age: 59
Discharge: HOME | End: 2024-09-09
Attending: FAMILY MEDICINE
Payer: COMMERCIAL

## 2024-09-09 DIAGNOSIS — Z12.31 SCREENING MAMMOGRAM, ENCOUNTER FOR: ICD-10-CM

## 2024-09-09 PROCEDURE — 77063 BREAST TOMOSYNTHESIS BI: CPT | Performed by: FAMILY MEDICINE

## 2024-09-09 PROCEDURE — 77067 SCR MAMMO BI INCL CAD: CPT | Performed by: FAMILY MEDICINE

## 2024-09-12 NOTE — TELEPHONE ENCOUNTER
Family Medical Leave Act received in forms dept. Logged for processing. Valid Release of Information attached.

## 2024-09-16 NOTE — TELEPHONE ENCOUNTER
Patient called to check status on Family Medical Leave Act, adv still pending processing,     Gathered the below.     Type of Leave: Continuous Family Medical Leave Act   Reason for Leave: Severe Asthma Crisis    Start date of leave: 08/27/2024  End date of leave: 09/19/2024- W/ Return to work 9/20/2024  How many flare ups per month/length?:  How many appts per month/length?:   Was Fee and Turnaround info Given?:

## 2024-09-17 NOTE — TELEPHONE ENCOUNTER
Dr. Estrella,       Please sign off on form if you agree to: Family Medical Leave Act Cont. 08/27/2024-09/19/2024   (place your signature on the first page only)    -From your Inbasket, Highlight the patient and click Chart   -Double click the 09/05/2024 Forms Completion telephone encounter  -Scroll down to the Media section   -Click the blue Hyperlink: Family Medical Leave Act Dr. Rodrigo Estrella 09/17/2024  -Click Acknowledge located in the top right ribbon/menu   -Drag the mouse into the blank space of the document and a + sign will appear. Left click to   electronically sign the document.     Thank you,  Nida WILKES

## 2024-09-18 RX ORDER — POTASSIUM CHLORIDE 1500 MG/1
20 TABLET, EXTENDED RELEASE ORAL DAILY
Qty: 90 TABLET | Refills: 0 | Status: SHIPPED | OUTPATIENT
Start: 2024-09-18

## 2024-09-18 NOTE — TELEPHONE ENCOUNTER
Requesting   Requested Prescriptions     Pending Prescriptions Disp Refills    METFORMIN  MG Oral Tablet 24 Hr [Pharmacy Med Name: METFORMIN HCL  MG TABLET] 90 tablet 0     Sig: TAKE 1 TABLET BY MOUTH EVERY DAY      LOV: 02/27/24 ( tele)  RTC: 3mo  Last Relevant Labs:   Filled: 06/12/24 #90 with 0 refills    No future appointments.

## 2024-09-19 RX ORDER — METFORMIN HYDROCHLORIDE 750 MG/1
750 TABLET, EXTENDED RELEASE ORAL DAILY
Qty: 90 TABLET | Refills: 0 | Status: SHIPPED | OUTPATIENT
Start: 2024-09-19

## 2024-09-19 NOTE — TELEPHONE ENCOUNTER
Family Medical Leave Act for patient has been completed and uploaded to patient via [employer] due to absence of Fax number with signed authorization. Archiving forms.

## 2024-10-11 RX ORDER — MONTELUKAST SODIUM 10 MG/1
10 TABLET ORAL DAILY
Qty: 90 TABLET | Refills: 1 | Status: SHIPPED | OUTPATIENT
Start: 2024-10-11

## 2024-10-25 ENCOUNTER — TELEMEDICINE (OUTPATIENT)
Dept: FAMILY MEDICINE CLINIC | Facility: CLINIC | Age: 59
End: 2024-10-25
Payer: COMMERCIAL

## 2024-10-25 DIAGNOSIS — J32.9 RECURRENT SINUS INFECTIONS: Primary | ICD-10-CM

## 2024-10-25 RX ORDER — DOXYCYCLINE HYCLATE 100 MG
100 TABLET ORAL 2 TIMES DAILY
Qty: 20 TABLET | Refills: 0 | Status: SHIPPED | OUTPATIENT
Start: 2024-10-25 | End: 2024-11-04

## 2024-10-25 RX ORDER — PREDNISONE 20 MG/1
TABLET ORAL
Qty: 22 TABLET | Refills: 0 | Status: SHIPPED | OUTPATIENT
Start: 2024-10-25 | End: 2024-11-09

## 2024-10-25 NOTE — PROGRESS NOTES
Subjective:   Patient ID: Maryanne Reyes is a 58 year old female.    HPI  Ms. Reyes is a pleasant 58-year-old female with history of hypertension, hyperlipidemia, asthma, hypothyroidism, recurrent sinusitis presenting for video visit today for sinus and ear congestion for the past several days associated with postnasal drip.  She has been experiencing left ear pain and on and off fever.  She does not have shortness of breath, chest pain, nausea, vomiting, abdominal pain.  No sick contacts that she reports off. I had reviewed past medical and family histories together with allergy and medication lists documented.    History/Other:   Review of Systems   Constitutional:  Negative for fatigue.   HENT:  Negative for sore throat and trouble swallowing.    Gastrointestinal:  Negative for abdominal pain, diarrhea, nausea and vomiting.   Neurological:  Negative for dizziness and weakness.     Current Outpatient Medications   Medication Sig Dispense Refill    Doxycycline Hyclate 100 MG Oral Tab Take 1 tablet (100 mg total) by mouth 2 (two) times daily for 10 days. 20 tablet 0    predniSONE 20 MG Oral Tab Take 1 tablet (20 mg total) by mouth 2 (two) times daily for 5 days, THEN 1 tablet (20 mg total) daily for 5 days, THEN 0.5 tablets (10 mg total) daily for 5 days. 22 tablet 0    MONTELUKAST 10 MG Oral Tab TAKE 1 TABLET BY MOUTH EVERY DAY 90 tablet 1    METFORMIN  MG Oral Tablet 24 Hr TAKE 1 TABLET BY MOUTH EVERY DAY 90 tablet 0    KLOR-CON M20 20 MEQ Oral Tab CR TAKE 1 TABLET BY MOUTH EVERY DAY 90 tablet 0    linaCLOtide (LINZESS) 145 MCG Oral Cap Take 2 capsules by mouth daily as needed. 180 capsule 0    PEG 3350-KCl-Na Bicarb-NaCl 420 g Oral Recon Soln Take as directed by physician 4000 mL 0    Amphetamine-Dextroamphet ER (ADDERALL XR) 20 MG Oral Capsule SR 24 Hr Take 1 capsule (20 mg total) by mouth daily. 30 capsule 0    furosemide 20 MG Oral Tab Take 1 tablet (20 mg total) by mouth daily. 90 tablet 1     Phentermine HCl 37.5 MG Oral Tab Take 1 tablet (37.5 mg total) by mouth every morning before breakfast. 30 tablet 2    SPIRONOLACTONE 25 MG Oral Tab TAKE 1 TABLET BY MOUTH TWICE A  tablet 1    ALENDRONATE 70 MG Oral Tab TAKE 1 TABLET (70 MG TOTAL) BY MOUTH ONCE A WEEK 12 tablet 3    ipratropium-albuterol 0.5-2.5 (3) MG/3ML Inhalation Solution Take 3 mL by nebulization every 6 (six) hours while awake. 50 each 0    LEVOXYL 112 MCG Oral Tab Take 1 tablet (112 mcg total) by mouth before breakfast.      ALPRAZolam 0.25 MG Oral Tab Take 1 tablet (0.25 mg total) by mouth 2 (two) times daily as needed for Anxiety. 30 tablet 0    albuterol (2.5 MG/3ML) 0.083% Inhalation Nebu Soln Take 3 mL (2.5 mg total) by nebulization every 6 (six) hours as needed for Wheezing.      TRELEGY ELLIPTA 100-62.5-25 MCG/INH Inhalation Aerosol Powder, Breath Activated Inhale 1 puff into the lungs daily.      ipratropium-albuterol 0.5-2.5 (3) MG/3ML Inhalation Solution Take 1 vial by nebulization every 6 (six) hours as needed.      Vitamin D3, Cholecalciferol, 25 MCG (1000 UT) Oral Tab Take 1 tablet (1,000 Units total) by mouth daily.      gabapentin 100 MG Oral Cap Take 1-3 capsules (100-300 mg total) by mouth in the morning, at noon, and at bedtime. As directed       Allergies:Allergies[1]    Objective:   Physical Exam  Constitutional:       General: She is not in acute distress.  Neurological:      Mental Status: She is alert.   Psychiatric:         Mood and Affect: Mood normal.         Behavior: Behavior normal.         Assessment & Plan:   1. Recurrent sinus infections    -Keep hydrated  - May take Tylenol as needed for fever or pain  - We will treat with prednisone taper and doxycycline as directed  - Go to the emergency room if with respiratory distress  - Call or come in sooner if symptoms worsen or persist    This note was prepared using Dragon Medical voice recognition dictation software. As a result errors may occur. When  identified these errors have been corrected. While every attempt is made to correct errors during dictation discrepancies may still exist            No orders of the defined types were placed in this encounter.      Meds This Visit:  Requested Prescriptions     Signed Prescriptions Disp Refills    Doxycycline Hyclate 100 MG Oral Tab 20 tablet 0     Sig: Take 1 tablet (100 mg total) by mouth 2 (two) times daily for 10 days.    predniSONE 20 MG Oral Tab 22 tablet 0     Sig: Take 1 tablet (20 mg total) by mouth 2 (two) times daily for 5 days, THEN 1 tablet (20 mg total) daily for 5 days, THEN 0.5 tablets (10 mg total) daily for 5 days.       Imaging & Referrals:  None         [1]   Allergies  Allergen Reactions    Amoxicillin HIVES and UNKNOWN     TABS    Levofloxacin ANAPHYLAXIS and UNKNOWN    Lyrica [Pregabalin] SWELLING    Cat Hair Extract ASTHMA, Coughing, Runny nose, SHORTNESS OF BREATH and WHEEZING    [place]rees, [place] ASTHMA, Runny nose and WHEEZING

## 2024-11-04 ENCOUNTER — TELEMEDICINE (OUTPATIENT)
Dept: FAMILY MEDICINE CLINIC | Facility: CLINIC | Age: 59
End: 2024-11-04
Payer: COMMERCIAL

## 2024-11-04 DIAGNOSIS — M26.622 ARTHRALGIA OF LEFT TEMPOROMANDIBULAR JOINT: ICD-10-CM

## 2024-11-04 DIAGNOSIS — J01.11 ACUTE RECURRENT FRONTAL SINUSITIS: Primary | ICD-10-CM

## 2024-11-04 RX ORDER — CLINDAMYCIN HYDROCHLORIDE 300 MG/1
300 CAPSULE ORAL 3 TIMES DAILY
Qty: 30 CAPSULE | Refills: 0 | Status: SHIPPED | OUTPATIENT
Start: 2024-11-04 | End: 2024-11-14

## 2024-11-04 NOTE — PROGRESS NOTES
Subjective:   Patient ID: Maryanne Reyes is a 58 year old female.    HPI  Ms. Reyes is a pleasant 58-year-old female with history of hypertension, hyperlipidemia, asthma, hypothyroidism, recurrent sinusitis presenting for video visit today for sinus congestion associated with fever, cough.  No shortness of breath no chest pain no nausea no vomiting no abdominal pain.  She previously had a video visit with me and was prescribed with doxycycline and prednisone but with not much improvement.  She also is complaining of pain lower in her left TMJ which tends to hurt more when she opens it.  She does work at the dentist office and will would like to ask her dentist to see what they think about this.    I had reviewed past medical and family histories together with allergy and medication lists documented.    History/Other:   Review of Systems  Constitutional:  Negative for fatigue.   HENT:  Negative for sore throat and trouble swallowing.    Gastrointestinal:  Negative for abdominal pain, diarrhea, nausea and vomiting.   Neurological:  Negative for dizziness and weakness.   Current Outpatient Medications   Medication Sig Dispense Refill    clindamycin 300 MG Oral Cap Take 1 capsule (300 mg total) by mouth 3 (three) times daily for 10 days. 30 capsule 0    Doxycycline Hyclate 100 MG Oral Tab Take 1 tablet (100 mg total) by mouth 2 (two) times daily for 10 days. 20 tablet 0    predniSONE 20 MG Oral Tab Take 1 tablet (20 mg total) by mouth 2 (two) times daily for 5 days, THEN 1 tablet (20 mg total) daily for 5 days, THEN 0.5 tablets (10 mg total) daily for 5 days. 22 tablet 0    MONTELUKAST 10 MG Oral Tab TAKE 1 TABLET BY MOUTH EVERY DAY 90 tablet 1    METFORMIN  MG Oral Tablet 24 Hr TAKE 1 TABLET BY MOUTH EVERY DAY 90 tablet 0    KLOR-CON M20 20 MEQ Oral Tab CR TAKE 1 TABLET BY MOUTH EVERY DAY 90 tablet 0    linaCLOtide (LINZESS) 145 MCG Oral Cap Take 2 capsules by mouth daily as needed. 180 capsule 0    PEG  3350-KCl-Na Bicarb-NaCl 420 g Oral Recon Soln Take as directed by physician 4000 mL 0    furosemide 20 MG Oral Tab Take 1 tablet (20 mg total) by mouth daily. 90 tablet 1    Phentermine HCl 37.5 MG Oral Tab Take 1 tablet (37.5 mg total) by mouth every morning before breakfast. 30 tablet 2    SPIRONOLACTONE 25 MG Oral Tab TAKE 1 TABLET BY MOUTH TWICE A  tablet 1    ALENDRONATE 70 MG Oral Tab TAKE 1 TABLET (70 MG TOTAL) BY MOUTH ONCE A WEEK 12 tablet 3    ipratropium-albuterol 0.5-2.5 (3) MG/3ML Inhalation Solution Take 3 mL by nebulization every 6 (six) hours while awake. 50 each 0    LEVOXYL 112 MCG Oral Tab Take 1 tablet (112 mcg total) by mouth before breakfast.      ALPRAZolam 0.25 MG Oral Tab Take 1 tablet (0.25 mg total) by mouth 2 (two) times daily as needed for Anxiety. 30 tablet 0    albuterol (2.5 MG/3ML) 0.083% Inhalation Nebu Soln Take 3 mL (2.5 mg total) by nebulization every 6 (six) hours as needed for Wheezing.      TRELEGY ELLIPTA 100-62.5-25 MCG/INH Inhalation Aerosol Powder, Breath Activated Inhale 1 puff into the lungs daily.      ipratropium-albuterol 0.5-2.5 (3) MG/3ML Inhalation Solution Take 1 vial by nebulization every 6 (six) hours as needed.      Vitamin D3, Cholecalciferol, 25 MCG (1000 UT) Oral Tab Take 1 tablet (1,000 Units total) by mouth daily.      gabapentin 100 MG Oral Cap Take 1-3 capsules (100-300 mg total) by mouth in the morning, at noon, and at bedtime. As directed       Allergies:Allergies[1]    Objective:   Physical Exam  Constitutional:       General: She is not in acute distress.  Neurological:      Mental Status: She is alert.   Psychiatric:         Mood and Affect: Mood normal.         Behavior: Behavior normal.      Assessment & Plan:   1. Acute recurrent frontal sinusitis   -Will go ahead and treat with clindamycin  - Call or come us if symptoms worsen or persist  - May try Mucinex over-the-counter as needed for congestion   2. Arthralgia of left temporomandibular  joint   -Tylenol as needed for pain  - Agree with seeing her dentist and will await recommendations and evaluation     This note was prepared using Dragon Medical voice recognition dictation software. As a result errors may occur. When identified these errors have been corrected. While every attempt is made to correct errors during dictation discrepancies may still exist          No orders of the defined types were placed in this encounter.      Meds This Visit:  Requested Prescriptions     Signed Prescriptions Disp Refills    clindamycin 300 MG Oral Cap 30 capsule 0     Sig: Take 1 capsule (300 mg total) by mouth 3 (three) times daily for 10 days.       Imaging & Referrals:  None         [1]   Allergies  Allergen Reactions    Amoxicillin HIVES and UNKNOWN     TABS    Levofloxacin ANAPHYLAXIS and UNKNOWN    Lyrica [Pregabalin] SWELLING    Cat Hair Extract ASTHMA, Coughing, Runny nose, SHORTNESS OF BREATH and WHEEZING    [place]rees, [place] ASTHMA, Runny nose and WHEEZING

## 2024-11-09 DIAGNOSIS — E66.9 OBESITY (BMI 30-39.9): ICD-10-CM

## 2024-11-09 RX ORDER — PHENTERMINE HYDROCHLORIDE 37.5 MG/1
37.5 TABLET ORAL
Qty: 30 TABLET | Refills: 2 | Status: SHIPPED | OUTPATIENT
Start: 2024-11-09

## 2024-11-11 ENCOUNTER — TELEPHONE (OUTPATIENT)
Dept: FAMILY MEDICINE CLINIC | Facility: CLINIC | Age: 59
End: 2024-11-11

## 2024-11-11 NOTE — TELEPHONE ENCOUNTER
Fax received from [facility] pharmacy requesting new rx.     Drug: Adderall.       [facility]/pharmacy #[id number] - [place], [place] - [address] AT [facility], [phone], [phone] [address] [place] [address]  Phone: [phone] Fax: [phone]

## 2024-11-21 ENCOUNTER — PATIENT MESSAGE (OUTPATIENT)
Dept: FAMILY MEDICINE CLINIC | Facility: CLINIC | Age: 59
End: 2024-11-21

## 2024-11-22 ENCOUNTER — TELEMEDICINE (OUTPATIENT)
Dept: FAMILY MEDICINE CLINIC | Facility: CLINIC | Age: 59
End: 2024-11-22
Payer: COMMERCIAL

## 2024-11-22 DIAGNOSIS — F90.0 ATTENTION DEFICIT HYPERACTIVITY DISORDER (ADHD), PREDOMINANTLY INATTENTIVE TYPE: ICD-10-CM

## 2024-11-22 DIAGNOSIS — J32.9 RECURRENT SINUS INFECTIONS: Primary | ICD-10-CM

## 2024-11-22 RX ORDER — DEXTROAMPHETAMINE SACCHARATE, AMPHETAMINE ASPARTATE MONOHYDRATE, DEXTROAMPHETAMINE SULFATE AND AMPHETAMINE SULFATE 5; 5; 5; 5 MG/1; MG/1; MG/1; MG/1
20 CAPSULE, EXTENDED RELEASE ORAL DAILY
Qty: 30 CAPSULE | Refills: 0 | Status: SHIPPED | OUTPATIENT
Start: 2024-12-23 | End: 2025-01-22

## 2024-11-22 RX ORDER — DOXYCYCLINE HYCLATE 100 MG
100 TABLET ORAL 2 TIMES DAILY
Qty: 28 TABLET | Refills: 0 | Status: SHIPPED | OUTPATIENT
Start: 2024-11-22 | End: 2024-12-06

## 2024-11-22 RX ORDER — DEXTROAMPHETAMINE SACCHARATE, AMPHETAMINE ASPARTATE MONOHYDRATE, DEXTROAMPHETAMINE SULFATE AND AMPHETAMINE SULFATE 5; 5; 5; 5 MG/1; MG/1; MG/1; MG/1
20 CAPSULE, EXTENDED RELEASE ORAL DAILY
Qty: 30 CAPSULE | Refills: 0 | Status: SHIPPED | OUTPATIENT
Start: 2025-01-23 | End: 2025-02-22

## 2024-11-22 RX ORDER — METHYLPREDNISOLONE 4 MG/1
TABLET ORAL
Qty: 1 EACH | Refills: 0 | Status: SHIPPED | OUTPATIENT
Start: 2024-11-22

## 2024-11-22 RX ORDER — DEXTROAMPHETAMINE SACCHARATE, AMPHETAMINE ASPARTATE MONOHYDRATE, DEXTROAMPHETAMINE SULFATE AND AMPHETAMINE SULFATE 5; 5; 5; 5 MG/1; MG/1; MG/1; MG/1
20 CAPSULE, EXTENDED RELEASE ORAL DAILY
Qty: 30 CAPSULE | Refills: 0 | Status: SHIPPED | OUTPATIENT
Start: 2024-11-22 | End: 2024-12-22

## 2024-11-22 NOTE — PROGRESS NOTES
Subjective:   Patient ID: Maryanne Reyes is a 59 year old female.    HPI  Ms. Reyes is a pleasant 59-year-old female with history of hypertension, hyperlipidemia, asthma, hypothyroidism, recurrent sinusitis, ADD presenting for video visit today for sinus congestion for the past 3 to 4 days associated with sinus pressure, cough, postnasal drip, fatigue and weakness.  She was treated prior with clindamycin but would not fully improve.  No sick contacts that she knows of.    She also will need refills for her Adderall for management of ADD which has been effective for her.      I had reviewed past medical and family histories together with allergy and medication lists documented.    History/Other:   Review of Systems   Constitutional:  Positive for fatigue and fever.   HENT:  Positive for congestion. Negative for sore throat and trouble swallowing.    Respiratory:  Positive for cough and shortness of breath. Negative for wheezing.    Cardiovascular:  Negative for chest pain and palpitations.   Gastrointestinal:  Negative for abdominal pain, diarrhea, nausea and vomiting.   Neurological:  Positive for headaches.     Current Outpatient Medications   Medication Sig Dispense Refill    Doxycycline Hyclate 100 MG Oral Tab Take 1 tablet (100 mg total) by mouth 2 (two) times daily for 14 days. 28 tablet 0    methylPREDNISolone (MEDROL) 4 MG Oral Tablet Therapy Pack As directed. 1 each 0    Amphetamine-Dextroamphet ER (ADDERALL XR) 20 MG Oral Capsule SR 24 Hr Take 1 capsule (20 mg total) by mouth daily. 30 capsule 0    [START ON 12/23/2024] Amphetamine-Dextroamphet ER (ADDERALL XR) 20 MG Oral Capsule SR 24 Hr Take 1 capsule (20 mg total) by mouth daily. 30 capsule 0    [START ON 1/23/2025] Amphetamine-Dextroamphet ER (ADDERALL XR) 20 MG Oral Capsule SR 24 Hr Take 1 capsule (20 mg total) by mouth daily. 30 capsule 0    PHENTERMINE HCL 37.5 MG Oral Tab TAKE 1 TABLET BY MOUTH EVERY MORNING BEFORE BREAKFAST 30 tablet 2     MONTELUKAST 10 MG Oral Tab TAKE 1 TABLET BY MOUTH EVERY DAY 90 tablet 1    METFORMIN  MG Oral Tablet 24 Hr TAKE 1 TABLET BY MOUTH EVERY DAY 90 tablet 0    KLOR-CON M20 20 MEQ Oral Tab CR TAKE 1 TABLET BY MOUTH EVERY DAY 90 tablet 0    linaCLOtide (LINZESS) 145 MCG Oral Cap Take 2 capsules by mouth daily as needed. 180 capsule 0    PEG 3350-KCl-Na Bicarb-NaCl 420 g Oral Recon Soln Take as directed by physician 4000 mL 0    furosemide 20 MG Oral Tab Take 1 tablet (20 mg total) by mouth daily. 90 tablet 1    SPIRONOLACTONE 25 MG Oral Tab TAKE 1 TABLET BY MOUTH TWICE A  tablet 1    ALENDRONATE 70 MG Oral Tab TAKE 1 TABLET (70 MG TOTAL) BY MOUTH ONCE A WEEK 12 tablet 3    ipratropium-albuterol 0.5-2.5 (3) MG/3ML Inhalation Solution Take 3 mL by nebulization every 6 (six) hours while awake. 50 each 0    LEVOXYL 112 MCG Oral Tab Take 1 tablet (112 mcg total) by mouth before breakfast.      ALPRAZolam 0.25 MG Oral Tab Take 1 tablet (0.25 mg total) by mouth 2 (two) times daily as needed for Anxiety. 30 tablet 0    albuterol (2.5 MG/3ML) 0.083% Inhalation Nebu Soln Take 3 mL (2.5 mg total) by nebulization every 6 (six) hours as needed for Wheezing.      TRELEGY ELLIPTA 100-62.5-25 MCG/INH Inhalation Aerosol Powder, Breath Activated Inhale 1 puff into the lungs daily.      ipratropium-albuterol 0.5-2.5 (3) MG/3ML Inhalation Solution Take 1 vial by nebulization every 6 (six) hours as needed.      Vitamin D3, Cholecalciferol, 25 MCG (1000 UT) Oral Tab Take 1 tablet (1,000 Units total) by mouth daily.      gabapentin 100 MG Oral Cap Take 1-3 capsules (100-300 mg total) by mouth in the morning, at noon, and at bedtime. As directed       Allergies:Allergies[1]    Objective:   Physical Exam  Constitutional:       General: She is not in acute distress.  Neurological:      Mental Status: She is alert.   Psychiatric:         Mood and Affect: Mood normal.         Behavior: Behavior normal.         Assessment & Plan:   1.  Recurrent sinus infections   -Keep hydrated  - May take Tylenol as needed for fever pain  - We will treat with doxycycline 1 tablet twice a day for 14 days  - Will supplement with Medrol Dosepak  - Go to the emergency room if with respiratory distress and/or severe dehydration   2. Attention deficit hyperactivity disorder (ADHD), predominantly inattentive type   -Will renew Adderall 20 mg ER daily for the next 3 months     Call or come in sooner if symptoms worsen or persist    This note was prepared using Dragon Medical voice recognition dictation software. As a result errors may occur. When identified these errors have been corrected. While every attempt is made to correct errors during dictation discrepancies may still exist          No orders of the defined types were placed in this encounter.      Meds This Visit:  Requested Prescriptions     Signed Prescriptions Disp Refills    Doxycycline Hyclate 100 MG Oral Tab 28 tablet 0     Sig: Take 1 tablet (100 mg total) by mouth 2 (two) times daily for 14 days.    methylPREDNISolone (MEDROL) 4 MG Oral Tablet Therapy Pack 1 each 0     Sig: As directed.    Amphetamine-Dextroamphet ER (ADDERALL XR) 20 MG Oral Capsule SR 24 Hr 30 capsule 0     Sig: Take 1 capsule (20 mg total) by mouth daily.    Amphetamine-Dextroamphet ER (ADDERALL XR) 20 MG Oral Capsule SR 24 Hr 30 capsule 0     Sig: Take 1 capsule (20 mg total) by mouth daily.    Amphetamine-Dextroamphet ER (ADDERALL XR) 20 MG Oral Capsule SR 24 Hr 30 capsule 0     Sig: Take 1 capsule (20 mg total) by mouth daily.       Imaging & Referrals:  None         [1]   Allergies  Allergen Reactions    Amoxicillin HIVES and UNKNOWN     TABS    Levofloxacin ANAPHYLAXIS and UNKNOWN    Lyrica [Pregabalin] SWELLING    Cat Hair Extract ASTHMA, Coughing, Runny nose, SHORTNESS OF BREATH and WHEEZING    [place], [place] ASTHMA, Runny nose and WHEEZING

## 2024-12-04 NOTE — Clinical Note
FYI, TCM call made, see notes. NCM placed call for assistance to schedule TCM HFU due to limited schedule.  TT Carlotta- TCM HFU scheduled on 9/5/17 RSV Swab sent to lab via tube station at this time.

## 2024-12-09 ENCOUNTER — APPOINTMENT (OUTPATIENT)
Dept: GENERAL RADIOLOGY | Facility: HOSPITAL | Age: 59
End: 2024-12-09
Payer: COMMERCIAL

## 2024-12-09 ENCOUNTER — HOSPITAL ENCOUNTER (EMERGENCY)
Facility: HOSPITAL | Age: 59
Discharge: HOME | End: 2024-12-09
Attending: EMERGENCY MEDICINE
Payer: COMMERCIAL

## 2024-12-09 VITALS
DIASTOLIC BLOOD PRESSURE: 77 MMHG | RESPIRATION RATE: 13 BRPM | TEMPERATURE: 98 F | HEIGHT: 63 IN | HEART RATE: 70 BPM | SYSTOLIC BLOOD PRESSURE: 155 MMHG | OXYGEN SATURATION: 100 % | BODY MASS INDEX: 34.2 KG/M2 | WEIGHT: 193 LBS

## 2024-12-09 DIAGNOSIS — J01.91 ACUTE RECURRENT SINUSITIS, UNSPECIFIED LOCATION: Primary | ICD-10-CM

## 2024-12-09 LAB
ANION GAP SERPL CALC-SCNC: 11 MMOL/L (ref 0–18)
BUN BLD-MCNC: 14 MG/DL (ref 9–23)
CALCIUM BLD-MCNC: 10.4 MG/DL (ref 8.7–10.4)
CHLORIDE SERPL-SCNC: 103 MMOL/L (ref 98–112)
CO2 SERPL-SCNC: 26 MMOL/L (ref 21–32)
CREAT BLD-MCNC: 0.91 MG/DL
EGFRCR SERPLBLD CKD-EPI 2021: 73 ML/MIN/1.73M2 (ref 60–?)
FLUAV + FLUBV RNA SPEC NAA+PROBE: NEGATIVE
FLUAV + FLUBV RNA SPEC NAA+PROBE: NEGATIVE
GLUCOSE BLD-MCNC: 145 MG/DL (ref 70–99)
OSMOLALITY SERPL CALC.SUM OF ELEC: 293 MOSM/KG (ref 275–295)
POTASSIUM SERPL-SCNC: 3.6 MMOL/L (ref 3.5–5.1)
RSV RNA SPEC NAA+PROBE: NEGATIVE
SARS-COV-2 RNA RESP QL NAA+PROBE: NOT DETECTED
SODIUM SERPL-SCNC: 140 MMOL/L (ref 136–145)

## 2024-12-09 PROCEDURE — 99284 EMERGENCY DEPT VISIT MOD MDM: CPT

## 2024-12-09 PROCEDURE — 0241U SARS-COV-2/FLU A AND B/RSV BY PCR (GENEXPERT): CPT | Performed by: EMERGENCY MEDICINE

## 2024-12-09 PROCEDURE — 0241U SARS-COV-2/FLU A AND B/RSV BY PCR (GENEXPERT): CPT

## 2024-12-09 PROCEDURE — 80048 BASIC METABOLIC PNL TOTAL CA: CPT | Performed by: EMERGENCY MEDICINE

## 2024-12-09 PROCEDURE — 36415 COLL VENOUS BLD VENIPUNCTURE: CPT

## 2024-12-09 PROCEDURE — 71045 X-RAY EXAM CHEST 1 VIEW: CPT | Performed by: EMERGENCY MEDICINE

## 2024-12-09 RX ORDER — ACETAMINOPHEN 500 MG
1000 TABLET ORAL ONCE
Status: COMPLETED | OUTPATIENT
Start: 2024-12-09 | End: 2024-12-09

## 2024-12-09 RX ORDER — CEFDINIR 300 MG/1
300 CAPSULE ORAL 2 TIMES DAILY
Qty: 28 CAPSULE | Refills: 0 | Status: SHIPPED | OUTPATIENT
Start: 2024-12-09 | End: 2024-12-23

## 2024-12-09 NOTE — ED INITIAL ASSESSMENT (HPI)
Pt here with cough/runny nose/fevers for last 2 days, hx asthma. Pt took tylenol PTA about 2 hours ago.
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
## 2024-12-10 NOTE — ED QUICK NOTES
Rounding Completed    Plan of Care reviewed. Waiting for disposition.  Elimination needs assessed.  Patient resting comfortably on cart. Family remains at bedside     Bed is locked and in lowest position. Call light within reach.

## 2024-12-12 ENCOUNTER — TELEMEDICINE (OUTPATIENT)
Dept: FAMILY MEDICINE CLINIC | Facility: CLINIC | Age: 59
End: 2024-12-12
Payer: COMMERCIAL

## 2024-12-12 DIAGNOSIS — J32.9 RECURRENT SINUS INFECTIONS: Primary | ICD-10-CM

## 2024-12-12 NOTE — PROGRESS NOTES
Subjective:   Patient ID: Maryanne Reyes is a 59 year old female.    HPI  Ms. Reyes is a pleasant 59-year-old female with history of hypertension, hyperlipidemia, asthma, hypothyroidism, recurrent sinusitis, ADD presenting for video visit today after she was seen at the emergency room on 12/9/2024.  She presented with cough and congestion at that time.  Chest x-ray was done which was negative for pneumonia.  She tested negative for COVID-19, influenza and RSV.  She was previously exposed to somebody with walking pneumonia.  She was discharged on cefdinir for sinusitis.  She was given a letter to go back to work today.  However she did report a reminder that she has been experiencing fever.  She also has mild wheezing and very mild shortness of breath which she has been using her medications for asthma.  She did go back to work today and had told her coworkers that she had fever and was sent back home.  She is requesting for a letter to be excused.    I had reviewed past medical and family histories together with allergy and medication lists documented.        History/Other:   Review of Systems   Constitutional:  Positive for fever. Negative for fatigue.   HENT:  Positive for congestion.    Respiratory:  Positive for cough, shortness of breath and wheezing.    Gastrointestinal:  Negative for abdominal pain.   Neurological:  Negative for dizziness.     Current Outpatient Medications   Medication Sig Dispense Refill    cefdinir 300 MG Oral Cap Take 1 capsule (300 mg total) by mouth 2 (two) times daily for 14 days. 28 capsule 0    methylPREDNISolone (MEDROL) 4 MG Oral Tablet Therapy Pack As directed. 1 each 0    Amphetamine-Dextroamphet ER (ADDERALL XR) 20 MG Oral Capsule SR 24 Hr Take 1 capsule (20 mg total) by mouth daily. 30 capsule 0    [START ON 12/23/2024] Amphetamine-Dextroamphet ER (ADDERALL XR) 20 MG Oral Capsule SR 24 Hr Take 1 capsule (20 mg total) by mouth daily. 30 capsule 0    [START ON 1/23/2025]  Amphetamine-Dextroamphet ER (ADDERALL XR) 20 MG Oral Capsule SR 24 Hr Take 1 capsule (20 mg total) by mouth daily. 30 capsule 0    PHENTERMINE HCL 37.5 MG Oral Tab TAKE 1 TABLET BY MOUTH EVERY MORNING BEFORE BREAKFAST 30 tablet 2    MONTELUKAST 10 MG Oral Tab TAKE 1 TABLET BY MOUTH EVERY DAY 90 tablet 1    METFORMIN  MG Oral Tablet 24 Hr TAKE 1 TABLET BY MOUTH EVERY DAY 90 tablet 0    KLOR-CON M20 20 MEQ Oral Tab CR TAKE 1 TABLET BY MOUTH EVERY DAY 90 tablet 0    linaCLOtide (LINZESS) 145 MCG Oral Cap Take 2 capsules by mouth daily as needed. 180 capsule 0    PEG 3350-KCl-Na Bicarb-NaCl 420 g Oral Recon Soln Take as directed by physician 4000 mL 0    furosemide 20 MG Oral Tab Take 1 tablet (20 mg total) by mouth daily. 90 tablet 1    SPIRONOLACTONE 25 MG Oral Tab TAKE 1 TABLET BY MOUTH TWICE A  tablet 1    ALENDRONATE 70 MG Oral Tab TAKE 1 TABLET (70 MG TOTAL) BY MOUTH ONCE A WEEK 12 tablet 3    ipratropium-albuterol 0.5-2.5 (3) MG/3ML Inhalation Solution Take 3 mL by nebulization every 6 (six) hours while awake. 50 each 0    LEVOXYL 112 MCG Oral Tab Take 1 tablet (112 mcg total) by mouth before breakfast.      ALPRAZolam 0.25 MG Oral Tab Take 1 tablet (0.25 mg total) by mouth 2 (two) times daily as needed for Anxiety. 30 tablet 0    albuterol (2.5 MG/3ML) 0.083% Inhalation Nebu Soln Take 3 mL (2.5 mg total) by nebulization every 6 (six) hours as needed for Wheezing.      TRELEGY ELLIPTA 100-62.5-25 MCG/INH Inhalation Aerosol Powder, Breath Activated Inhale 1 puff into the lungs daily.      ipratropium-albuterol 0.5-2.5 (3) MG/3ML Inhalation Solution Take 1 vial by nebulization every 6 (six) hours as needed.      Vitamin D3, Cholecalciferol, 25 MCG (1000 UT) Oral Tab Take 1 tablet (1,000 Units total) by mouth daily.      gabapentin 100 MG Oral Cap Take 1-3 capsules (100-300 mg total) by mouth in the morning, at noon, and at bedtime. As directed       Allergies:Allergies[1]    Objective:   Physical  Exam  Constitutional:       General: She is not in acute distress.  Neurological:      Mental Status: She is alert.         Assessment & Plan:   1. Recurrent sinus infections    -Keep hydrated  - Continue cefdinir  - May take Tylenol as needed for fever pain  - Continue with medications for asthma  - Go to the emergency room if with respiratory distress and/or severe dehydration  - Will write a letter for work to be excused starting today till Saturday and hopefully could go back to work Monday of next week      This note was prepared using Dragon Medical voice recognition dictation software. As a result errors may occur. When identified these errors have been corrected. While every attempt is made to correct errors during dictation discrepancies may still exist            No orders of the defined types were placed in this encounter.      Meds This Visit:  Requested Prescriptions      No prescriptions requested or ordered in this encounter       Imaging & Referrals:  None         [1]   Allergies  Allergen Reactions    Amoxicillin HIVES and UNKNOWN     TABS    Levofloxacin ANAPHYLAXIS and UNKNOWN    Lyrica [Pregabalin] SWELLING    Cat Hair Extract ASTHMA, Coughing, Runny nose, SHORTNESS OF BREATH and WHEEZING    Trees, [place] ASTHMA, Runny nose and WHEEZING

## 2024-12-12 NOTE — PATIENT INSTRUCTIONS
Thank you for choosing Rodrigo Estrella MD at [facility]  To Do: Maryanne GAVIN Book  1. Please see below   Call [phone] to schedule the appointment.   Please signup for [facility], which is electronic access to your record if you have not done so.  All your results will post on there.  [email or web address]   You can NOW use [facility] to book your appointments with us, or consider using open access scheduling which is through the [place] website [email or web address] and type in Rodrigo Estrella MD and follow the links for \"Schedule Online Now\"    To schedule Imaging or tests at [facility] call Central Scheduling [phone], Go to [facility] ER Building (For example: CT scans, X rays, Ultrasound, MRI)  Cardiac Testing in ER building Building A second floor Cardiac Testing [phone] (For example: Holter Monitor, Cardiac Stress tests,Event Monitor, or 2D Echocardiograms)  [facility] call [phone] usually in [facility]  Walk in Clinic in [place] at [address] Mon-Fri at 8am-7:30 p.m., and Sat/Sun 9:00a.m.-4:30 p.m.  Also at [address] [place] [address]  Call [phone] for info     Please call our office about any questions regarding your treatment/medicines/tests as a result of today's visit.  For your safety, read the entire package insert of all medicines prescribed to you and be aware of all of the risks of treatment even beyond those discussed today.  All therapies have potential risk of harm or side effects or medication interactions.  It is your duty and for your safety to discuss with the pharmacist and our office with questions, and to notify us and stop treatment if problems arise, but know that our intention is that the benefits outweigh those potential risks and we strive to make you healthier and to improve your quality of life.    Referrals, and Radiology Information:    If your insurance requires a referral to a specialist, please allow 5 business days to process  your referral request.    If Rodrigo Estrella MD orders a CT or MRI, it may take up to 10 business days to receive approval from your insurance company. Once our office has called informing you that the insurance company approved your testing, please call Central Scheduling at [phone]  Please allow our office 5 business days to contact you regarding any testing results.    Refill policies:   Allow 3 business days for refills; controlled substances may take longer and must be picked up from the office in person.  Narcotic medications can only be filled in 30 day increments and must be refilled at an office visit only.  If your prescription is due for a refill, you may be due for a follow-up appointment.  We cannot refill your maintenance medications at a preventative wellness visit.  To best provide you care, patients receiving maintenance medications need to be seen at least twice a year.

## 2024-12-13 RX ORDER — SPIRONOLACTONE 25 MG/1
25 TABLET ORAL 2 TIMES DAILY
Qty: 180 TABLET | Refills: 1 | Status: SHIPPED | OUTPATIENT
Start: 2024-12-13

## 2024-12-23 RX ORDER — METFORMIN HYDROCHLORIDE 750 MG/1
750 TABLET, EXTENDED RELEASE ORAL DAILY
Qty: 90 TABLET | Refills: 0 | OUTPATIENT
Start: 2024-12-23

## 2024-12-23 RX ORDER — POTASSIUM CHLORIDE 1500 MG/1
20 TABLET, EXTENDED RELEASE ORAL DAILY
Qty: 90 TABLET | Refills: 0 | OUTPATIENT
Start: 2024-12-23

## 2024-12-23 NOTE — TELEPHONE ENCOUNTER
Requesting Klor Con and Metformin  LOV: 2/27/24 video  RTC: not noted  Last Relevant Labs: 11/3/23  Filled: 9/19/24 #90 with 0 refills    No future appointments.    Denied KlorCon as it is prescribed by pcp  Patient has not had any appt since video in Feb.  Too long without seeing provider - denied

## 2024-12-30 ENCOUNTER — TELEMEDICINE (OUTPATIENT)
Dept: FAMILY MEDICINE CLINIC | Facility: CLINIC | Age: 59
End: 2024-12-30
Payer: COMMERCIAL

## 2024-12-30 DIAGNOSIS — E66.9 OBESITY (BMI 30-39.9): ICD-10-CM

## 2024-12-30 DIAGNOSIS — J32.9 RECURRENT SINUS INFECTIONS: Primary | ICD-10-CM

## 2024-12-30 DIAGNOSIS — R09.81 CONGESTION OF PARANASAL SINUS: ICD-10-CM

## 2024-12-30 RX ORDER — CLINDAMYCIN HYDROCHLORIDE 300 MG/1
300 CAPSULE ORAL 3 TIMES DAILY
Qty: 30 CAPSULE | Refills: 0 | Status: SHIPPED | OUTPATIENT
Start: 2024-12-30 | End: 2025-01-09

## 2024-12-30 RX ORDER — POTASSIUM CHLORIDE 1500 MG/1
20 TABLET, EXTENDED RELEASE ORAL DAILY
Qty: 90 TABLET | Refills: 0 | Status: SHIPPED | OUTPATIENT
Start: 2024-12-30

## 2024-12-30 RX ORDER — METHYLPREDNISOLONE 4 MG/1
TABLET ORAL
Qty: 1 EACH | Refills: 0 | Status: SHIPPED | OUTPATIENT
Start: 2024-12-30

## 2024-12-30 RX ORDER — METFORMIN HYDROCHLORIDE 750 MG/1
750 TABLET, EXTENDED RELEASE ORAL DAILY
Qty: 90 TABLET | Refills: 0 | Status: SHIPPED | OUTPATIENT
Start: 2024-12-30

## 2024-12-30 RX ORDER — PHENTERMINE HYDROCHLORIDE 37.5 MG/1
37.5 TABLET ORAL
Qty: 30 TABLET | Refills: 2 | Status: SHIPPED | OUTPATIENT
Start: 2024-12-30

## 2024-12-30 NOTE — PROGRESS NOTES
Subjective:   Patient ID: Maryanne Reyes is a 59 year old female.    HPI  Ms. Reyes is a pleasant 59-year-old female with history of hypertension, hyperlipidemia, asthma, hypothyroidism, recurrent sinusitis, ADD, obesity presenting for video visit today for sinus congestion associated with fever, fatigue, chills, body aches, cough since this past weekend.  She does not report chest pain, shortness of breath, palpitations, nausea, vomiting, abdominal pain.  She also will need refills on her medications including phentermine and metformin potassium supplements.    I had reviewed past medical and family histories together with allergy and medication lists documented.    History/Other:   Review of Systems  Constitutional:  Positive for fever. Negative for fatigue.   HENT:  Positive for congestion.    Gastrointestinal:  Negative for abdominal pain.   Neurological:  Negative for dizziness.   Current Outpatient Medications   Medication Sig Dispense Refill    potassium chloride (KLOR-CON M20) 20 MEQ Oral Tab CR Take 1 tablet (20 mEq total) by mouth daily. 90 tablet 0    Phentermine HCl 37.5 MG Oral Tab Take 1 tablet (37.5 mg total) by mouth before breakfast. 30 tablet 2    metFORMIN  MG Oral Tablet 24 Hr Take 1 tablet (750 mg total) by mouth daily. 90 tablet 0    methylPREDNISolone (MEDROL) 4 MG Oral Tablet Therapy Pack As directed. 1 each 0    clindamycin 300 MG Oral Cap Take 1 capsule (300 mg total) by mouth 3 (three) times daily for 10 days. 30 capsule 0    SPIRONOLACTONE 25 MG Oral Tab TAKE 1 TABLET BY MOUTH TWICE A  tablet 1    Amphetamine-Dextroamphet ER (ADDERALL XR) 20 MG Oral Capsule SR 24 Hr Take 1 capsule (20 mg total) by mouth daily. 30 capsule 0    [START ON 1/23/2025] Amphetamine-Dextroamphet ER (ADDERALL XR) 20 MG Oral Capsule SR 24 Hr Take 1 capsule (20 mg total) by mouth daily. 30 capsule 0    MONTELUKAST 10 MG Oral Tab TAKE 1 TABLET BY MOUTH EVERY DAY 90 tablet 1    linaCLOtide (LINZESS) 145  MCG Oral Cap Take 2 capsules by mouth daily as needed. 180 capsule 0    PEG 3350-KCl-Na Bicarb-NaCl 420 g Oral Recon Soln Take as directed by physician 4000 mL 0    furosemide 20 MG Oral Tab Take 1 tablet (20 mg total) by mouth daily. 90 tablet 1    ALENDRONATE 70 MG Oral Tab TAKE 1 TABLET (70 MG TOTAL) BY MOUTH ONCE A WEEK 12 tablet 3    ipratropium-albuterol 0.5-2.5 (3) MG/3ML Inhalation Solution Take 3 mL by nebulization every 6 (six) hours while awake. 50 each 0    LEVOXYL 112 MCG Oral Tab Take 1 tablet (112 mcg total) by mouth before breakfast.      ALPRAZolam 0.25 MG Oral Tab Take 1 tablet (0.25 mg total) by mouth 2 (two) times daily as needed for Anxiety. 30 tablet 0    albuterol (2.5 MG/3ML) 0.083% Inhalation Nebu Soln Take 3 mL (2.5 mg total) by nebulization every 6 (six) hours as needed for Wheezing.      TRELEGY ELLIPTA 100-62.5-25 MCG/INH Inhalation Aerosol Powder, Breath Activated Inhale 1 puff into the lungs daily.      ipratropium-albuterol 0.5-2.5 (3) MG/3ML Inhalation Solution Take 1 vial by nebulization every 6 (six) hours as needed.      Vitamin D3, Cholecalciferol, 25 MCG (1000 UT) Oral Tab Take 1 tablet (1,000 Units total) by mouth daily.      gabapentin 100 MG Oral Cap Take 1-3 capsules (100-300 mg total) by mouth in the morning, at noon, and at bedtime. As directed       Allergies:Allergies[1]    Objective:   Physical Exam  Constitutional:       General: She is not in acute distress.  Neurological:      Mental Status: She is alert.   Assessment & Plan:   1. Recurrent sinus infections   -Will treat with clindamycin and Medrol Dosepak  - Go to the emergency room with respiratory distress   2. Obesity (BMI 30-39.9)   -Stable  - Continue phentermine   3. Congestion of paranasal sinus   -Please see #1  - Keep hydrated  - May take Tylenol as needed for fever pain  - Order for culture for influenza, C. difficile and RSV placed     She does have refills for her Adderall which she had requested  initially.  Follow-up as scheduled or as needed    This note was prepared using Dragon Medical voice recognition dictation software. As a result errors may occur. When identified these errors have been corrected. While every attempt is made to correct errors during dictation discrepancies may still exist          Orders Placed This Encounter   Procedures    SARS-CoV-2/Flu A and B/RSV by PCR ([facility]) [E]       Meds This Visit:  Requested Prescriptions     Signed Prescriptions Disp Refills    potassium chloride (KLOR-CON M20) 20 MEQ Oral Tab CR 90 tablet 0     Sig: Take 1 tablet (20 mEq total) by mouth daily.    Phentermine HCl 37.5 MG Oral Tab 30 tablet 2     Sig: Take 1 tablet (37.5 mg total) by mouth before breakfast.    metFORMIN  MG Oral Tablet 24 Hr 90 tablet 0     Sig: Take 1 tablet (750 mg total) by mouth daily.    methylPREDNISolone (MEDROL) 4 MG Oral Tablet Therapy Pack 1 each 0     Sig: As directed.    clindamycin 300 MG Oral Cap 30 capsule 0     Sig: Take 1 capsule (300 mg total) by mouth 3 (three) times daily for 10 days.       Imaging & Referrals:  None         [1]   Allergies  Allergen Reactions    Amoxicillin HIVES and UNKNOWN     TABS    Levofloxacin ANAPHYLAXIS and UNKNOWN    Lyrica [Pregabalin] SWELLING    Cat Hair Extract ASTHMA, Coughing, Runny nose, SHORTNESS OF BREATH and WHEEZING    [place], [place] ASTHMA, Runny nose and WHEEZING

## 2025-01-13 ENCOUNTER — TELEMEDICINE (OUTPATIENT)
Dept: FAMILY MEDICINE CLINIC | Facility: CLINIC | Age: 60
End: 2025-01-13
Payer: COMMERCIAL

## 2025-01-13 DIAGNOSIS — J32.9 RECURRENT SINUS INFECTIONS: Primary | ICD-10-CM

## 2025-01-13 RX ORDER — METHYLPREDNISOLONE 4 MG/1
TABLET ORAL
Qty: 1 EACH | Refills: 3 | Status: SHIPPED | OUTPATIENT
Start: 2025-01-13

## 2025-01-13 RX ORDER — CLINDAMYCIN HYDROCHLORIDE 300 MG/1
300 CAPSULE ORAL 3 TIMES DAILY
Qty: 42 CAPSULE | Refills: 1 | Status: SHIPPED | OUTPATIENT
Start: 2025-01-13 | End: 2025-01-27

## 2025-01-13 NOTE — PROGRESS NOTES
Subjective:   Patient ID: Maryanne Reyes is a 59 year old female.    HPI  Ms. Reyes is a pleasant 59-year-old female with history of hypertension, hyperlipidemia, asthma, hypothyroidism, recurrent sinusitis, ADD, obesity presenting for video visit today for sinus congestion associated with fever, fatigue, chills, body aches, cough over the past few days.  She does not report chest pain, shortness of breath, wheezing, nausea, vomiting, abdominal pain.    I had reviewed past medical and family histories together with allergy and medication lists documented.    History/Other:   Review of Systems  Constitutional:  Positive for fever. Negative for fatigue.   HENT:  Positive for congestion.    Gastrointestinal:  Negative for abdominal pain.   Neurological:  Negative for dizziness.        Current Outpatient Medications   Medication Sig Dispense Refill    methylPREDNISolone (MEDROL) 4 MG Oral Tablet Therapy Pack As directed. 1 each 3    clindamycin 300 MG Oral Cap Take 1 capsule (300 mg total) by mouth 3 (three) times daily for 14 days. 42 capsule 1    potassium chloride (KLOR-CON M20) 20 MEQ Oral Tab CR Take 1 tablet (20 mEq total) by mouth daily. 90 tablet 0    Phentermine HCl 37.5 MG Oral Tab Take 1 tablet (37.5 mg total) by mouth before breakfast. 30 tablet 2    metFORMIN  MG Oral Tablet 24 Hr Take 1 tablet (750 mg total) by mouth daily. 90 tablet 0    methylPREDNISolone (MEDROL) 4 MG Oral Tablet Therapy Pack As directed. 1 each 0    SPIRONOLACTONE 25 MG Oral Tab TAKE 1 TABLET BY MOUTH TWICE A  tablet 1    Amphetamine-Dextroamphet ER (ADDERALL XR) 20 MG Oral Capsule SR 24 Hr Take 1 capsule (20 mg total) by mouth daily. 30 capsule 0    [START ON 1/23/2025] Amphetamine-Dextroamphet ER (ADDERALL XR) 20 MG Oral Capsule SR 24 Hr Take 1 capsule (20 mg total) by mouth daily. 30 capsule 0    MONTELUKAST 10 MG Oral Tab TAKE 1 TABLET BY MOUTH EVERY DAY 90 tablet 1    linaCLOtide (LINZESS) 145 MCG Oral Cap Take 2  capsules by mouth daily as needed. 180 capsule 0    PEG 3350-KCl-Na Bicarb-NaCl 420 g Oral Recon Soln Take as directed by physician 4000 mL 0    furosemide 20 MG Oral Tab Take 1 tablet (20 mg total) by mouth daily. 90 tablet 1    ALENDRONATE 70 MG Oral Tab TAKE 1 TABLET (70 MG TOTAL) BY MOUTH ONCE A WEEK 12 tablet 3    ipratropium-albuterol 0.5-2.5 (3) MG/3ML Inhalation Solution Take 3 mL by nebulization every 6 (six) hours while awake. 50 each 0    LEVOXYL 112 MCG Oral Tab Take 1 tablet (112 mcg total) by mouth before breakfast.      ALPRAZolam 0.25 MG Oral Tab Take 1 tablet (0.25 mg total) by mouth 2 (two) times daily as needed for Anxiety. 30 tablet 0    albuterol (2.5 MG/3ML) 0.083% Inhalation Nebu Soln Take 3 mL (2.5 mg total) by nebulization every 6 (six) hours as needed for Wheezing.      TRELEGY ELLIPTA 100-62.5-25 MCG/INH Inhalation Aerosol Powder, Breath Activated Inhale 1 puff into the lungs daily.      ipratropium-albuterol 0.5-2.5 (3) MG/3ML Inhalation Solution Take 1 vial by nebulization every 6 (six) hours as needed.      Vitamin D3, Cholecalciferol, 25 MCG (1000 UT) Oral Tab Take 1 tablet (1,000 Units total) by mouth daily.      gabapentin 100 MG Oral Cap Take 1-3 capsules (100-300 mg total) by mouth in the morning, at noon, and at bedtime. As directed       Allergies:Allergies[1]    Objective:   Physical Exam  Constitutional:       General: She is not in acute distress.  Neurological:      Mental Status: She is alert.   Assessment & Plan:   1. Recurrent sinus infections    -Will treat with clindamycin but for 14 days today with Medrol Dosepak  - Keep hydrated  - May take Tylenol as needed for fever pain  - Go to the emergency room if with respiratory distress and/or severe dehydration      This note was prepared using Dragon Medical voice recognition dictation software. As a result errors may occur. When identified these errors have been corrected. While every attempt is made to correct errors  during dictation discrepancies may still exist            No orders of the defined types were placed in this encounter.      Meds This Visit:  Requested Prescriptions     Signed Prescriptions Disp Refills    methylPREDNISolone (MEDROL) 4 MG Oral Tablet Therapy Pack 1 each 3     Sig: As directed.    clindamycin 300 MG Oral Cap 42 capsule 1     Sig: Take 1 capsule (300 mg total) by mouth 3 (three) times daily for 14 days.       Imaging & Referrals:  None         [1]   Allergies  Allergen Reactions    Amoxicillin HIVES and UNKNOWN     TABS    Levofloxacin ANAPHYLAXIS and UNKNOWN    Lyrica [Pregabalin] SWELLING    Cat Hair Extract ASTHMA, Coughing, Runny nose, SHORTNESS OF BREATH and WHEEZING    Trees, [place] ASTHMA, Runny nose and WHEEZING

## 2025-01-21 RX ORDER — FUROSEMIDE 20 MG/1
20 TABLET ORAL DAILY
Qty: 90 TABLET | Refills: 1 | Status: SHIPPED | OUTPATIENT
Start: 2025-01-21

## 2025-03-27 ENCOUNTER — PATIENT MESSAGE (OUTPATIENT)
Dept: FAMILY MEDICINE CLINIC | Facility: CLINIC | Age: 60
End: 2025-03-27

## 2025-03-28 RX ORDER — POTASSIUM CHLORIDE 1500 MG/1
TABLET, EXTENDED RELEASE ORAL
Qty: 90 TABLET | Refills: 0 | Status: SHIPPED | OUTPATIENT
Start: 2025-03-28

## 2025-03-28 RX ORDER — METFORMIN HYDROCHLORIDE 750 MG/1
TABLET, EXTENDED RELEASE ORAL
Qty: 90 TABLET | Refills: 0 | Status: SHIPPED | OUTPATIENT
Start: 2025-03-28

## 2025-03-31 ENCOUNTER — TELEMEDICINE (OUTPATIENT)
Dept: FAMILY MEDICINE CLINIC | Facility: CLINIC | Age: 60
End: 2025-03-31
Payer: COMMERCIAL

## 2025-03-31 DIAGNOSIS — F90.0 ATTENTION DEFICIT HYPERACTIVITY DISORDER (ADHD), PREDOMINANTLY INATTENTIVE TYPE: ICD-10-CM

## 2025-03-31 DIAGNOSIS — J32.9 RECURRENT SINUS INFECTIONS: Primary | ICD-10-CM

## 2025-03-31 DIAGNOSIS — F41.9 ANXIETY: ICD-10-CM

## 2025-03-31 RX ORDER — ALPRAZOLAM 0.25 MG
0.25 TABLET ORAL 2 TIMES DAILY PRN
Qty: 30 TABLET | Refills: 0 | Status: SHIPPED | OUTPATIENT
Start: 2025-03-31

## 2025-03-31 RX ORDER — DEXTROAMPHETAMINE SACCHARATE, AMPHETAMINE ASPARTATE MONOHYDRATE, DEXTROAMPHETAMINE SULFATE AND AMPHETAMINE SULFATE 5; 5; 5; 5 MG/1; MG/1; MG/1; MG/1
20 CAPSULE, EXTENDED RELEASE ORAL DAILY
Qty: 30 CAPSULE | Refills: 0 | Status: SHIPPED | OUTPATIENT
Start: 2025-03-31 | End: 2025-04-30

## 2025-03-31 RX ORDER — DEXTROAMPHETAMINE SACCHARATE, AMPHETAMINE ASPARTATE MONOHYDRATE, DEXTROAMPHETAMINE SULFATE AND AMPHETAMINE SULFATE 5; 5; 5; 5 MG/1; MG/1; MG/1; MG/1
20 CAPSULE, EXTENDED RELEASE ORAL DAILY
Qty: 30 CAPSULE | Refills: 0 | Status: SHIPPED | OUTPATIENT
Start: 2025-05-01 | End: 2025-05-31

## 2025-03-31 RX ORDER — METHYLPREDNISOLONE 4 MG/1
TABLET ORAL
Qty: 1 EACH | Refills: 0 | Status: SHIPPED | OUTPATIENT
Start: 2025-03-31

## 2025-03-31 RX ORDER — CLINDAMYCIN HYDROCHLORIDE 300 MG/1
300 CAPSULE ORAL 3 TIMES DAILY
Qty: 42 CAPSULE | Refills: 1 | Status: SHIPPED | OUTPATIENT
Start: 2025-03-31 | End: 2025-04-14

## 2025-03-31 RX ORDER — DEXTROAMPHETAMINE SACCHARATE, AMPHETAMINE ASPARTATE MONOHYDRATE, DEXTROAMPHETAMINE SULFATE AND AMPHETAMINE SULFATE 5; 5; 5; 5 MG/1; MG/1; MG/1; MG/1
20 CAPSULE, EXTENDED RELEASE ORAL DAILY
Qty: 30 CAPSULE | Refills: 0 | Status: SHIPPED | OUTPATIENT
Start: 2025-06-01 | End: 2025-07-01

## 2025-03-31 NOTE — PROGRESS NOTES
Subjective:   Patient ID: Maryanne Reyes is a 59 year old female.    HPI  Ms. Reyes is a pleasant 59-year-old female with history of hypertension, hyperlipidemia, asthma, hypothyroidism, recurrent sinusitis, ADD, obesity presenting for video visit today for sinus congestion for the past several days.  She has been experiencing sinus and nasal congestion and cough with mild shortness of breath.  She had taken clindamycin in the past which has been helpful.  She recently lost 2 family members and has been going through some stress.  In the past she had taken alprazolam as needed which has helped and is requesting refills for it.  She also needs her refills for her Adderall which has been effective in managing her ADD.  No side effects of taking this meds    I had reviewed past medical and family histories together with allergy and medication lists documented.    History/Other:   Review of Systems  Constitutional:  Positive for fever. Negative for fatigue.   HENT:  Positive for congestion.    Gastrointestinal:  Negative for abdominal pain.   Neurological:  Negative for dizziness.   Current Outpatient Medications   Medication Sig Dispense Refill    ALPRAZolam 0.25 MG Oral Tab Take 1 tablet (0.25 mg total) by mouth 2 (two) times daily as needed for Anxiety. 30 tablet 0    methylPREDNISolone (MEDROL) 4 MG Oral Tablet Therapy Pack As directed. 1 each 0    Amphetamine-Dextroamphet ER (ADDERALL XR) 20 MG Oral Capsule SR 24 Hr Take 1 capsule (20 mg total) by mouth daily. 30 capsule 0    [START ON 5/1/2025] Amphetamine-Dextroamphet ER (ADDERALL XR) 20 MG Oral Capsule SR 24 Hr Take 1 capsule (20 mg total) by mouth daily. 30 capsule 0    [START ON 6/1/2025] Amphetamine-Dextroamphet ER (ADDERALL XR) 20 MG Oral Capsule SR 24 Hr Take 1 capsule (20 mg total) by mouth daily. 30 capsule 0    clindamycin 300 MG Oral Cap Take 1 capsule (300 mg total) by mouth 3 (three) times daily for 14 days. 42 capsule 1    POTASSIUM CHLORIDE 20 MEQ  Oral Tab CR TAKE 1 TABLET BY MOUTH EVERY DAY**SAYS TO BILL OTHER INSURANCE 90 tablet 0    METFORMIN  MG Oral Tablet 24 Hr TAKE 1 TABLET BY MOUTH EVERY DAY**SAYS TO BILL OTHER INS 90 tablet 0    FUROSEMIDE 20 MG Oral Tab TAKE 1 TABLET BY MOUTH EVERY DAY 90 tablet 1    methylPREDNISolone (MEDROL) 4 MG Oral Tablet Therapy Pack As directed. 1 each 3    Phentermine HCl 37.5 MG Oral Tab Take 1 tablet (37.5 mg total) by mouth before breakfast. 30 tablet 2    methylPREDNISolone (MEDROL) 4 MG Oral Tablet Therapy Pack As directed. 1 each 0    SPIRONOLACTONE 25 MG Oral Tab TAKE 1 TABLET BY MOUTH TWICE A  tablet 1    MONTELUKAST 10 MG Oral Tab TAKE 1 TABLET BY MOUTH EVERY DAY 90 tablet 1    linaCLOtide (LINZESS) 145 MCG Oral Cap Take 2 capsules by mouth daily as needed. 180 capsule 0    PEG 3350-KCl-Na Bicarb-NaCl 420 g Oral Recon Soln Take as directed by physician 4000 mL 0    ALENDRONATE 70 MG Oral Tab TAKE 1 TABLET (70 MG TOTAL) BY MOUTH ONCE A WEEK 12 tablet 3    ipratropium-albuterol 0.5-2.5 (3) MG/3ML Inhalation Solution Take 3 mL by nebulization every 6 (six) hours while awake. 50 each 0    LEVOXYL 112 MCG Oral Tab Take 1 tablet (112 mcg total) by mouth before breakfast.      albuterol (2.5 MG/3ML) 0.083% Inhalation Nebu Soln Take 3 mL (2.5 mg total) by nebulization every 6 (six) hours as needed for Wheezing.      TRELEGY ELLIPTA 100-62.5-25 MCG/INH Inhalation Aerosol Powder, Breath Activated Inhale 1 puff into the lungs daily.      ipratropium-albuterol 0.5-2.5 (3) MG/3ML Inhalation Solution Take 1 vial by nebulization every 6 (six) hours as needed.      Vitamin D3, Cholecalciferol, 25 MCG (1000 UT) Oral Tab Take 1 tablet (1,000 Units total) by mouth daily.      gabapentin 100 MG Oral Cap Take 1-3 capsules (100-300 mg total) by mouth in the morning, at noon, and at bedtime. As directed       Allergies:Allergies[1]    Objective:   Physical Exam  Constitutional:       General: She is not in acute  distress.  Neurological:      Mental Status: She is alert.   Assessment & Plan:   1. Recurrent sinus infections   -Will treat with clindamycin but for 14 days today with Medrol Dosepak  - Keep hydrated  - May take Tylenol as needed for fever pain  - Go to the emergency room if with respiratory distress and/or severe dehydration  -Will also prescribe a Medrol dose pack   2. Anxiety   -Will renew alprazolam as directed   3. Attention deficit hyperactivity disorder (ADHD), predominantly inattentive type   chronic stable issue, continue present management with observation and medications as noted         This note was prepared using Dragon Medical voice recognition dictation software. As a result errors may occur. When identified these errors have been corrected. While every attempt is made to correct errors during dictation discrepancies may still exist            No orders of the defined types were placed in this encounter.      Meds This Visit:  Requested Prescriptions     Signed Prescriptions Disp Refills    ALPRAZolam 0.25 MG Oral Tab 30 tablet 0     Sig: Take 1 tablet (0.25 mg total) by mouth 2 (two) times daily as needed for Anxiety.    methylPREDNISolone (MEDROL) 4 MG Oral Tablet Therapy Pack 1 each 0     Sig: As directed.    Amphetamine-Dextroamphet ER (ADDERALL XR) 20 MG Oral Capsule SR 24 Hr 30 capsule 0     Sig: Take 1 capsule (20 mg total) by mouth daily.    Amphetamine-Dextroamphet ER (ADDERALL XR) 20 MG Oral Capsule SR 24 Hr 30 capsule 0     Sig: Take 1 capsule (20 mg total) by mouth daily.    Amphetamine-Dextroamphet ER (ADDERALL XR) 20 MG Oral Capsule SR 24 Hr 30 capsule 0     Sig: Take 1 capsule (20 mg total) by mouth daily.    clindamycin 300 MG Oral Cap 42 capsule 1     Sig: Take 1 capsule (300 mg total) by mouth 3 (three) times daily for 14 days.       Imaging & Referrals:  None         [1]   Allergies  Allergen Reactions    Amoxicillin HIVES and UNKNOWN     TABS    Levofloxacin ANAPHYLAXIS and  UNKNOWN    Lyrica [Pregabalin] SWELLING    Cat Hair Extract ASTHMA, Coughing, Runny nose, SHORTNESS OF BREATH and WHEEZING    [place]rees, [place] ASTHMA, Runny nose and WHEEZING

## 2025-04-01 RX ORDER — MONTELUKAST SODIUM 10 MG/1
10 TABLET ORAL DAILY
Qty: 90 TABLET | Refills: 1 | Status: SHIPPED | OUTPATIENT
Start: 2025-04-01

## 2025-06-05 NOTE — TELEPHONE ENCOUNTER
See results below. Spoke with patient regarding lab results. Patient aware, all questions answered.    Pharmacy location confirmed with patient and order placed Render In Strict Bullet Format?: No Initiate Treatment: Apply opzelura BID to face Detail Level: Zone Samples Given: (1) Opzelura

## 2025-06-16 RX ORDER — SPIRONOLACTONE 25 MG/1
TABLET ORAL
Qty: 180 TABLET | Refills: 1 | Status: SHIPPED | OUTPATIENT
Start: 2025-06-16

## 2025-06-16 NOTE — TELEPHONE ENCOUNTER
Requesting Spironolactone 25mg  LOV: 3/31/25 Telemedicine  RTC: prn  Last Relevant Labs: 8/5/24  Filled: 12/13/24 #180 with 1  refills    No future appointments.    Hypertension Medications Protocol [id number]/15/2025 06:49 AM   Protocol Details   Last BP reading less than 140/90    Medication is active on med list    CMP or BMP in past 12 months    In person appointment or virtual visit in the past 12 mos or appointment in next 3 mos    EGFRCR or GFRNAA > 50     Rx sent to pharmacy per protocol

## 2025-06-23 PROBLEM — C43.61 MALIGNANT MELANOMA OF RIGHT UPPER ARM (HCC): Status: ACTIVE | Noted: 2025-06-23
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
## 2025-06-25 ENCOUNTER — APPOINTMENT (OUTPATIENT)
Dept: CT IMAGING | Age: 60
End: 2025-06-25
Attending: EMERGENCY MEDICINE
Payer: COMMERCIAL

## 2025-06-25 ENCOUNTER — OFFICE VISIT (OUTPATIENT)
Dept: SURGERY | Facility: CLINIC | Age: 60
End: 2025-06-25
Payer: COMMERCIAL

## 2025-06-25 ENCOUNTER — TELEPHONE (OUTPATIENT)
Dept: FAMILY MEDICINE CLINIC | Facility: CLINIC | Age: 60
End: 2025-06-25

## 2025-06-25 ENCOUNTER — HOSPITAL ENCOUNTER (EMERGENCY)
Age: 60
Discharge: HOME | End: 2025-06-25
Attending: EMERGENCY MEDICINE
Payer: COMMERCIAL

## 2025-06-25 VITALS
TEMPERATURE: 98 F | BODY MASS INDEX: 32.96 KG/M2 | OXYGEN SATURATION: 100 % | RESPIRATION RATE: 15 BRPM | DIASTOLIC BLOOD PRESSURE: 73 MMHG | HEIGHT: 63 IN | HEART RATE: 72 BPM | WEIGHT: 186 LBS | SYSTOLIC BLOOD PRESSURE: 151 MMHG

## 2025-06-25 VITALS
DIASTOLIC BLOOD PRESSURE: 99 MMHG | SYSTOLIC BLOOD PRESSURE: 163 MMHG | RESPIRATION RATE: 16 BRPM | BODY MASS INDEX: 33 KG/M2 | TEMPERATURE: 99 F | HEART RATE: 97 BPM | WEIGHT: 186 LBS | OXYGEN SATURATION: 99 %

## 2025-06-25 DIAGNOSIS — J45.42 MODERATE PERSISTENT ASTHMA WITH STATUS ASTHMATICUS (HCC): ICD-10-CM

## 2025-06-25 DIAGNOSIS — N23 RENAL COLIC: Primary | ICD-10-CM

## 2025-06-25 DIAGNOSIS — C43.61 MALIGNANT MELANOMA OF RIGHT UPPER ARM (HCC): Primary | ICD-10-CM

## 2025-06-25 DIAGNOSIS — C73 MALIGNANT NEOPLASM OF THYROID GLAND (HCC): ICD-10-CM

## 2025-06-25 LAB
BILIRUB UR QL STRIP.AUTO: NEGATIVE
CLARITY UR REFRACT.AUTO: CLEAR
COLOR UR AUTO: YELLOW
GLUCOSE UR STRIP.AUTO-MCNC: NEGATIVE MG/DL
KETONES UR STRIP.AUTO-MCNC: NEGATIVE MG/DL
LEUKOCYTE ESTERASE UR QL STRIP.AUTO: NEGATIVE
NITRITE UR QL STRIP.AUTO: NEGATIVE
PH UR STRIP.AUTO: 7 [PH] (ref 5–8)
PROT UR STRIP.AUTO-MCNC: NEGATIVE MG/DL
SP GR UR STRIP.AUTO: 1.02 (ref 1–1.03)
UROBILINOGEN UR STRIP.AUTO-MCNC: 0.2 MG/DL

## 2025-06-25 PROCEDURE — 81015 MICROSCOPIC EXAM OF URINE: CPT | Performed by: EMERGENCY MEDICINE

## 2025-06-25 PROCEDURE — 96361 HYDRATE IV INFUSION ADD-ON: CPT

## 2025-06-25 PROCEDURE — 74176 CT ABD & PELVIS W/O CONTRAST: CPT | Performed by: EMERGENCY MEDICINE

## 2025-06-25 PROCEDURE — 81001 URINALYSIS AUTO W/SCOPE: CPT | Performed by: EMERGENCY MEDICINE

## 2025-06-25 PROCEDURE — 99285 EMERGENCY DEPT VISIT HI MDM: CPT

## 2025-06-25 PROCEDURE — 99284 EMERGENCY DEPT VISIT MOD MDM: CPT

## 2025-06-25 PROCEDURE — 96374 THER/PROPH/DIAG INJ IV PUSH: CPT

## 2025-06-25 PROCEDURE — 96375 TX/PRO/DX INJ NEW DRUG ADDON: CPT

## 2025-06-25 PROCEDURE — 99205 OFFICE O/P NEW HI 60 MIN: CPT | Performed by: SURGERY

## 2025-06-25 RX ORDER — ONDANSETRON 2 MG/ML
4 INJECTION INTRAMUSCULAR; INTRAVENOUS ONCE
Status: COMPLETED | OUTPATIENT
Start: 2025-06-25 | End: 2025-06-25

## 2025-06-25 RX ORDER — POTASSIUM CHLORIDE 1500 MG/1
TABLET, EXTENDED RELEASE ORAL
Qty: 90 TABLET | Refills: 0 | Status: SHIPPED | OUTPATIENT
Start: 2025-06-25

## 2025-06-25 RX ORDER — KETOROLAC TROMETHAMINE 15 MG/ML
15 INJECTION, SOLUTION INTRAMUSCULAR; INTRAVENOUS ONCE
Status: COMPLETED | OUTPATIENT
Start: 2025-06-25 | End: 2025-06-25

## 2025-06-25 NOTE — PATIENT INSTRUCTIONS
Surgery:  Wide excision melanoma right upper arm with sentinel lymph node biopsy    Date of Surgery:    Hospital:    [facility]- [address] [place] [id number]  Phone: [phone]    This is an Outpatient procedure.  Use the provided Chlorhexadine surgical soap(instructions attached) to shower the night before and morning of your procedure.  Do not apply powders, creams, lotions or deodorant after showering.  Do not apply any kind of makeup and make sure to remove nail polish prior to your surgery.  For faster recovery from anesthesia and surgery please follow the instructions below regarding your pre-op diet:  12 hours prior to your surgery time you are to drink one 10oz bottle of Ensure Pre-Surgery Drink. You are to have NO solid food or water after 11pm the night before your surgery EXCEPT one additional 10oz bottle of Ensure Pre-Surgery Drink. You need to finish drinking this 4 hours prior to surgery time.  Take Tylenol 1000mg by mouth at the time of your second Ensure Pre-Surgery drink(4hrs prior to surgery time), unless instructed otherwise by your surgeon.   Bowel Prep: No   If you take Insulin contact your primary care physician for specific instructions regarding dosing around your surgery.  Do not drink alcohol or smoke tobacco products 24 hours prior to procedure.   Bring your picture ID and insurance card with you.  Wear comfortable clothing that can easily be removed. Preferably, something that zips, snaps, or buttons up the front.   If you are taking blood thinners including: Plavix, Eliquis, Xarelto, Coumadin, full strength Aspirin you will need to contact the prescribing provider for specific instructions on holding these medications for your procedure.  Motrin, Advil, Ibuprofen, Aspirin, Baby Aspirin and Fish Oil are also blood thinners and need to be held at least one week prior to your procedure. It is okay to take Tylenol.  Inform your primary care physician of your surgery  and ask if him/her will need to see you prior to surgery.  If you develop symptoms of another illness prior to surgery please contact our office immediately.   If this is an inpatient surgery, attending the Surgical Oncology Pre-operative Education Class is strongly encouraged. Email [email or web address] to RSVP or for more class information.       Pre-Operative Testing  x CBC x CMP  BMP    PT, PTT, INR  UA x EKG    Chest X-Ray  Cardiac Clearance x H & P Medical Clearance     Se Cartagena MD, FACS  Chief of Surgical Oncology  Co-Medical Director, Oncology Services  Professor of Surgery    For Dr. Cartagena's office: [phone]  Fax: [phone]  After hours you will reach the answering service    Pre-Admission [phone]  Central [phone]  Medical Records:   [phone]    Diagnosis: Malignant Melanoma right upper arm     SURGEON: Dr. Se Cartagena    LOCATION: [facility]    Type: Outpatient    Length of surgery: 1 hour  Anesthesia:general  Joint case with:   SA:  No   Special Equipment:   Special request:     Allergies:   Allergies   Allergen Reactions    Amoxicillin HIVES and UNKNOWN     TABS    Levofloxacin ANAPHYLAXIS and UNKNOWN    Lyrica [Pregabalin] SWELLING    Cat Hair Extract ASTHMA, Coughing, Runny nose, SHORTNESS OF BREATH and WHEEZING    Trees, [place] ASTHMA, Runny nose and WHEEZING       Orders:  No  -Colon Bundle/Bowel Prep  No  -Type and cross 2 units PRBC  No  -Type and Screen  No  -Advanced Oncology Order Set (HIPEC)  Yes-Heparin Pre-Op  Yes-Nuclear Med Injection  No  -Wire localization needed  No  -Midline placement (HIPEC, Whipple, Esophagectomy, Liver)  Yes-Tylenol administration prior to surgery  Does patient have a pacemaker?: No    No  -CHG Cloths ([place])    P.A.T. orders: CBC, CMP, and EKG     For RN use only: Medical Clearance

## 2025-06-25 NOTE — TELEPHONE ENCOUNTER
Call transferred from PSR:      Spoke to patient, states she thinks she has been passing a kidney stone since Monday. Hx of large kidney stones, s/p lithotripsy. C/o lower abdominal pain.  +urgency  +frequency  Patient states she called the last urology she saw but hasn't been seen since 2021 so was told to call PCP for testing. Advised outpatient testing wouldn't be able to be scheduled for a couple of weeks. Advised to seek care at ER if patient feels stone is too large to pass or if she experiences any hematuria or difficulty urinating. Patient states she as an appointment with onc surg today that she can't miss at 2:45 but will go to the ER after the appointment if she feels worse. Patient verbalized understanding and agreement. All questions answered.

## 2025-06-25 NOTE — TELEPHONE ENCOUNTER
Requesting Potassium Chloride 20meq  Last OV: 3/31/25 Telemedicine  RTC: not noted  Last Rx'd 3/28/25 #90 with 0 refills    Future Appointments   Date Time Provider Department Center   6/25/2025  2:45 PM Se Cartagena MD [facility]   7/16/2025  2:00 PM Alecia Santos MD [id number]       Non-protocol med:  Rx pended and routed for approval/denial
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
## 2025-06-27 ENCOUNTER — TELEPHONE (OUTPATIENT)
Dept: FAMILY MEDICINE CLINIC | Facility: CLINIC | Age: 60
End: 2025-06-27

## 2025-06-27 DIAGNOSIS — C43.61 MALIGNANT MELANOMA OF RIGHT UPPER ARM (HCC): Primary | ICD-10-CM

## 2025-06-27 DIAGNOSIS — Z01.818 PRE-OP TESTING: Primary | ICD-10-CM

## 2025-06-27 DIAGNOSIS — N23 RENAL COLIC: ICD-10-CM

## 2025-06-27 DIAGNOSIS — R10.9 FLANK PAIN: ICD-10-CM

## 2025-06-27 NOTE — TELEPHONE ENCOUNTER
Patient will need CBC and CMP and EKG done prior to upcoming pre-surgical appointment.  Future Appointments   Date Time Provider Department Center   7/9/2025  3:00 PM Rodrigo Estrella MD [facility] [address] [facility] [address]      Please place orders.     Patient has a 9 mm stone-keeps moving location and has been trying to pass since Monday evening.  Please advise on how patient can get rid of that.

## 2025-06-27 NOTE — TELEPHONE ENCOUNTER
Date of surgery: 7/25/2025  Surgeon: Dr. Cartagena  Procedure: wide excision melanoma right upper arm with sentinel lymph node biopsy    CBC, CMP and EKG ordered.    MCM sent to patient.

## 2025-06-29 ENCOUNTER — HOSPITAL ENCOUNTER (EMERGENCY)
Facility: HOSPITAL | Age: 60
Discharge: HOME | End: 2025-06-29
Attending: EMERGENCY MEDICINE
Payer: COMMERCIAL

## 2025-06-29 VITALS
HEIGHT: 63 IN | RESPIRATION RATE: 16 BRPM | TEMPERATURE: 98 F | OXYGEN SATURATION: 98 % | WEIGHT: 186 LBS | BODY MASS INDEX: 32.96 KG/M2 | SYSTOLIC BLOOD PRESSURE: 151 MMHG | HEART RATE: 91 BPM | DIASTOLIC BLOOD PRESSURE: 83 MMHG

## 2025-06-29 DIAGNOSIS — N21.0 URINARY BLADDER STONE: Primary | ICD-10-CM

## 2025-06-29 LAB
BILIRUB UR QL STRIP.AUTO: NEGATIVE
CLARITY UR REFRACT.AUTO: CLEAR
COLOR UR AUTO: YELLOW
GLUCOSE UR STRIP.AUTO-MCNC: NORMAL MG/DL
KETONES UR STRIP.AUTO-MCNC: NEGATIVE MG/DL
LEUKOCYTE ESTERASE UR QL STRIP.AUTO: NEGATIVE
NITRITE UR QL STRIP.AUTO: NEGATIVE
PH UR STRIP.AUTO: 5.5 [PH] (ref 5–8)
PROT UR STRIP.AUTO-MCNC: NEGATIVE MG/DL
RBC #/AREA URNS AUTO: >10 /HPF
SP GR UR STRIP.AUTO: 1.01 (ref 1–1.03)
UROBILINOGEN UR STRIP.AUTO-MCNC: NORMAL MG/DL

## 2025-06-29 PROCEDURE — 99284 EMERGENCY DEPT VISIT MOD MDM: CPT

## 2025-06-29 PROCEDURE — 81001 URINALYSIS AUTO W/SCOPE: CPT | Performed by: EMERGENCY MEDICINE

## 2025-06-29 RX ORDER — TAMSULOSIN HYDROCHLORIDE 0.4 MG/1
0.4 CAPSULE ORAL DAILY
Qty: 7 CAPSULE | Refills: 0 | Status: SHIPPED | OUTPATIENT
Start: 2025-06-29 | End: 2025-07-14
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
## 2025-06-29 NOTE — ED INITIAL ASSESSMENT (HPI)
Pt presents to the ED with c/o dx with kidney stone the other day at PED, was told in bladder. Pt states she has continued discomfort and does not feel like she passed the stone. Pt states she is taking azo, tylenol and ibuprofen with some relief. Pt awake and alert, skin w/d,resps reg/unlabored.

## 2025-06-30 RX ORDER — METFORMIN HYDROCHLORIDE 750 MG/1
TABLET, EXTENDED RELEASE ORAL
Qty: 90 TABLET | Refills: 0 | Status: SHIPPED | OUTPATIENT
Start: 2025-06-30

## 2025-06-30 NOTE — CM/SW NOTE
Received a call from the pt requesting to assist her w/ expedite appt w/ Dr.Craig ARSENIO Davis MD as soon as possible for a visit in 2 days (around 7/1/2025)  Specialty: UROLOGY  Contact:  [address]  [address] [address] [phone]  Pt stated called Dr. Davis's office and they took her message and they have not called back w/ a appt sched.  Will endorse to incoming edcm in the morning.

## 2025-06-30 NOTE — TELEPHONE ENCOUNTER
Requesting Metformin 750mg  LOV: 3/31/25 Telemedicine  RTC: prn  Last Relevant Labs: 8/5/24  Filled: 3/28/25 #90 with 0 refills    Future Appointments   Date Time Provider Department Center   7/1/2025  8:00 AM [facility] [place]   7/1/2025  8:30 AM [facility] [place]   7/9/2025  3:00 PM Rodrigo Estrella MD [facility] [address]   7/25/2025  8:00 AM [facility]  [facility]     Diabetes Medication Protocol [id number]/30/2025 12:02 PM   Protocol Details   Last A1C < 7.5 and within past 6 months    Microalbumin procedure in past 12 months or taking ACE/ARB    In person appointment or virtual visit in the past 6 mos or appointment in next 3 mos    EGFRCR or GFRNAA > 50    GFR in the past 12 months    Medication is active on med list     Rx sent to pharmacy per protocol

## 2025-07-01 ENCOUNTER — TELEPHONE (OUTPATIENT)
Dept: CASE MANAGEMENT | Facility: HOSPITAL | Age: 60
End: 2025-07-01

## 2025-07-01 ENCOUNTER — LAB ENCOUNTER (OUTPATIENT)
Dept: LAB | Age: 60
End: 2025-07-01
Attending: FAMILY MEDICINE
Payer: COMMERCIAL

## 2025-07-01 ENCOUNTER — EKG ENCOUNTER (OUTPATIENT)
Dept: LAB | Age: 60
End: 2025-07-01
Attending: FAMILY MEDICINE
Payer: COMMERCIAL

## 2025-07-01 DIAGNOSIS — Z01.818 PRE-OP TESTING: ICD-10-CM

## 2025-07-01 LAB
ALBUMIN SERPL-MCNC: 5.1 G/DL (ref 3.2–4.8)
ALBUMIN/GLOB SERPL: 2.4 {RATIO} (ref 1–2)
ALP LIVER SERPL-CCNC: 79 U/L (ref 46–118)
ALT SERPL-CCNC: 22 U/L (ref 10–49)
ANION GAP SERPL CALC-SCNC: 7 MMOL/L (ref 0–18)
AST SERPL-CCNC: 17 U/L (ref ?–34)
BASOPHILS # BLD AUTO: 0.1 X10(3) UL (ref 0–0.2)
BASOPHILS NFR BLD AUTO: 1.1 %
BILIRUB SERPL-MCNC: 0.6 MG/DL (ref 0.3–1.2)
BUN BLD-MCNC: 21 MG/DL (ref 9–23)
CALCIUM BLD-MCNC: 9.8 MG/DL (ref 8.7–10.6)
CHLORIDE SERPL-SCNC: 101 MMOL/L (ref 98–112)
CO2 SERPL-SCNC: 32 MMOL/L (ref 21–32)
CREAT BLD-MCNC: 0.82 MG/DL (ref 0.55–1.02)
EGFRCR SERPLBLD CKD-EPI 2021: 82 ML/MIN/1.73M2 (ref 60–?)
EOSINOPHIL # BLD AUTO: 0.24 X10(3) UL (ref 0–0.7)
EOSINOPHIL NFR BLD AUTO: 2.7 %
ERYTHROCYTE [DISTWIDTH] IN BLOOD BY AUTOMATED COUNT: 13.7 %
FASTING STATUS PATIENT QL REPORTED: YES
GLOBULIN PLAS-MCNC: 2.1 G/DL (ref 2–3.5)
GLUCOSE BLD-MCNC: 116 MG/DL (ref 70–99)
HCT VFR BLD AUTO: 42 % (ref 35–48)
HGB BLD-MCNC: 13.8 G/DL (ref 12–16)
IMM GRANULOCYTES # BLD AUTO: 0.09 X10(3) UL (ref 0–1)
IMM GRANULOCYTES NFR BLD: 1 %
LYMPHOCYTES # BLD AUTO: 2.29 X10(3) UL (ref 1–4)
LYMPHOCYTES NFR BLD AUTO: 26.1 %
MCH RBC QN AUTO: 29.6 PG (ref 26–34)
MCHC RBC AUTO-ENTMCNC: 32.9 G/DL (ref 31–37)
MCV RBC AUTO: 89.9 FL (ref 80–100)
MONOCYTES # BLD AUTO: 0.48 X10(3) UL (ref 0.1–1)
MONOCYTES NFR BLD AUTO: 5.5 %
NEUTROPHILS # BLD AUTO: 5.59 X10 (3) UL (ref 1.5–7.7)
NEUTROPHILS # BLD AUTO: 5.59 X10(3) UL (ref 1.5–7.7)
NEUTROPHILS NFR BLD AUTO: 63.6 %
OSMOLALITY SERPL CALC.SUM OF ELEC: 294 MOSM/KG (ref 275–295)
PLATELET # BLD AUTO: 414 10(3)UL (ref 150–450)
POTASSIUM SERPL-SCNC: 4.5 MMOL/L (ref 3.5–5.1)
PROT SERPL-MCNC: 7.2 G/DL (ref 5.7–8.2)
RBC # BLD AUTO: 4.67 X10(6)UL (ref 3.8–5.3)
SODIUM SERPL-SCNC: 140 MMOL/L (ref 136–145)
WBC # BLD AUTO: 8.8 X10(3) UL (ref 4–11)

## 2025-07-01 PROCEDURE — 36415 COLL VENOUS BLD VENIPUNCTURE: CPT

## 2025-07-01 PROCEDURE — 80053 COMPREHEN METABOLIC PANEL: CPT

## 2025-07-01 PROCEDURE — 93010 ELECTROCARDIOGRAM REPORT: CPT | Performed by: INTERNAL MEDICINE

## 2025-07-01 PROCEDURE — 85025 COMPLETE CBC W/AUTO DIFF WBC: CPT

## 2025-07-01 PROCEDURE — 93005 ELECTROCARDIOGRAM TRACING: CPT

## 2025-07-01 NOTE — CM/SW NOTE
Patient requesting sooner appointment with Urology.  Called Dr. Ari Davis's office, [phone].  Per Amy MUNROE, they need a referral before they can book the appointment.  Patient can book with any provider in the office, not just Dr. Davis.  Called patient.  Patient calling office to discuss referral and schedule follow up appointment.

## 2025-07-02 ENCOUNTER — OFFICE VISIT (OUTPATIENT)
Facility: LOCATION | Age: 60
End: 2025-07-02
Payer: COMMERCIAL

## 2025-07-02 DIAGNOSIS — N21.0 BLADDER STONE: Primary | ICD-10-CM

## 2025-07-02 DIAGNOSIS — N20.0 NEPHROLITHIASIS: ICD-10-CM

## 2025-07-02 DIAGNOSIS — R31.0 GROSS HEMATURIA: ICD-10-CM

## 2025-07-02 DIAGNOSIS — R39.89 BLADDER PAIN: ICD-10-CM

## 2025-07-02 LAB
APPEARANCE: CLEAR
BILIRUBIN: NEGATIVE
GLUCOSE (URINE DIPSTICK): NEGATIVE MG/DL
KETONES (URINE DIPSTICK): NEGATIVE MG/DL
LEUKOCYTES: NEGATIVE
MULTISTIX LOT#: ABNORMAL NUMERIC
NITRITE, URINE: NEGATIVE
PH, URINE: 5.5 (ref 4.5–8)
PROTEIN (URINE DIPSTICK): 30 MG/DL
SPECIFIC GRAVITY: 1.02 (ref 1–1.03)
URINE-COLOR: YELLOW
UROBILINOGEN,SEMI-QN: 0.2 MG/DL (ref 0–1.9)

## 2025-07-02 PROCEDURE — 99204 OFFICE O/P NEW MOD 45 MIN: CPT

## 2025-07-02 PROCEDURE — 81003 URINALYSIS AUTO W/O SCOPE: CPT
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
## 2025-07-02 NOTE — PATIENT INSTRUCTIONS
Cystoscopy    Cystoscopy is a test that allows your doctor to look at the inside of the bladder and the urethra using a thin, lighted instrument called a cystoscope.  The cystoscope is inserted into your urethra and slowly advanced into the bladder. Cystoscopy allows your doctor to look at areas of your bladder and urethra that usually do not show up well on X-rays. Tiny surgical instruments can be inserted through the cystoscope that allow your doctor to remove samples of tissue (biopsy) or samples of urine.  Small bladder stones and some small growths can be removed during cystoscopy. This may eliminate the need for more extensive surgery.   Why It Is Done  Cystoscopy may be done to:  Find the cause of symptoms such as blood in the urine (hematuria), painful urination (dysuria), urinary incontinence, urinary frequency or hesitancy, an inability to pass urine (retention), or a sudden and overwhelming need to urinate (urgency).   Find the cause of problems of the urinary tract, such as frequent, repeated urinary tract infections or urinary tract infections that do not respond to treatment.   Look for problems in the urinary tract, such as blockage in the urethra caused by an enlarged prostate, kidney stones, or tumors.   Evaluate problems that cannot be seen on X-ray or to further investigate problems detected by ultrasound or during intravenous pyelography, such as kidney stones or tumors.   Remove tissue samples for biopsy.   Remove foreign objects.   Treat urinary tract problems. For example, cystoscopy can be done to remove urinary tract stones or growths, treat bleeding in the bladder, relieve blockages in the urethra, or treat or remove tumors.   How To Prepare  You may eat and drink normally before the procedure. You may be asked to give a urine sample at the time of your procedure. Please do not urinate before.  How It Is Done  Cystoscopy is performed by a urologist, with one or more assistants. The test is  done in a special testing room in the doctor's office.  You will need undress from the waste down, and you will be given a cloth or paper covering to use during the test. You will lie on your back on a special table. Females will have their knees bent and feet placed in stirrups. Males will lay flat on the table, legs straight. Your genital area is cleaned with an antiseptic solution, and your abdomen and thighs are covered with sterile cloths. A local anesthetic jelly will be inserted into the urethra. No needles are used.   After the anesthetic takes effect, a well-lubricated cystoscope is inserted into your urethra and slowly advanced into your bladder. If your urethra has a spot that is too narrow to allow the scope to pass, other smaller instruments are inserted first to gradually enlarge the opening.  After the cystoscope is inside your bladder, either sterile water or saline is injected through the scope to help expand your bladder and to create a clear view. Tiny instruments may be inserted through the scope to collect tissue samples for biopsy if necessary; the tissue samples then are sent to the laboratory for analysis.  The cystoscope is usually in your bladder for only 2 to 10 minutes. But the entire test may take up to 30 minutes or longer.  You will be able to get up immediately following the procedure and proceed with normal daily activities.  How It Feels  Most people report that this test is not nearly as uncomfortable as they had expected.   When local anesthetic is used, you may feel a burning sensation or an urge to urinate when the instrument is inserted and removed. Also, when your bladder is irrigated with sterile water or saline, you may feel a cool sensation, an uncomfortable fullness, and an urgent need to urinate. Try to relax during the test by taking slow, deep breaths. Also, if the test is lengthy, lying on the table can become tiring and uncomfortable.   After the test  After the test,  you may need to urinate frequently, with some burning during and after urination for a day or two. Drink lots of fluids to help minimize the burning and to prevent a urinary tract infection. You may also see a tinge of blood in the urine for 2-3 days.    You will be given an instruction sheet following your cystoscopy with post procedure instructions.   Results  Cystoscopy is a test that allows the doctor to look at the inside of the bladder and the urethra. Your doctor may be able to talk to you about some of the results right after the cystoscopy. If a biopsy is preformed, the results usually take several days to become available. You will be called promptly with results.   Thank you for the pleasure of allowing us to be involved in your care.

## 2025-07-03 ENCOUNTER — PROCEDURE (OUTPATIENT)
Facility: LOCATION | Age: 60
End: 2025-07-03
Payer: COMMERCIAL

## 2025-07-03 DIAGNOSIS — R39.89 BLADDER PAIN: Primary | ICD-10-CM

## 2025-07-03 DIAGNOSIS — N21.0 BLADDER STONE: ICD-10-CM

## 2025-07-03 LAB
APPEARANCE: CLEAR
ATRIAL RATE: 77 BPM
BILIRUBIN: NEGATIVE
GLUCOSE (URINE DIPSTICK): NEGATIVE MG/DL
KETONES (URINE DIPSTICK): NEGATIVE MG/DL
MULTISTIX LOT#: ABNORMAL NUMERIC
NITRITE, URINE: NEGATIVE
P AXIS: 64 DEGREES
P-R INTERVAL: 160 MS
PH, URINE: 5.5 (ref 4.5–8)
PROTEIN (URINE DIPSTICK): NEGATIVE MG/DL
Q-T INTERVAL: 390 MS
QRS DURATION: 102 MS
QTC CALCULATION (BEZET): 441 MS
R AXIS: 22 DEGREES
SPECIFIC GRAVITY: 1.01 (ref 1–1.03)
T AXIS: 47 DEGREES
URINE-COLOR: YELLOW
UROBILINOGEN,SEMI-QN: 0.2 MG/DL (ref 0–1.9)
VENTRICULAR RATE: 77 BPM

## 2025-07-03 PROCEDURE — 88108 CYTOPATH CONCENTRATE TECH: CPT | Performed by: UROLOGY

## 2025-07-03 PROCEDURE — 52315 CYSTOSCOPY AND TREATMENT: CPT | Performed by: UROLOGY

## 2025-07-03 PROCEDURE — 99214 OFFICE O/P EST MOD 30 MIN: CPT | Performed by: UROLOGY

## 2025-07-03 PROCEDURE — 82365 CALCULUS SPECTROSCOPY: CPT | Performed by: UROLOGY

## 2025-07-03 PROCEDURE — 88300 SURGICAL PATH GROSS: CPT | Performed by: UROLOGY

## 2025-07-03 PROCEDURE — 81003 URINALYSIS AUTO W/O SCOPE: CPT | Performed by: UROLOGY

## 2025-07-03 RX ORDER — SULFAMETHOXAZOLE AND TRIMETHOPRIM 800; 160 MG/1; MG/1
1 TABLET ORAL ONCE
Status: COMPLETED | OUTPATIENT
Start: 2025-07-03 | End: 2025-07-03

## 2025-07-03 RX ADMIN — SULFAMETHOXAZOLE AND TRIMETHOPRIM 1 TABLET: 800; 160 TABLET ORAL at 09:59:00

## 2025-07-03 NOTE — PROGRESS NOTES
Clinic Procedure Note    INDICATIONS:      Maryanne Reyes is a 59 year old female with hx of kidney stones, anxiety, thyroid cancer (s/p PERRY, stage 1), ADHD, HTN, HLD, and asthma, who presents to the office today for kidney stone consultation.    Patient previously followed [facility] in 2020 and 2021.  At that time, she had had a right-sided ureteroscopy in June 2020 for a kidney stone.  She later had shockwave lithotripsy 2 months later.  This was complicated by perirenal fluid collection that required IR drainage.  More recently patient was in the emergency room on 6/25/2025 with left pelvic pain.  She also had some urinary symptoms and nausea.  Your CT scan was done and showed left hydronephrosis and hydroureter, possible passed stone.  Tiny 1 mm lower pole stone was noted.  9 mm stone within the bladder.  Possibly passed stone.  She had ongoing pain and return to the emergency room a few days later.  She was discharged home with tamsulosin and pain control.  She was then advised to see urology.  She saw us yesterday.  Saw RUI Luevano.  She reported ongoing pelvic pressure, she feels that she can feel the stone moving around in her bladder.  She does have some gross hematuria.  She has some nausea associated with her symptoms.  No signs of sepsis.  At that visit, cystoscopy was planned for further evaluation of the above.  Today, patient has ongoing bladder pain.  No flank pain.      PROCEDURE:       1. Flexible cystourethroscopy    DATE OF PROCEDURE: 7/3/2025     PRE-PROCEDURE DIAGNOSIS: Gross hematuria, bladder stone     POST-PROCEDURE DIAGNOSIS: Same     SURGEON: Jose Guadalupe Galvin MD    FINDINGS:    Urethra: Normal caliber urethra throughout without lesions    Bladder: Normal mucosa with no papillary lesions or erythema, no trabeculation; 1 cm soft appearing stone at the base of the bladder-this was easily fragmented using a flexible grasper and then subsequently removed atraumatically.    Ureteral  orifices: Orthotopic    Other findings: None    PROCEDURE:   Patient was brought to the procedure suite and a time-out was performed identifiying the patient,  and procedure to be performed. The risks and benefits of the procedure were once again discussed with the patient including bleeding, infection, and dysuria. The patient agreed to proceed. The patient did not have any signs or symptoms of active UTI.    She was placed in supine position on the table with legs to the sides and the genital area was prepped and draped in the standard sterile fashion. A flexible cystoscope was inserted per urethra. There were no urethral strictures present. The bladder was fully inspected (including retroflexion) and showed findings as above. Both ureteral orifices were orthotopic.   Is a soft appearing 1 cm stone at the base of the bladder.  I used a flexible grasping forcep to gently crush the stone and then subsequently removed the largest fragment with the grasping forceps.  The remainder of the stone  debris was then irrigated out of the bladder.  This was done atraumatically.  Verbal consent was obtained prior to attempting to remove the stone.  We did briefly discuss the option to do this in the operating room, patient had preferred to do this In the clinic   If able.  The scope was then carefully removed and once again no urethral abnormalities were noted.  There were no complications and the patient tolerated the procedure well.    IMPRESSION AND PLAN:   Gross hematuria  Kidney stones  Bladder stone    Workup as above.  Small bladder stone was able to be removed after gently crushing with a grasper.  This was done atraumatically and she tolerated the procedure well.  We discussed her above stone history.  Will plan for a metabolic workup with 24-hour urine, blood work.  This is ordered.  Given her hydronephrosis on last exam and gross hematuria we will order a CT urogram to be done in 1 month to complete her  workup.  Increase fluid intake.  Will send urine cytology.  Send stone fragment for analysis.         Return in 2mo     In total, an additional 30 minutes were spent on this patient encounter (including chart review, patient history, physical, and counseling, documentation, and communication).          Jose Guadalupe Galvin MD  Staff Urologist  [facility]  Office: [phone]

## 2025-07-07 RX ORDER — ALENDRONATE SODIUM 70 MG/1
70 TABLET ORAL WEEKLY
Qty: 12 TABLET | Refills: 1 | Status: SHIPPED | OUTPATIENT
Start: 2025-07-07

## 2025-07-07 NOTE — TELEPHONE ENCOUNTER
Requesting Alendronate 70mg  LOV: 3/31/25 Telemedicine  RTC: prn  Last Dexa: 11/24/23  Filled: 7/30/24 #90 with 3 refills    Future Appointments   Date Time Provider Department Center   7/9/2025  3:00 PM Rodrigo Estrella MD [facility] [address] [facility] [address]   7/25/2025  9:00 AM [facility]   8/8/2025  8:45 AM Jose Guadalupe Galvin MD [id number] [address]     Osteoporosis Medication Protocol [id number]/2025 01:20 PM   Protocol Details   In person appointment or virtual visit in the past 6 mos or appointment in next 3 mos    DEXA scan within past 2 years    CMP within the past 12 months    Calcium level between 8.3 and 10.3    GFR level greater than 35    Medication is active on med list     Rx sent to pharmacy per protocol

## 2025-07-08 ENCOUNTER — RESULTS FOLLOW-UP (OUTPATIENT)
Facility: LOCATION | Age: 60
End: 2025-07-08

## 2025-07-08 LAB — NON GYNE INTERPRETATION: NEGATIVE

## 2025-07-09 ENCOUNTER — OFFICE VISIT (OUTPATIENT)
Dept: FAMILY MEDICINE CLINIC | Facility: CLINIC | Age: 60
End: 2025-07-09
Payer: COMMERCIAL

## 2025-07-09 VITALS
BODY MASS INDEX: 32.96 KG/M2 | SYSTOLIC BLOOD PRESSURE: 158 MMHG | HEART RATE: 92 BPM | TEMPERATURE: 98 F | RESPIRATION RATE: 16 BRPM | HEIGHT: 63 IN | DIASTOLIC BLOOD PRESSURE: 88 MMHG | WEIGHT: 186 LBS | OXYGEN SATURATION: 98 %

## 2025-07-09 DIAGNOSIS — E66.9 OBESITY (BMI 30-39.9): ICD-10-CM

## 2025-07-09 DIAGNOSIS — Z01.818 PREOPERATIVE CLEARANCE: Primary | ICD-10-CM

## 2025-07-09 DIAGNOSIS — I10 ESSENTIAL HYPERTENSION: ICD-10-CM

## 2025-07-09 DIAGNOSIS — F90.0 ATTENTION DEFICIT HYPERACTIVITY DISORDER (ADHD), PREDOMINANTLY INATTENTIVE TYPE: ICD-10-CM

## 2025-07-09 DIAGNOSIS — C43.61 MALIGNANT MELANOMA OF RIGHT UPPER ARM (HCC): ICD-10-CM

## 2025-07-09 PROCEDURE — 99215 OFFICE O/P EST HI 40 MIN: CPT | Performed by: FAMILY MEDICINE

## 2025-07-09 RX ORDER — PHENTERMINE HYDROCHLORIDE 37.5 MG/1
37.5 TABLET ORAL
Qty: 30 TABLET | Refills: 2 | Status: SHIPPED | OUTPATIENT
Start: 2025-07-09

## 2025-07-09 RX ORDER — METHYLPREDNISOLONE 4 MG/1
TABLET ORAL
Qty: 1 EACH | Refills: 0 | Status: SHIPPED | OUTPATIENT
Start: 2025-07-09

## 2025-07-09 RX ORDER — DEXTROAMPHETAMINE SACCHARATE, AMPHETAMINE ASPARTATE MONOHYDRATE, DEXTROAMPHETAMINE SULFATE AND AMPHETAMINE SULFATE 5; 5; 5; 5 MG/1; MG/1; MG/1; MG/1
20 CAPSULE, EXTENDED RELEASE ORAL DAILY
Qty: 30 CAPSULE | Refills: 0 | Status: SHIPPED | OUTPATIENT
Start: 2025-08-09 | End: 2025-07-14

## 2025-07-09 RX ORDER — DEXTROAMPHETAMINE SACCHARATE, AMPHETAMINE ASPARTATE MONOHYDRATE, DEXTROAMPHETAMINE SULFATE AND AMPHETAMINE SULFATE 5; 5; 5; 5 MG/1; MG/1; MG/1; MG/1
20 CAPSULE, EXTENDED RELEASE ORAL DAILY
Qty: 30 CAPSULE | Refills: 0 | Status: SHIPPED | OUTPATIENT
Start: 2025-07-09 | End: 2025-08-08

## 2025-07-09 RX ORDER — DEXTROAMPHETAMINE SACCHARATE, AMPHETAMINE ASPARTATE MONOHYDRATE, DEXTROAMPHETAMINE SULFATE AND AMPHETAMINE SULFATE 5; 5; 5; 5 MG/1; MG/1; MG/1; MG/1
20 CAPSULE, EXTENDED RELEASE ORAL DAILY
Qty: 30 CAPSULE | Refills: 0 | Status: SHIPPED | OUTPATIENT
Start: 2025-09-09 | End: 2025-07-14

## 2025-07-09 RX ORDER — CLINDAMYCIN HYDROCHLORIDE 300 MG/1
300 CAPSULE ORAL 3 TIMES DAILY
Qty: 30 CAPSULE | Refills: 0 | Status: SHIPPED | OUTPATIENT
Start: 2025-07-09 | End: 2025-07-19

## 2025-07-09 NOTE — PATIENT INSTRUCTIONS
Thank you for choosing Rodrigo Estrella MD at [facility]  To Do: Maryanne GAVIN Book  1. Considerations in the Preoperative period:   Surgery is a big deal.  Anesthesia and your medicines and medical issues all stress out your body.  Therefore read about the below issues and ask questions.(Certain exceptions exist depending on your medical history, call the doctor back with questions or contact your specialist doctors as well)    Medications:  Medications that thin the blood pose a bleeding risk- No NSAIDS/nonsteroidal anti inflammatory drugs- meaning motrin, advil, ibuprofen, alleve, naproxen, or aspirin 1 week prior to surgery, but acetaminophen/tylenol for pain is ok.      Herbals: Ephedra, garlic, ginkgo, ginseng, kava, St. Johns Wort, and Valerian, should be held 3-5 days prior to surgery.    Cardiovascular agents:   Ace Inhibitors (lisinopril, benazepril, ramipril etc.) or ARBs (diovan, valsartan, losartan, avapro etc.) should be held night before surgery if BP is good.    Non statin cholesterol agents (fenofibrate, fish oils, niacin, zetia) held day before surgery.  Statins are continued during surgery.    Diuretics hold on day of surgery.    Endocrine Hormonal Agents:  Oral Contraception, Birth Control, Hormone replacement therapy, and Estrogen Receptor Blockers (raloxifene, tamoxifen)- Hold 4-6 weeks before high risk surgeries with high risk of blood clots (venous thromboembolism)    Insulin and steroids- case by case basis- but your doses may have to be adjusted sp talk to the doctor about it.  Short acting insulin novolog humalog is held during surgery, Long acting is typically decreased before and during surgery.    Blood Thinners:  Plavix, clopidegrel, prasugrel, ticagrelor, effient etc- held 7 days before unless directed differently by cardiology    Coumadin, or novel anticoagulants such as pradaxa, eliquis, xarelto etc.- discuss with your doctor or cardiologist    Psychotropic agents:  Serotinin  reuptake inhibitors like zoloft, effexor, paxil, prozac, citalopram, remeron etc. For mood should be held 3 weeks before surgery if high risk of bleeding as these may increase risk of bleeding  Most other psychiatric meds like antipsychotic meds, anxiety meds such as xanax, clonopin can be continued    Opioids: Will affect anesthesia and pain management discuss with the anesthesiologist.    Antiretroviral agents such as those used for HIV need to be discussed    Neurologic Agents:  Patients with medications for seizures, Parkinson's, delirium, or Myasthenia gravis pose risks that need to be discussed with the doctor.    Rheumatologic Immune Agents: Talk to your specialist doctor because  Sulfasalazine, azathioprine held 1 week before surgery  Leflunomide is held 2 weeks before surgery  etanercept, infliximab, anakinra, rituximab, adalimumab - held before surgery talk to the doctor about these  Gout meds such as allopurinol, colchicine are held on morning of surgery.    Medical Conditions needing evaluation and discussion with your doctors and specialists:  Cardiac conditions such as Blood pressure, coronary artery disease, heart failure, or irregular heart beats.    Pulmonary conditions such as asthma, COPD, or sleep apnea    Hormonal conditions such as diabetes and hormone replacement see above    Neurologic conditions as noted above Parkinsons, Myasthenia gravis, seizures to name a few need to have special consideration    Rheumatologic/Autoimmune disease need to be specially addressed and any condition that would impair healing such as HIV or history of splenectomy    Call [phone] to schedule the appointment.   Please signup for Parko, which is electronic access to your record if you have not done so.  All your results will post on there.  [email or web address]   You can NOW use Parko to book your appointments with us, or consider using open access scheduling which is through the [employer]  [email or web address] and type in Rodrigo Estrella MD and follow the links for \"Schedule Online Now\"    To schedule Imaging or tests at [place] call Central Scheduling [phone], Go to [facility] ER Building (For example: CT scans, X rays, Ultrasound, MRI)  Cardiac Testing in ER building Building A second floor Cardiac Testing [phone] (For example: Holter Monitor, Cardiac Stress tests,Event Monitor, or 2D Echocardiograms)  [facility] call [phone] usually in [facility]  Walk in Clinic in [place] at [address] Mon-Fri at 8am-7:30 p.m., and Sat/Sun 9:00a.m.-4:30 p.m.  Also at [address] [place] [address]  Call [phone] for info     Please call our office about any questions regarding your treatment/medicines/tests as a result of today's visit.  For your safety, read the entire package insert of all medicines prescribed to you and be aware of all of the risks of treatment even beyond those discussed today.  All therapies have potential risk of harm or side effects or medication interactions.  It is your duty and for your safety to discuss with the pharmacist and our office with questions, and to notify us and stop treatment if problems arise, but know that our intention is that the benefits outweigh those potential risks and we strive to make you healthier and to improve your quality of life.    Referrals, and Radiology Information:    If your insurance requires a referral to a specialist, please allow 5 business days to process your referral request.    If Rodrigo Estrella MD orders a CT or MRI, it may take up to 10 business days to receive approval from your insurance company. Once our office has called informing you that the insurance company approved your testing, please call Central Scheduling at [phone]  Please allow our office 5 business days to contact you regarding any testing results.    Refill policies:   Allow 3 business days for refills; controlled substances may take  longer and must be picked up from the office in person.  Narcotic medications can only be filled in 30 day increments and must be refilled at an office visit only.  If your prescription is due for a refill, you may be due for a follow-up appointment.  We cannot refill your maintenance medications at a preventative wellness visit.  To best provide you care, patients receiving maintenance medications need to be seen at least twice a year.

## 2025-07-09 NOTE — H&P
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 Cousin Female 54        estimate  2nd cousin    Allergies Other         family hx    Asthma Other         family hx    Other (colon cancer) Other         family hx    Other (CHF) Other         family hx    Diabetes Other         family hx    Other (nephrolithiasis) Other         family hx    Other (oavrian cancer) Other         family hx    Other (stroke syndrome) Other         family hx   [5]   Allergies  Allergen Reactions    Amoxicillin HIVES and UNKNOWN     TABS    Levofloxacin ANAPHYLAXIS and UNKNOWN    Lyrica [Pregabalin] SWELLING    Cat Hair Extract ASTHMA, Coughing, Runny nose, SHORTNESS OF BREATH and WHEEZING    [place]rees, [place] ASTHMA, Runny nose and WHEEZING   [6]   Current Outpatient Medications:     Phentermine HCl 37.5 MG Oral Tab, Take 1  tablet (37.5 mg total) by mouth every morning before breakfast., Disp: 30 tablet, Rfl: 2    Amphetamine-Dextroamphet ER (ADDERALL XR) 20 MG Oral Capsule SR 24 Hr, Take 1 capsule (20 mg total) by mouth daily., Disp: 30 capsule, Rfl: 0    [START ON 8/9/2025] Amphetamine-Dextroamphet ER (ADDERALL XR) 20 MG Oral Capsule SR 24 Hr, Take 1 capsule (20 mg total) by mouth daily., Disp: 30 capsule, Rfl: 0    [START ON 9/9/2025] Amphetamine-Dextroamphet ER (ADDERALL XR) 20 MG Oral Capsule SR 24 Hr, Take 1 capsule (20 mg total) by mouth daily., Disp: 30 capsule, Rfl: 0    methylPREDNISolone (MEDROL) 4 MG Oral Tablet Therapy Pack, As directed., Disp: 1 each, Rfl: 0    clindamycin 300 MG Oral Cap, Take 1 capsule (300 mg total) by mouth 3 (three) times daily for 10 days., Disp: 30 capsule, Rfl: 0    alendronate 70 MG Oral Tab, TAKE 1 TABLET (70 MG TOTAL) BY MOUTH ONCE A WEEK, Disp: 12 tablet, Rfl: 1    METFORMIN  MG Oral Tablet 24 Hr, TAKE 1 TABLET BY MOUTH EVERY DAY**SAYS TO BILL OTHER INS, Disp: 90 tablet, Rfl: 0    POTASSIUM CHLORIDE 20 MEQ Oral Tab CR, TAKE 1 TABLET BY MOUTH EVERY DAY**SAYS TO BILL OTHER INSURANCE, Disp: 90 tablet, Rfl: 0    Meloxicam 15 MG Oral Tab, Take 1 tablet (15 mg total) by mouth daily., Disp: , Rfl:     SPIRONOLACTONE 25 MG Oral Tab, TAKE 1 TABLET BY MOUTH TWICE A DAY**SAYS TO BILL OTHER INS, Disp: 180 tablet, Rfl: 1    MONTELUKAST 10 MG Oral Tab, TAKE 1 TABLET BY MOUTH EVERY DAY, Disp: 90 tablet, Rfl: 1    ALPRAZolam 0.25 MG Oral Tab, Take 1 tablet (0.25 mg total) by mouth 2 (two) times daily as needed for Anxiety., Disp: 30 tablet, Rfl: 0    FUROSEMIDE 20 MG Oral Tab, TAKE 1 TABLET BY MOUTH EVERY DAY, Disp: 90 tablet, Rfl: 1    linaCLOtide (LINZESS) 145 MCG Oral Cap, Take 2 capsules by mouth daily as needed., Disp: 180 capsule, Rfl: 0    ipratropium-albuterol 0.5-2.5 (3) MG/3ML Inhalation Solution, Take 3 mL by nebulization every 6 (six) hours while awake., Disp: 50 each, Rfl: 0    LEVOXYL 112  MCG Oral Tab, Take 1 tablet (112 mcg total) by mouth before breakfast., Disp: , Rfl:     TRELEGY ELLIPTA 100-62.5-25 MCG/INH Inhalation Aerosol Powder, Breath Activated, Inhale 1 puff into the lungs in the morning., Disp: , Rfl:     ipratropium-albuterol 0.5-2.5 (3) MG/3ML Inhalation Solution, Take 1 vial by nebulization every 6 (six) hours as needed., Disp: , Rfl:     Vitamin D3, Cholecalciferol, 25 MCG (1000 UT) Oral Tab, Take 1 tablet (1,000 Units total) by mouth in the morning., Disp: , Rfl:     gabapentin 100 MG Oral Cap, Take 1-3 capsules (100-300 mg total) by mouth in the morning, at noon, and at bedtime. As directed, Disp: , Rfl:     albuterol (2.5 MG/3ML) 0.083% Inhalation Nebu Soln, Take 3 mL (2.5 mg total) by nebulization every 6 (six) hours as needed for Wheezing. (Patient not taking: Reported on 7/9/2025), Disp: , Rfl:

## 2025-07-14 RX ORDER — MULTIVIT WITH MINERALS/LUTEIN
1000 TABLET ORAL DAILY
COMMUNITY

## 2025-07-14 RX ORDER — LEVOCETIRIZINE DIHYDROCHLORIDE 5 MG/1
5 TABLET, FILM COATED ORAL EVERY EVENING
COMMUNITY

## 2025-07-14 RX ORDER — CALCIUM CARBONATE 500 MG/1
1 TABLET, CHEWABLE ORAL DAILY
COMMUNITY

## 2025-07-14 RX ORDER — CETIRIZINE HYDROCHLORIDE 10 MG/1
10 TABLET ORAL DAILY
COMMUNITY

## 2025-07-14 RX ORDER — GABAPENTIN 100 MG/1
200 CAPSULE ORAL NIGHTLY
COMMUNITY

## 2025-07-16 LAB
CAOX DIHYDRATE: 80 %
CAOX MONOHYDRATE: 20 %
WEIGHT-STONE: 226 MG

## 2025-07-19 ENCOUNTER — ANESTHESIA EVENT (OUTPATIENT)
Dept: SURGERY | Facility: HOSPITAL | Age: 60
End: 2025-07-19
Payer: COMMERCIAL

## 2025-07-21 ENCOUNTER — TELEPHONE (OUTPATIENT)
Dept: SURGERY | Facility: CLINIC | Age: 60
End: 2025-07-21

## 2025-07-21 RX ORDER — FUROSEMIDE 20 MG/1
TABLET ORAL
Qty: 90 TABLET | Refills: 1 | Status: SHIPPED | OUTPATIENT
Start: 2025-07-21

## 2025-07-21 NOTE — TELEPHONE ENCOUNTER
Requesting Furosemide 20mg  LOV: 7/9/25 Pre-op  RTC: prn  Last Relevant Labs: 7/1/25  Filled: 1/21/25 #90 with 1 refills    Future Appointments   Date Time Provider Department Center   7/25/2025 12:30 PM [facility] NUC RM1 [facility]   8/8/2025  8:45 AM Jose Guadalupe Galvin MD [id number] [facility]     Hypertension Medications Protocol [id number]/21/2025 01:26 PM   Protocol Details   Last BP reading less than 140/90    Medication is active on med list    CMP or BMP in past 12 months    In person appointment or virtual visit in the past 12 mos or appointment in next 3 mos    EGFRCR or GFRNAA > 50     Rx sent to pharmacy per protocol

## 2025-07-22 NOTE — TELEPHONE ENCOUNTER
Dr. Cartagena/ Catherine DORADO      Please sign off on form if you agree to: [facility]  Start date of leave: Normal off day on Friday; start: Monday 07/28/25  End date of leave: 08/04/25    (place your signature on the first page only)    -From your Inbasket, Highlight the patient and click Chart   -Double click the 07/21/25 Forms Completion telephone encounter  -Scroll down to the Media section   -Click the blue Hyperlink: TESSY Cartagena 07/22/25  -Click Acknowledge located in the top right ribbon/menu   -Drag the mouse into the blank space of the document and a + sign will appear. Left click to   electronically sign the document.     Thank you,    Guerline VASQUEZ

## 2025-07-22 NOTE — TELEPHONE ENCOUNTER
Patient called back to provide details for Family Medical Leave Act form.    Type of Leave: Family Medical Leave Act (intermittent)   Reason for Leave:  sx 07/25/25   Start date of leave: Normal off day on Friday; start: Monday 07/28/25  End date of leave: 08/04/25    How many flare ups per month/length?:  How many appts per month/length?:   Was Fee and Turnaround info Given?: Yes
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
## 2025-07-25 ENCOUNTER — ANESTHESIA (OUTPATIENT)
Dept: SURGERY | Facility: HOSPITAL | Age: 60
End: 2025-07-25
Payer: COMMERCIAL

## 2025-07-25 ENCOUNTER — HOSPITAL ENCOUNTER (OUTPATIENT)
Dept: NUCLEAR MEDICINE | Facility: HOSPITAL | Age: 60
Discharge: HOME | End: 2025-07-25
Attending: SURGERY
Payer: COMMERCIAL

## 2025-07-25 ENCOUNTER — HOSPITAL ENCOUNTER (OUTPATIENT)
Facility: HOSPITAL | Age: 60
Setting detail: HOSPITAL OUTPATIENT SURGERY
Discharge: HOME | End: 2025-07-25
Attending: SURGERY | Admitting: SURGERY

## 2025-07-25 VITALS
SYSTOLIC BLOOD PRESSURE: 137 MMHG | OXYGEN SATURATION: 94 % | HEIGHT: 63 IN | RESPIRATION RATE: 18 BRPM | WEIGHT: 183 LBS | HEART RATE: 66 BPM | DIASTOLIC BLOOD PRESSURE: 72 MMHG | BODY MASS INDEX: 32.43 KG/M2 | TEMPERATURE: 98 F

## 2025-07-25 DIAGNOSIS — C43.61 MALIGNANT MELANOMA OF RIGHT UPPER ARM (HCC): ICD-10-CM

## 2025-07-25 LAB — GLUCOSE BLD-MCNC: 81 MG/DL (ref 70–99)

## 2025-07-25 PROCEDURE — 78195 LYMPH SYSTEM IMAGING: CPT | Performed by: SURGERY

## 2025-07-25 PROCEDURE — 82962 GLUCOSE BLOOD TEST: CPT

## 2025-07-25 PROCEDURE — 88305 TISSUE EXAM BY PATHOLOGIST: CPT | Performed by: SURGERY

## 2025-07-25 PROCEDURE — 88307 TISSUE EXAM BY PATHOLOGIST: CPT | Performed by: SURGERY

## 2025-07-25 PROCEDURE — 88342 IMHCHEM/IMCYTCHM 1ST ANTB: CPT | Performed by: SURGERY

## 2025-07-25 PROCEDURE — 88341 IMHCHEM/IMCYTCHM EA ADD ANTB: CPT | Performed by: SURGERY

## 2025-07-25 RX ORDER — HEPARIN SODIUM 5000 [USP'U]/ML
5000 INJECTION, SOLUTION INTRAVENOUS; SUBCUTANEOUS ONCE
Status: COMPLETED | OUTPATIENT
Start: 2025-07-25 | End: 2025-07-25

## 2025-07-25 RX ORDER — DIPHENHYDRAMINE HYDROCHLORIDE 50 MG/ML
12.5 INJECTION, SOLUTION INTRAMUSCULAR; INTRAVENOUS AS NEEDED
Status: DISCONTINUED | OUTPATIENT
Start: 2025-07-25 | End: 2025-07-25

## 2025-07-25 RX ORDER — ACETAMINOPHEN 500 MG
1000 TABLET ORAL ONCE
Status: DISCONTINUED | OUTPATIENT
Start: 2025-07-25 | End: 2025-07-25 | Stop reason: HOSPADM

## 2025-07-25 RX ORDER — HYDROCODONE BITARTRATE AND ACETAMINOPHEN 5; 325 MG/1; MG/1
1 TABLET ORAL ONCE AS NEEDED
Status: COMPLETED | OUTPATIENT
Start: 2025-07-25 | End: 2025-07-25

## 2025-07-25 RX ORDER — HYDROMORPHONE HYDROCHLORIDE 1 MG/ML
0.2 INJECTION, SOLUTION INTRAMUSCULAR; INTRAVENOUS; SUBCUTANEOUS EVERY 5 MIN PRN
Status: DISCONTINUED | OUTPATIENT
Start: 2025-07-25 | End: 2025-07-25

## 2025-07-25 RX ORDER — PROCHLORPERAZINE EDISYLATE 5 MG/ML
5 INJECTION INTRAMUSCULAR; INTRAVENOUS EVERY 8 HOURS PRN
Status: DISCONTINUED | OUTPATIENT
Start: 2025-07-25 | End: 2025-07-25

## 2025-07-25 RX ORDER — ONDANSETRON 2 MG/ML
INJECTION INTRAMUSCULAR; INTRAVENOUS AS NEEDED
Status: DISCONTINUED | OUTPATIENT
Start: 2025-07-25 | End: 2025-07-25 | Stop reason: SURG

## 2025-07-25 RX ORDER — ONDANSETRON 2 MG/ML
4 INJECTION INTRAMUSCULAR; INTRAVENOUS EVERY 6 HOURS PRN
Status: DISCONTINUED | OUTPATIENT
Start: 2025-07-25 | End: 2025-07-25

## 2025-07-25 RX ORDER — ROCURONIUM BROMIDE 10 MG/ML
INJECTION, SOLUTION INTRAVENOUS AS NEEDED
Status: DISCONTINUED | OUTPATIENT
Start: 2025-07-25 | End: 2025-07-25 | Stop reason: SURG

## 2025-07-25 RX ORDER — KETOROLAC TROMETHAMINE 30 MG/ML
INJECTION, SOLUTION INTRAMUSCULAR; INTRAVENOUS AS NEEDED
Status: DISCONTINUED | OUTPATIENT
Start: 2025-07-25 | End: 2025-07-25 | Stop reason: SURG

## 2025-07-25 RX ORDER — ISOSULFAN BLUE 50 MG/5ML
INJECTION, SOLUTION SUBCUTANEOUS AS NEEDED
Status: DISCONTINUED | OUTPATIENT
Start: 2025-07-25 | End: 2025-07-25 | Stop reason: HOSPADM

## 2025-07-25 RX ORDER — NICOTINE POLACRILEX 4 MG
15 LOZENGE BUCCAL
Status: DISCONTINUED | OUTPATIENT
Start: 2025-07-25 | End: 2025-07-25

## 2025-07-25 RX ORDER — LIDOCAINE AND PRILOCAINE 25; 25 MG/G; MG/G
CREAM TOPICAL ONCE
Status: COMPLETED | OUTPATIENT
Start: 2025-07-25 | End: 2025-07-25

## 2025-07-25 RX ORDER — MIDAZOLAM HYDROCHLORIDE 1 MG/ML
1 INJECTION INTRAMUSCULAR; INTRAVENOUS EVERY 5 MIN PRN
Status: DISCONTINUED | OUTPATIENT
Start: 2025-07-25 | End: 2025-07-25

## 2025-07-25 RX ORDER — SODIUM CHLORIDE, SODIUM LACTATE, POTASSIUM CHLORIDE, CALCIUM CHLORIDE 600; 310; 30; 20 MG/100ML; MG/100ML; MG/100ML; MG/100ML
INJECTION, SOLUTION INTRAVENOUS CONTINUOUS
Status: DISCONTINUED | OUTPATIENT
Start: 2025-07-25 | End: 2025-07-25

## 2025-07-25 RX ORDER — ACETAMINOPHEN 500 MG
1000 TABLET ORAL ONCE AS NEEDED
Status: COMPLETED | OUTPATIENT
Start: 2025-07-25 | End: 2025-07-25

## 2025-07-25 RX ORDER — LIDOCAINE HYDROCHLORIDE 10 MG/ML
INJECTION, SOLUTION EPIDURAL; INFILTRATION; INTRACAUDAL; PERINEURAL AS NEEDED
Status: DISCONTINUED | OUTPATIENT
Start: 2025-07-25 | End: 2025-07-25 | Stop reason: SURG

## 2025-07-25 RX ORDER — TRAMADOL HYDROCHLORIDE 50 MG/1
50 TABLET ORAL EVERY 6 HOURS PRN
Qty: 15 TABLET | Refills: 0 | Status: SHIPPED | OUTPATIENT
Start: 2025-07-25

## 2025-07-25 RX ORDER — HYDROCODONE BITARTRATE AND ACETAMINOPHEN 5; 325 MG/1; MG/1
2 TABLET ORAL ONCE AS NEEDED
Status: COMPLETED | OUTPATIENT
Start: 2025-07-25 | End: 2025-07-25

## 2025-07-25 RX ORDER — MIDAZOLAM HYDROCHLORIDE 1 MG/ML
INJECTION INTRAMUSCULAR; INTRAVENOUS AS NEEDED
Status: DISCONTINUED | OUTPATIENT
Start: 2025-07-25 | End: 2025-07-25 | Stop reason: SURG

## 2025-07-25 RX ORDER — HYDROMORPHONE HYDROCHLORIDE 1 MG/ML
0.6 INJECTION, SOLUTION INTRAMUSCULAR; INTRAVENOUS; SUBCUTANEOUS EVERY 5 MIN PRN
Status: DISCONTINUED | OUTPATIENT
Start: 2025-07-25 | End: 2025-07-25

## 2025-07-25 RX ORDER — DIAZEPAM 5 MG/1
5 TABLET ORAL EVERY 30 MIN PRN
Status: DISCONTINUED | OUTPATIENT
Start: 2025-07-25 | End: 2025-07-25 | Stop reason: HOSPADM

## 2025-07-25 RX ORDER — GLYCOPYRROLATE 0.2 MG/ML
INJECTION, SOLUTION INTRAMUSCULAR; INTRAVENOUS AS NEEDED
Status: DISCONTINUED | OUTPATIENT
Start: 2025-07-25 | End: 2025-07-25 | Stop reason: SURG

## 2025-07-25 RX ORDER — NICOTINE POLACRILEX 4 MG
30 LOZENGE BUCCAL
Status: DISCONTINUED | OUTPATIENT
Start: 2025-07-25 | End: 2025-07-25

## 2025-07-25 RX ORDER — SCOPOLAMINE 1 MG/3D
1 PATCH, EXTENDED RELEASE TRANSDERMAL ONCE
Status: DISCONTINUED | OUTPATIENT
Start: 2025-07-25 | End: 2025-07-25

## 2025-07-25 RX ORDER — MEPERIDINE HYDROCHLORIDE 25 MG/ML
12.5 INJECTION INTRAMUSCULAR; INTRAVENOUS; SUBCUTANEOUS AS NEEDED
Status: DISCONTINUED | OUTPATIENT
Start: 2025-07-25 | End: 2025-07-25

## 2025-07-25 RX ORDER — PHENYLEPHRINE HCL 10 MG/ML
VIAL (ML) INJECTION AS NEEDED
Status: DISCONTINUED | OUTPATIENT
Start: 2025-07-25 | End: 2025-07-25 | Stop reason: SURG

## 2025-07-25 RX ORDER — DEXAMETHASONE SODIUM PHOSPHATE 4 MG/ML
VIAL (ML) INJECTION AS NEEDED
Status: DISCONTINUED | OUTPATIENT
Start: 2025-07-25 | End: 2025-07-25 | Stop reason: SURG

## 2025-07-25 RX ORDER — DEXTROSE MONOHYDRATE 25 G/50ML
50 INJECTION, SOLUTION INTRAVENOUS
Status: DISCONTINUED | OUTPATIENT
Start: 2025-07-25 | End: 2025-07-25

## 2025-07-25 RX ORDER — BUPIVACAINE HYDROCHLORIDE 2.5 MG/ML
INJECTION, SOLUTION EPIDURAL; INFILTRATION; INTRACAUDAL; PERINEURAL AS NEEDED
Status: DISCONTINUED | OUTPATIENT
Start: 2025-07-25 | End: 2025-07-25 | Stop reason: HOSPADM

## 2025-07-25 RX ORDER — NALOXONE HYDROCHLORIDE 0.4 MG/ML
0.08 INJECTION, SOLUTION INTRAMUSCULAR; INTRAVENOUS; SUBCUTANEOUS AS NEEDED
Status: DISCONTINUED | OUTPATIENT
Start: 2025-07-25 | End: 2025-07-25

## 2025-07-25 RX ORDER — NEOSTIGMINE METHYLSULFATE 1 MG/ML
INJECTION INTRAVENOUS AS NEEDED
Status: DISCONTINUED | OUTPATIENT
Start: 2025-07-25 | End: 2025-07-25 | Stop reason: SURG

## 2025-07-25 RX ORDER — HYDROMORPHONE HYDROCHLORIDE 1 MG/ML
0.4 INJECTION, SOLUTION INTRAMUSCULAR; INTRAVENOUS; SUBCUTANEOUS EVERY 5 MIN PRN
Status: DISCONTINUED | OUTPATIENT
Start: 2025-07-25 | End: 2025-07-25

## 2025-07-25 RX ADMIN — NEOSTIGMINE METHYLSULFATE 5 MG: 1 INJECTION INTRAVENOUS at 17:03:00

## 2025-07-25 RX ADMIN — SODIUM CHLORIDE, SODIUM LACTATE, POTASSIUM CHLORIDE, CALCIUM CHLORIDE: 600; 310; 30; 20 INJECTION, SOLUTION INTRAVENOUS at 15:54:00

## 2025-07-25 RX ADMIN — PHENYLEPHRINE HCL 100 MCG: 10 MG/ML VIAL (ML) INJECTION at 16:55:00

## 2025-07-25 RX ADMIN — MIDAZOLAM HYDROCHLORIDE 2 MG: 1 INJECTION INTRAMUSCULAR; INTRAVENOUS at 15:56:00

## 2025-07-25 RX ADMIN — ROCURONIUM BROMIDE 50 MG: 10 INJECTION, SOLUTION INTRAVENOUS at 15:59:00

## 2025-07-25 RX ADMIN — PHENYLEPHRINE HCL 100 MCG: 10 MG/ML VIAL (ML) INJECTION at 16:10:00

## 2025-07-25 RX ADMIN — DEXAMETHASONE SODIUM PHOSPHATE 8 MG: 4 MG/ML VIAL (ML) INJECTION at 16:12:00

## 2025-07-25 RX ADMIN — ONDANSETRON 4 MG: 2 INJECTION INTRAMUSCULAR; INTRAVENOUS at 16:55:00

## 2025-07-25 RX ADMIN — PHENYLEPHRINE HCL 100 MCG: 10 MG/ML VIAL (ML) INJECTION at 16:34:00

## 2025-07-25 RX ADMIN — PHENYLEPHRINE HCL 100 MCG: 10 MG/ML VIAL (ML) INJECTION at 16:04:00

## 2025-07-25 RX ADMIN — PHENYLEPHRINE HCL 100 MCG: 10 MG/ML VIAL (ML) INJECTION at 16:49:00

## 2025-07-25 RX ADMIN — GLYCOPYRROLATE 0.6 MG: 0.2 INJECTION, SOLUTION INTRAMUSCULAR; INTRAVENOUS at 17:03:00

## 2025-07-25 RX ADMIN — LIDOCAINE HYDROCHLORIDE 50 MG: 10 INJECTION, SOLUTION EPIDURAL; INFILTRATION; INTRACAUDAL; PERINEURAL at 15:59:00

## 2025-07-25 RX ADMIN — KETOROLAC TROMETHAMINE 30 MG: 30 INJECTION, SOLUTION INTRAMUSCULAR; INTRAVENOUS at 16:55:00

## 2025-07-25 NOTE — BRIEF OP NOTE
Pre-Operative Diagnosis: Malignant melanoma of right upper arm (HCC) [C43.61]     Post-Operative Diagnosis: Malignant melanoma of right upper arm (HCC) [C43.61]      Procedure Performed:   Wide excision melanoma right upper arm with sentinel lymph node biopsy; complex closure.    Surgeons and Role:     * Se Cartagena MD - Primary    Assistant(s):   Aysha Cedeño MD PGY-5     Surgical Findings: Per op note     Specimen: Right arm melanoma wide local excision, right axilla sentinel lymph nodes x2     Estimated Blood Loss: Blood Output: 10 mL (7/25/2025  4:56 PM)    Aysha Cedeño MD  7/25/2025  5:11 PM

## 2025-07-25 NOTE — ANESTHESIA PROCEDURE NOTES
Airway  Date/Time: 7/25/2025 4:01 PM  Reason: elective    Airway not difficult    General Information and Staff   Patient location during procedure: OR  Anesthesiologist: Sampson Wei MD  Performed: anesthesiologist   Performed by: Sampson Wei MD  Authorized by: Sampson Wei MD        Indications and Patient Condition  Indications for airway management: anesthesia  Sedation level: deep      Preoxygenated: yesPatient position: sniffing    Mask difficulty assessment: 0 - not attempted    Final Airway Details    Final airway type: endotracheal airway    Successful airway: ETT  Cuffed: yes   Successful intubation technique: direct laryngoscopy  Facilitating devices/methods: intubating stylet  Endotracheal tube insertion site: oral  Blade: Jonh  Blade size: #3  ETT size (mm): 7.0    Cormack-Lehane Classification: grade IIB - view of arytenoids or posterior of glottis only  Placement verified by: capnometry   Cuff volume (mL): 9  Measured from: lips  ETT to lips (cm): 22  Number of attempts at approach: 1  Number of other approaches attempted: 0

## 2025-07-25 NOTE — ANESTHESIA PREPROCEDURE EVALUATION
PRE-OP EVALUATION    Patient Name: Maryanne GAVIN Book    Admit Diagnosis: Malignant melanoma of right upper arm (HCC) [C43.61]    Pre-op Diagnosis: Malignant melanoma of right upper arm (HCC) [C43.61]    Wide excision melanoma right upper arm with sentinel lymph node biopsy    Anesthesia Procedure: Wide excision melanoma right upper arm with sentinel lymph node biopsy (Right)    Surgeons and Role:     * Se Cartagena MD - Primary    Pre-op vitals reviewed.  Temp: 97.7 °F (36.5 °C)  Pulse: 84  Resp: 18  BP: 121/77  SpO2: 100 %  Body mass index is 32.42 kg/m².    Current medications reviewed.  Hospital Medications:  Current Medications[1]    Outpatient Medications:   Prescriptions Prior to Admission[2]    Allergies: Amoxicillin; Dander; Levofloxacin; Trees, box elder; and Lyrica [pregabalin]      Anesthesia Evaluation    Patient summary reviewed.    Anesthetic Complications           GI/Hepatic/Renal      (+) GERD       (-) chronic renal disease   (+) liver disease                 Cardiovascular        Exercise tolerance: good     MET: >4      (+) hypertension       (-) past MI  (-) CABG/stent            (-) CHF  (-) angina              Endo/Other      (+) diabetes  type 2, not using insulin      (-) anemia        (-) thrombocytopenia           Pulmonary      (+) asthma  (-) COPD            (+) sleep apnea       Neuro/Psych    Negative neuro/psych ROS.      (-) CVA   (-) TIA                         Past Surgical History[3]  Social Hx on file[4]  History   Drug Use No     Available pre-op labs reviewed.  Lab Results   Component Value Date    WBC 8.8 07/01/2025    RBC 4.67 07/01/2025    HGB 13.8 07/01/2025    HCT 42.0 07/01/2025    MCV 89.9 07/01/2025    MCH 29.6 07/01/2025    MCHC 32.9 07/01/2025    RDW 13.7 07/01/2025    .0 07/01/2025     Lab Results   Component Value Date     07/01/2025    K 4.5 07/01/2025     07/01/2025    CO2 32.0 07/01/2025    BUN 21 07/01/2025    CREATSERUM 0.82 07/01/2025      (H) 07/01/2025    CA 9.8 07/01/2025            Airway      Mallampati: II  Mouth opening: >3 FB  TM distance: > 6 cm  Neck ROM: full Cardiovascular    Cardiovascular exam normal.         Dental    Dentition appears grossly intact         Pulmonary    Pulmonary exam normal.                 Other findings              ASA: 2   Plan: general  NPO status verified and           Plan/risks discussed with: patient                Present on Admission:  **None**             [1]    [Transfer Hold] acetaminophen (Tylenol Extra Strength) tab 1,000 mg  1,000 mg Oral Once    scopolamine (Transderm-Scop) 1 MG/3DAYS patch 1 patch  1 patch Transdermal Once    lactated ringers infusion   Intravenous Continuous    [COMPLETED] heparin (Porcine) 5000 UNIT/ML injection 5,000 Units  5,000 Units Subcutaneous Once    ceFAZolin (Ancef) 2g in 10mL IV syringe premix  2 g Intravenous Once    [Transfer Hold] diazePAM (Valium) tab 5 mg  5 mg Oral Q30 Min PRN    [COMPLETED] lidocaine-prilocaine (Emla) 2.5-2.5 % cream   Topical Once   [2]   Facility-Administered Medications Prior to Admission   Medication Dose Route Frequency Provider Last Rate Last Admin    [COMPLETED] sulfamethoxazole-trimethoprim DS (Bactrim DS) 800-160 MG per tab 1 tablet  1 tablet Oral Once Jose Guadalupe Galvin MD   1 tablet at 07/03/25 0959     Medications Prior to Admission   Medication Sig Dispense Refill Last Dose/Taking    FUROSEMIDE 20 MG Oral Tab TAKE 1 TABLET BY MOUTH EVERY DAY**NEED NEW INS 90 tablet 1 7/24/2025 at  9:00 AM    gabapentin 100 MG Oral Cap Take 2 capsules (200 mg total) by mouth nightly.   7/24/2025 at  8:00 PM    levocetirizine 5 MG Oral Tab Take 1 tablet (5 mg total) by mouth every evening.   7/24/2025 at 12:00 PM    cetirizine 10 MG Oral Tab Take 1 tablet (10 mg total) by mouth daily.   7/24/2025 at  8:00 AM    Multiple Vitamins-Minerals (BARIATRIC MULTIVITAMINS) Oral Tab Take 1 tablet by mouth daily.   7/24/2025 at  8:00 AM    calcium carbonate 500  MG Oral Chew Tab Chew 1 tablet (500 mg total) by mouth daily.   2025    Ascorbic Acid (VITAMIN C) 1000 MG Oral Tab Take 1 tablet (1,000 mg total) by mouth daily.   2025    Magnesium 100 MG Oral Tab Take 1 tablet (100 mg total) by mouth daily.   2025 at 12:00 PM    NON FORMULARY Take 1 tablet by mouth daily. GI Encap   2025 at 12:00 PM    Phentermine HCl 37.5 MG Oral Tab Take 1 tablet (37.5 mg total) by mouth every morning before breakfast. 30 tablet 2 2025    Amphetamine-Dextroamphet ER (ADDERALL XR) 20 MG Oral Capsule SR 24 Hr Take 1 capsule (20 mg total) by mouth daily. 30 capsule 0 2025    methylPREDNISolone (MEDROL) 4 MG Oral Tablet Therapy Pack As directed. 1 each 0 Past Month    [] clindamycin 300 MG Oral Cap Take 1 capsule (300 mg total) by mouth 3 (three) times daily for 10 days. 30 capsule 0 Taking    alendronate 70 MG Oral Tab TAKE 1 TABLET (70 MG TOTAL) BY MOUTH ONCE A WEEK (Patient taking differently: Take 1 tablet (70 mg total) by mouth once a week. ) 12 tablet 1 2025    METFORMIN  MG Oral Tablet 24 Hr TAKE 1 TABLET BY MOUTH EVERY DAY**SAYS TO BILL OTHER INS (Patient taking differently: Take 1 tablet (750 mg total) by mouth daily with breakfast.) 90 tablet 0 2025    [] tamsulosin (FLOMAX) 0.4 MG Oral Cap Take 1 capsule (0.4 mg total) by mouth daily for 7 days. 7 capsule 0 Taking    POTASSIUM CHLORIDE 20 MEQ Oral Tab CR TAKE 1 TABLET BY MOUTH EVERY DAY**SAYS TO BILL OTHER INSURANCE 90 tablet 0 2025 at  9:00 AM    SPIRONOLACTONE 25 MG Oral Tab TAKE 1 TABLET BY MOUTH TWICE A DAY**SAYS TO BILL OTHER  tablet 1 2025 at 12:00 PM    MONTELUKAST 10 MG Oral Tab TAKE 1 TABLET BY MOUTH EVERY DAY 90 tablet 1 2025 at  8:00 PM    ipratropium-albuterol 0.5-2.5 (3) MG/3ML Inhalation Solution Take 3 mL by nebulization every 6 (six) hours while awake. 50 each 0 Past Week    LEVOXYL 112 MCG Oral Tab Take 1 tablet (112 mcg total) by  mouth before breakfast.   2025 at  8:00 AM    albuterol (2.5 MG/3ML) 0.083% Inhalation Nebu Soln Take 3 mL (2.5 mg total) by nebulization every 6 (six) hours as needed for Wheezing.   Past Week    TRELEGY ELLIPTA 100-62.5-25 MCG/INH Inhalation Aerosol Powder, Breath Activated Inhale 1 puff into the lungs nightly.   Past Week    Vitamin D3, Cholecalciferol, 25 MCG (1000 UT) Oral Tab Take 1 tablet (1,000 Units total) by mouth in the morning.   2025    gabapentin 100 MG Oral Cap Take 1 capsule (100 mg total) by mouth Noon.   2025 at 12:00 PM    Meloxicam 15 MG Oral Tab Take 1 tablet (15 mg total) by mouth daily as needed for Pain.   More than a month    ALPRAZolam 0.25 MG Oral Tab Take 1 tablet (0.25 mg total) by mouth 2 (two) times daily as needed for Anxiety. 30 tablet 0 More than a month    linaCLOtide (LINZESS) 145 MCG Oral Cap Take 2 capsules by mouth daily as needed. 180 capsule 0 More than a month   [3]   Past Surgical History:  Procedure Laterality Date    Anesth, section  //92    Back surgery        ,,,    x3    Colonoscopy  2014    1 cm cecal polyp s/p tattoo and removal was hyperplastic. Smaller polyps in descending were adenomas/hp's. repeat ,     Colonoscopy N/A 2016    Procedure: COLONOSCOPY;  Surgeon: Avinash Gonzalez DO;  Location:  ENDOSCOPY    Colonoscopy,biopsy  2014    Procedure: ;  Surgeon: Philip Cary MD;  Location: [facility]    Colonoscopy,remv lesn,snare  2014    Procedure: ;  Surgeon: Philip Cary MD;  Location: [facility]    Colonoscpy, flexible, proximal to splenic flexure; w/directed submucosa injection(s), any substance  2014    Procedure: ;  Surgeon: Philip Cary MD;  Location: [facility]    Egd N/A 2016    Procedure: ESOPHAGOGASTRODUODENOSCOPY (EGD);  Surgeon: Avinash Gonzalez DO;  Location:  ENDOSCOPY    Hysterectomy      Dedra  localization wire 1 site left (cpt=19281)  09/2021    benign    Other  1/1/99    lithotomy, several kidney stones    Other  2008    thyroidectomy    Other surgical history      back surgery, L4-5 discectomy    Other surgical history      ablation of vaginal lesion    Other surgical history  12/10/2020    rbus Dr. Davis    Sinus surgery    03/28/2017    Thyroidectomy  1/2008    complete    Total abdom hysterectomy      Up gi endoscopy,dilatn w guide  8/28/2014    Procedure: ;  Surgeon: Philip Cary MD;  Location: [facility]    Upper gi endoscopy - referral  8/2014    otherwise unremarkable exam. Upper and lower esophageal biopsies taken (-) eosinophilic esophagitis, and empiric dilation to 57 Citizen of [place]     Upper gi endoscopy,biopsy N/A 8/28/2014    Procedure: ESOPHAGOGASTRODUODENOSCOPY, COLONOSCOPY, POSSIBLE BIOPSY, POSSIBLE POLYPECTOMY [id number];  Surgeon: Philip Cary MD;  Location: [facility]   [4]   Social History  Socioeconomic History    Marital status:    Tobacco Use    Smoking status: Never    Smokeless tobacco: Never   Vaping Use    Vaping status: Never Used   Substance and Sexual Activity    Alcohol use: Yes     Comment: occasional    Drug use: No    Sexual activity: Yes     Partners: Male   Other Topics Concern    Caffeine Concern Yes    Exercise Yes

## 2025-07-25 NOTE — ANESTHESIA POSTPROCEDURE EVALUATION
[facility]    Maryanne S Book Patient Status:  Hospital Outpatient Surgery   Age/Gender 59 year old female MRN [id number]   Location [facility] POST ANESTHESIA CARE UNIT Attending Se Cartagena MD   Hosp Day # 0 PCP Rodrigo Estrella MD       Anesthesia Post-op Note    Wide excision melanoma right upper arm with sentinel lymph node biopsy; complex closure.    Procedure Summary       Date: 07/25/25 Room / Location:  MAIN OR 10 /  MAIN OR    Anesthesia Start: 1554 Anesthesia Stop: 1719    Procedure: Wide excision melanoma right upper arm with sentinel lymph node biopsy; complex closure. (Right: Upper Arm) Diagnosis:       Malignant melanoma of right upper arm (HCC)      (Malignant melanoma of right upper arm (HCC) [C43.61])    Surgeons: Se Cartagena MD Anesthesiologist: Sampson Wei MD    Anesthesia Type: general ASA Status: 2            Anesthesia Type: general    Vitals Value Taken Time   /81 07/25/25 17:29   Temp 98.7 °F (37.1 °C) 07/25/25 17:14   Pulse 74 07/25/25 17:31   Resp 22 07/25/25 17:31   SpO2 96 % 07/25/25 17:31   Vitals shown include unfiled device data.        Patient Location: PACU    Anesthesia Type: general    Airway Patency: patent and extubated    Postop Pain Control: adequate    Mental Status: preanesthetic baseline    Nausea/Vomiting: none    Cardiopulmonary/Hydration status: stable euvolemic    Complications: no apparent anesthesia related complications    Postop vital signs: stable    Comments: Report given to RN    Dental Exam: Unchanged from Preop    Patient to be discharged from PACU when criteria met.

## 2025-07-25 NOTE — INTERVAL H&P NOTE
There has been no significant change since I saw patient as documented in EPIC.   Surgery revisted.   To proceed as planned.      Se Cartagena MD FACS

## 2025-07-25 NOTE — DISCHARGE INSTRUCTIONS
Wide Excision of Melanoma Discharge Instructions  Thank you for choosing the Surgical Oncology team at [facility].        Managing Your Pain  Follow the guidelines below to help manage your pain at home:  Take your medications as directed and as needed. You may also take 1000 mg of acetaminophen (Tylenol®) every 6 hours as needed for pain.  Call our office if the medication prescribed for you does not ease your pain.  Don't drive or drink alcohol while you're taking prescription pain medications.  Keep track of when you take your pain medication. It works best 30 to 45 minutes after you take it.   Caring for Your Incision  It's normal for the skin below your incision to feel numb. This happens because some of the nerves were cut during your surgery. The numbness will go away over time.  Leave the dressing on for 48 hours after surgery. The clear outer covering (tegaderm) can then come off.   Your surgeon used dissolvable sutures (stitches) underneath your skin, and they will not need to be removed.   The Steri-Strips (small white adhesive strips) on your incision will loosen and fall off by themselves. If they haven't fallen off within 14 days, you can take them off.  If the area around your incision is red, puffy, or if you have any drainage from your incision, contact our office.  Showering  You may shower 48 hours after surgery. When you shower, use soap to gently wash your incision. After you shower, pat the area dry with a clean towel. Don't rub over your incision. Leave your incision uncovered, unless there's drainage.  Avoid tub baths until your surgeon says it's okay.  Activity  After the procedure, take it easy for the rest of the day.  If you had general anesthesia, don't use machinery or power tools, drink alcohol, or make any major decisions for at least the first 24 hours.  Avoid lifting more than 10 pounds with your affected arm for at least 4 weeks  Avoid rigorous movement with right arm  for at least 4 weeks     When to Call:  Contact our office if you experience the following symptoms:  Fever of 100.4° F (38°C) or higher  Cloudy or smelly drainage from the incision site  The skin around your incision is warm/red/swollen  Sudden increase in pain or new pain  Shaking chills  Fast pulse  Shortness of breath or chest pain  Nausea or vomiting   Diarrhea  Constipation that isn't relieved in 2 days  Any new or unexplained symptoms    We will call you to set up a follow-up appointment.    Please arrive at the following location:  [facility]: [address] [address]  Caterina BAUER [facility]: [address] [place] [address]  Please call us at [phone] if you are unable to make it to your appointment or if you have any questions.

## 2025-07-28 ENCOUNTER — TELEPHONE (OUTPATIENT)
Dept: SURGERY | Facility: CLINIC | Age: 60
End: 2025-07-28

## 2025-07-28 NOTE — OPERATIVE REPORT
[facility]    PATIENT'S NAME: MADISON LOBATO   ATTENDING PHYSICIAN: Se Cartagena MD   OPERATING PHYSICIAN: Se Cartagena MD   PATIENT ACCOUNT#:   [id number]    LOCATION:  [facility]  MEDICAL RECORD #:   [id number]       YOB: 1965  ADMISSION DATE:       07/25/2025      OPERATION DATE:  07/25/2025    OPERATIVE REPORT    PREOPERATIVE DIAGNOSIS:  Malignant melanoma, right upper arm.    POSTOPERATIVE DIAGNOSIS:  Malignant melanoma, right upper arm.    PROCEDURE:  1.   Wide excision, malignant melanoma, right upper arm (3 cm).  2.   Complex closure (6 cm).  3.   [place] lymph node biopsy, right axilla.    ASSISTANT:  Aysha Cedeño MD.    ANESTHESIA:  General.    INDICATIONS:  Patient is a 59-year-old woman who was diagnosed with a 0.8 mm thick melanoma of the right upper arm.  She presents today for wide excision and sentinel lymph node biopsy.  On this day, patient presented to the nuclear medicine department where she underwent injection of radiolabeled colloid followed by lymphoscintigraphy.    OPERATIVE TECHNIQUE:  Patient was brought to the operating room and was placed in supine position.  She was given general anesthesia by the anesthesiology service.  Sequential compression boots were placed.  Patient received preoperative intravenous antibiotic prophylaxis.  She was prepped and draped in normal sterile fashion.  About 2 mL of isosulfan blue dye were injected intradermally around the biopsy site.  About 5 minutes later, a right axillary incision overlying a hot transcutaneous site was made and deepened.  I encountered 2 sentinel lymph nodes that were grossly unremarkable with increased uptake by the gamma probe.  These were not blue in nature.  There were no blue lymphatics.  [place] lymph node #1 had an in vivo count of 858 per 10 seconds.  It was dissected free of surrounding tissue and had an ex vivo count of 647 per 10 seconds.  [place] lymph node #2 had an in  vivo count of 2004 per 10 seconds with an ex vivo count of 942 per 10 seconds.  Background count was 54.  Both nodes were sent separately to Pathology for permanent section evaluation.  There was no evidence for any enlarged lymph nodes or any other hot nodes.  The wound was then infiltrated with 0.25% Marcaine and closed in layers using 3-0 and 4-0 Vicryl sutures.  Steri-Strips with Telfa and Tegaderm dressings applied.    Next, a 1 cm margin was marked around the biopsy site of the right upper arm, and a longitudinal elliptical incision measuring 3 x 6 cm was made and deepened to underlying muscle fascia.  The specimen was excised, oriented, and sent to Pathology for permanent section evaluation.  Hemostasis was achieved and maintained.  To allow for primary closure without tension, flaps were raised and undermined anteriorly and posteriorly.  Layered closure was attained using 3-0 Vicryl sutures and 4-0 Vicryl sutures.  Steri-Strips with Telfa and Tegaderm dressings applied.    Patient tolerated the procedure well without immediate complications.  She was extubated in the operating room and was sent in stable condition to recovery room.  Counts were correct.  I was present throughout the procedure.    Wide Local Excision for Primary Cutaneous Melanoma - Excision 1 (Upper Arm - Right)  Operation performed with curative intent Yes   Original [place] thickness of the lesion  0.8 mm (to the tenth of a millimeter)   Clinical margin width  (measured from the edge of the lesion or the prior excision scar) 1 cm   Depth of excision Full-thickness skin/subcutaneous tissue down to fascia (melanoma)      Dictated By Se Cartagena MD  d: 07/26/2025 15:06:43  t: 07/26/2025 18:43:52  [facility] [id number]  [facility]/

## 2025-07-28 NOTE — TELEPHONE ENCOUNTER
Called to check on patient after procedure on Friday.  Patient pain controlled with no concerns.  Confirmed post op for Thursday.

## 2025-07-31 ENCOUNTER — OFFICE VISIT (OUTPATIENT)
Dept: SURGERY | Facility: CLINIC | Age: 60
End: 2025-07-31
Payer: COMMERCIAL

## 2025-07-31 VITALS
DIASTOLIC BLOOD PRESSURE: 80 MMHG | RESPIRATION RATE: 16 BRPM | TEMPERATURE: 97 F | OXYGEN SATURATION: 98 % | HEART RATE: 108 BPM | SYSTOLIC BLOOD PRESSURE: 142 MMHG

## 2025-07-31 DIAGNOSIS — C43.61 MALIGNANT MELANOMA OF RIGHT UPPER ARM (HCC): Primary | ICD-10-CM

## 2025-07-31 PROCEDURE — 99024 POSTOP FOLLOW-UP VISIT: CPT | Performed by: PHYSICIAN ASSISTANT

## 2025-08-05 ENCOUNTER — OFFICE VISIT (OUTPATIENT)
Dept: FAMILY MEDICINE CLINIC | Facility: CLINIC | Age: 60
End: 2025-08-05

## 2025-08-05 VITALS
WEIGHT: 185 LBS | RESPIRATION RATE: 16 BRPM | BODY MASS INDEX: 32.78 KG/M2 | HEIGHT: 63 IN | SYSTOLIC BLOOD PRESSURE: 138 MMHG | OXYGEN SATURATION: 98 % | HEART RATE: 70 BPM | DIASTOLIC BLOOD PRESSURE: 84 MMHG | TEMPERATURE: 98 F

## 2025-08-05 DIAGNOSIS — E66.9 OBESITY (BMI 30-39.9): ICD-10-CM

## 2025-08-05 DIAGNOSIS — I10 ESSENTIAL HYPERTENSION: Primary | ICD-10-CM

## 2025-08-05 DIAGNOSIS — F90.0 ATTENTION DEFICIT HYPERACTIVITY DISORDER (ADHD), PREDOMINANTLY INATTENTIVE TYPE: ICD-10-CM

## 2025-08-05 DIAGNOSIS — G47.33 OSA ON CPAP: ICD-10-CM

## 2025-08-05 DIAGNOSIS — C43.61 MALIGNANT MELANOMA OF RIGHT UPPER ARM (HCC): ICD-10-CM

## 2025-08-05 PROCEDURE — 99215 OFFICE O/P EST HI 40 MIN: CPT | Performed by: FAMILY MEDICINE

## 2025-08-05 RX ORDER — DEXTROAMPHETAMINE SACCHARATE, AMPHETAMINE ASPARTATE MONOHYDRATE, DEXTROAMPHETAMINE SULFATE AND AMPHETAMINE SULFATE 5; 5; 5; 5 MG/1; MG/1; MG/1; MG/1
20 CAPSULE, EXTENDED RELEASE ORAL DAILY
Qty: 30 CAPSULE | Refills: 0 | Status: SHIPPED | OUTPATIENT
Start: 2025-10-06 | End: 2025-11-05

## 2025-08-05 RX ORDER — DEXTROAMPHETAMINE SACCHARATE, AMPHETAMINE ASPARTATE MONOHYDRATE, DEXTROAMPHETAMINE SULFATE AND AMPHETAMINE SULFATE 5; 5; 5; 5 MG/1; MG/1; MG/1; MG/1
20 CAPSULE, EXTENDED RELEASE ORAL DAILY
Qty: 30 CAPSULE | Refills: 0 | Status: SHIPPED | OUTPATIENT
Start: 2025-08-05 | End: 2025-09-04

## 2025-08-05 RX ORDER — DEXTROAMPHETAMINE SACCHARATE, AMPHETAMINE ASPARTATE MONOHYDRATE, DEXTROAMPHETAMINE SULFATE AND AMPHETAMINE SULFATE 5; 5; 5; 5 MG/1; MG/1; MG/1; MG/1
20 CAPSULE, EXTENDED RELEASE ORAL DAILY
Qty: 30 CAPSULE | Refills: 0 | Status: SHIPPED | OUTPATIENT
Start: 2025-09-05 | End: 2025-10-05

## 2025-08-14 ENCOUNTER — TELEPHONE (OUTPATIENT)
Dept: SURGERY | Facility: CLINIC | Age: 60
End: 2025-08-14

## 2025-08-14 DIAGNOSIS — L03.114 CELLULITIS OF LEFT UPPER EXTREMITY: Primary | ICD-10-CM

## 2025-08-14 RX ORDER — CEPHALEXIN 500 MG/1
500 CAPSULE ORAL 4 TIMES DAILY
Qty: 20 CAPSULE | Refills: 0 | Status: SHIPPED | OUTPATIENT
Start: 2025-08-14 | End: 2025-08-19

## (undated) DIAGNOSIS — Z02.9 ENCOUNTERS FOR UNSPECIFIED ADMINISTRATIVE PURPOSE: ICD-10-CM

## (undated) DIAGNOSIS — I10 ESSENTIAL HYPERTENSION: ICD-10-CM

## (undated) DEVICE — SINGLE-USE DIGITAL FLEXIBLE URETEROSCOPE: Brand: LITHOVUE

## (undated) DEVICE — SINGLE USE SUCTION VALVE MAJ-209: Brand: SINGLE USE SUCTION VALVE (STERILE)

## (undated) DEVICE — Device

## (undated) DEVICE — SOLUTION  .9 1000ML BTL

## (undated) DEVICE — COVER LT HNDL RIG FOR SUR CAM DISP

## (undated) DEVICE — SLEEVE COMPR MD KNEE LEN SGL USE KENDALL SCD

## (undated) DEVICE — APPLIER CLP M L11IN TI MULT RNG HNDL 30 CLP

## (undated) DEVICE — SINGLE USE BIOPSY VALVE MAJ-210: Brand: SINGLE USE BIOPSY VALVE (STERILE)

## (undated) DEVICE — SOLUTION ENDOSCOPIC ANTI-FOG NON-TOXIC NON-ABRASIVE 6 CUBIC CENTIMETER WITH RADIOPAQUE ADHESIVE-BACKED SPONGE STERILE NOT MADE WITH NATURAL RUBBER LATEX MEDICHOICE: Brand: MEDICHOICE

## (undated) DEVICE — BOWLS UTILITY 16OZ

## (undated) DEVICE — KIT SLEEVE BARIATRIC BYPASS

## (undated) DEVICE — 3M™ TEGADERM™ TRANSPARENT FILM DRESSING, 1626W, 4 IN X 4-3/4 IN (10 CM X 12 CM), 50 EACH/CARTON, 4 CARTON/CASE: Brand: 3M™ TEGADERM™

## (undated) DEVICE — TROCAR: Brand: KII® SLEEVE

## (undated) DEVICE — 60 ML SYRINGE REGULAR TIP: Brand: MONOJECT

## (undated) DEVICE — KENDALL SCD EXPRESS SLEEVES, KNEE LENGTH, MEDIUM: Brand: KENDALL SCD

## (undated) DEVICE — SPECIMEN TRAP LUKI

## (undated) DEVICE — ENDOSCOPY PACK UPPER: Brand: MEDLINE INDUSTRIES, INC.

## (undated) DEVICE — UNDYED BRAIDED (POLYGLACTIN 910), SYNTHETIC ABSORBABLE SUTURE: Brand: COATED VICRYL

## (undated) DEVICE — PACK DRAPE HEAD/NECK STER

## (undated) DEVICE — 3M™ STERI-DRAPE™ INSTRUMENT POUCH 1018L: Brand: STERI-DRAPE™

## (undated) DEVICE — HOVERMATT 34IN SINGLE USE

## (undated) DEVICE — MEDI-VAC NON-CONDUCTIVE SUCTION TUBING: Brand: CARDINAL HEALTH

## (undated) DEVICE — SEAM GUARD PURPLE

## (undated) DEVICE — CLOSURE EXOFIN 1.0ML

## (undated) DEVICE — SYRINGE MED 10ML LL TIP W/O SFTY DISP

## (undated) DEVICE — ZIPWIRE GUIDEWIRE .038X150 STR

## (undated) DEVICE — DRAPE SHEET LARGE 76X55

## (undated) DEVICE — RLD STPLR 3MM 3.5MM 4MM 60MM

## (undated) DEVICE — SOL  .9 3000ML

## (undated) DEVICE — GLOVE SUR 6.5 SENSICARE PI PIP CRM PWD F

## (undated) DEVICE — SEAL Y BIOPSY PORT P6R SCOPE

## (undated) DEVICE — MASK ISOLATION

## (undated) DEVICE — VISIGI 3D®  CALIBRATION SYSTEM  SIZE 40FR STD W/ BULB: Brand: BOEHRINGER® VISIGI 3D™ SLEEVE GASTRECTOMY CALIBRATION SYSTEM, SIZE 40FR W/BULB

## (undated) DEVICE — TROCAR: Brand: KII FIOS FIRST ENTRY

## (undated) DEVICE — SOLUTION  .9 3000ML

## (undated) DEVICE — METZENBAUM ADTEC SINGLE USE DISSECTING SCISSORS, SHAFT ONLY, MONOPOLAR, CURVED TO LEFT, WORKING LENGTH: 16 1/2", (420 MM), DIAM. 5 MM, INSULATED, DOUBLE ACTION, STERILE, DISPOSABLE, PACKAGE OF 10 PIECES: Brand: AESCULAP

## (undated) DEVICE — PAD SACRAL SPAN AID

## (undated) DEVICE — TROCARS: Brand: KII® OPTICAL ACCESS SYSTEM

## (undated) DEVICE — TIGERTAIL 5F FLXTIP 70CM

## (undated) DEVICE — GLOVE SUR 7.5 SENSICARE PI PIP CRM PWD F

## (undated) DEVICE — STERILE POLYISOPRENE POWDER-FREE SURGICAL GLOVES: Brand: PROTEXIS

## (undated) DEVICE — 1200CC GUARDIAN II: Brand: GUARDIAN

## (undated) DEVICE — GLOVE SUR 7 SENSICARE PI PIP CRM PWD F

## (undated) DEVICE — SYRINGE 10ML SLIP TIP

## (undated) DEVICE — SUTURE PASSOR WITH GUIDE

## (undated) DEVICE — SUT PDS II 1 CT-1 Z341H

## (undated) DEVICE — GAMMEX® PI HYBRID SIZE 7.5, STERILE POWDER-FREE SURGICAL GLOVE, POLYISOPRENE AND NEOPRENE BLEND: Brand: GAMMEX

## (undated) DEVICE — MOSES 200 FIBER

## (undated) DEVICE — HYDROGEL COATED URETERAL DILATOR: Brand: NOTTINGHAM ONE-STEP

## (undated) DEVICE — SUT PERMA- 2-0 30IN SH NABSRB BLK L26MM 1/

## (undated) DEVICE — [HIGH FLOW INSUFFLATOR,  DO NOT USE IF PACKAGE IS DAMAGED,  KEEP DRY,  KEEP AWAY FROM SUNLIGHT,  PROTECT FROM HEAT AND RADIOACTIVE SOURCES.]: Brand: PNEUMOSURE

## (undated) DEVICE — FORCEPS BX 100CM 1.8MM RJ STD

## (undated) DEVICE — SUTURE ETHBND XL 2-0 30IN NABSRB GRN 26MM SH 1/2 CIR

## (undated) DEVICE — LAP CHOLE: Brand: MEDLINE INDUSTRIES, INC.

## (undated) DEVICE — SUT PROL 2-0 30IN SH NABSRB BLU L26MM 1/2 CIR

## (undated) DEVICE — HANDLE SUCT REG CAP FLANGETIP SMOOTH YANK

## (undated) DEVICE — GAMMEX® PI HYBRID SIZE 7, STERILE POWDER-FREE SURGICAL GLOVE, POLYISOPRENE AND NEOPRENE BLEND: Brand: GAMMEX

## (undated) DEVICE — STOCKINETTE SUR 9X48IN IMPERV FOR HANDLING

## (undated) DEVICE — ENSEAL X1 TISSUE SEALER, CURVED JAW, 45 CM SHAFT LENGTH: Brand: ENSEAL

## (undated) DEVICE — SEAM GUARD BLACK

## (undated) DEVICE — CYSTO CDS-LF: Brand: MEDLINE INDUSTRIES, INC.

## (undated) DEVICE — 3M™ RED DOT™ MONITORING ELECTRODE WITH FOAM TAPE AND STICKY GEL, 50/BAG, 20/CASE, 72/PLT 2570: Brand: RED DOT™

## (undated) DEVICE — FILTERLINE NASAL ADULT O2/CO2

## (undated) DEVICE — BLADE SURGICAL INCISION #24 DISP

## (undated) DEVICE — PROXIMATE SKIN STAPLERS (35 WIDE) CONTAINS 35 STAINLESS STEEL STAPLES (FIXED HEAD): Brand: PROXIMATE

## (undated) DEVICE — MEDI-VAC SUCTION HANDLE REGULAR CAPACITY: Brand: CARDINAL HEALTH

## (undated) DEVICE — Device: Brand: JELCO

## (undated) NOTE — LETTER
ASTHMA ACTION PLAN for Milan Cam     : 1965     Date: 2018  Provider:  Sana Asencio PA-C  Phone for doctor or clinic: ED [facility], [address] [facility] , [address] [facility] [address]

## (undated) NOTE — LETTER
10/07/21    To whom it may concern,    Patience Cates underwent surgery on 9/23 and followed up with me in clinic on 10/7. She is cleared to resume physical therapy from my perspective. She has no further restrictions.             Thank you for allowing

## (undated) NOTE — ED AVS SNAPSHOT
Augustine Cline   MRN: [id number]    Department:  [facility] Emergency Department   Date of Visit:  3/27/2019           Disclosure     Insurance plans vary and the physician(s) referred by the ER may not be covered by your plan.  Please contact yo tell this physician (or your personal doctor if your instructions are to return to your personal doctor) about any new or lasting problems. The primary care or specialist physician will see patients referred from the [facility] Emergency Department.  Adam aSenz

## (undated) NOTE — MR AVS SNAPSHOT
[facility] [address]  [address] [facility] Blanca Salazar  [phone]               Thank you for choosing us for your health care visit with Marian Ramirez MD.  We are glad to serve you and happy to provide you with t [address], [address] [facility]   [phone]            Sep 18, 2017  1:40 PM   Follow up with MD Jayro Luna 26, [address] [facility] [address] [place] [address])    Lamonte Valentine Take 1 tablet (0.25 mg total) by mouth 2 (two) times daily as needed for Anxiety. Commonly known as:  XANAX           BIOTIN 5000 5 MG Caps   Generic drug:  Biotin   Take 10,000 Units by mouth daily.  Takes 3 tabs daily           CALCIUM 500/VITAMIN D 500 Commonly known as:  VITAMIN B12           VITAMIN C OR   Take 1 tablet by mouth daily.                 Where to Get Your Medications      You can get these medications from any pharmacy     Bring a paper prescription for each of these medications    - Phent

## (undated) NOTE — LETTER
Concepción Casey [address]  Shyann, [address] [place] [address]    Consent for Operation  Date: __________________                                Time: _______________    1.  I authorize the performance upon [address] the following operation:  Anshu revealed by the pictures or by descriptive texts accompanying them. If the procedure has been videotaped, the surgeon will obtain the original videotape. The hospital will not be responsible for storage or maintenance of this tape.     6. For the purpose of THAT MY DOCTOR PROVIDED ME WITH THE ABOVE EXPLANATIONS, THAT ALL BLANKS OR STATEMENTS REQUIRING INSERTION OR COMPLETION WERE FILLED IN.     Signature of Patient:   ___________________________    When the patient is a minor or mentally incompetent to give co

## (undated) NOTE — ED AVS SNAPSHOT
Chivo Khoury   MRN: [id number]    Department:  [facility] Emergency Department in [place]   Date of Visit:  5/20/2019           Disclosure     Insurance plans vary and the physician(s) referred by the ER may not be covered by your plan.  Please conta tell this physician (or your personal doctor if your instructions are to return to your personal doctor) about any new or lasting problems. The primary care or specialist physician will see patients referred from the [facility] Emergency Department.  Adam Saenz

## (undated) NOTE — Clinical Note
TCM call completed. A TE was sent to triage with a condition update and appointment request. Thank you.

## (undated) NOTE — LETTER
Date: 12/12/2024    Patient Name: Maryanne Reyes          To Whom it may concern:    This letter has been written at the patient's request. The above patient was seen at [facility] for treatment of a medical condition.    This patient should be excused from attending work from 12/12 through 12/14.    The patient may return to work on 12/16 with the following limitations none.        Sincerely,    Rodrigo Estrella MD

## (undated) NOTE — LETTER
06/14/19        [facility] [employer]  [address] [facility] [address]      Dear Maurisio [facility],    [address] [facility] records indicate that you have outstanding lab work and or testing that was ordered for you and has not yet been completed: Mammogram  You can schedu

## (undated) NOTE — ED AVS SNAPSHOT
Yarelis Linseykerline   MRN: [id number]    Department:  [facility] Emergency Department   Date of Visit:  1/17/2019           Disclosure     Insurance plans vary and the physician(s) referred by the ER may not be covered by your plan.  Please contact yo tell this physician (or your personal doctor if your instructions are to return to your personal doctor) about any new or lasting problems. The primary care or specialist physician will see patients referred from the [facility] Emergency Department.  Reynaldo Ott

## (undated) NOTE — LETTER
Date & Time: 12/9/2024, 8:27 PM  Patient: Maryanne Reyes  Encounter Provider(s):    Yariel Arce MD       To Whom It May Concern:    Maryanne Reyes was seen and treated in our department on 12/9/2024. She can return to work on 12/12/24.    If you have any questions or concerns, please do not hesitate to call.        _____________________________  Physician/APC Signature

## (undated) NOTE — LETTER
Date & Time: 12/20/2023, 6:08 PM  Patient: aMnsi Console  Encounter Provider(s):    Gustave Cabot, MD       To Whom It May Concern:    Maria M Love was seen and treated in our department on 12/20/2023. She should not return to work until December 26, 2023 .     If you have any questions or concerns, please do not hesitate to call.        _____________________________  Physician/APC Signature

## (undated) NOTE — LETTER
Your patient was recently seen at the [facility] for a hospital follow-up visit. The visit note is attached. Please contact the clinic with any questions at [phone].     Thank you,  Calli Arredondo, APRN

## (undated) NOTE — ED AVS SNAPSHOT
Morteza Mckeon   MRN: [id number]    Department:  THE [facility] Emergency Department in [place]   Date of Visit:  12/8/2018           Disclosure     Insurance plans vary and the physician(s) referred by the ER may not be covered by your plan.  Please conta tell this physician (or your personal doctor if your instructions are to return to your personal doctor) about any new or lasting problems. The primary care or specialist physician will see patients referred from the [facility] Emergency Department.  Diana Adair

## (undated) NOTE — LETTER
ASTHMA ACTION PLAN for Maryanne GAVIN Book     : 1965     Date: 2024  Provider:  Rodrigo Estrella MD  Phone for doctor or clinic: [facility], [address] [place] [address] [address] [place] [id number] [phone]    ACT Score: 13      You can use the colors of a traffic light to help learn about your asthma medicines.      1. Green - Go! % of Personal Best Peak Flow Use controller medicine.   Breathing is good  No cough or wheeze  Can work and play Medicine How much to take When to take it    Singulair (Montelukast) 10 mg by mouth daily   Disp Refills Start End    TRELEGY ELLIPTA 100-62.5-25 MCG/INH Inhalation Aerosol Powder, Breath Activated        Sig - Route: Inhale 1 puff into the lungs daily. - Inhalation          2. Yellow - Caution. 50-79% Personal Best Peak  Flow.  Use reliever medicine to keep an asthma attack from getting bad.   Cough  Wheezing  Tight Chest  Wake up at night Medicine How much to take When to take it    Albuterol 2.5 mg nebules, use in nebulizer every 4 to 6 hours as needed  pratropium-albuterol 0.5-2.5 (3) MG/3ML Inhalation Solution    --   Sig:   Take 3 mL by nebulization every 6 (six) hours while awake.            Additional instructions         3. Red - Stop! Danger!  <50% Personal Best Peak  Flow. Take these medications until  Get help from a doctor   Medicine not helping  Breathing is hard and fast  Nose opens wide  Can't walk  Ribs show  Can't talk well Medicine How much to take When to take it    CALL 911, GO TO NEAREST ER,CALL YOUR DOCTOR     Additional Instructions If your symptoms do not improve and you cannot contact your doctor, [facility] or call 911 immediately!     [x] Asthma Action Plan reviewed with patient (and caregiver if necessary) and a copy of the plan was given to the patient/caregiver.   [] Asthma Action Plan reviewed with patient (and caregiver if necessary) on the phone and mailed copy to  patient or submitted via [employer].     Signatures:  Provider  Rodrigo Estrella MD   Patient Caretaker

## (undated) NOTE — MR AVS SNAPSHOT
[facility] [address]  [place] Kristin Galaviz  [phone]               Thank you for choosing us for your health care visit with Tsering Arteaga PA-C.   We are glad to serve you and happy to provide you •Walk in Clinic in [place] at [facility].  [address] Mon-Fri at 8am-7:30pm, and Sat/Sun 9am-430pm  Also at [address]  Call [phone] for info    • Please call our office about any questions regarding your treatment/medicines/tests as a re Return if symptoms worsen or fail to improve.       Your Appointments     Jan 20, 2017 11:40 AM   Exam - Established Patient with Abi Bermudez MD   [facility] [address] [address] [facility] [address] [place] [address] CALCIUM 500/VITAMIN D 500-125 MG-UNIT Tabs   Generic drug:  Calcium Carbonate-Vitamin D   Take 1 Tab by mouth daily. Dicyclomine HCl 20 MG Tabs   Take 20 mg by mouth every 4 (four) hours as needed.    Commonly known as:  BENTYL           Esomepra Today's Orders     ENT Referral - In Network    Complete by:  As directed    Assoc Dx:  Bilateral chronic serous otitis media [H65.23], Chronic maxillary sinusitis [J32.0]                 Referral Details     Referred By    Referred To    Geovanny Pool Summaries. If you've been to the Emergency Department or your doctor's office, you can view your past visit information in [employer] by going to Visits < Visit Summaries. [employer] questions? Call [phone] for help.   [employer] is NOT to be used for urge

## (undated) NOTE — LETTER
ASTHMA ACTION PLAN for Mansi Cortez     : 1965     Date: 2023  Provider:  Franchesca Dai MD  Phone for doctor or clinic: [facility], [address] [facility] , [address] [place] [id number] [phone]    ACT Score: 19      You can use the colors of a traffic light to help learn about your asthma medicines. 1. Green - Go! % of Personal Best Peak Flow Use controller medicine. Breathing is good  No cough or wheeze  Can work and play Medicine How much to take When to take it    Singulair (Montelukast) 10 mg by mouth daily  TRELEGY ELLIPTA 100-62.5-25 MCG/INH Inhalation Aerosol Powder, Breath Activated        Sig - Route: Inhale 1 puff into the lungs daily. - Inhalation          2. Yellow - Caution. 50-79% Personal Best Peak  Flow. Use reliever medicine to keep an asthma attack from getting bad. Cough  Wheezing  Tight Chest  Wake up at night Medicine How much to take When to take it    Albuterol inhaler,  2 puffs every four hours as needed. Albuterol 2.5 mg nebules, use in nebulizer every 4 to 6 hours as needed         Additional instructions         3. Red - Stop! Danger!  <50% Personal Best Peak  Flow. Take these medications until  Get help from a doctor   Medicine not helping  Breathing is hard and fast  Nose opens wide  Can't walk  Ribs show  Can't talk well Medicine How much to take When to take it    CALL 911, GO TO NEAREST ER, CALL YOUR DOCTOR     Additional Instructions If your symptoms do not improve and you cannot contact your doctor, [facility] or call 911 immediately! [x] Asthma Action Plan reviewed with patient (and caregiver if necessary) and a copy of the plan was given to the patient/caregiver. [] Asthma Action Plan reviewed with patient (and caregiver if necessary) on the phone and mailed copy to patient or submitted via [address]      Signatures:  Provider  Franchesca Dai MD   Patient Caretaker

## (undated) NOTE — MR AVS SNAPSHOT
[address] [phone]               Thank you for choosing us for your health care visit with Luis Gilmore MD.  We are glad to serve you and happy to provide you with this summary of Allergies as of Jan 24, 2017     Amoxicillin Hives    TABS    Dust Mites     Grass     Kdc:Yellow Dye+Amoxicillin+Brilliant Blue Fcf Hives    Lyrica [Pregabalin]     Mold     Ragweed                 Today's Vital Signs     BP Pulse Height Weight BMI    124 Commonly known as:  SINGULAIR           Naltrexone-Bupropion HCl ER 8-90 MG Tb12   Take 2 tablets by mouth 2 (two) times daily.    Commonly known as:  CONTRAVE           Phentermine HCl 37.5 MG Tabs   Take 1 tablet (37.5 mg total) by mouth every morning bef HIV AG AB Combo [E]    Complete by:  Jan 24, 2017 (Approximate)    Assoc Dx:  Encounter for therapeutic drug monitoring [Z51.81], Obesity (BMI 30-39. 9) [E66.9], Recurrent sinus infections [J32.9]           TSH    Complete by:  Jan 24, 2017 (Approximate) Call [phone] for help. [employer] is NOT to be used for urgent needs. For medical emergencies, dial 911.            Visit [facility] online at  [employer]

## (undated) NOTE — LETTER
Medical Clearance Request    Rodrigo Estrella MD  [address]  [address]  [facility] [address]  Via In Basket    The patient listed below is scheduled for surgery and needs the following pre-op labs and/or clearance prior to surgery.  The patient has been instructed to schedule an appointment with you for a pre-op physical.      Patient: Maryanne GAVIN Book  : 1965    Surgeon:  Dr. Se Cartagena    Procedure:  Wide excision melanoma right upper arm with sentinel lymph node biopsy with general anesthesia    Surgery Date:  25  Location:  [facility]    outpatient    [] Hematocrit/Hemogram [x] EKG     [x] CBC    [] Chest X-Ray    [x] CMP    [] PT/PTT    [] Urinalysis   [] MRSA Nasal Culture    [] Urinalysis with reflex  [] History and Physical    [] Urine pregnancy  [x] Medical Clearance    [] Qualitative HCG (blood) [] Cardiac Clearance      Please fax to fax number indicated above when completed.  Call [phone] with any questions.     Thank you for your prompt attention to these requirements.    [facility]

## (undated) NOTE — LETTER
ASTHMA ACTION PLAN for Paula Manual     : 1965     Date: 2021  Provider:  Cornelia Kelley PA-C  Phone for doctor or clinic: [address] [facility], [address] [place] [address] , [address] [place] [address],[address] [place] [address]

## (undated) NOTE — LETTER
ASTHMA ACTION PLAN for Ayanna Pennington     : 1965     Date: 2020  Provider:  Gaston Smith PA-C  Phone for doctor or clinic: [address] [facility], [address] [place] [address] , [address] [place] [address],[address] [place] [address]

## (undated) NOTE — LETTER
Date & Time: 10/19/2023, 3:08 PM  Patient: Jearld Phoenix  Encounter Provider(s):    Carrillo Granger MD       To Whom It May Concern:    Jen Nuñez was seen and treated in our department on 10/19/2023. She should not return to work until 10/23/23 .     If you have any questions or concerns, please do not hesitate to call.        _____________________________  Physician/APC Signature

## (undated) NOTE — LETTER
Concepc[address] Casey [address] [facility]  Shyann, [address] [place] [address]    Consent for Operation  Date: __________________                                Time: _______________    1.  I authorize the performance upon [address] the following operation:  Anshu revealed by the pictures or by descriptive texts accompanying them. If the procedure has been videotaped, the surgeon will obtain the original videotape. The hospital will not be responsible for storage or maintenance of this tape.   8. For the purpose of a THAT MY DOCTOR PROVIDED ME WITH THE ABOVE EXPLANATIONS, THAT ALL BLANKS OR STATEMENTS REQUIRING INSERTION OR COMPLETION WERE FILLED IN.     Signature of Patient:   ___________________________    When the patient is a minor or mentally incompetent to give co iii. All of the medicines I take (including prescriptions, herbal supplements, and pills I can buy without a prescription (including street drugs/illegal medications).  Failure to inform my anesthesiologist about these medicines may increase my risk of anes _____________________________________________________________________________  Anesthesiologist Signature     Date   Time  I have discussed the procedure and information above with the patient (or patient’s representative) and answered their questions.  The

## (undated) NOTE — ED AVS SNAPSHOT
Joe Larry   MRN: [id number]    Department:  [facility] in [place]   Date of Visit:  12/20/2019           Disclosure     Insurance plans vary and the physician(s) referred by the ER may not be covered by your plan.  Please cont tell this physician (or your personal doctor if your instructions are to return to your personal doctor) about any new or lasting problems. The primary care or specialist physician will see patients referred from the [facility] Emergency Department.  Caro Macias

## (undated) NOTE — ED AVS SNAPSHOT
Monahans Pedro   MRN: [id number]    Department:  [facility] Emergency Department in [place]   Date of Visit:  1/3/2018           Disclosure     Insurance plans vary and the physician(s) referred by the ER may not be covered by your plan.  Please contac tell this physician (or your personal doctor if your instructions are to return to your personal doctor) about any new or lasting problems. The primary care or specialist physician will see patients referred from the [facility] Emergency Department.  Dinorah Rodriguez

## (undated) NOTE — MR AVS SNAPSHOT
[facility] [address]  [facility]  [phone]               Thank you for choosing us for your health care visit with Lupe Barney PA-C.   We are glad to serve you and happy to provide you Assoc Dx:  Pre-op examination [Z01.818], Recurrent sinus infections [J32.9]           Comp Metabolic Panel (14)    Complete by:  Feb 24, 2017 (Approximate)    Assoc Dx:  Pre-op examination [Z01.818], Recurrent sinus infections [J32.9]                 Reas Insulin and steroids- case by case basis- but your doses may have to be adjusted sp talk to the doctor about it. Short acting insulin novolog humalog is held during surgery, Long acting is typically decreased before and during surgery.     Blood Thinners: seizures to name a few need to have special consideration    Rheumatologic/Autoimmune disease need to be specially addressed and any condition that would impair healing such as HIV or history of splenectomy      • Please signup for MY CHART, which is elect benefits outweigh those potential risks and we strive to make you healthier and to improve your quality of life.     Referrals, and Radiology Information:    If your insurance requires a referral to a specialist, please allow 5 business days to process your Take 1 tablet (0.25 mg total) by mouth 2 (two) times daily as needed for Anxiety. Commonly known as:  XANAX           BIOTIN 5000 5 MG Caps   Generic drug:  Biotin   Take 10,000 Units by mouth daily.  Takes 3 tabs daily           CALCIUM 500/VITAMIN D 500 Take 1 tablet (50 mg total) by mouth 2 (two) times daily. Commonly known as:  TOPAMAX           Vitamin B-12 500 MCG Tabs   Take 1 tablet (500 mcg total) by mouth daily.    Commonly known as:  VITAMIN B12           VITAMIN C OR   Take 1 tablet by mouth da

## (undated) NOTE — LETTER
21    Patient: Milan Cam  : 1965 Visit date: 2021    Dear  Vicente Cohen MD    I am Dr. Pacheco Mosher, the newest member of [facility] [place] [facility]. Thank you for referring Milan Cam to my practice.   I have scheduled her for

## (undated) NOTE — MR AVS SNAPSHOT
After Visit Summary   9/27/2019    Dale Weaver    MRN: [id number]           Visit Information     Date & Time  9/27/2019  1:00 PM Provider  [address]  Phone  [phone] Were NEBULIZER (2.5 MG TOTAL) EVERY 6 (SIX) HOURS AS NEEDED FOR WHEEZING.     LEVOTHYROXINE SODIUM 150 MCG Oral Tab LEVOXYL BRAND 150 mcg daily - BRAND    promethazine-codeine 6.25-10 MG/5ML Oral Syrup Take 2.5 mL by mouth every 6 (six) hours as needed for cough complete it and provide feedback. We strive to deliver the best patient experience and are looking for ways to make improvements. Your feedback will help us do so. For more information on [employer], please visit [email or web address] Life-threatening emergencies needing immediate intervention at a hospital emergency room.     Average cost  $2,300*   *Cost varies based on your insurance coverage  For more information about hours, locations or appointment options available at [facility],   visit

## (undated) NOTE — LETTER
[facility]     I agree to have a Peripherally Inserted Central Catheter (PICC) placed in my arm.    1. The PICC insertion procedure, care, maintenance, risks, benefits, and complications have been explained to me by my physic 7. The person performing this procedure has discussed the potential benefits, risks, and side effects of the PICC; the likelihood of achieving goals; and potential problems that might occur during recuperation.  They also discussed reasonable alternatives t

## (undated) NOTE — ED AVS SNAPSHOT
Debbie Ramos   MRN: [id number]    Department:  [facility] Emergency Department   Date of Visit:  12/19/2018           Disclosure     Insurance plans vary and the physician(s) referred by the ER may not be covered by your plan.  Please contact y tell this physician (or your personal doctor if your instructions are to return to your personal doctor) about any new or lasting problems. The primary care or specialist physician will see patients referred from the [facility] Emergency Department.  Frankie Garcia

## (undated) NOTE — MR AVS SNAPSHOT
[address] 195 6065 [address]21               Thank you for choosing us for your health care visit with Velma Rodrigues MD.  We are glad to serve you and happy to provide you with this summary of **REFERRAL REQUEST**    Your physician has referred you to a specialist.  Your physician or the clinic staff will provide you with the phone number you should call to schedule your appointment.      If you are confident that your benefit plan will not Take 1 Tab by mouth daily. fluticasone-salmeterol 500-50 MCG/DOSE Aepb   Inhale 1 puff into the lungs daily.    Commonly known as:  ADVAIR DISKUS           furosemide 20 MG Tabs   TAKE 1 TABLET ONCE DAILY   Commonly known as:  LASIX           Ipra If you've recently had a stay at the Hospital you can access your discharge instructions in [employer] by going to Visits < Admission Summaries.  If you've been to the Emergency Department or your doctor's office, you can view your past visit information in My

## (undated) NOTE — ED AVS SNAPSHOT
Dale Weaver   MRN: [id number]    Department:  THE [facility] Emergency Department in [place]   Date of Visit:  9/17/2019           Disclosure     Insurance plans vary and the physician(s) referred by the ER may not be covered by your plan.  Please conta tell this physician (or your personal doctor if your instructions are to return to your personal doctor) about any new or lasting problems. The primary care or specialist physician will see patients referred from the [facility] Emergency Department.  Frankie Garcia

## (undated) NOTE — LETTER
ASTHMA ACTION PLAN for Jennifer Rangel     : 1965     Date: 2017  Provider:  Kahlil Guillory PA-C  Phone for doctor or clinic: [address] [facility], [address] [place] [address] , [address] [facility] [address] [facility] [phone]  [phone]

## (undated) NOTE — LETTER
Date & Time: 9/2/2024, 3:53 PM  Patient: Maryanne Reyes  Encounter Provider(s):    Jose Gonzales MD       To Whom It May Concern:    Maryanne Reyes was seen and treated in our department on 9/2/2024. She should not return to work until 9/4/24 .    If you have any questions or concerns, please do not hesitate to call.        _____________________________  Physician/APC Signature

## (undated) NOTE — LETTER
Date: 9/3/2024    Patient Name: Maryanne Reyes          To Whom it may concern:    This letter has been written at the patient's request. The above patient was seen at [facility] for treatment of a medical condition.    This patient should be excused from attending work from 8/27 through 9/8.    The patient may return to work on 9/9 with the following limitations none.        Sincerely,    Rodrigo Estrella MD

## (undated) NOTE — LETTER
ASTHMA ACTION PLAN for Justin Haque     : 1965     Date: 2022  Provider:  Erich Rhodes MD  Phone for doctor or clinic: [facility], [address] [place] [address]

## (undated) NOTE — LETTER
Date & Time: 12/11/2023, 11:26 AM  Patient: Robe Ho  Encounter Provider(s): Evita Faulkner MD       To Whom It May Concern:    Alcon Fry was seen and treated in our department on 12/11/2023. She should not return to work until 12/14/23 .     If you have any questions or concerns, please do not hesitate to call.        _____________________________  Physician/APC Signature

## (undated) NOTE — MR AVS SNAPSHOT
[address] 263 [address] 2377               Thank you for choosing us for your health care visit with Virgil Hurley MD.  We are glad to serve you and happy to provide you with this summary of Lyrica [Pregabalin]     Mold     Ragweed                 Today's Vital Signs     BP Pulse Height Weight BMI    122/78 mmHg 74 63\" 180 lb 31.89 kg/m2         Current Medications          This list is accurate as of: 4/14/17 11:28 AM.  Always use your most SUPER B COMPLEX Tabs   Take  by mouth. topiramate 50 MG Tabs   Take 1 tablet (50 mg total) by mouth 2 (two) times daily. Commonly known as:  TOPAMAX           Vitamin B-12 500 MCG Tabs   Take 1 tablet (500 mcg total) by mouth daily.    Commonly

## (undated) NOTE — ED AVS SNAPSHOT
Laceymalaika Yossi   MRN: [id number]    Department:  [facility] Emergency Department   Date of Visit:  9/11/2018           Disclosure     Insurance plans vary and the physician(s) referred by the ER may not be covered by your plan.  Please contact yo tell this physician (or your personal doctor if your instructions are to return to your personal doctor) about any new or lasting problems. The primary care or specialist physician will see patients referred from the [facility] Emergency Department.  Benedicto Bates

## (undated) NOTE — LETTER
08/18/20    Maryanne S Book      To Whom It May Concern:     This letter has been written at the patient's request.     I would like to notify you that she has had temperature and would occasionally go above 100s but does not mean that there is ongoing inf

## (undated) NOTE — MR AVS SNAPSHOT
[facility] [address]  [address] [facility]  [phone]               Thank you for choosing us for your health care visit with Amira Granger MD.  We are glad to serve you and happy to provide you with t Current Medications          This list is accurate as of: 3/13/17  5:31 PM.  Always use your most recent med list.                Acidophilus/Pectin Caps   Take 2 capsules by mouth 2 (two) times daily with meals.    Commonly known as:  PROBIOTIC Potassium Chloride ER 20 MEQ Tbcr   Take 1 tablet (20 mEq total) by mouth once daily. Commonly known as:  KLOR-CON M20           SUPER B COMPLEX Tabs   Take  by mouth.            topiramate 50 MG Tabs   Take 1 tablet (50 mg total) by mouth 2 (two) times

## (undated) NOTE — ED AVS SNAPSHOT
Jennifer Soto   MRN: [id number]    Department:  THE [facility] Emergency Department in [place]   Date of Visit:  10/1/2019           Disclosure     Insurance plans vary and the physician(s) referred by the ER may not be covered by your plan.  Please conta tell this physician (or your personal doctor if your instructions are to return to your personal doctor) about any new or lasting problems. The primary care or specialist physician will see patients referred from the [facility] Emergency Department.  Al Rogers

## (undated) NOTE — LETTER
Date & Time: 12/20/2023, 6:17 PM  Patient: Elvin Carter  Encounter Provider(s):    Bertin Gann MD       To Whom It May Concern:    Elva Barrett was seen and treated in our department on 12/20/2023.  She  can return to work  on 12/26/2023    If you have any questions or concerns, please do not hesitate to call.        _____________________________  Physician/APC Signature

## (undated) NOTE — LETTER
Date: 12/13/2023    Patient Name: Elvin Carter          To Whom it may concern: This letter has been written at the patient's request. The above patient was seen at the [facility] for treatment of a medical condition. This patient should be excused from attending work from 12/11 through 12/17. The patient may return to work on 12/18 with the following limitations none.         Sincerely,    Bobby Fuentes MD

## (undated) NOTE — LETTER
[facility] [address], [address] [facility]    Consent for Anesthesia   1.    Charlykimberly Prerna agree to be cared for by a physician anesthesiologist alone and/or with a nurse anesthetist, who is specially trained to monitor me and give me allergic reactions to medications, injury to my airway, heart, lungs, vision, nerves, or muscles and in extremely rare instances death. 5. My doctor has explained to me other choices available to me for my care (alternatives).   6. Pregnant Patients (“epid Printed: 6/21/2020 at 10:20 AM    Medical Record #: [id number]                                            Page 1 of 1

## (undated) NOTE — ED AVS SNAPSHOT
Morteza Mckeon   MRN: [id number]    Department:  THE [facility] Emergency Department in [place]   Date of Visit:  12/16/2017           Disclosure     Insurance plans vary and the physician(s) referred by the ER may not be covered by your plan.  Please cont tell this physician (or your personal doctor if your instructions are to return to your personal doctor) about any new or lasting problems. The primary care or specialist physician will see patients referred from the [facility] Emergency Department.  Diana Adair

## (undated) NOTE — MR AVS SNAPSHOT
[facility] [address]  [address] [facility]  [phone]               Thank you for choosing us for your health care visit with Harsha Tran MD.  We are glad to serve you and happy to provide you with t Lyrica [Pregabalin]     Mold     Ragweed                 Today's Vital Signs     BP Pulse Height Weight BMI    122/78 mmHg 78 63\" 195 lb 34.55 kg/m2         Current Medications          This list is accurate as of: 2/13/17  3:51 PM.  Always use your most Commonly known as:  SINGULAIR           Naltrexone-Bupropion HCl ER 8-90 MG Tb12   Take 2 tablets by mouth 2 (two) times daily.    Commonly known as:  CONTRAVE           Phentermine HCl 37.5 MG Tabs   Take 1 tablet (37.5 mg total) by mouth every morning bef Educational Information     Healthy Diet and Regular Exercise  [employer] [facility] [address] for making healthy food choices  -   Enjoy your food, but eat less. Fully enjoy your food when eating. Don’t eat while distracted and slow down.    Avoid

## (undated) NOTE — MR AVS SNAPSHOT
[address] [facility] [address]  [facility]  [phone]               Thank you for choosing us for your health care visit with Kahlil Guillory PA-C.   We are glad to serve you and happy to provide you •Walk in Clinic in [place] at [facility].  [address] Mon-Fri at 8am-7:30pm, and Sat/Sun 9am-430pm  Also at [address]  Call [phone] for info    • Please call our office about any questions regarding your treatment/medicines/tests as a re Return in about 1 week (around 1/12/2017), or if symptoms worsen or fail to improve.       Your Appointments     Jan 20, 2017 11:40 AM   Exam - Established Patient with Kezia Goins MD   [place] [address] ([address] [place] [address])    [id number] Take 1 capsule (200 mg total) by mouth 3 (three) times daily as needed. Commonly known as:  TESSALON           BIOTIN 5000 5 MG Caps   Generic drug:  Biotin   Take  by mouth.            CALCIUM 500/VITAMIN D 500-125 MG-UNIT Tabs   Generic drug:  Calcium C Take 1 tablet by mouth daily. VITAMIN D-3 OR   Take  by mouth.                 Where to Get Your Medications      These medications were sent to [address] [place] [address] Raj Polo, [address]

## (undated) NOTE — Clinical Note
Pt is set for TCM appt tomorrow, 10/14. Pt stated over all she is doing good. Pt has a PICC line and a drain in. Pt is administering IV abx- she was advised to administer every 8 hours as she has only been doing twice a day.  Dr. Kailash Sampson office was contacte